# Patient Record
Sex: FEMALE | Race: WHITE
[De-identification: names, ages, dates, MRNs, and addresses within clinical notes are randomized per-mention and may not be internally consistent; named-entity substitution may affect disease eponyms.]

---

## 2024-10-21 ENCOUNTER — HOSPITAL ENCOUNTER (OUTPATIENT)
Dept: HOSPITAL 100 - US | Age: 24
Discharge: HOME | End: 2024-10-21
Payer: COMMERCIAL

## 2024-10-21 DIAGNOSIS — Z12.4: Primary | ICD-10-CM

## 2024-10-21 PROCEDURE — 88175 CYTOPATH C/V AUTO FLUID REDO: CPT

## 2024-10-21 PROCEDURE — G0145 SCR C/V CYTO,THINLAYER,RESCR: HCPCS

## 2024-10-23 ENCOUNTER — HOSPITAL ENCOUNTER (OUTPATIENT)
Dept: HOSPITAL 100 - LAB | Age: 24
Discharge: HOME | End: 2024-10-23
Payer: COMMERCIAL

## 2024-10-23 DIAGNOSIS — E28.2: Primary | ICD-10-CM

## 2024-10-23 LAB
ALANINE AMINOTRANSFER ALT/SGPT: 51 U/L (ref 13–56)
ALBUMIN SERPL-MCNC: 4.2 G/DL (ref 3.2–5)
ALKALINE PHOSPHATASE: 79 U/L (ref 45–117)
ANION GAP: 5 (ref 5–15)
AST(SGOT): 22 U/L (ref 15–37)
BUN SERPL-MCNC: 9 MG/DL (ref 7–18)
BUN/CREAT RATIO: 11 RATIO (ref 10–20)
CALCIUM SERPL-MCNC: 8.9 MG/DL (ref 8.5–10.1)
CARBON DIOXIDE: 26 MMOL/L (ref 21–32)
CHLORIDE: 109 MMOL/L (ref 98–107)
CHOLEST SERPL-MCNC: 237 MG/DL
EST GLOM FILT RATE - AFR AMER: 108 ML/MIN (ref 60–?)
GLOBULIN: 3.3 G/DL (ref 2.2–4.2)
GLUCOSE: 105 MG/DL (ref 74–106)
POTASSIUM: 4 MMOL/L (ref 3.5–5.1)
TRIGLYCERIDES: 86 MG/DL
VLDLC SERPL-MCNC: 17 MG/DL (ref 5–40)

## 2024-10-23 PROCEDURE — 83036 HEMOGLOBIN GLYCOSYLATED A1C: CPT

## 2024-10-23 PROCEDURE — 82627 DEHYDROEPIANDROSTERONE: CPT

## 2024-10-23 PROCEDURE — 80053 COMPREHEN METABOLIC PANEL: CPT

## 2024-10-23 PROCEDURE — 84146 ASSAY OF PROLACTIN: CPT

## 2024-10-23 PROCEDURE — 84443 ASSAY THYROID STIM HORMONE: CPT

## 2024-10-23 PROCEDURE — 84439 ASSAY OF FREE THYROXINE: CPT

## 2024-10-23 PROCEDURE — 80061 LIPID PANEL: CPT

## 2024-10-23 PROCEDURE — 84144 ASSAY OF PROGESTERONE: CPT

## 2024-10-23 PROCEDURE — 84402 ASSAY OF FREE TESTOSTERONE: CPT

## 2024-10-23 PROCEDURE — 36415 COLL VENOUS BLD VENIPUNCTURE: CPT

## 2024-10-23 NOTE — XMS RPT_ITS
Comprehensive CCD (C-CDA v2.1)  
  
                          Created on: 2024  
  
  
OLEKSANDR MITTALHEL  
External Reference #: CDR,PersonID:[id number]  
: 2000  
Sex: Female  
  
Demographics  
  
  
                                        Address             [address] [place]  [address]  
   
                                        Home Phone          [phone]  
   
                                        Work Phone          [phone]  
   
                                        Home Phone          [phone]  
   
                                        Home Phone          [phone]  
   
                                        Preferred Language  en  
   
                                        Marital Status        
   
                                        Sikh Affiliation Unknown  
   
                                        Race                White  
   
                                        Ethnic Group        Not  or Lati  
no  
  
  
Author  
  
  
                                        Organization        [facility]  
[facility]  
  
  
Care Team Providers  
  
  
                                Care Team Member Name Role            Phone  
   
                                Thelma CANO MD, Virginie ROBBINS Primary Care Provider Keira  
slime Sanderson MD, Jose Goldman Primary Care Provider [phone]  
[phone]  
   
                                Bridenthal JAMES, Monica Primary Care Provider [phone]( [phone]  
   
                                ERNESTINE SHER    Attending       Unavailable  
   
                                BRIDENTHAL, MONICA Primary Care    Unavailable  
   
                                AWILDA BELL Attending       Unavail  
able  
   
                                VIRGINIE RENEE III Primary Care    Unavailable  
   
                                BRIDENTHAL, MONICA Primary Care    Unavailable  
   
                                SHER, ERNESTINE    Attending       Unavailable  
   
                                BRIDENTHAL, MONICA Primary Care    Unavailable  
   
                                SHER ERNESTINE    Attending       Unavailable  
   
                                SHER, ERNESTINE    Referring       Unavailable  
   
                                BRIDENTHAL, MONICA Primary Care    Unavailable  
   
                                BRIDENTHAL, MONICA Attending       Unavailable  
   
                                ANTONIO, JOSE Primary Care    Unavailable  
   
                                BRIDENTHAL, MONICA Attending       Unavailable  
   
                                ANTONIO, JOSE Primary Care    Unavailable  
   
                                BRIDENTHAL, MONICA Attending       Unavailable  
   
                                ANTONIO, JOSE Primary Care    Unavailable  
   
                                BRIDENTHAL, MONICA Attending       Unavailable  
   
                                ANTONIO, JOSE Primary Care    Unavailable  
   
                                BRIDENTHAL, MONICA Attending       Unavailable  
   
                                ANTONIO, JOSE Primary Care    Unavailable  
  
  
  
Allergies  
  
  
                                                    Allergy   
Classification                          Reported   
Allergen(s)               Allergy Type              Date of   
Onset                     Reaction(s)               Facility  
   
                                                      
(16 sources)                            Grass pollen;   
Translations:   
[GRASS POLLEN]            Drug Allergy                
3                                       Unknown, Other:   
See Comments                            [facility]  
   
                                                      
(16 sources)                            Seasonal   
allergy;   
Translations:   
[SEASONAL   
ALLERGIES]                              Allergy to   
substance                                 
3                                       Unknown, Other:   
See Comments                            [facility]  
   
                                                      
(16 sources)                            Tree;   
Translations:   
[TREES]                                 Allergy to   
substance                                 
3                                       Unknown, Other:   
See Comments                            [facility]  
   
                                                      
(6 sources)         Grass pollen        Drug Allergy          
3                         Unknown                   [facility]  
   
                                                      
(6 sources)               Other                     Allergy to   
substance                                 
3                         Unknown                   [facility]  
   
                                                      
(1 source)                Pollen                    Allergy to   
substance                                 
3                         Other                     [facility]  
  
  
  
Medications  
Current Medications  
  
  
  
                      Medication Drug Class(es) Dates      Sig (Normalized) Sig   
(Original)  
   
                                                    24 hr buPROPion   
hydrochloride 300 mg   
extended release oral   
tablet  
(7 sources)               Aminoketone               Start:   
2024                              take 1 tablet by   
mouth once daily in   
the morning                             buPROPion XL   
(Wellbutrin XL)   
300 MG 24 hr   
tablet   
Indications:   
Anxiety and   
depression take 1   
tablet by mouth   
every morning DO   
NOT CRUSH, CHEW,   
AND/OR DIVIDE 30   
tablet 2   
2024 Active  
  
  
  
                                                    Start: 2024  
End: 2024                         take 1 tablet by mouth once   
daily in the morning                    buPROPion XL (WELLBUTRIN XL) 150 mg   
24 hr tablet Take 150 mg by mouth   
every morning. 0 2024 Active  
  
  
  
                                        Comment on above:   Take 150 mg by mouth  
 every morning.   
   
                                                    Desogestrel / Ethinyl   
Estradiol  
(2 sources)                             Progestin,   
Estrogen                                Start:   
  
4  
End:   
  
5                                       take 1 tablet   
by mouth once   
daily, then   
take 0.15   
tablet by mouth   
once                                    Desogestrel-Ethinyl   
Estradiol (APRI)   
0.15-0.03 mg per   
tablet Indications:   
PCOS (polycystic   
ovarian syndrome) ,   
Provided repeat   
prescription for oral   
contraceptive Take 1   
tablet by mouth once   
daily. 84 tablet 3   
2024   
Active  
  
  
  
                                                take 1 tablet by mouth once ashley  
y Juleber 0.15-30 MG-MCG tablet Take 1 tablet   
by   
mouth daily. 0 Active  
  
  
  
                                        Comment on above:   Take 1 tablet by grover  
 once daily.   
   
                                                    metFORMIN   
hydrochloride 500 mg   
oral tablet  
(6 sources)               Biguanide                 Start:   
  
4  
End:   
  
4                                       take 2 tablets by   
mouth twice daily   
at mealtime                             metFORMIN   
(GLUCOPHAGE) 500 mg   
tablet Indications:   
PCOS (polycystic   
ovarian syndrome) ,   
IFG (impaired   
fasting glucose) ,   
Irregular menses ,   
Overweight with   
body mass index   
(BMI) of 29 to 29.9   
in adult Take 2   
tablets by mouth   
two times a day   
with meals. 360   
tablet 0 2024 Active  
  
  
  
                                                    Start: 2024  
End: 2024                         take 1 tablet by mouth in the   
morning                                 metFORMIN (Glucophage) 500 MG tablet   
Take 1 tablet by mouth in the   
morning and 1 tablet in the evening.   
Take with meals. 0 2024 Active  
  
  
  
                                        Comment on above:   Take 2 tablets by mo  
uth two times a day with meals.   
   
                                                            Take 1 tablet by grover  
th two times a day with meals.   
   
                                                    naltrexone   
hydrochloride 50 mg   
oral tablet  
(2 sources)               Opioid Antagonist         Start:   
  
4  
End:   
  
4                                       take 0.5 tablet   
by mouth twice   
daily                                   naltrexone 50 mg   
tablet Indications:   
PCOS (polycystic   
ovarian syndrome) ,   
Overweight with   
body mass index   
(BMI) of 29 to 29.9   
in adult Take 0.5   
tablets by mouth   
two times a day. 90   
tablet 0 2024 Active  
  
  
  
                                                                naltrexone (Depa  
de) 50 MG tablet Take 25 mg by mouth in the morning and 25 mg   
in   
the evening. 0 Active  
  
  
  
                                        Comment on above:   Take 0.5 tablets by   
mouth two times a day.   
   
                                                    Prenatal   
Multivit-Min-Fe-FA   
(PRENATAL/IRON PO)  
(3 sources)                                                 take 1 tablet by   
mouth once in the   
morning                                 Prenatal   
Multivit-Min-Fe-FA   
(PRENATAL/IRON PO)   
Take 1 tablet by   
mouth in the morning.   
0 Active  
  
  
  
Completed/Discontinued Medications  
  
  
  
                      Medication Drug Class(es) Dates      Sig (Normalized) Sig   
(Original)  
   
                                                    [id number]   
actuat albuterol   
0.09 mg/actuat   
metered dose   
inhaler  
(15 sources)                            beta2-Adrenergic   
Agonist                                 Start:   
2019                              take 2 puff(s) by   
inhalation every   
four hours as   
needed                                  albuterol HFA   
(PROVENTIL HFA,   
VENTOLIN HFA) 90   
mcg/actuation   
inhaler   
Indications:   
Exertional asthma   
Inhale 2 Puffs as   
instructed every 4   
hours as needed. 1   
Inhaler 0   
2019 Active  
   
                                        Comment on above:   Inhale 2 Puffs as in  
structed every 4 hours as needed.   
   
                                                    drospirenone /   
Ethinyl Estradiol  
(1 source)                Progestin, Estrogen       Start:   
2023                              take 1 tablet by   
mouth once daily,   
then take 1 tablet   
by mouth once                           Drospirenone-Ethin  
yl Estradiol   
(SINGH, 28,)   
3-0.03 mg per   
tablet   
Indications: PCOS   
(polycystic   
ovarian syndrome)   
Take 1 tablet by   
mouth once daily.   
Take one active   
pill continuously   
x 3 months-   
discard placebo   
pills 112 tablet 3   
2023 Active  
   
                                        Comment on above:   Take 1 tablet by grover  
th once daily. Take one active pill   
continuously x 3 months- discard placebo pills   
   
                                                    escitalopram 20 mg   
oral tablet  
(3 sources)                             Serotonin Reuptake   
Inhibitor                               Start:   
12-  
End:   
2024                              take 1 tablet by   
mouth once daily                        escitalopram   
(Lexapro) 20 MG   
tablet   
Indications:   
Anxiety and   
depression Take 1   
tablet (20 mg) by   
mouth daily. 30   
tablet 1   
12/15/2023   
2024   
Discontinued   
(Ineffective)  
  
  
  
                                                    Start: 2023  
End: 2024                         take 1 tablet by mouth once   
daily                                   escitalopram (Lexapro) 10 MG tablet   
Indications: Anxiety and depression   
Take 1 tablet (10 mg) by mouth   
daily. 30 tablet 1 2023 Active  
  
  
  
                                                    Ethinyl Estradiol /   
Norethindrone  
(12 sources)              Estrogen                  Start: 2023  
End: 2023                         take 1 tablet   
by mouth once   
daily, then   
take 0.05   
tablet by mouth   
once                                    Norethindrone   
Acet-Ethinyl Est   
(JUNEL 1/20, 21,)   
1-20 mg-mcg per   
tablet TAKE 1 TABLET   
BY MOUTH DAILY 28   
tablet 2 2023   
Discontinued  
  
  
  
                                        Start: 2023   take 1 tablet by grover  
th once   
daily, then take 0.05 tablet   
by mouth once                           Norethindrone Acet-Ethinyl Est (JUNEL   
1/20, ,) 1-20 mg-mcg per tablet   
TAKE 1 TABLET BY MOUTH DAILY 28   
tablet 2 2023 Active  
   
                                                    Start: 2023  
End: 2023                         take 1 tablet by mouth once   
daily, then take 0.05 tablet   
by mouth once                           Norethindrone Acet-Ethinyl Est (JUNEL   
1/20, ,) 1-20 mg-mcg per tablet   
TAKE 1 TABLET BY MOUTH DAILY 28   
tablet 3 2023   
Discontinued  
   
                                        Start: 2023   take 1 tablet by grover  
th once   
daily, then take 0.05 tablet   
by mouth once                           Norethindrone Acet-Ethinyl Est (JUNEL   
1/20, 21,) 1-20 mg-mcg per tablet   
TAKE 1 TABLET BY MOUTH DAILY 28   
tablet 3 2023 Active  
   
                                                    Start: 2023  
End: 2023                         take 1 tablet by mouth once   
daily, then take 0.05 tablet   
by mouth once                           Norethindrone Acet-Ethinyl Est (JUNEL   
1/20, 21,) 1-20 mg-mcg per tablet   
TAKE 1 TABLET BY MOUTH DAILY 28   
tablet 3 2023   
Discontinued  
   
                                                    Start: 2022  
End: 2022                         take 1 tablet by mouth once   
daily, then take 0.05 tablet   
by mouth once                           Norethindrone Acet-Ethinyl Est (JUNEL   
1/20, 21,) 1-20 mg-mcg per tablet   
TAKE 1 TABLET BY MOUTH DAILY 28   
tablet 14 2022   
Discontinued  
   
                                        Start: 2022   take 1 tablet by grover  
th once   
daily, then take 0.05 tablet   
by mouth once                           Norethindrone Acet-Ethinyl Est (JUNEL   
1/20, 21,) 1-20 mg-mcg per tablet   
TAKE 1 TABLET BY MOUTH DAILY 28   
tablet 14 2022 Active  
   
                                                    Start: 2021  
End: 2022                         take 1 tablet by mouth once   
daily, then take 0.05 tablet   
by mouth once                           Norethindrone Acet-Ethinyl Est (JUNEL   
1/20, 21,) 1-20 mg-mcg per tablet   
TAKE 1 TABLET BY MOUTH DAILY TAKES   
ACTIVE TABLET CONTINUOUSLY 84 tablet   
3 2021 Discontinued  
  
  
  
                                        Comment on above:   TAKE 1 TABLET BY GROVER  
TH DAILY TAKES ACTIVE TABLET   
CONTINUOUSLY   
   
                                                            TAKE 1 TABLET BY GROVER  
TH DAILY   
   
                                                    medroxyPROGESTERone   
acetate 10 mg oral tablet  
(4 sources)               Progestin                 Start:   
2022  
End:   
2023                                    take 1   
tablet by   
mouth once   
daily                                   medroxyPROGESTERone   
(PROVERA) 10 mg tablet   
Take 1 tablet by mouth   
once daily for 10 days.   
Or until menses starts.   
10 tablet 0 2022 Discontinued  
   
                                        Comment on above:   Take 1 tablet by grover  
th once daily for 10 days. Or until   
menses starts.   
   
                                                    PNV/iron/folic acid   
(PRENATAL VITAMIN-IRON-FA   
ORAL)  
(2 sources)                                                 take 1   
tablet by   
mouth once   
daily                                   PNV/iron/folic acid   
(PRENATAL   
VITAMIN-IRON-FA ORAL)   
Take 1 tablet by mouth   
once daily. 0 Active  
   
                                        Comment on above:   Take 1 tablet by grover  
th once daily.   
   
                                                    Prenatal MV-Min-Fe   
Fum-FA-DHA (PRENATAL 1   
PO)  
(6 sources)                                           
End:   
2024                                                Prenatal MV-Min-Fe   
Fum-FA-DHA (PRENATAL 1   
PO) Take by mouth. 0   
2024 Discontinued   
(Duplicate order)  
  
  
  
                                                                Prenatal MV-Min-  
Fe Fum-FA-DHA (PRENATAL 1 PO) Take by mouth. 0 Active  
  
  
  
Problems  
Active Problems  
  
  
                                                    Problem   
Classification  Problem         Date            Documented Date Episodic/Chronic  
   
                                                    Anxiety disorders  
(18 sources)                            Mixed anxiety and   
depressive disorder;   
Translations:   
[Anxiety disorder,   
unspecified]                            Onset:   
2023                Chronic  
   
                                                    Asthma  
(8 sources)                             Asthma; Translations:   
[Unspecified asthma,   
uncomplicated]                          Onset:   
2011                Chronic  
   
                                                    Contraceptive and   
procreative   
management  
(2 sources)                             Oral contraceptive   
repeat; Translations:   
[Encounter for   
surveillance of   
contraceptive pills]                    Onset:   
2024                Episodic  
   
                                                    Diabetes mellitus   
without complication  
(1 source)                              Impaired fasting   
glycemia;   
Translations:   
[Impaired fasting   
glucose]                                2024          Episodic  
   
                                                    Disorders of lipid   
metabolism  
(3 sources)                             Raised low density   
lipoprotein   
cholesterol;   
Translations: [Pure   
hypercholesterolemia,   
unspecified]                            Onset:   
2024                Chronic  
   
                                                    Female infertility  
(1 source)                              Anovulation;   
Translations: [Female   
infertility   
associated with   
anovulation]                                                Chronic  
   
                                                    Menstrual disorders  
(6 sources)                             Intermenstrual   
bleeding - irregular;   
Translations:   
[Excessive and   
frequent menstruation   
with irregular cycle]                   Onset:   
2024                                          Chronic  
   
                                                    Mood disorders  
(9 sources)                             Mood disorders;   
Translations:   
[Anxiety and   
depression]                             Onset:   
2023                  
   
                                                    Other endocrine   
disorders  
(14 sources)                            Polycystic ovary   
syndrome;   
Translations:   
[Polycystic ovarian   
syndrome]                               Onset:   
2022                                          Chronic  
   
                                                    Other endocrine   
disorders  
(1 source)                              Polycystic ovarian   
syndrome;   
Translations: [PCOS   
(polycystic ovarian   
syndrome)]                              Onset:   
2024                                          Chronic  
   
                                                    Other female genital   
disorders  
(1 source)                              Abnormal uterine   
bleeding;   
Translations:   
[Abnormal uterine and   
vaginal bleeding,   
unspecified]                                                Chronic  
   
                                                    Other nervous system   
disorders  
(2 sources)                             Skin sensation   
disturbance;   
Translations:   
[Unspecified   
disturbances of skin   
sensation]                              Onset:   
2024                Episodic  
   
                                                    Other nutritional;   
endocrine; and   
metabolic disorders  
(3 sources)                             Overweight in   
adulthood with body   
mass index of 25 or   
more but less than   
30; Translations:   
[Overweight]                            Onset:   
2024                Episodic  
   
                                                    Other nutritional;   
endocrine; and   
metabolic disorders  
(1 source)                              Overweight;   
Translations:   
[Overweight with body   
mass index (BMI) of   
29 to 29.9 in adult]                    Onset:   
2024                                          Episodic  
   
                                                    Other nutritional;   
endocrine; and   
metabolic disorders  
(1 source)                              Body mass index (BMI)   
29.0-29.9, adult;   
Translations:   
[Overweight with body   
mass index (BMI) of   
29 to 29.9 in adult]                    Onset:   
2024                                          Episodic  
   
                                                    Screening and history   
of mental health and   
substance abuse codes  
(8 sources)                             History of bulimia   
nervosa;   
Translations:   
[Personal history of   
other mental and   
behavioral disorders]                   Onset:   
2024                Episodic  
   
                                                    Unclassified  
(2 sources)                             Medication Check;   
Translations:   
[Medication Check]                      Onset:   
2024                                            
  
  
Past or Other Problems  
  
  
                      Problem Classification Problem    Date       Documented Da  
te Episodic/Chronic  
   
                                                    Other nutritional;   
endocrine; and   
metabolic disorders  
(7 sources)                             Weight gain;   
Translations:   
[Abnormal weight   
gain]                                   Onset:   
2024                Episodic  
   
                                                    Other screening for   
suspected conditions   
(not mental disorders   
or infectious disease)  
(20 sources)                            Patient encounter   
status;   
Translations:   
[Encounter for   
screening for   
malignant   
neoplasm of   
cervix]                                 Onset:   
08-                                          Episodic  
   
                                                    Other skin disorders  
(18 sources)                            Hirsutism;   
Translations:   
[Hirsutism]                             Onset:   
03-                08-                Episodic  
   
                                                    Other upper respiratory   
disease  
(20 sources)                            Vocal cord   
dysfunction;   
Translations:   
[Other diseases   
of vocal cords]                         Onset:   
05-                05-                Episodic  
   
                                                    Other upper respiratory   
disease  
(2 sources)                             Other diseases of   
vocal cords;   
Translations:   
[Other diseases   
of vocal cords]                         Onset:   
2023                                          Episodic  
  
  
  
Results  
  
  
                      Test Name  Value      Interpretation Reference Range Facil  
ity  
   
                                                    36on 2024   
   
                      36         Not due for refill. Normal                [facility]  
   
                      36         Refill not yet due. Normal                [facility]  
   
                                                    Office Visiton 2024   
   
                                        Follow-up visit     [id number] Lux Mittal   
2000 F  
Date Provider   
Department Center  
2024   
[id number] SANCHEZ [facility]  
Family History  
Problem Relation Age   
of Onset  
Thyroid disease Mother  
Perkins syndrome Father  
Asthma Sister  
Breast cancer Maternal   
Grandmother  
Cancer Maternal   
Grandfather  
Cancer Paternal   
Grandfather  
Family Status -   
Relation Status Age at   
Death  
Mother Alive  
Father Alive  
Sister  
Sister   
Maternal Grandmother   
  
Maternal Grandfather   
  
Paternal Grandmother   
Alive  
Paternal Grandfather   
  
Level of Service:61750   
MT OFFICE/OUTPATIENT   
ESTABLISHED LOW MDM 20   
MIN  
Reason for Visit and   
Comments:  
Medication Check   
[[id number]]  
Foot Problem [229]  Normal                                  [facility]  
   
                                                    PATINSon 2024   
   
                                        PATINS              Ice and elevate foot  
   
couple times a day, be   
careful not to wear   
shoes that  
pinch/pressure to much   
on top of foot.     Normal                                  [facility]  
   
                                                    Progress Noteon 2024   
   
                                        Progress Note       Stable. Continue   
Wellbutrin 300 mg   
daily               Normal                                  [facility]  
   
                                        Progress Note       Left foot exam   
unremarkable and   
currently   
asymptomatic. Likely   
superficial  
nerve inflammation.   
Recommend avoiding   
shoes that are tight   
across the top of  
the foot, ice and   
elevate 2-3 times   
daily and follow-up if   
fails to resolve.  
Unknown etiology at   
this time otherwise Normal                                  [facility]  
   
                                        Progress Note       2024  
Don Mittal (:   
2000) is a 24   
y.o. female ,   
Established patient,   
here  
for evaluation of the   
following chief   
complaint(s):  
Medication Check and   
Foot Problem  
ASSESSMENT/PLAN:  
1. Anxiety and   
depression  
Assessment & Plan:  
Stable. Continue   
Wellbutrin 300 mg   
daily  
2. Skin sensation   
disturbance  
Assessment & Plan:  
Left foot exam   
unremarkable and   
currently   
asymptomatic. Likely   
superficial  
nerve inflammation.   
Recommend avoiding   
shoes that are tight   
across the top of  
the foot, ice and   
elevate 2-3 times   
daily and follow-up if   
fails to resolve.  
Unknown etiology at   
this time otherwise  
Follow up in about 6   
months (around   
2024) for Yearly   
Wellness Visit.  
SUBJECTIVE/OBJECTIVE:  
HPI -  
Don Mittal (:   
2000) is a 24   
y.o. female ,   
Established patient,   
here  
for the evaluation of   
the following chief   
complaint(s):  
Medication Check and   
Foot Problem  
Anxiety/depression-   
Doing pretty well,   
wellbutrin  mg   
daily. Denies any SI  
or HI. Not doing   
counseling. Reports   
being able to handle   
stress better and not  
feeling as   
overwhelmed.  
Left foot problem:  
Is having a hot   
sensation/tingling to   
the top of the left   
foot and numbness. No  
known trigger. No   
known injury.   
Happening for about   
1.5 weeks, will happen  
intermittently through   
the day (?50 times or   
so)- lasts only for a   
few seconds  
or so.  
No swelling or redness   
noted.  
Currently asymptomatic  
Prior to Admission   
medications  
Medication Sig Start   
Date End Date Taking?   
Authorizing Provider  
buPROPion XL   
(Wellbutrin XL) 300 MG   
24 hr tablet take 1   
tablet by mouth every  
morning DO NOT CRUSH,   
CHEW, AND/OR DIVIDE   
24 Yes Adri Aquino APRN - CNP  
Juleber 0.15-30 MG-MCG   
tablet Take 1 tablet   
by mouth daily. Yes   
Historical  
Provider, MD  
metFORMIN (Glucophage)   
500 MG tablet Take 1   
tablet by mouth in the   
morning and 1  
tablet in the evening.   
Take with meals.   
24 Yes   
Historical Provider,  
MD  
naltrexone (Depade) 50   
MG tablet Take 25 mg   
by mouth in the   
morning and 25 mg in  
the evening. Yes   
Historical Provider,   
MD  
Prenatal   
Multivit-Min-Fe-FA   
(PRENATAL/IRON PO)   
Take 1 tablet by mouth   
in the  
morning. Yes   
Historical Provider,   
MD  
Prenatal MV-Min-Fe   
Fum-FA-DHA (PRENATAL 1   
PO) Take by mouth.   
Historical  
Provider, MD  
Review of Systems  
Constitutional:   
Negative for activity   
change, chills,   
fatigue and fever.  
Respiratory: Negative.  
Cardiovascular:   
Negative.  
Gastrointestinal:   
Negative.  
Genitourinary:   
Negative for   
difficulty urinating.  
Musculoskeletal:  
Left foot.  
Psychiatric/Behavioral  
: Positive for   
decreased   
concentration.   
Negative for  
agitation, dysphoric   
mood and sleep   
disturbance. The   
patient is not  
nervous/anxious.  
Vitals:  
24 1307  
BP: 129/85  
Pulse: 102  
Resp: 18  
Temp: 36.9 ?C (98.4   
?F)  
TempSrc: Infrared  
SpO2: 97%  
Weight: 161 lb 9.6 oz   
(73.3 kg)  
Physical Exam  
Constitutional:  
General: She is not in   
acute distress.  
Appearance: Normal   
appearance. She is not   
ill-appearing.  
HENT:  
Head: Normocephalic   
and atraumatic.  
Cardiovascular:  
Rate and Rhythm:   
Normal rate and   
regular rhythm.  
Pulses: Normal pulses.  
Heart sounds: Normal   
heart sounds.  
Pulmonary:  
Effort: Pulmonary   
effort is normal.  
Breath sounds: Normal   
breath sounds.  
Musculoskeletal:  
Right foot: Normal.  
Left foot: Normal.  
Comments: Left foot   
exam normal  
Skin:  
General: Skin is warm   
and dry.  
Neurological:  
Mental Status: She is   
alert and oriented to   
person, place, and   
time.  
Psychiatric:  
Mood and Affect: Mood   
normal.  
Behavior: Behavior   
normal.  
Thought Content:   
Thought content   
normal.  
Judgment: Judgment   
normal.  
An electronic   
signature was used to   
authenticate this   
note.  
SHANEKA Rao - CNP  
2024  
4:40 PM             [facility]  
   
                                        Progress Note       Patient was identifi  
ed   
by name and Date of   
Birth.              [facility]  
   
                                                    36on 2024   
   
                                        36                  Rx sent. Follow up a  
s   
scheduled.          [facility]  
   
                                        36                  Prescription Request  
:  
  
Last medication check:   
24  
Last physical exam:   
23  
Next scheduled   
appointment: 24  
  
Last date of refill on   
this medication   
3/21/24             [facility]  
   
                                                    CNOVon 2024   
   
                                        CNOV                Office Visit (OBGYWM  
)  
----------------------  
--------------  
DON MITTAL   
([id number]) 2000   
F  
Date Time Provider   
Department  
3/21/24 7:30 AM   
ERNESTINE SHER  
During your visit   
today, we recorded the   
following information   
about you:  
Pulse Blood pressure   
Weight  
80/minute 126/82 75.8   
kg  
Ernestine Sher APRN.CNP   
3/21/2024 8:47 AM   
Signed  
Some documentation   
from previous visit of   
2024 was copied   
and pasted,  
documentation has been   
reviewed and edited as   
necessary for today's   
visit.  
Patient Summary:   
Don is a 24 year   
old female who   
presents for follow-up  
evaluation of her   
obesity/weight   
management to treat   
PCOS, IFG, elevated   
LDL,  
anxiety and depression   
and prevent   
relatedco-morbidities.   
In our previous  
visits we have   
discussed lifestyle   
intervention including   
a nutrition  
recommendations and   
physical activity   
optimization. Her last   
office visit was 1  
month ago.  
Irregular menses -   
Menses 2023 LMP   
1/2-3 spotting  
No contraception.  
20 lb weight gain with   
Lexapro for anxiety.   
Changed to bupropion   
by PCP plans  
to discuss increasing   
dose with PCP  
Assessment/plan from   
last visit:  
Metformin 1 gm twice a   
day with meals -   
Adjusted to 500 mg at   
breakfast and 1  
gm at dinner but has   
had frequent diarrhea   
since increasing to 1   
gm twice a day  
3 weeks ago.  
History of anorexia   
and bulimia in HS - no   
treatment, In   
remission since age  
15. Dad helped her and   
she started adding   
protein shakes.  
Interval History -  
Awake - 700  
B - 0745 Premier   
Protein shake  
S - none  
L -  cup cottage   
cheese with fruit  
S - none  
D -  protein,   
pasta/potato/rice/swee  
t potato and veg -   
asparagus, cucumber  
salad, winter and   
summer squash/salad  
S - none  
Fluids - water,   
unsweetened tea  
Bedtime -   
She feels the   
medication is helping   
to lessen hunger and   
craving to candy  
controlled  
Exercise: stable   
sedentary job at bank.   
1 mile daily walk with   
dog  
Stress: increased -   
 wearing heart   
monitor  
Sleep: 7 hours, up to   
go to bathroom a few   
times LASHAWN -no  
Weight gain since last   
vist: 2 lbs since last   
visit  
3/21/2024 167 lb BMI:   
28.67  
2024 169 lb  
2024 166 lb   
metformin  
CrCl cannot be   
calculated (Patient's   
most recent lab result   
is older than the  
maximum 180 days   
allowed.).  
PAST MEDICAL HISTORY  
Diagnosis Date  
Exertional asthma   
2012  
Generalized anxiety   
disorder  
IFG (impaired fasting   
glucose) 2024  
Medial meniscus tear   
2012  
PCOS (polycystic   
ovarian syndrome)   
Unspecified otitis   
media  
Recurrent otitis  
Vocal cord dysfunction   
05/15/2012  
Current Outpatient   
Medications  
Medication Sig   
Dispense Refill  
metFORMIN (GLUCOPHAGE)   
500 mg tablet Take 2   
tablets by mouth two   
times a day  
with meals. 360 tablet   
0  
buPROPion XL   
(WELLBUTRIN XL) 150 mg   
24 hr tablet Take 150   
mg by mouth every  
morning.  
PNV/iron/folic acid   
(PRENATAL   
VITAMIN-IRON-FA ORAL)   
Take 1 tablet by mouth  
once daily.  
albuterol HFA   
(PROVENTIL HFA,   
VENTOLIN HFA) 90   
mcg/actuation inhaler   
Inhale 2  
Puffs as instructed   
every 4 hours as   
needed. 1 Inhaler 0  
No current   
facility-administered   
medications for this   
visit.  
Occupation: bank  
Contraception: none  
/82   Pulse 80     
Wt 167 lb (75.8 kg)     
LMP 2024   SpO2   
98%    
BMI 28.67 kg/m?  
Assessment/Plan:  
Don Mittal is a 24   
year old yo female   
with overweight   
(pre-obesity) who  
presented today for   
follow up for   
supervised weight loss   
to treat PCOS, IFG,  
elevated LDL, anxiety   
and depression and   
prevent related   
co-morbidities.  
ASSESSMENT/PLAN:  
1. PCOS (polycystic   
ovarian syndrome) -   
ICD9: 256.4, ICD10:   
E28.2 (primary  
diagnosis)  
- Whole food balanced   
protein low-carb   
nutrition  
- METFORMIN 500 MG   
TABLET  
- DESOGESTREL 0.15   
MG-ETHINYL ESTRADIOL   
0.03 MG TABLET  
- NALTREXONE 50 MG   
TABLET  
2. Irregular menses -   
ICD9: 626.4, ICD10:   
N92.6  
- DESOGESTREL 0.15   
MG-ETHINYL ESTRADIOL   
0.03 MG TABLET  
3. Anxiety and   
depression - ICD9:   
300.00, 311, ICD10:   
F41.9, F32.A  
- Continue bupropion   
prescribed by PCP- she   
plans to discuss   
increasing dose  
with PCP  
4. History of bulimia   
- ICD9: V11.8, ICD10:   
Z86.59  
- in remission since   
age 15  
5. History of anorexia   
nervosa - ICD9: V11.8,   
ICD10: Z86.59  
- in remission since   
age 15  
6. Provided repeat   
prescription for oral   
contraceptive - ICD9:   
V25.41, ICD10:  
Z30.41  
- RX for Apri given   
today.  
- discussed with   
patient on how to take   
OCP's.Given written   
information  
- counseled on   
benefits, risks and   
possible severe side   
effects of OCP's.  
- discussed need to   
use Condoms to help to   
prevent STD's   
including HIV etc.  
- DESOGESTREL 0.15   
MG-ETHINYL ESTRADIOL   
0.03 MG TABLET  
7. Overweight with   
body mass index (BMI)   
of 29 to 29.9 in adult   
- ICD9: 278.02,  
V85.25, ICD10: E66.3,   
Z68.29  
- METFORMIN 500 MG   
TABLET - decrease to   
500 mg with breakfast   
and 1 gm with  
dinner due to diarrhea   
with 1 gm twice daily  
- DESOGESTREL 0.15   
MG-ETHINYL ESTRADIOL   
0.03 MG TABLET  
- NALTREXONE 50 (more   
content not   
included)...        Normal                                  [facility]  
   
                                                    CNOVon 2024   
   
                                        CNOV                Office Visit (OBGYWM  
)  
----------------------  
--------------  
DON MITTAL   
([id number]) 2000   
F  
Date Time Provider   
Department  
24 7:00 AM   
ERNESTINE SHER  
During your visit   
today, we recorded the   
following information   
about you:  
Blood pressure Weight   
Last Period  
116/84 76.7 kg   
24  
Ernestine Sher APRN.CNP   
2024 7:59 PM   
Signed  
Some documentation   
from previous visit of   
2023 was copied   
and pasted,  
documentation has been   
reviewed and edited as   
necessary for today's   
visit.  
Patient Summary:   
Don is a 23 year   
old female who   
presents for follow-up  
evaluation of her   
obesity/weight   
management to treat   
PCOS, IFG, elevated   
LDL,  
anxiety and depression   
and prevent   
relatedco-morbidities.   
In our previous  
visits we have   
discussed lifestyle   
intervention including   
a nutrition  
recommendations and   
physical activity   
optimization. Her last   
office visit was 1  
month ago.  
Irregular menses -   
Menses 2023 LMP   
1/2-3 spotting  
20 lb weight gain with   
Lexapro for anxiety.   
Changed to bupropion   
by PCP.  
Assessment/plan from   
last visit:  
Metformin 500 mg twice   
a day with meals -   
tried 1 gm at dinner   
but had nausea  
and mild diarrhea   
(actually took at   
bedtime)  
Bupropion 150 mg 24/hr   
tab for anxiety and   
depression  
Was in [place] with   
 who races -   
very hectic travel.   
Tried to eat  
healthier.  
Quit second job so   
life is becoming more   
calm  
History of anorexia   
and bulimia in HS - no   
treatment, Dad helped   
her and she  
started adding protein   
shakes.  
Interval History -   
changes made in past   
month  
Awake - 0700 but is   
changing to 0520 to   
exercise before work  
B - 0745 Premier   
Protein shake  
S - none  
L - SKIP  
S - none  
D - 1830 protein,   
pasta/potato/rice/swee  
t potato and veg -   
asparagus, cucumber  
salad, winter and   
summer squash  
S - none or Skinny   
popcorn  
Fluids - water, zero   
sugar electrolyte   
drink mixes,   
unsweetened tea  
Bedtime -   
She feels the   
medication is helping   
to lessen hunger and   
craving to candy  
Exercise: stable   
sedentary job at bank.   
Just got gym   
membership - walking  
and free weights  
Stress: decreased -   
 races and   
season has not started   
yet and decreased  
stress after quitting   
second job  
Sleep: 7 hours, well   
rested LASHAWN -no  
Weight gain since last   
vist: 3 lbs since last   
visit  
2024 169 lb  
2024 166 lb   
metformin  
CrCl cannot be   
calculated (Patient's   
most recent lab result   
is older than the  
maximum 180 days   
allowed.).  
PAST MEDICAL HISTORY  
Diagnosis Date  
Exertional asthma   
2012  
Generalized anxiety   
disorder  
IFG (impaired fasting   
glucose) 2024  
Medial meniscus tear   
2012  
PCOS (polycystic   
ovarian syndrome)   
Unspecified otitis   
media  
Recurrent otitis  
Vocal cord dysfunction   
05/15/2012  
Current Outpatient   
Medications  
Medication Sig   
Dispense Refill  
buPROPion XL   
(WELLBUTRIN XL) 150 mg   
24 hr tablet Take 150   
mg by mouth every  
morning.  
PNV/iron/folic acid   
(PRENATAL   
VITAMIN-IRON-FA ORAL)   
Take 1 tablet by mouth  
once daily.  
metFORMIN (GLUCOPHAGE)   
500 mg tablet Take 1   
tablet by mouth two   
times a day  
with meals. 180 tablet   
0  
albuterol HFA   
(PROVENTIL HFA,   
VENTOLIN HFA) 90   
mcg/actuation inhaler   
Inhale 2  
Puffs as instructed   
every 4 hours as   
needed. 1 Inhaler 0  
No current   
facility-administered   
medications for this   
visit.  
/84   Wt 169 lb   
(76.7 kg)   LMP   
2024   BMI 29.01   
kg/m?  
Assessment/Plan:  
Don Mittal is a 23   
year old yo female   
with overweight   
(pre-obesity) who  
presented today for   
follow up for   
supervised weight loss   
to treat PCOS, IFG,  
elevated LDL, anxiety   
and depression and   
prevent related   
co-morbidities.  
ASSESSMENT/PLAN:  
1. PCOS (polycystic   
ovarian syndrome) -   
ICD9: 256.4, ICD10:   
E28.2 (primary  
diagnosis)  
- Whole food balanced   
protein low-carb   
nutrition  
- METFORMIN 500 MG   
TABLET  
2. Elevated LDL   
cholesterol level -   
ICD9: 272.0, ICD10:   
E78.00  
- benefits of weight   
loss discussed  
3. IFG (impaired   
fasting glucose) -   
ICD9: 790.21, ICD10:   
R73.01  
- METFORMIN 500 MG   
TABLET  
4. Irregular menses -   
ICD9: 626.4, ICD10:   
N92.6  
- METFORMIN 500 MG   
TABLET  
5. Anxiety and   
depression - ICD9:   
300.00, 311, ICD10:   
F41.9, F32.A  
- well-controlled with   
bupropion  
6. History of bulimia   
- ICD9: V11.8, ICD10:   
Z86.59  
- in remission  
7. History of anorexia   
nervosa - ICD9: V11.8,   
ICD10: Z86.59  
- in remission  
8. Overweight with   
body mass index (BMI)   
of 29 to 29.9 in adult   
- ICD9: 278.02,  
V85.25, ICD10: E66.3,   
Z68.29  
- METFORMIN 500 MG   
TABLET - continue to   
increase dose as   
tolerated  
- Recommended whole   
food low-carb diet   
with 30 g of protein 3   
times a day and  
30 g of carbs at lunch   
and dinner only. Given   
tracking log.  
- Given 15 gram carb   
whole food and protein   
suggestion list.  
- Given protein snack   
ideas  
- continue exercise  
Lengthy discussion   
regarding history of   
anorexia and bulimia   
which is in  
remission. Discussed   
appropriate amount of   
exercise. We agreed   
that in-office  
visits (more content   
not included)...    Normal                                  [facility]  
   
                                                    Office Visiton 2024   
   
                                        Follow-up visit     [id number] Lux Mittal   
2000 F  
Date Provider   
Department Center  
2024   
[id number]   
[facility]  
Family History  
Problem Relation Age   
of Onset  
Thyroid disease Mother  
Perkins syndrome Father  
Asthma Sister  
Breast cancer Maternal   
Grandmother  
Cancer Maternal   
Grandfather  
Cancer Paternal   
Grandfather  
Family Status -   
Relation Status Age at   
Death  
Mother Alive  
Father Alive  
Sister  
Sister   
Maternal Grandmother   
  
Maternal Grandfather   
  
Paternal Grandmother   
Alive  
Paternal Grandfather   
  
Level of Service:68245   
MT OFFICE/OUTPATIENT   
ESTABLISHED MOD MDM 30   
MIN  
Reason for Visit and   
Comments:  
Medication Check   
[[id number]]        [facility]  
   
                                                    PATINSon 2024   
   
                                        PATINS              Asked about extended  
   
release metformin.  
Give update in about a   
month- if you want to   
increase dose of   
Wellbutrin or not   Normal                                  [facility]  
   
                                                    Progress Noteon 2024   
   
                                        Progress Note       Recommend following   
up   
with OB/GYN regarding   
side effects of   
metformin and  
requesting if she can   
take extended release   
metformin to see if   
that is better  
tolerated.          [facility]  
   
                                        Progress Note       Denies any suicidal   
or   
homicidal ideation.   
Anxiety and depression   
have improved  
we will continue   
Wellbutrin 150 mg   
daily, patient to give   
us an update in about  
1 month via [place] if   
she would like to   
increase the dose to   
300 mg. We will  
schedule her for   
follow-up in 3 months [facility]  
   
                                        Progress Note       Patient was identifi  
ed   
by name and Date of   
Birth.              [facility]  
   
                                        Progress Note       2024  
Don Mittal (:   
2000) is a 23   
y.o. female ,   
Established patient,   
here  
for evaluation of the   
following chief   
complaint(s):  
Medication Check  
ASSESSMENT/PLAN:  
1. Anxiety and   
depression  
Assessment & Plan:  
Denies any suicidal or   
homicidal ideation.   
Anxiety and depression   
have improved  
we will continue   
Wellbutrin 150 mg   
daily, patient to give   
us an update in about  
1 month via [facility] [place] if   
she would like to   
increase the dose to   
300 mg. We will  
schedule her for   
follow-up in 3 months  
2. PCOS (polycystic   
ovarian syndrome)  
Assessment & Plan:  
Recommend following up   
with OB/GYN regarding   
side effects of   
metformin and  
requesting if she can   
take extended release   
metformin to see if   
that is better  
tolerated.  
Follow up for 3 month   
[facility].  
SUBJECTIVE/OBJECTIVE:  
HPI -  
Don Mittal (:   
2000) is a 23   
y.o. female ,   
Established patient,   
here  
for the evaluation of   
the following chief   
complaint(s):  
Medication Check  
RACING WITH  IN   
[place] WENT WELL, WAS   
SUPER BUSY. Did not   
get to do any  
sightseeing as they   
were busy at the track   
every day. Got back   
from [place] on  
2024.   
Patient reports she  
went to ob/gyn for   
pcos- was started on   
metformin and reports   
that she has been  
having nausea and mild   
diarrhea with it. She   
has a follow-up with   
them  
tomorrow.  
Reports that the   
wellbutrin has helped   
anxiety and mood. Is   
doing better  
handling things. Is   
not. Feeling as   
overwhelmed, reports   
sleeping is getting  
back on schedule.  
Still doing shreya and   
yoga. Most days.  
Would like to keep the   
Wellbutrin at the   
current dose for now  
Prior to Admission   
medications  
Medication Sig Start   
Date End Date Taking?   
Authorizing Provider  
buPROPion XL   
(Wellbutrin XL) 150 MG   
24 hr tablet Take 1   
tablet (150 mg) by   
mouth  
every morning. Do not   
crush, chew, or split.   
1/22/24 3/22/24 Yes   
SHANEKA Chacon CNP  
metFORMIN (Glucophage)   
500 MG tablet Take 1   
tablet by mouth in the   
morning and 1  
tablet in the evening.   
Take with meals.   
24 Yes   
Historical Provider,  
MD  
Prenatal   
Multivit-Min-Fe-FA   
(PRENATAL/IRON PO)   
Take 1 tablet by mouth   
in the  
morning. Yes   
Historical Provider,   
MD  
Prenatal MV-Min-Fe   
Fum-FA-DHA (PRENATAL 1   
PO) Take by mouth. Yes   
Historical  
Provider, MD  
Review of Systems  
Constitutional:   
Negative for activity   
change, chills,   
fatigue and fever.  
HENT: Negative.   
Negative for   
congestion.  
Respiratory: Negative.  
Cardiovascular:   
Negative.  
Gastrointestinal:   
Positive for diarrhea   
and nausea. Negative   
for abdominal pain  
and vomiting.  
Genitourinary:   
Positive for menstrual   
problem (Irregular   
menses due to PCOS).  
Neurological:   
Negative.  
Vitals:  
24 0735  
BP: 112/69  
Pulse: 94  
Resp: 18  
Temp: 37 ?C (98.6 ?F)  
TempSrc: Infrared  
SpO2: 98%  
Weight: 170 lb 3.2 oz   
(77.2 kg)  
Physical Exam  
Constitutional:  
Appearance: Normal   
appearance.  
HENT:  
Head: Normocephalic   
and atraumatic.  
Mouth/Throat:  
Mouth: Mucous   
membranes are moist.  
Pharynx: Oropharynx is   
clear. No posterior   
oropharyngeal   
erythema.  
Cardiovascular:  
Rate and Rhythm:   
Normal rate and   
regular rhythm.  
Pulses: Normal pulses.  
Heart sounds: Normal   
heart sounds.  
Pulmonary:  
Effort: Pulmonary   
effort is normal.  
Breath sounds: Normal   
breath sounds.  
Skin:  
General: Skin is warm   
and dry.  
Neurological:  
Mental Status: She is   
alert and oriented to   
person, place, and   
time.  
Psychiatric:  
Mood and Affect: Mood   
normal.  
Behavior: Behavior   
normal.  
Thought Content:   
Thought content   
normal.  
Judgment: Judgment   
normal.  
An electronic   
signature was used to   
authenticate this   
note.  
SHANEKA Chacon CNP  
2024  
9:35 AM             Normal                                  [facility]  
   
                                                    CNOVon 2024   
   
                                        CNOV                Office Visit (OBGYWM  
)  
----------------------  
--------------  
DON MITTAL   
([id number]) 2000   
F  
Date Time Provider   
Department  
24 7:00 AM   
ERNESTINE SHER OBTIFFANYWVENICE  
During your visit   
today, we recorded the   
following information   
about you:  
Blood pressure Weight   
Last Period  
100/72 75.3 kg   
24  
Ernestine Sher APRN.CNP   
2024 1:09 PM   
Signed  
Don GRIMES Hernan is a 23   
year old female who   
presents for problem   
visit missed  
menses x 2 months and   
multiple negative home   
pregnancy tests.  
HPI: Stopped OCP to   
attempt pregnancy in   
2023. Last   
menses in  
November. Multiple   
home pregnancy tests   
have all been   
negative.  
Has PCOS - recently   
noticing more facial   
hair although acne   
improving. 20 lb  
weight gain with   
Lexapro for anxiety.   
Changed to bupropion a   
few days ago by  
PCP.  
No previous   
pregnancies. Partner   
has 3 children.  
Shreya and yoga daily   
for exercise. Trying   
to eat healthy.  
OB History  
 T0  L0  
SAB0 IAB0 Ectopic0   
Multiple0 Live Births0  
Comment: 3   
stepchildren  
Gyn History  
LMP: 2024,   
Having periods  
Age at Menarche:  
Age at First   
Pregnancy:  
Age at Menopause:  
Gyn History Comments:  
Sexual Activity: Yes;   
Male; sexually active   
with   
Contraception: Pill  
PAST MEDICAL HISTORY  
Diagnosis Date  
Exertional asthma   
2012  
Generalized anxiety   
disorder  
Medial meniscus tear   
2012  
PCOS (polycystic   
ovarian syndrome)   
Unspecified otitis   
media  
Recurrent otitis  
Vocal cord dysfunction   
05/15/2012  
PAST SURGICAL HISTORY  
Procedure Laterality   
Date  
ANKLE ARTHROSCOPY   
Right 16  
Dr. Yeung  
FAMILY HISTORY  
Problem Relation Age   
of Onset  
other (Other [Other])   
Paternal Grandfather  
leukemia  
Cancer Maternal   
Grandmother  
breast  
Asthma Sister  
Social History  
Tobacco Use  
Smoking status: Never  
Smokeless tobacco:   
Never  
Vaping Use  
Vaping Use: Never used  
Substance Use Topics  
Alcohol use: Yes  
Drug use: No  
Current Outpatient   
Medications  
Medication Sig  
buPROPion XL   
(WELLBUTRIN XL) 150 mg   
24 hr tablet Take 150   
mg by mouth every  
morning.  
PNV/iron/folic acid   
(PRENATAL   
VITAMIN-IRON-FA ORAL)   
Take 1 tablet by mouth  
once daily.  
albuterol HFA   
(PROVENTIL HFA,   
VENTOLIN HFA) 90   
mcg/actuation inhaler   
Inhale 2  
Puffs as instructed   
every 4 hours as   
needed.  
Drospirenone-Ethinyl   
Estradiol (SINGH,   
28,) 3-0.03 mg per   
tablet Take 1  
tablet by mouth once   
daily. Take one active   
pill continuously x 3   
months-  
discard placebo pills   
(Patient not taking:   
Reported on 2024)  
No current   
facility-administered   
medications for this   
visit.  
Allergies As of Date:   
2024  
Allergen Noted   
Reaction  
GRASS POLLEN   
2013 Unknown  
SEASONAL ALLERGIES   
2013 Unknown  
TREES 2013   
Unknown  
Fully Assessed   
2024  
REVIEW OF SYSTEMS  
Abdomen: No bloating,   
early satiety,   
indigestion, or   
increased flatulence.   
No  
abdominal pain,   
nausea, vomiting,   
diarrhea, or   
constipation.  
Bladder: No dysuria,   
gross hematuria,   
urinary frequency,   
urinary urgency, or  
incontinence.  
Allergies and current   
medication updated:Yes  
EXAM: /72   Wt   
166 lb (75.3kg)   LMP   
2024  
GENERAL: pleasant,   
 female in no   
apparent distress  
CHEST: Normal   
inspiratory effort  
NEURO: alert and   
oriented x3,exam   
grossly non-focal  
ASSESSMENT/PLAN:  
1. Irregular menses -   
ICD9: 626.4, ICD10:   
N92.6 (primary   
diagnosis)  
- HCG QUAL UR B/O -   
negative  
- METFORMIN 500 MG   
TABLET  
- HGB A1C  
- INSULIN ASSAY BLOOD  
- GLUCOSE FASTING BLD  
2. PCOS (polycystic   
ovarian syndrome) -   
ICD9: 256.4, ICD10:   
E28.2  
- METFORMIN 500 MG   
TABLET  
Agreeable to begin   
Metformin 500 mg with   
dinner daily x 1 week.   
If tolerating  
will increase to 2   
tablets with dinner   
daily.  
We discussed common   
side effects of this   
medication including   
nausea, changes  
in bowel habits,   
abdominal discomfort,   
and flatulence.   
Discussed taking it   
with  
food and complication   
of lactic acidosis and   
signs/symptoms and   
medication  
handout given. Further   
instructed that if she   
experiences malaise,   
muscle  
aches, difficulty   
breathing, or severe   
abdominal pain to seek   
immediate medical  
attention.  
- HGB A1C  
- INSULIN ASSAY BLOOD  
- GLUCOSE FASTING BLD  
- - discussed with pt   
- review of PCOS with   
signs and symptoms.   
Increased risk  
of DMT2, CAD,   
infertility. Treatment   
discussed: weight loss   
to restore  
ovulatory cycles and   
reduce androgen   
concentrations,   
exercise, cyclic  
medroxyprogesterone to   
induce menses.   
Metformin discussed -   
explained that it  
is not considered a   
first-line treatment   
as it does not change   
pregnancy  
outcomes but that it   
will decrease HgbA1c   
although weight loss   
will do the  
same. Discussed that   
she may need   
medication to induce   
ovulation if she does  
not become pregnant   
with lifestyle changes   
and weight loss.  
- Eat primarily whole   
foods. Limit carbs,   
especially processed   
carbs.  
- Do not drink your   
calories  
- 30 grams of protein   
for your first meal of   
the day decreases your   
hu (more content not   
included)...        Normal                                  [facility]  
   
                                                    Glucose p fast SerPl-mCncon   
2024   
   
                                                    Glucose post fast   
[Mass/Vol]      101 mg/dL       High            74-99           [facility]  
   
                                        Comment on above:   Order Comment: Speci  
men Type: BLOOD SPECIMEN  
Ordering Facility: [facility]  
Address: [address] [place] [address]   
   
                                                            Result Comment: Amer  
ican Diabetes Association guidelines state that   
a diabetes mellitus diagnosis is preliminarily made when the   
fasting plasma glucose meets or exceeds 126 mg/dL. In the absence   
of unequivocal hyperglycemia, results should be confirmed with   
repeat testing. Patients are at increased risk for diabetes   
mellitus (prediabetes) when the fasting glucose is 100 to 125   
mg/dL.   
   
                                                            Performed By: #### 1  
[id number] ####  
[facility]  
CLIA [id number]  
[address] [place] [address] [place]   
   
                                                    HbA1c (Bld)on 2024   
   
                                                    Average glucose   
Estimated from   
glycated   
hemoglobin (Bld)   
[Mass/Vol]      111 mg/dL       Normal                          [facility]  
   
                                        Comment on above:   Order Comment: Speci  
men Type: BLOOD SPECIMEN  
Ordering Facility: [facility]  
Address: [address] [place] [address]   
   
                                                            Result Comment: eAG:  
 (Estimated average glucose) is a calculated   
value from HgbA1c and is representative of the average blood   
glucose level in the last 2-3 month period.   
   
                                                            Performed By: #### 5  
[id number] ####  
[facility]  
CLIA [id number]  
[address] [place] [address] [place]   
   
                                                    HbA1c (Bld) [Mass   
fraction]       5.5 %           Normal          4.3-5.6         [facility]  
   
                                        Comment on above:   Order Comment: Speci  
men Type: BLOOD SPECIMEN  
Ordering Facility: [facility]  
Address: [address] [place] [address]   
   
                                                            Result Comment: Amer  
ican Diabetes Association guidelines indicate   
that patients with HgbA1c in the range 5.7-6.4% are at increased   
risk for development of diabetes, and intervention by lifestyle   
modification may be beneficial. HgbA1c greater or equal to 6.5% is   
considered diagnostic of diabetes.   
   
                                                            Performed By: #### 5  
[id number] ####  
[facility]  
CLIA [id number]  
[address] [place] [address] [place]   
   
                                                    Insulin SerPl-aCncon   
024   
   
                      Insulin Qn 13.0 u[IU]/mL Normal     3.0-25.0   [facility]  
   
                                        Comment on above:   Order Comment: Speci  
men Type: BLOOD SPECIMEN  
Ordering Facility: [facility]  
Address: [address] [place] [address]   
   
                                                            Performed By: #### 2  
[id number] ####  
[facility]  
CLIA [id number]  
[address] [place] [address] [place]   
   
                                                    Office Visiton 2024   
   
                                        Follow-up visit     [id number] Lux Mittal   
2000 F  
Date Provider   
Department Center  
2024   
MONICA MORRIS [facility]   
[facility]  
Family History  
Problem Relation Age   
of Onset  
Thyroid disease Mother  
Perkins syndrome Father  
Asthma Sister  
Breast cancer Maternal   
Grandmother  
Cancer Maternal   
Grandfather  
Cancer Paternal   
Grandfather  
Family Status -   
Relation Status Age at   
Death  
Mother Alive  
Father Alive  
Sister  
Sister   
Maternal Grandmother   
  
Maternal Grandfather   
  
Paternal Grandmother   
Alive  
Paternal Grandfather   
  
Level of Service:74623   
MT OFFICE/OUTPATIENT   
ESTABLISHED MOD MDM 30   
MIN  
Reason for Visit and   
Comments:  
Medication Check   
[[id number]]  
Weight Gain [180]   Normal                                  [facility]  
   
                                                    PATINSon 2024   
   
                                        PATINS              Start wellbutrin and  
   
decrease lexapro to 10   
mg daily x 7 days,   
then stop the  
lexapro.  
Check [employer] james   
for meditation      Normal                                  [facility]  
   
                                                    Progress Noteon 2024   
   
                                        Progress Note       Will have her follow  
   
up with her ob/gyn,   
suspect gain posssilby   
from pcos.  
Continue exercise,   
healthy eating and   
portion control. Will   
stop lexapro  
(possible weight gain)   
and switch to   
wellbutrin.         Normal                                  [facility]  
   
                                        Progress Note       Denies si/hi. Anxiet  
y   
and depression   
symptoms better but   
still is having   
trouble  
focusing. Will taper   
discontinue lexapro   
and start wellbutrin.   
Follow up in  
about 1 month       Normal                                  [facility]  
   
                                        Progress Note       Patient was identifi  
ed   
by name and Date of   
Birth.              Normal                                  [facility]  
   
                                        Progress Note       2024  
Don Mittal (:   
2000) is a 23   
y.o. female ,   
Established patient,   
here  
for evaluation of the   
following chief   
complaint(s):  
Medication Check and   
Weight Gain  
ASSESSMENT/PLAN:  
1. Anxiety and   
depression  
Assessment & Plan:  
Denies si/hi. Anxiety   
and depression   
symptoms better but   
still is having   
trouble  
focusing. Will taper   
discontinue lexapro   
and start wellbutrin.   
Follow up in  
about 1 month  
Orders:  
- buPROPion XL   
(Wellbutrin XL) 150 MG   
24 hr tablet; Take 1   
tablet (150 mg)  
by mouth every   
morning. Do not crush,   
chew, or split.,   
Starting Mon   
2024,  
Until Fri 3/22/2024,   
Normal  
2. Weight gain  
Assessment & Plan:  
Will have her follow   
up with her ob/gyn,   
suspect gain posssilby   
from pcos.  
Continue exercise,   
healthy eating and   
portion control. Will   
stop lexapro  
(possible weight gain)   
and switch to   
wellbutrin.  
Follow up in about 1   
month (around   
2024).  
SUBJECTIVE/OBJECTIVE:  
HPI -  
Don Mittal (:   
2000) is a 23   
y.o. female ,   
Established patient,   
here  
for the evaluation of   
the following chief   
complaint(s):  
Medication Check and   
Weight Gain  
Stopped nicotine and   
stopped etoh since we   
last met. . Has gained   
some weight.  
She thinks it may be   
from going off of her   
birth control   
medication, Is working  
out with online [employer]   
classes daily,   
watching what she is   
eating. Is doing well  
as far as eating and   
exercising. She also   
is doing yoga.  
Stopped birth control.   
Has not had period   
since sept and   
November and has been  
pregnancy testing. Is   
trying to get   
pregnant. Will be   
following up with her  
ob/gyn.  
Reports anxiety is   
better, but is not   
able to focus well   
still. Denies si/hi.  
Her and her    
are getting ready to   
go to [place] for car   
racing for 11  
days. Her    
races cars. States she   
is looking forward to   
going.  
Prior to Admission   
medications  
Medication Sig Start   
Date End Date Taking?   
Authorizing Provider  
escitalopram (Lexapro)   
20 MG tablet Take 1   
tablet (20 mg) by   
mouth daily.  
12/15/23 3/14/24 Yes   
SHANEKA Chacon - CNP  
Prenatal MV-Min-Fe   
Fum-FA-DHA (PRENATAL 1   
PO) Take by mouth. Yes   
Historical  
Provider, MD  
Review of Systems  
Constitutional:   
Negative.  
HENT: Negative.  
Respiratory: Negative.  
Cardiovascular:   
Negative.  
Gastrointestinal:   
Negative.  
Genitourinary:   
Negative.  
Musculoskeletal:   
Negative.  
Neurological:   
Negative.  
Psychiatric/Behavioral  
: Positive for   
decreased   
concentration.   
Negative for  
self-injury, sleep   
disturbance and   
suicidal ideas. The   
patient is  
nervous/anxious.  
Vitals:  
24 1346  
BP: 133/89  
Pulse: 98  
Resp: 18  
Temp: 36.7 ?C (98.1   
?F)  
TempSrc: Infrared  
SpO2: 97%  
Weight: 164 lb (74.4   
kg)  
Physical Exam  
Constitutional:  
General: She is not in   
acute distress.  
Appearance: Normal   
appearance. She is not   
ill-appearing.  
HENT:  
Head: Normocephalic   
and atraumatic.  
Right Ear: Tympanic   
membrane normal.  
Left Ear: Tympanic   
membrane normal.  
Nose: No congestion or   
rhinorrhea.  
Mouth/Throat:  
Mouth: Mucous   
membranes are moist.  
Pharynx: Oropharynx is   
clear. No posterior   
oropharyngeal   
erythema.  
Eyes:  
Conjunctiva/sclera:   
Conjunctivae normal.  
Cardiovascular:  
Rate and Rhythm:   
Normal rate and   
regular rhythm.  
Pulmonary:  
Effort: Pulmonary   
effort is normal.  
Breath sounds: Normal   
breath sounds.  
Lymphadenopathy:  
Cervical: No cervical   
adenopathy.  
Skin:  
General: Skin is warm   
and dry.  
Neurological:  
Mental Status: She is   
alert and oriented to   
person, place, and   
time.  
Psychiatric:  
Mood and Affect: Mood   
normal.  
Behavior: Behavior   
normal.  
Thought Content:   
Thought content   
normal.  
Judgment: Judgment   
normal.  
An electronic   
signature was used to   
authenticate this   
note.  
Monica Laura,   
APRN - CNP  
2024  
4:24 PM             [facility]  
   
                                                    Office Visiton 2023   
   
                                        Follow-up visit     [id number] Lux Mittal   
2000 F  
Date Provider   
Department Center  
2023   
[id number]  
Family History  
Problem Relation Age   
of Onset  
Thyroid disease Mother  
Perkins syndrome Father  
Asthma Sister  
Breast cancer Maternal   
Grandmother  
Cancer Maternal   
Grandfather  
Cancer Paternal   
Grandfather  
Family Status -   
Relation Status Age at   
Death  
Mother Alive  
Father Alive  
Sister  
Sister   
Maternal Grandmother   
  
Maternal Grandfather   
  
Paternal Grandmother   
Alive  
Paternal Grandfather   
  
Level of Service:01645   
MT PERIODIC PREVENTIVE   
MED EST PATIENT 18-39   
YRS  
Reason for Visit and   
Comments:  
Follow-up [189494]  
Health Maintenance   
[872] - Lipid-pended  
HIV/Hep  
C-declined  
Covid-declined  
PHQ-completed  
Pap-[facility] (will scan  
in)  
Tdap-declined  
Influenza-declined  
?                   Normal                                  [facility]  
   
                                                    PATINSon 2023   
   
                                        PATINS              Melatonin 1- 5 mg   
nightly to help with   
sleep.  
  
Send update via   
[employer] to provider in   
1 month on how you are   
doing on the  
lexapro 10 mg daily. Normal                                  [facility]  
   
                                                    Progress Noteon 2023   
   
                                        Progress Note       Denies any suicidal   
or   
homicidal ideation.   
Reports improved   
symptoms. We will  
continue on Lexapro 10   
mg daily she is to   
provide an update   
through [place] in  
approximately 4 weeks.   
Consider up titration   
if needed at that   
point.              Normal                                  [facility]  
   
                                        Progress Note       Patient was identifi  
ed   
by name and Date of   
Birth.  
  
Health Main:  
  
Lipid-pended  
HIV/Hep C-declined  
Covid-declined  
PHQ-completed  
Pap-[facility] (will scan in)  
Tdap-declined  
Influenza-declined  Normal                                  [facility]  
   
                                        Progress Note       2023  
Don Mittal (:   
2000) is a 23   
y.o. female ,   
Established patient,   
here  
for evaluation of the   
following chief   
complaint(s):  
Follow-up and Health   
Maintenance   
(Lipid-pended/HIV/Hep  
C-declined/Covid-decli  
edin/PHQ-completed/Pap-  
[facility] (will scan  
in)/Tdap-declined/Infl  
uenza-declined/ )  
ASSESSMENT/PLAN:  
1. Annual physical   
exam  
- Comprehensive   
metabolic panel  
- CBC  
- Lipid panel  
2. Screening for   
deficiency anemia  
- CBC  
3. Screening for   
cholesterol level  
- Lipid panel  
4. Anxiety and   
depression  
Assessment & Plan:  
Denies any suicidal or   
homicidal ideation.   
Reports improved   
symptoms. We will  
continue on Lexapro 10   
mg daily she is [facility]   
[facility]  
[facility] weeks.   
Consider up titration   
if needed at that   
point.  
Follow up in about 3   
months (around   
2024).  
SUBJECTIVE/OBJECTIVE:  
HPI -  
Don Mittal (:   
2000) is a 23   
y.o. female ,   
Established patient,   
here  
for the evaluation of   
the following chief   
complaint(s):  
Follow-up and Health   
Maintenance   
(Lipid-pended/HIV/Hep  
C-declined/Covid-decli  
edin/PHQ-completed/Pap-  
[facility] (will scan  
in)/Tdap-declined/Infl  
uenza-declined/ )  
Recently newly   
established patient to   
us about 2 weeks ago   
who had presented for  
an acute complaint of   
anxiety and   
depression. She was   
started on Lexapro 10   
mg  
at that time and   
recommended to get set   
up with a counselor.   
No other acute  
complaints today.  
Anxiety/dep- feels   
like medication is   
helping some. Is able   
to think clearer. Is  
processing thoughts   
better. States that   
she has not broke down   
and cried like  
she was before. Denies   
any suicidal or   
homicidal ideation.  
She has yet to set up   
with a counselor.  
Has OB/GYN which she   
follows for women's   
health.  
Prior to Admission   
medications  
Medication Sig Start   
Date End Date Taking?   
Authorizing Provider  
escitalopram (Lexapro)   
10 MG tablet Take 1   
tablet (10 mg) by   
mouth daily.  
23 Yes   
SHANEKA Chacon - CNP  
Prenatal MV-Min-Fe   
Fum-FA-DHA (PRENATAL 1   
PO) Take by mouth. Yes   
Historical  
Provider, MD  
Review of Systems  
Constitutional:   
Negative.  
HENT: Negative.  
Respiratory: Negative.  
Cardiovascular:   
Negative.  
Gastrointestinal:   
Negative.  
Genitourinary:   
Negative.  
Musculoskeletal:   
Negative.  
Neurological:   
Negative.  
Psychiatric/Behavioral  
: Positive for   
decreased   
concentration and   
sleep  
disturbance (still not   
sleeping well.).   
Negative for   
self-injury and   
suicidal  
ideas. The patient is   
nervous/anxious.  
Vitals:  
23 0929  
BP: 116/77  
Pulse: 98  
Resp: 18  
Temp: 36.4 ?C (97.6   
?F)  
TempSrc: Infrared  
SpO2: 99%  
Weight: 151 lb (68.5   
kg)  
Physical Exam  
Constitutional:  
General: She is not in   
acute distress.  
Appearance: Normal   
appearance. She is not   
ill-appearing.  
HENT:  
Head: Normocephalic   
and atraumatic.  
Right Ear: Tympanic   
membrane normal.  
Left Ear: Tympanic   
membrane normal.  
Mouth/Throat:  
Mouth: Mucous   
membranes are moist.  
Pharynx: Oropharynx is   
clear. No posterior   
oropharyngeal   
erythema.  
Eyes:  
Conjunctiva/sclera:   
Conjunctivae normal.  
Cardiovascular:  
Rate and Rhythm:   
Normal rate and   
regular rhythm.  
Pulses: Normal pulses.  
Heart sounds: Normal   
heart sounds.  
Musculoskeletal:  
Right lower leg: No   
edema.  
Left lower leg: No   
edema.  
Skin:  
General: Skin is warm   
and dry.  
Neurological:  
Mental Status: She is   
alert and oriented to   
person, place, and   
time.  
Psychiatric:  
Mood and Affect: Mood   
normal.  
Behavior: Behavior   
normal.  
Thought Content:   
Thought content   
normal.  
Judgment: Judgment   
normal.  
An electronic   
signature was used to   
authenticate this   
note.  
SHANEKA Chacon - JAMES  
2023  
1:22 PM             Normal                                  [facility]  
   
                                                    Office Visiton 2023   
   
                                        Follow-up visit     [id number] HernanLux   
2000 F  
Date Provider   
Department Center  
2023   
[id number]  
Family History  
Problem Relation Age   
of Onset  
Thyroid disease Mother  
Perkins syndrome Father  
Asthma Sister  
Breast cancer Maternal   
Grandmother  
Cancer Maternal   
Grandfather  
Cancer Paternal   
Grandfather  
Family Status -   
Relation Status Age at   
Death  
Mother Alive  
Father Alive  
Sister  
Sister   
Maternal Grandmother   
  
Maternal Grandfather   
  
Paternal Grandmother   
Alive  
Paternal Grandfather   
  
Level of Service:94438   
MT OFFICE/OUTPATIENT   
NEW MODERATE MDM 45-59   
MINUTES  
Reason for Visit and   
Comments:  
New Patient [542]   Normal                                  [facility]  
   
                                                    PATINSon 2023   
   
                                        PATINS              Start with 1/2 tab   
daily x 1 week, then   
increase to 1 whole   
tablet  
If you or someone you   
know needs support   
now, call or text [phone]   
or chat  
[email or web address]  
----------------------  
--------------  
----------------------  
----------------------  
---  
To find providers in   
your area for mental   
health services  
https://findtreatment.  
gov/  
To find additional   
support and   
information regarding   
mental health services   
and  
substance abuse  
https://www.samhsa.gov  
/  
Mental Health and   
Psychiatry Resources  
Counseling  
Family [address]  
[phone]  
[address]  
[phone]  
[facility]   
[facility]   
Office  
[address],   
[place]  
[phone]  
[facility] Counseling   
and Consultation   
Services (LGBTQIA+   
inclusive)  
[phone]  
[place] Office  
[address]   
[address]  
[place]   
[address]  
[place] Office  
[address] [place] [id number]  
[email or web address]  
[facility]   
[facility]ing Center  
[facility] [place], [place]  
[phone]  
Psychiatry  
[facility]  
[facility]  
[phone]  
[email or web address]  
CHRISTIE Sumner  
(commercial insurance   
only)  
Behavioral Health   
Services  
[facility]  
[phone]  
Counseling and   
Psychiatry  
[facility]  
[address] [place] [address] Drive   
[address], [place]  
[phone]  
[facility]  
[place] [facility]   
[facility]: [phone]  
[place] [facility]   
[facility]: [phone]  
[facility]   
Outpatient Clinic:   
[phone]  
Psychiatric Emergency   
Services, [address]   
[place] (OPEN   
): [phone]  
[facility] Behavioral   
Health, Psychiatry and   
Traumatic Stress   
Center  
[facility]   
Health [address]  
[phone]  
[facility]  
Multiple locations in   
[place]  
Go to [email or web address] Normal                                  [facility]  
   
                                                    Progress Noteon 2023   
   
                                        Progress Note       Denies any active   
suicidal or homicidal   
ideation. Symptoms are   
poorly  
controlled. Will start   
Lexapro 10 mg daily,   
recommend that she   
start cognitive  
behavioral   
therapy-resources   
provided. Close   
follow-up in 2 weeks   
sooner for  
any worsening symptoms Normal                                  [facility]  
   
                                        Progress Note       Follow-up with OB/GY  
N   
as directed         Normal                                  [facility]  
   
                                        Progress Note       Symptoms are   
controlled.         Normal                                  [facility]  
   
                                        Progress Note       Controlled. Has not   
needed her rescue   
inhaler over a year,   
continue albuterol  
as needed as needed Normal                                  [facility]  
   
                                        Progress Note       2023  
Don Mittal (:   
2000) is a 23   
y.o. female , New   
patient, here for  
evaluation of the   
following chief   
complaint(s):  
New Patient  
ASSESSMENT/PLAN:  
1. Anxiety and   
depression  
Assessment & Plan:  
Denies any active   
suicidal or homicidal   
ideation. Symptoms are   
poorly  
controlled. Will start   
Lexapro 10 mg daily,   
recommend that she   
start cognitive  
behavioral   
therapy-resources   
provided. Close   
follow-up in 2 weeks   
sooner for  
any worsening symptoms  
Orders:  
- escitalopram   
(Lexapro) 10 MG   
tablet; Take 1 tablet   
(10 mg) by mouth  
daily., Starting 2023, Until 2024, Normal  
2. Mild intermittent   
asthma, unspecified   
whether complicated  
Assessment & Plan:  
Controlled. Has not   
needed her rescue   
inhaler over a year,   
continue albuterol  
as needed as needed  
3. Vocal cord   
dysfunction  
Assessment & Plan:  
Symptoms are   
controlled.  
4. PCOS (polycystic   
ovarian syndrome)  
Assessment & Plan:  
Follow-up with OB/GYN   
as directed  
Follow up in about 2   
weeks (around   
2023) for   
Recheck.  
SUBJECTIVE/OBJECTIVE:  
HPI -  
Don Mittal presents   
as new patient,   
previous primary care   
provider Virginie Mann III, last seen 10   
years by previous   
provider.  
Specialists/other   
providers? Yes,   
describe: ob/gyn.  
Chief complaint(s):   
New Patient  
Patient presents today   
to establish and for   
an acute complaint of   
anxiety and  
depression symptoms.   
Reports these have   
been worsening over   
the past few months  
and is not sure if it   
is related to the   
amount of stressors   
that she is  
currently   
experiencing. States   
she has a history of   
some trauma in her  
childhood. Her father   
is currently   
estranged, has a good   
relationship now with  
her mother.  
Is working full-time   
at [employer]   
and then part-time at   
[employer].  
She also helps with   
administrative and   
business aspects with   
her  and  
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   
business. Was    
2 years ago to her   
 and is  
now his stepmom to 3   
young children, they   
have them usually   
every other  
weekend. She reports   
this is stressful for   
her and she tries the   
best she can  
but struggles with   
figuring out how to be   
a good mom. There are   
some stressors  
with their biological   
mother whom may or may   
not have bipolar   
disorder or some  
type of mood disorder   
as she is not always   
consistent with   
behaviors/attitude  
toward her.  
States that she has   
struggled with anxiety   
and some depression   
symptoms since  
childhood but has   
never been on any   
medications and only   
briefly saw a   
counselor  
1 time about 6 years   
ago. Reports daily   
symptoms of feeling   
overwhelmed,  
difficulty   
concentrating, and   
difficulty in daily   
activities. Reports   
fleeting  
thoughts of suicide   
when feeling   
overwhelmed with no   
plan. States she would  
never act on any of   
those thoughts due to   
her stepchildren and   
has been.  
Vocal cord   
dysfunction-reports   
diagnosed as a child   
and has gone through   
speech  
therapy for this which   
has helped. States she   
has not had any   
flareups. Also  
thinks that it helped   
after she quit vaping.  
Asthma,-reports   
symptoms controlled.   
Has not needed her   
rescue inhaler for a  
very long time, no   
hospitalizations  
PCOS-was briefly on   
birth control through   
her OB/GYN however   
decided not to take  
it any longer . Is   
currently taking   
prenatal vitamins   
states she would be   
okay  
if she got pregnant  
Past Medical History:  
Diagnosis Date  
Asthma 2010  
Past Surgical History:  
Procedure Laterality   
Date  
ANKLE SURGERY Right  
x 3. Dr. Reza Verdin- [facility] /   
previously [facility]   
[facility]- last  
sx 2019  
WISDOM TOOTH   
EXTRACTION Bilateral  
2015  
Family History  
Problem Relation Name   
Age of Onset  
Thyroid disease Mother  
Perkins syndrome Father  
Asthma Sister Karo Kincaid  
Breast cancer Maternal   
Grandmother  
Cancer Maternal   
Grandfather  
Cancer Paternal   
Grandfather  
Social History  
Socioeconomic History  
Marital status:   
  
Spouse name: Not on   
file  
Number of children:   
Not on file  
Years of education:   
Not on file  
Highest education   
level: Not on file  
Occupational History  
Not on file  
Tobacco Use  
Smoking status: Never  
Smokeless tobacco:   
Current  
Types: Chew  
Tobacco comments:  
Nicotine pouch,  
Vaping Use  
Vaping Use: Former  
Substance and Sexual   
Activity  
Alcohol use: Yes  
Alcohol/week: 5.0   
standard drinks of   
alcohol  
Types: 5 Shots of   
liquor per week  
Comment: occ.  
Drug use: Never  
Sexual activity: Yes  
Partners: Male  
Birth   
control/protection:   
None  
Other Topics Concern  
Not on file  
Social History   
Narrative  
Lives with -   
padmini, 2 dogs- nancy and   
lev, dalmation and   
rott- both 2 yo.  has 3   
kids- live wth them   
occasionally- every   
other weekend (10-  
Cristian boy,8- Jenny-   
girl, 2- Akash- boy)  
Works at [employer]   
bank full time and   
part time [employer]   
[employer]  
 races cars.   
R[place] home in [place]   
and then lives in   
motor home when he  
is racing.  
Social Determinants of   
Health  
Financial Resource   
Strain: Not on file  
Food Insecurity: Not   
on file  
Transp (more content   
not included)...    [facility] 2023   
   
                                        OV                Office Visit (OBGYWM  
)  
----------------------  
--------------  
DON MITTAL   
([id number]) 2000   
F  
Date Time Provider   
Department  
23 2:30 PM AWILDA BELL   
[facility]  
During your visit   
today, we recorded the   
following information   
about you:  
Blood pressure Weight   
Height Last Period  
118/68 67.6 kg 1.626 m   
23  
Awilda Bell MD 2023   
3:09 PM Signed  
Chaperone offered:   
Patient declines.  
Don is a 23 year   
old  who   
presents for an annual   
gynecologic exam  
without complaints.   
Does have irregular   
bleeding with pills.   
 does  
sprint car racing. Has   
three step children.   
Thinking about kids.  
Menses: cycles every   
28 days and 5-8 days   
but does have a lot of   
irregular  
bleeding for 3-4 days   
per month- not as   
heavy. Does not miss   
pills or take them  
late.  
Contraception:   
combined hormonal   
contraceptives  
HPV vaccine: Yes  
Last Pap: 2022   
normal  
HPV: N/A  
History of abnormal   
pap: No  
Last mammogram: never  
Sexually active: Yes  
History of STDS: None  
Patient concerns for   
STD exposure: No.  
Pain with intercourse:   
No  
Postcoital bleeding:   
No  
Exercise:active   
walking  
Diet: balanced  
OB History  
 T0  L0  
SAB0 IAB0 Ectopic0   
Multiple0 Live Births0  
Comment: 3   
stepchildren  
Gyn History  
LMP: 2022,   
Having periods  
Age at Menarche:  
Age at First   
Pregnancy:  
Age at Menopause:  
Gyn History Comments:  
Sexual Activity: Yes;   
Male; sexually active   
with   
Contraception: Pill  
PAST MEDICAL HISTORY  
Diagnosis Date  
Exertional asthma   
2012  
Medial meniscus tear   
2012  
Unspecified otitis   
media  
Recurrent otitis  
Vocal cord dysfunction   
5/15/2012  
PAST SURGICAL HISTORY  
Procedure Laterality   
Date  
ANKLE ARTHROSCOPY   
Right 16  
Dr. Yeung  
FAMILY HISTORY  
Problem Relation Age   
of Onset  
other (Other [Other])   
Paternal Grandfather  
leukemia  
Cancer Maternal   
Grandmother  
breast  
Asthma Sister  
SOCIAL HISTORY  
Social History  
Tobacco Use  
Smoking status: Never  
Smokeless tobacco:   
Never  
Vaping Use  
Vaping Use: Never used  
Substance Use Topics  
Alcohol use: Yes  
Drug use: No  
REVIEW OF SYSTEMS  
Abdomen: No abdominal   
pain, nausea,   
vomiting, diarrhea, or   
constipation. No  
bloating, early   
satiety, indigestion,   
or increased   
flatulence.  
Bladder: No dysuria,   
gross hematuria,   
urinary frequency,   
urinary urgency, or  
incontinence.  
Breast: No breast   
lumps, nipple d/c,   
overlying skin   
changes, redness or   
skin  
retraction.  
Allergies and current   
medication updated:Yes  
EXAM: /68   Ht   
5' 4  (1.63m)   Wt 149   
lb (67.6kg)   LMP   
2023   BMI  
25.56 kg/(m2).  
GENERAL: pleasant,   
 female in no   
apparent distress  
HEENT: Normocephalic,   
atraumatic, mucus   
membranes moist, and   
no lesions  
NECK: Supple, full   
range of motion, no   
adenopathy, and   
thyroid normal  
DERMATOLOGY: Normal,   
without lesions,   
non-icteric, and   
non-hirsute  
BREAST: soft,   
non-tender, symmetric,   
no dominant mass,   
normal nipple-areolar  
complex, no   
lymphadenopathy, and   
no nipple discharge  
ABDOMEN: soft,   
non-tender, and no   
masses  
PELVIC: external   
genitalia normal,   
normal Bartholin's   
glands, urethra,   
Goldston's  
glands, no vulvar   
lesions, no cervical   
lesions, good vaginal   
support,  
physiologic discharge   
present, normal   
appearing perineal   
body and perianal  
region  
BIMANUAL: uterus   
normal size, shape and   
consistency, no   
adnexal masses, and  
non-tender  
RECTOVAGINAL:   
deferred.  
NEURO: alert and   
oriented x3,exam   
grossly non-focal  
EXTREMITIES: normal  
ASSESSMENT/PLAN:  
1) Health maintenance:  
Pap/HPV up to date.  
Mammogram starting age   
38.  
Nutrition, exercise   
and routine health   
maintenance exams   
reviewed.  
Calcium/Vitamin D   
supplementation   
information provided.  
2) Contraception:   
combined hormonal   
contraceptives.   
Contraceptive options  
reviewed and   
information provided.   
Changed to singh  
3) STD screening:   
Declined STD check.  
4) Follow up one year   
or sooner as needed  
Awilda Schulz MD  
Allergies As of Date:   
2023 Noted   
Allergy Reaction  
GRASS POLLEN   
2013 16 -   
Unknown  
SEASONAL ALLERGIES   
2013 16 -   
Unknown  
Comments: Weeds  
TREES 2013 16 -   
Unknown  
Date Reviewed:   
2023  
Reviewed by: Queta Tejada Ma - Fully   
Assessed  
Reason for Visit:  
Yearly Exam [187]  
Primary Visit   
Diagnosis:Encounter   
for gynecological   
examination (general)  
(routine) without   
abnormal findings   
[Z01.419]  
Other Visit   
Diagnosis:PCOS   
(polycystic ovarian   
syndrome) [E28.2]  
Order(s):Drospirenone-  
Ethinyl Estradiol   
(SINGH, 28,) 3-0.03   
mg per tabletTake  
1 tablet by mouth once   
daily. Take one active   
pill continuously x 3  
months- discard   
placebo pillsDisp: 112   
tabletRfl: 3  
Prescriptions as of   
2023  
- Drospirenone-Ethinyl   
Estradiol (SINGH,   
28,) 3-0.03 mg per   
tablet  
Take 1 tablet by mouth   
once daily. Take one   
active pill   
continuously x 3  
months- discard   
placebo pills  
- albuterol HFA   
(PROVENTIL HFA,   
VENTOLIN HFA) 90   
mcg/actuatio (more   
content not   
included)...        Normal                                  [facility]  
   
                                                    CBC W Auto Differential pane  
l (Bld)on 2022   
   
                      Abs Immature Gran <0.03                 <0.10 k/uL [facility]  
   
                                                    Basophils (Bld)   
[#/Vol]         0.03 10*3/uL                    <0.11 k/uL      [facility]  
   
                                                    Basophils/100 WBC   
(Bld)           0.4 %                                           [facility]  
   
                                                    Differential cell   
count method Nom   
(Bld)           Auto                                            [facility]  
   
                                                    Eosinophils (Bld)   
[#/Vol]         0.18 10*3/uL                    <0.46 k/uL      [facility]  
   
                                                    Eosinophils/100   
WBC (Bld)       2.5 %                                           [facility]  
   
                                                    Erythrocyte   
distribution width   
(RBC) [Ratio]   13.0 %                          11.5 - 15.0 %   [facility]  
   
                                                    Hematocrit (Bld)   
[Volume fraction] 37.3 %                          36.0 - 46.0 %   [facility]  
   
                                                    Hemoglobin (Bld)   
[Mass/Vol]          12.3 g/dL                               11.5 - 15.5   
g/dL                                    [facility]  
   
                      Immature Gran % 0.1 %                            [facility]  
   
                                                    Lymphocytes (Bld)   
[#/Vol]             2.81 10*3/uL                            1.00 - 4.00   
k/uL                                    [facility]  
   
                                                    Lymphocytes/100   
WBC (Bld)       39.7 %                                          [facility]  
   
                                                    MCH (RBC) [Entitic   
mass]           28.6 pg                         26.0 - 34.0 pg  [facility]  
   
                                                    MCHC (RBC)   
[Mass/Vol]          33.0 g/dL                               30.5 - 36.0   
g/dL                                    [facility]  
   
                                                    MCV (RBC) [Entitic   
vol]            86.7 fL                         80.0 - 100.0 fL [facility]  
   
                                                    Monocytes (Bld)   
[#/Vol]         0.46 10*3/uL                    <0.87 k/uL      [facility]  
   
                                                    Monocytes/100 WBC   
(Bld)           6.5 %                                           [facility]  
   
                                                    Neutrophils (Bld)   
[#/Vol]             3.58 10*3/uL                            1.45 - 7.50   
k/uL                                    [facility]  
   
                                                    Neutrophils/100   
WBC (Bld)       50.8 %                                          [facility]  
   
                                                    Nucleated RBC   
(Bld) [#/Vol]   10*3/uL                         <0.01 k/uL      [facility]  
   
                                                    Nucleated RBC/100   
WBC (Bld) [Ratio] 0.0 /100 WBC                                    [facility]  
   
                                                    Platelet mean   
volume (Bld)   
[Entitic vol]   11.4 fL                         9.0 - 12.7 fL   [facility]  
   
                                                    Platelets (Bld)   
[#/Vol]         276 10*3/uL                     150 - 400 k/uL  [facility]  
   
                          RBC (Bld) [#/Vol] 4.30 10*6/uL              3.90 - 5.2  
0   
m/uL                                    [facility]  
   
                          WBC (Bld) [#/Vol] 7.07 10*3/uL              3.70 - 11.  
00   
k/uL                                    [facility]  
   
                                                    HCG QUAL UR B/Oon 2022  
   
   
                      Pregnancy status Negative              neg - pos  [facility]   
[facility]  
   
                      Quality Check Yes                              [facility]  
  
  
  
Vital Signs  
  
  
                      Date Time  Vital Sign Value      Performing Clinician Rody gould  
   
                                                    2024   
13:                              Body mass index (BMI)   
[Ratio]                   27.74 kg/m2               Monica Bridenthal   
APRN - CNP  
Work Phone:   
[phone]                          [facility]  
   
                                                    2024   
13:          Body temperature    98.4 [degF]         Monica Bridenthal   
APRN - CNP  
Work Phone:   
[phone]                          [facility]  
   
                                                    2024   
13:          Body weight         73.3 kg             Monica Bridenthal   
APRN - CNP  
Work Phone:   
[phone]                          [facility]  
   
                                                    2024   
13:                              Diastolic blood   
pressure                  85 mm[Hg]                 Monica Bridenthal   
APRN - CNP  
Work Phone:   
[phone]                          [facility]  
   
                                                    2024   
13:          Heart rate          102 /min            Monica Bridenthal   
APRN - CNP  
Work Phone:   
[phone]                          [facility]  
   
                                                    2024   
13:          Respiratory rate    18 /min             Monica Bridenthal   
APRN - CNP  
Work Phone:   
[phone]                          [facility]  
   
                                                    2024   
13:                              SaO2% (BldA) [Mass   
fraction]                 97 %                      Monica Bridenthal   
APRN - CNP  
Work Phone:   
[phone]                          [facility]  
   
                                                    2024   
13:                              Systolic blood   
pressure                  129 mm[Hg]                Monica Bridenthal   
APRN - CNP  
Work Phone:   
[phone]                          [facility]  
   
                                                    2024   
07:          Body weight         75.75 kg            Ernestine Sher APRN.CNP  
Work Phone:   
[phone]                          [facility]  
   
                                                    2024   
07:                              Diastolic blood   
pressure                  82 mm[Hg]                 Ernestine Sher APRN.CNP  
Work Phone:   
[phone]                          [facility]  
   
                                                    2024   
07:          Heart rate          80 /min             Ernestine ALANISCNP  
Work Phone:   
[phone]                          [facility]  
   
                                                    2024   
07:                              SaO2% (BldA) [Mass   
fraction]                 98 %                      Ernestine ALANISCNP  
Work Phone:   
[phone]                          [facility]  
   
                                                    2024   
07:                              Systolic blood   
pressure                  126 mm[Hg]                Ernestine MUNROE.CNP  
Work Phone:   
[phone]                          [facility]  
   
                                                    2024   
06:          Body weight         76.66 kg            Ernestine MUNROE.CNP  
Work Phone:   
[phone]                          [facility]  
   
                                                    2024   
06:                              Diastolic blood   
pressure                  84 mm[Hg]                 Ernestine MUNROE.CNP  
Work Phone:   
[phone]                          [facility]  
   
                                                    2024   
06:                              Systolic blood   
pressure                  116 mm[Hg]                Ernestine MUNROE.CNP  
Work Phone:   
[phone]                          [facility]  
   
                                                    2024   
07:                              Body mass index (BMI)   
[Ratio]                   29.21 kg/m2               Monica Bridenthal   
APRN - CNP  
Work Phone:   
[phone]                          [facility]  
   
                                                    2024   
07:          Body temperature    98.6 [degF]         Monica Bridenthal   
APRN - CNP  
Work Phone:   
[phone]                          [facility]  
   
                                                    2024   
07:          Body weight         77.2 kg             Monica Bridenthal   
APRN - CNP  
Work Phone:   
[phone]                          [facility]  
   
                                                    2024   
07:                              Diastolic blood   
pressure                  69 mm[Hg]                 Monica Bridenthal   
APRN - CNP  
Work Phone:   
[phone]                          [facility]  
   
                                                    2024   
07:          Heart rate          94 /min             Monica Bridenthal   
APRN - CNP  
Work Phone:   
[phone]                          [facility]  
   
                                                    2024   
07:          Respiratory rate    18 /min             Monica Bridenthal   
APRN - CNP  
Work Phone:   
[phone]                          "[employer] [facility]  
   
                                                    2024   
07:                              SaO2% (BldA) [Mass   
fraction]                 98 %                      Monica Luis Antonioenthal   
APRN - CNP  
Work Phone:   
[phone]                          "[employer] [facility]  
   
                                                    2024   
07:                              Systolic blood   
pressure                  112 mm[Hg]                Monica Luis Antonioenthal   
APRN - CNP  
Work Phone:   
[phone]                          "[employer] [facility]  
   
                                                    2024   
13:                              Body mass index (BMI)   
[Ratio]                   28.15 kg/m2               Monica Bridenthal   
APRN - CNP  
Work Phone:   
[phone]                          "[employer] [facility]  
   
                                                    2024   
13:          Body temperature    98.1 [degF]         Monica Luis Antonioenthal   
APRN - CNP  
Work Phone:   
[phone]                          "[employer] [facility]  
   
                                                    2024   
13:          Body weight         74.39 kg            Monica Luis Antonioenthal   
APRN - CNP  
Work Phone:   
[phone]                          "[employer] [facility]  
   
                                                    2024   
13:                              Diastolic blood   
pressure                  89 mm[Hg]                 Monica Bridenthal   
APRN - CNP  
Work Phone:   
[phone]                          "[employer] [facility]  
   
                                                    2024   
13:          Heart rate          98 /min             Monica Luis Antonioenthal   
APRN - CNP  
Work Phone:   
[phone]                          "[employer] [facility]  
   
                                                    2024   
13:          Respiratory rate    18 /min             Monica Bridenthal   
APRN - CNP  
Work Phone:   
[phone]                          "[employer] [facility]  
   
                                                    2024   
13:                              SaO2% (BldA) [Mass   
fraction]                 97 %                      Monica Bridenthal   
APRN - CNP  
Work Phone:   
[phone]                          "[employer] [facility]  
   
                                                    2024   
13:                              Systolic blood   
pressure                  133 mm[Hg]                Monica Luis Antonioenthal   
APRN - CNP  
Work Phone:   
[phone]                          [facility]  
   
                                                    2023   
09:                              Body mass index (BMI)   
[Ratio]                   25.92 kg/m2               Monica Bridenthal   
APRN - CNP  
Work Phone:   
[phone]                          [facility]  
   
                                                    2023   
09:          Body temperature    97.59 [degF]        Monica Bridenthal   
APRN - CNP  
Work Phone:   
[phone]                          [facility]  
   
                                                    2023   
09:          Body weight         68.49 kg            Monica Bridenthal   
APRN - CNP  
Work Phone:   
[phone]                          [facility]  
   
                                                    2023   
09:                              Diastolic blood   
pressure                  77 mm[Hg]                 Monica Bridenthal   
APRN - CNP  
Work Phone:   
[phone]                          [facility]  
   
                                                    2023   
09:          Heart rate          98 /min             Monica Bridenthal   
APRN - CNP  
Work Phone:   
[phone]                          [facility]  
   
                                                    2023   
09:          Respiratory rate    18 /min             Monica Bridenthal   
APRN - CNP  
Work Phone:   
[phone]                          [facility]  
   
                                                    2023   
09:                              SaO2% (BldA) [Mass   
fraction]                 99 %                      Monica Bridenthal   
APRN - CNP  
Work Phone:   
[phone]                          [facility]  
   
                                                    2023   
09:                              Systolic blood   
pressure                  116 mm[Hg]                Monica Bridenthal   
APRN - CNP  
Work Phone:   
[phone]                          [facility]  
   
                                                    2023   
14:          Body height         162.6 cm            Awilda Leonard MD  
Work Phone:   
[phone]                          [facility]  
   
                                                    2023   
14:          Body weight         67.59 kg            Awilda Leonard MD  
Work Phone:   
[phone]                          [facility]  
   
                                                    2023   
14:                              Diastolic blood   
pressure                  68 mm[Hg]                 wAilda Leonard MD  
Work Phone:   
[phone]                          [facility]  
   
                                                    2023   
14:                              Systolic blood   
pressure                  118 mm[Hg]                Awilda Leonard MD  
Work Phone:   
[phone]                          [facility]  
   
                                                    2022   
07:240400          Body height         163 cm              Lauryn Ryan MD  
Work Phone:   
[phone]                          [facility]  
   
                                                    2022   
07:          Body weight         67.59 kg            Lauryn Ryan MD  
Work Phone:   
[phone]                          [facility]  
   
                                                    2022   
07:                              Diastolic blood   
pressure                  64 mm[Hg]                 Lauryn Ryan MD  
Work Phone:   
[phone]                          [facility]  
   
                                                    2022   
07:                              Systolic blood   
pressure                  104 mm[Hg]                Lauryn Ryan MD  
Work Phone:   
[phone]                          [facility]  
  
  
  
Encounters  
  
  
                          Encounter Date Encounter Type Care Provider Facility  
   
                                                    Start: 2024  
End: 2024                         Office outpatient visit 15   
minutes                                 Monica Bridmorrisal   
APRN - CNP  
Work Phone:   
[phone]                          [facility]   
Med[facility]  
   
                                        Comment on above:   Anxiety and depressi  
on (Primary Dx);  
Skin sensation disturbance   
   
                                                    Start: 2024  
End: 2024     ambulatory          MONICA BRIDENTHAL  [facility]  
   
                                                    Start: 2024  
End: 2024     ambulatory          MONICA BRIDENTHAL  Facility:[facility]  
   
                                                    Start: 2024  
End: 2024                         Patient encounter   
procedure                               Ernestine MUNROE.CNP  
Work Phone:   
[phone]                          OB/Gynecology  
   
                                        Comment on above:   PCOS (polycystic ova  
cheo syndrome) (Primary Dx);  
Irregular menses;  
Anxiety and depression;  
History of bulimia;  
History of anorexia nervosa;  
Provided repeat prescription for oral contraceptive;  
Overweight with body mass index (BMI) of 29 to 29.9 in adult   
   
                                                    Start: 2024  
End: 2024     ambulatory          MONICA BRIDENTHAL  Facility:[facility]  
   
                                                    Start: 2024  
End: 2024                         Patient encounter   
procedure                               Ernestine MUNROE.CNP  
Work Phone:   
[phone]                          OB/Gynecology  
   
                                        Comment on above:   PCOS (polycystic ova  
cheo syndrome) (Primary Dx);  
Elevated LDL cholesterol level;  
IFG (impaired fasting glucose);  
Irregular menses;  
Anxiety and depression;  
History of bulimia;  
History of anorexia nervosa;  
Overweight with body mass index (BMI) of 29 to 29.9 in adult   
   
                                                    Start: 2024  
End: 2024                         Office outpatient visit 15   
minutes                                 Monica Bridenthal   
APRN - CNP  
Work Phone:   
[phone]                          [facility]   
Med[facility]  
   
                                        Comment on above:   Anxiety and depressi  
on (Primary Dx);  
PCOS (polycystic ovarian syndrome)   
   
                                                    Start: 2024  
End: 2024                         Office outpatient visit 25   
minutes                                 Monica Bridenthal   
APRN - CNP  
Work Phone:   
[phone]                          [facility]   
[facility]  
   
                                        Comment on above:   Anxiety and depressi  
on (Primary Dx);  
PCOS (polycystic ovarian syndrome)   
   
                                                    Start: 2024  
End: 2024     [facility]  
   
                                                    Start: 2024  
End: 2024     ambulatory          ERNESTINE SHER         Facility:[facility]  
   
                                                    Start: 2024  
End: 2024                         Office outpatient visit 15   
minutes                                 Monica Bridenthal   
APRN - CNP  
Work Phone:   
[phone]                          [facility]   
[facility]  
   
                                        Comment on above:   Anxiety and depressi  
on (Primary Dx);  
Weight gain   
   
                                                    Start: 2024  
End: 2024                         Office outpatient visit 25   
minutes                                 Monica Bridenthal   
APRN - CNP  
Work Phone:   
[phone]                          [facility]   
[facility]  
   
                                        Comment on above:   Anxiety and depressi  
on (Primary Dx);  
Weight gain   
   
                                                    Start: 2024  
End: 2024     ambulatory          [facility]  
   
                                                    Start: 2023  
End: 2023                         Patient encounter   
procedure                               Monica Bridenthal   
APRN - CNP  
Work Phone:   
[phone]                          [facility]  
Work Phone:   
[phone]  
   
                                                    Start: 2023  
End: 2023                         Periodic preventive med   
est patient 18-39 yrs                   Monica Bridenthal   
APRN - CNP  
Work Phone:   
[phone]                          [facility]   
[facility]  
   
                                        Comment on above:   Annual physical exam  
 (Primary Dx);  
Screening for deficiency anemia;  
Screening for cholesterol level;  
Anxiety and depression   
   
                                                    Start: 2023  
End: 2023     ambulatory          [facility]  
   
                                                    Start: 2023  
End: 2023                         Encounter for general   
adult medical examination   
without abnormal findings [facility]  
   
                                                    Start: 2023  
End: 2023     ambulatory          [facility]  
   
                                                    Start: 2023  
End: 2023           ambulatory                AWILDA LEONARD                                Facility:[facility]  
   
                                                    Start: 2023  
End: 2023                         Patient encounter   
procedure                               Awilda Leonard MD  
Work Phone:   
[phone]                          OB/Gynecology  
   
                                        Comment on above:   Encounter for gyneco  
logical examination (general) (routine)   
without   
abnormal findings (Primary Dx);  
PCOS (polycystic ovarian syndrome)   
   
                                                    Start: 2023  
End: 2023           Patient encounter status  Awilda Leonard MD  
Work Phone:   
[phone]                          [facility]  
   
                                Start: 2023 Refill          Rachel small APRN.CNM  
Work Phone:   
[phone]                          OB/Gynecology  
   
                                        Comment on above:   Refill Request; Refi  
ll Request   
   
                                Start: 2023 ambulatory      Lauryn CHAPA  
Work Phone:   
[phone]                          OB/Gynecology  
   
                                        Comment on above:   Birth Control   
   
                                Start: 2023 Telephone encounter Lauryn grady MD  
Work Phone:   
[phone]                          OB/Gynecology  
   
                                        Comment on above:   Orders   
   
                                Start: 2023 ambulatory      Lauryn CHAPA  
Work Phone:   
[phone]                          OB/Gynecology  
   
                                        Comment on above:   Period Update   
   
                                Start: 2022 ambulatory      Lauryn CHAPA  
Work Phone:   
[phone]                          OB/Gynecology  
   
                                        Comment on above:   Period/Pregnancy Birdie  
t Update   
   
                                Start: 2022 Telephone encounter Lauryn grady MD  
Work Phone:   
[phone]                          OB/Gynecology  
   
                                        Comment on above:   Follow Up   
   
                                                    Start: 2022  
End: 2022           ambulatory                Lauryn Ryan MD  
Work Phone:   
[phone]                          OB/Gynecology  
   
                                        Comment on above:   PCOS (polycystic ova  
cheo syndrome) (Primary Dx);  
Missed menses   
   
                                                    Start: 2022  
End: 2022                         Telemedicine consultation   
with patient                            Lauryn Ryan MD  
Work Phone:   
[phone]                          [facility]  
   
                                Start: 2022 Telephone encounter Lauryn grady MD  
Work Phone:   
[phone]                          OB/Gynecology  
   
                                        Comment on above:   Patient Question   
   
                                Start: 2022 Telephone encounter Lauryn grady MD  
Work Phone:   
[phone]                          OB/Gynecology  
   
                                        Comment on above:   Patient Question   
   
                                Start: 2022 ambulatory      Awilda Leonard MD  
Work Phone:   
[phone]                          [facility]  
   
                                Start: 2022 Manual pelvic examination Deid  
chuyita Leonard MD  
Work Phone:   
[phone]                          OB/Gynecology  
   
                                        Comment on above:   Question regarding P  
ELVIC US [facility]   
   
                                                    Start: 2022  
End: 2022           ambulatory                Awilda Leonard MD  
Work Phone:   
[phone]                          OB/Gynecology  
   
                                        Comment on above:   GYN Ultrasound   
   
                                                    Start: 2022  
End: 2022                         Patient encounter   
procedure                               Awilda Leonard MD  
Work Phone:   
[phone]                          [facility]  
   
                                                    Start: 2022  
End: 2022                         Patient encounter   
procedure                               Lauryn Ryan MD  
Work Phone:   
[phone]                          OB/Gynecology  
   
                                        Comment on above:   Encounter for gyneco  
logical examination without abnormal   
finding   
(Primary Dx);  
Encounter for screening for malignant neoplasm of cervix;  
Irregular intermenstrual bleeding   
   
                                                    Start: 2022  
End: 2022           Patient encounter status  Lauryn Ryan MD  
Work Phone:   
[phone]                          OB/Gynecology  
  
  
  
Procedures  
  
  
                          Date         Procedure    Procedure Detail Performing   
Clinician  
   
                                        Start: 2023   Lipid 1996 panel - S  
eun   
or Plasma                                           Monica Bridenthal APRN   
- CNP  
Work Phone:   
[phone]  
   
                                        Start: 2022   Urine pregnancy test  
   
visual color cmprsn meths                           Lauryn Ryan MD  
Work Phone:   
[phone]  
   
                                        Start: 2022   Microscopic observat  
ion   
[Identifier] in Cervix by   
Cyto stain                                          Monica Laura APRN   
- CNP  
Work Phone:   
[phone]  
   
                                        Start: 2017   Adult depression scr  
eening   
assessment                                          Lauryn Ryan MD  
Work Phone:   
[phone]  
  
  
  
Plan of Treatment  
  
  
                          Date         Care Activity Detail       Author  
   
                                        Start: 2058   RSV Immunization age  
d   
60 or older (1 - 1-dose   
60+ series)                             RSV Immunization aged 60   
or older (1 - 1-dose 60+   
series)                                 [facility]  
   
                                        Start: 2048   Zoster Vaccines (1 o  
f   
2)                        Zoster Vaccines (1 of 2)  [facility]  
   
                          Start: 2028 Lipid panel  Lipid Panel  [facility]  
th  
   
                          Start: 2025 PAP TESTING  PAP TESTING  [facility]  
   
                                        Start: 2025   Screening for malign  
ant   
neoplasm of cervix                                  [facility]  
   
                          Start: 2024 COVID-19 Vaccine (#1) COVID-19 Vacci  
ne (#1) [facility]  
   
                                        Comment on above:   Postponed from  (Patient Refused)   
   
                                        Start: 2024   COVID-19 Vaccine ( season)                         COVID-19 Vaccine ( season)                         [facility]  
   
                                        Comment on above:   Postponed from  (Patient Refused)   
   
                                        Start: 2024   DTaP/Tdap/Td Vaccine  
s   
(7 - Td or Tdap)                        DTaP/Tdap/Td Vaccines (7   
- Td or Tdap)                           [facility]  
   
                                        Comment on above:   Postponed from  (Patient Refused)   
   
                          Start: 2024 Hepatitis C screening Hepatitis C Sc  
reening [facility]  
   
                                        Comment on above:   Postponed from  (Patient Refused)   
   
                          Start: 2024 HIV screening HIV Screening [facility]  
   
                                        Comment on above:   Postponed from  (Patient Refused)   
   
                                                    Start: 2024  
End: 2024                         Patient encounter   
procedure                               2024 7:40 AM EST   
Office Visit [facility]   
Family Medicine [address]   
[place] [address] [phone]   
Bridenthal Monica,   
APRN - CNP [address]   
[address] [place]   
[address] [phone]   
(Work) [phone]   
(Fax)                                   [facility]   
Medicine  
   
                                Start: 2024 Influenza vaccination Influenz  
a Vaccine   
(Season Ended)                          [facility]  
   
                          Start: 2024 Influenza vaccination Influenza Vacc  
ine (#1) [facility]  
   
                                        Comment on above:   Postponed from  (Patient Refused)   
   
                          Start: 2024 Depression Monitoring Depression Mon  
itoring [facility]  
   
                          Start: 2024 Depresssion Monitoring Depresssion M  
onitoring [facility]  
   
                                                    Start: 2024  
End: 2024                         Patient encounter   
procedure                               2024 1:00 PM EDT   
Office Visit [facility] [address] [place]   
[place] [address] [phone]   
Bridenthal Monica,   
APRN - CNP [address]    
[address] [place]   
[address] [phone]   
(Work) [phone]   
(Fax)                                   [facility]   
Medicine  
   
                                                    Start: 2024  
End: 2024                         Patient encounter   
procedure                               2024 2:00 PM EST   
Office Visit [facility] [address] [place]   
[place] [address] [phone]   
Bridenthal, Monica,   
APRN - CNP [address]   
[address] [place]   
[address] [phone]   
(Work) [phone]   
(Fax)                                   [facility]  
   
                                                    Start: 2024  
End: 2024                         Patient encounter   
procedure                               2024 7:40 AM EST   
Office Visit [facility] [address]   
[place] [address] [place] [address] [phone]   
Bridenthal, Monica,   
APRN - CNP [address]   
[address] [place]   
[address] [phone]   
(Work) [phone]   
(Fax)                                   [facility]  
   
                          Start: 2024 Depression Assessment [facility] Ass  
[facility]  
   
                                                    Start: 2023  
End: 2024                         CBC panel - Blood by   
Automated count                         CBC Lab Routine   
Screening for deficiency   
anemia Annual physical   
exam Expected:   
2023   
(Approximate), Expires:   
2024                              [facility]  
   
                                        Comment on above:   Expected: 2023  
 (Approximate), Expires: 2024   
   
                                                    Start: 2023  
End: 2024                         Comprehensive metabolic   
1998 panel - Serum or   
Plasma                                  Comprehensive metabolic   
panel Lab Routine Annual   
physical exam Expected:   
2023   
(Approximate), Expires:   
2024                              [facility]  
Work Phone:   
[phone]  
   
                                        Comment on above:   Expected: 2023  
 (Approximate), Expires: 2024   
   
                                                    Start: 2023  
End: 2024                         Lipid 1996 panel -   
Serum or Plasma                         Lipid panel Lab Routine   
Screening for   
cholesterol level Annual   
physical exam Expected:   
2023   
(Approximate), Expires:   
2024                              [facility]  
   
                                        Comment on above:   Expected: 2023  
 (Approximate), Expires: 2024   
   
                                        Start: 2023   Covid-19 Vaccine ( season)                         Covid-19 Vaccine ( season)                         [facility]  
   
                          Start: 2023 Influenza vaccination              [facility]  
   
                                                    Start: 2023  
End: 2023                         Progesterone   
[Mass/volume] in Serum   
or Plasma                               PROGESTERONE BLD Lab   
Routine Anovulation   
Expected: 2023,   
Expires: 2023                     [facility]  
Work Phone:   
[phone]  
   
                                        Comment on above:   Expected: 2023  
, Expires: 2023   
   
                          Start: 2023 [facility]PRESSION ASSESSMENT [facility]  
[facility]  
   
                          Start: 2022 PAP TESTING  PAP TESTING  [facility]  
   
                                                    Start: 2022  
End: 2023                         Choriogonadotropin.beta   
subunit [Units/volume]   
in Serum or Plasma                      HCG QUANTITATIVE Lab   
Routine Missed menses   
Expected: 2022,   
Expires: 2023                     [facility]  
Work Phone:   
[phone]  
   
                                        Comment on above:   Expected: 2022  
, Expires: 2023   
   
                          Start: 2022 Influenza vaccination              [facility]  
   
                                                    Start: 2022  
End: 2022                         Thyrotropin   
[Units/volume] in Serum   
or Plasma                                           [facility]  
Work Phone:   
[phone]  
   
                                        Comment on above:   Expected: 2022  
, Expires: 2022   
   
                          Start: 2022 DEPRESSION ASSESSMENT DEPRESSION ASS  
ESSMENT [facility]  
   
                                        Start: 2021   COVID-19 VACCINE (2   
-   
Booster for Gilbert   
series)                                 COVID-19 VACCINE (2 -   
Booster for Gilbert   
series)                                 [facility]  
   
                                        Start: 2020   Urine microalbumin   
profile                                             [facility]  
   
                                        Start: 2018   Adult depression   
screening assessment      DEPRESSION SCREENING      [facility]  
   
                          Start: 2016 HEPATITIS C SCREENING HEPATITIS C [facility]  
   
                          Start: 2016 Hepatitis C screening Hepatitis C [facility]  
   
                          Start: 2016 HIV SCREENING HIV SCREENING [facility]  
   
                          Start: 2016 HIV screening HIV Screening [facility]  
[facility]  
   
                                        Start: 2012   PEDS TO ADULT   
TRANSITION ANNUAL   
ASSESSMENT                              PEDS TO ADULT TRANSITION   
ANNUAL ASSESSMENT                       [facility]  
   
                                        Start: 2010   PEDS TO ADULT   
TRANSITION INITIAL   
DISCUSSION                              PEDS TO ADULT TRANSITION   
INITIAL DISCUSSION                      [facility]  
   
                                        Start: 2008   MENINGOCOCCAL B:   
Consider based on risk   
(1 of 2 - Risk Bexsero   
2-dose series)                          MENINGOCOCCAL B:   
Consider based on risk   
(1 of 2 - Risk Bexsero   
2-dose series)                          [facility]  
   
                                        Start: 2004   Pneumococcal Vaccine  
:   
Pediatrics (0 to 5   
Years) and At-Risk   
Patients (6 to 64   
Years) (1 of 2 - PCV)                   Pneumococcal Vaccine:   
Pediatrics (0 to 5   
Years) and At-Risk   
Patients (6 to 64 Years)   
(1 of 2 - PCV)                          [facility]  
   
                          Start: 1998 Lipid panel  Lipid Panel  [facility]  
th  
   
                                                            PAP FLUID CERVICAL   
SCREENING                               PAP FLUID CERVICAL   
SCREENING Lab Routine   
Encounter for screening   
for malignant neoplasm   
of cervix 2022   
8:07 AM EDT                             [facility]  
Work Phone:   
[phone]  
   
                                                PELVIC US [facility]I   PELVIC US [facility] An  
c   
Imaging Routine   
Irregular intermenstrual   
bleeding Ordered:   
2022                              [facility]  
Work Phone:   
[phone]  
   
                                        Comment on above:   Ordered: 2022   
   
                                                                 [facility]  
c  
   
                                                                 [facility]  
c  
   
                                                                 [facility]  
c  
   
                                                                 [facility]  
c  
   
                                                                 [facility]  
c  
   
                                                                 [facility]  
c  
   
                                                                 [facility]  
c  
   
                                                                 [facility]  
c  
   
                                                                 [facility]  
  
  
  
  
Immunizations  
  
  
                      Immunization Date Immunization Notes      Care Provider Fa  
benjamin  
   
                                        2021          Gilbert SARS-CoV-2   
Vaccination                                         Monica Bridalysas   
APRN - CNP  
Work Phone:   
[phone]                          [facility]  
   
                                        2017          influenza, injectabl  
e,   
quadrivalent, contains   
preservative                                        Lauryn Ryan MD  
Work Phone:   
[phone]                          [facility]  
   
                                        2017          influenza virus vacc  
ine,   
unspecified formulation                             Monica Sanchez   
APRN - CNP  
Work Phone:   
[phone]                          [facility]  
   
                                        2014          meningococcal   
polysaccharide (groups   
A, C, Y and W-135)   
diphtheria toxoid   
conjugate vaccine   
(MCV4P)                                             Lauryn Ryan MD  
Work Phone:   
[phone]                          [facility]  
   
                                        2013          human papilloma viru  
s   
vaccine, quadrivalent                               Lauryn Rayn MD  
Work Phone:   
[phone]                          [facility]  
   
                                        2013          human papilloma viru  
s   
vaccine, quadrivalent                               Lauryn Ryan MD  
Work Phone:   
[phone]                          [facility]  
   
                                        2013          human papilloma viru  
s   
vaccine, quadrivalent                               Lauryn Ryan MD  
Work Phone:   
[phone]                          [facility]  
   
                                        2010          Meningococcal, MCV4,  
   
unspecified conjugate   
formulation(groups A, C,   
Y and W-135)                                        Lauryn Ryan MD  
Work Phone:   
[phone]                          [facility]  
   
                                        2010          tetanus toxoid, redu  
pastor   
diphtheria toxoid, and   
acellular pertussis   
vaccine, adsorbed                                   Lauryn Ryan MD  
Work Phone:   
[phone]                          [facility]  
   
                          2009   varicella virus vaccine              Marianne Ryan MD  
Work Phone:   
[phone]                          [facility]  
   
                                        2005          poliovirus vaccine,   
inactivated                                         Lauryn Ryan MD  
Work Phone:   
[phone]                          [facility]  
Work Phone:   
[phone]  
   
                                        2004          measles, mumps and   
rubella virus vaccine                               Lauryn Ryan MD  
Work Phone:   
[phone]                          [facility]  
Work Phone:   
[phone]  
   
                                        2004          diphtheria, tetanus   
toxoids and acellular   
pertussis vaccine                                   Lauryn Ryan MD  
Work Phone:   
[phone]                          [facility]  
Work Phone:   
[phone]  
   
                                        1999          DTaP-Haemophilus   
influenzae type b   
conjugate vaccine                                   Lauryn Ryan MD  
Work Phone:   
[phone]                          [facility]  
   
                                        1999          poliovirus vaccine,   
inactivated                                         Lauryn Ryan MD  
Work Phone:   
[phone]                          [facility]  
   
                          06-   varicella virus vaccine              Marianne Ryan MD  
Work Phone:   
[phone]                          [facility]  
   
                                        1999          measles, mumps and   
rubella virus vaccine                               Lauryn Ryan MD  
Work Phone:   
[phone]                          [facility]  
   
                                        1998          hepatitis B vaccine,  
   
pediatric or   
pediatric/adolescent   
dosage                                              Lauryn Ryan MD  
Work Phone:   
[phone]                          [facility]  
   
                                        1998          DTaP-Haemophilus   
influenzae type b   
conjugate vaccine                                   Lauryn Ryan MD  
Work Phone:   
[phone]                          [facility]  
   
                                        1998          poliovirus vaccine,   
inactivated                                         Lauryn Ryan MD  
Work Phone:   
[phone]                          [facility]  
   
                                        1998          DTaP-Haemophilus   
influenzae type b   
conjugate vaccine                                   Lauryn Ryan MD  
Work Phone:   
[phone]                          [facility]  
   
                                        1998          poliovirus vaccine,   
inactivated                                         Lauryn Ryan MD  
Work Phone:   
[phone]                          [facility]  
   
                                        1998          DTaP-Haemophilus   
influenzae type b   
conjugate vaccine                                   Lauryn Ryan MD  
Work Phone:   
[phone]                          [facility]  
   
                                        1998          hepatitis B vaccine,  
   
pediatric or   
pediatric/adolescent   
dosage                                              Lauryn Ryan MD  
Work Phone:   
[phone]                          [facility]  
   
                                        1998          poliovirus vaccine,   
inactivated                                         Lauryn Ryan MD  
Work Phone:   
[phone]                          [facility]  
   
                                        1998          hepatitis B vaccine,  
   
pediatric or   
pediatric/adolescent   
dosage                                              Lauryn Ryan MD  
Work Phone:   
[phone]                          [facility]  
  
  
  
Payers  
  
  
                          Date         Payer Category Payer        Policy ID  
   
                          2023   Unknown                   [id number]  
   
                                2022      Unknown         [employer]  
 PLUS [id number]   
3/12/2022-Present [phone]   
[address] [place], [place]   
[address] PPO                          [id number]   
[id number]  
[id number]  
   
                          2022   Unknown                   [id number]  
[id number]  
[id number]  
  
  
  
Social History  
  
  
                          Date         Type         Detail       Facility  
   
                                                    Start:   
08-  
End: 2023                         Tobacco smoking status   
NHIS                      Never smoked tobacco      [facility]  
Work Phone:   
[phone]  
   
                                                    Start:   
2022  
End: 2024           Alcohol intake            Current drinker of   
alcohol (finding)                       [facility]  
   
                                                    Start:   
05-                              History SDOH Alcohol   
Frequency                 3                         [facility]  
   
                                                    Start:   
05-                              History SDOH Alcohol Std   
Drinks                    1                         [facility]  
   
                                                    Start:   
05-          History SDOH Alcohol Binge 2                   [facility]  
rob  
   
                                                    Start:   
05-                              History SDOH Social   
Connections Phone         5                         [facility]  
   
                                                    Start:   
05-                              History SDOH Social   
Connections Living        7                         [facility]  
   
                                                    Start:   
05-                              History SDOH Physical   
Activity DPW              4                         [facility]  
   
                                                    Start:   
05-                              History SDOH Physical   
Activity MPS              6                         [facility]  
   
                                                    Start:   
05-          Education           14                  [facility]  
   
                                                    Start:   
1998          Sex Assigned At Birth Female              [facility]  
   
                                                    Start:   
2022  
End: 2022                         Exposure to SARS-CoV-2   
(event)                   Not sure                  [facility]  
   
                                                    Start:   
08-                Tobacco use and exposure  Smokeless tobacco   
non-user                                [facility]  
   
                                                    Start:   
05-  
End: 2023     History of Social function                     [facility]  
rob  
   
                                                    Start:   
05-  
End: 2023                         Social connection and   
isolation panel                                     [facility]  
   
                                                            Do you belong to any  
 clubs   
or organizations such as   
P[employer]ism groups, unions,   
fraternal or athletic   
groups, or school groups? Yes                       [facility]  
   
                                                            Are you now ,  
   
, ,   
, never    
or living with a partner? Never              [facility]  
   
                                                            How often to you hav  
e a   
drink containing alcohol? 2-4 times a month         [facility]  
   
                                                            How many standard dr  
inks   
containing alcohol do you   
have on a typical day?    1 or 2                    [facility]  
   
                                                            How often do you hav  
e 6 or   
more drinks on 1 occasion? Less than monthly         [facility]  
   
                                                            How hard is it for y  
ou to   
pay for the very basics   
like food, housing,   
medical care, and heating Not very hard             [facility]  
   
                                                            Do you feel stress -  
   
tense, restless, nervous,   
or anxious, or unable to   
sleep at night because   
your mind is troubled all   
the time - these days   
[OSQ]                     Rather much               [facility]  
   
                                                            (I/We) worried angelica  
er   
(my/our) food would run   
out before (I/we) got   
money to buy more.        Never true                [facility]  
   
                                                            In the past 12 month  
s, has   
lack of transportation   
kept you from medical   
appointments or from   
getting medications?      No                        [facility]  
   
                                                            In the past 12 month  
s, was   
there a time when you were   
not able to pay the   
mortgage or rent on time? No                        [facility]  
   
                                                    Start:   
2022                Gender identity           Identifies as female   
gender (finding)                        [facility]  
   
                                                    Start:   
2022          Sexual orientation  Heterosexual (finding) [facility]  
   
                                                    Start:   
2023                Tobacco use and exposure  User of smokeless   
tobacco                                 [facility]  
   
                                       History of tobacco use Chews [facility]  
[facility]  
   
                                                    Start:   
2023          Tobacco Comment     Nicotine pouch,     [facility]  
   
                                                    Start:   
2023          Alcohol Comment     occ.                [facility]  
  
  
  
Clinical Notes 2013 to 2024  
  Assessment & Plan Note - SHANEKA Chacon CNP - 2024 4:40 PM   
EDTAssessment & Plan Note - SHANEKA Chacon CNP - 2024 4:40 PM 
EDTPatient Instructions  
  
                                Note Date & Type Note            Facility  
   
                                                    2024 Evaluation + Plan  
   
note                                    Associated Problem(s): Anxiety and   
depression  
Formatting of this note might be   
different from the original.  
Stable. Continue Wellbutrin 300 mg   
daily  
Electronically signed by SHANEKA Chacon CNP at   
2024 4:40 PM EDT  
                                        [facility]  
   
                                                    2024 Evaluation + Plan  
   
note                                    Associated Problem(s): Skin   
sensation disturbance  
Formatting of this note might be   
different from the original.  
Left foot exam unremarkable and   
currently asymptomatic. Likely   
superficial nerve inflammation.   
Recommend avoiding shoes that are   
tight across the top of the foot,   
ice and elevate 2-3 times daily   
and follow-up if fails to resolve.   
Unknown etiology at this time   
otherwise  
Electronically signed by SHANEKA Chacon CNP at   
2024 4:40 PM EDT  
                                        [facility]  
   
                                                    2024 Miscellaneous   
Notes                                   Associated Problem(s): Anxiety and   
depression  
Formatting of this note might be   
different from the original.  
Stable. Continue Wellbutrin 300 mg   
daily  
Electronically signed by SHANEKA Chacon CNP at   
2024 4:40 PM EDT  
Associated Problem(s): Skin   
sensation disturbance  
Formatting of this note might be   
different from the original.  
Left foot exam unremarkable and   
currently asymptomatic. Likely   
superficial nerve inflammation.   
Recommend avoiding shoes that are   
tight across the top of the foot,   
ice and elevate 2-3 times daily   
and follow-up if fails to resolve.   
Unknown etiology at this time   
otherwise  
Electronically signed by SHANEKA Chacon CNP at   
2024 4:40 PM EDT  
documented in this encounter            [facility]  
   
                                                    2024 History of   
Present illness Narrative               Formatting of this note might be   
different from the original.  
Patient was identified by name and   
Date of Birth.  
  
Electronically signed by Mandi D'Amico at 2024 4:41 PM EDT  
Formatting of this note is   
different from the original.  
Images from the original note were   
not included.  
  
  
2024  
  
Don Mittal (: 2000) is a   
24 y.o. female , Established   
patient, here for evaluation of   
the following chief complaint(s):  
Medication Check and Foot Problem  
  
ASSESSMENT/PLAN:  
  
1. Anxiety and depression  
Assessment & Plan:  
Stable. Continue Wellbutrin 300 mg   
daily  
2. Skin sensation disturbance  
Assessment & Plan:  
Left foot exam unremarkable and   
currently asymptomatic. Likely   
superficial nerve inflammation.   
Recommend avoiding shoes that are   
tight across the top of the foot,   
ice and elevate 2-3 times daily   
and follow-up if fails to resolve.   
Unknown etiology at this time   
otherwise  
  
Follow up in about 6 months   
(around 2024) for Yearly   
Wellness Visit.  
  
SUBJECTIVE/OBJECTIVE:  
  
Hospitals in [place] -  
Don Mittal (: 2000) is a   
24 y.o. female , Established   
patient, here for the evaluation   
of the following chief   
complaint(s):  
Medication Check and Foot Problem  
  
Anxiety/depression- Doing pretty   
well, wellbutrin  mg daily.   
Denies any SI or HI. Not doing   
counseling. Reports being able to   
handle stress better and not   
feeling as overwhelmed.  
  
Left foot problem:  
Is having a hot sensation/tingling   
to the top of the left foot and   
numbness. No known trigger. No   
known injury. Happening for about   
1.5 weeks, will happen   
intermittently through the day   
(?50 times or so)- lasts only for   
a few seconds or so.  
No swelling or redness noted.  
Currently asymptomatic  
  
Prior to Admission medications  
Medication Sig Start Date End Date   
Taking? Authorizing Provider  
buPROPion XL (Wellbutrin XL) 300   
MG 24 hr tablet take 1 tablet by   
mouth every morning DO NOT CRUSH,   
CHEW, AND/OR DIVIDE 24 Yes   
SHANEKA Phan - CNP  
Juleber 0.15-30 MG-MCG tablet Take   
1 tablet by mouth daily. Yes   
Historical Provider, MD  
metFORMIN (Glucophage) 500 MG   
tablet Take 1 tablet by mouth in   
the morning and 1 tablet in the   
evening. Take with meals. 24 Yes Historical Provider,   
MD  
naltrexone (Depade) 50 MG tablet   
Take 25 mg by mouth in the morning   
and 25 mg in the evening. Yes   
Historical Provider, MD  
Prenatal Multivit-Min-Fe-FA   
(PRENATAL/IRON PO) Take 1 tablet   
by mouth in the morning. Yes   
Historical Provider, MD  
Prenatal MV-Min-Fe Fum-FA-DHA   
(PRENATAL 1 PO) Take by mouth.   
Historical Provider, MD  
  
  
  
Review of Systems  
Constitutional: Negative for   
activity change, chills, fatigue   
and fever.  
Respiratory: Negative.  
Cardiovascular: Negative.  
Gastrointestinal: Negative.  
Genitourinary: Negative for   
difficulty urinating.  
Musculoskeletal:  
Left foot.  
Psychiatric/Behavioral: Positive   
for decreased concentration.   
Negative for agitation, dysphoric   
mood and sleep disturbance. The   
patient is not nervous/anxious.  
  
Vitals:  
24 1307  
BP: 129/85  
Pulse: 102  
Resp: 18  
Temp: 36.9 C (98.4 F)  
TempSrc: Infrared  
SpO2: 97%  
Weight: 161 lb 9.6 oz (73.3 kg)  
  
Physical Exam  
Constitutional:  
General: She is not in acute   
distress.  
Appearance: Normal appearance. She   
is not ill-appearing.  
HENT:  
Head: Normocephalic and   
atraumatic.  
Cardiovascular:  
Rate and Rhythm: Normal rate and   
regular rhythm.  
Pulses: Normal pulses.  
Heart sounds: Normal heart sounds.  
Pulmonary:  
Effort: Pulmonary effort is   
normal.  
Breath sounds: Normal breath   
sounds.  
Musculoskeletal:  
Right foot: Normal.  
Left foot: Normal.  
Comments: Left foot exam normal  
Skin:  
General: Skin is warm and dry.  
Neurological:  
Mental Status: She is alert and   
oriented to person, place, and   
time.  
Psychiatric:  
Mood and Affect: Mood normal.  
Behavior: Behavior normal.  
Thought Content: Thought content   
normal.  
Judgment: Judgment normal.  
  
An electronic signature was used   
to authenticate this note.  
  
SHANEKA Rao CNP  
2024  
4:40 PM  
Electronically signed by SHANEKA Chacon CNP at   
2024 4:41 PM EDT  
documented in this encounter            [facility]  
   
                                        2024 Instructions   
  
  
SHANEKA Chacon CNP -   
2024 1:00 PM EDTFormatting   
of this note might be different   
from the original.  
Ice and elevate foot couple times   
a day, be careful not to wear   
shoes that pinch/pressure to much   
on top of foot.  
Electronically signed by SHANEKA Chacon CNP at   
2024 1:25 PM EDT  
  
documented in this encounter            [facility]  
   
                                        2024 Note     HNO ID: [id number]  
Author: ERNESTINE SHER APRN.CNP  
Service: ?  
Author Type: Nurse Practitioner  
Type: Progress Notes  
Filed: 2024 08:47  
Note Text:  
Some documentation from previous   
visit of 2024 was copied and   
pasted,  
documentation has been reviewed   
and edited as necessary for   
today's visit.  
Patient Summary: Don is a 24   
year old female who presents for   
follow-up  
evaluation of her obesity/weight   
management to treat PCOS, IFG,   
elevated LDL,  
anxiety and depression and prevent   
relatedco-morbidities. In our   
previous  
visits we have discussed lifestyle   
intervention including a nutrition  
recommendations and physical   
activity optimization. Her last   
office visit was 1  
month ago.  
Irregular menses - Menses   
2023 LMP 1/2-3 spotting  
No contraception.  
20 lb weight gain with Lexapro for   
anxiety. Changed to bupropion by   
PCP plans  
to discuss increasing dose with   
PCP  
Assessment/plan from last visit:  
Metformin 1 gm twice a day with   
meals - Adjusted to 500 mg at   
breakfast and 1 gm  
at dinner but has had frequent   
diarrhea since increasing to 1 gm   
twice a day 3  
weeks ago.  
History of anorexia and bulimia in   
[place] - no treatment, In remission   
since age 15.  
Dad helped her and she started   
adding protein shakes.  
Interval History -  
Awake - 0700  
B - 0745 Premier Protein shake  
S - none  
L - 1/2 cup cottage cheese with   
fruit  
S - none  
D -  protein,   
pasta/potato/rice/sweet potato and   
veg - asparagus, cucumber  
salad, winter and summer   
squash/salad  
S - none  
Fluids - water, unsweetened tea  
Bedtime -   
She feels the medication is   
helping to lessen hunger and   
craving to candy  
controlled  
Exercise: stable sedentary job at   
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  
Stress: increased -    
wearing heart monitor  
Sleep: 7 hours, up to go to   
bathroom a few times LASHAWN -no  
Weight gain since last vist: 2 lbs   
since last visit  
3/21/2024 167 lb BMI: 28.67  
2024 169 lb  
2024 166 lb metformin  
CrCl cannot be calculated   
(Patient's most recent lab result   
is older than the  
maximum 180 days allowed.).  
PAST MEDICAL HISTORY  
Diagnosis Date  
Exertional asthma 2012  
Generalized anxiety disorder  
IFG (impaired fasting glucose)   
2024  
Medial meniscus tear 2012  
PCOS (polycystic ovarian syndrome)   
  
Unspecified otitis media  
Recurrent otitis  
Vocal cord dysfunction 05/15/2012  
Current Outpatient Medications  
Medication Sig Dispense Refill  
metFORMIN (GLUCOPHAGE) 500 mg   
tablet Take 2 tablets by mouth two   
times a day  
with meals. 360 tablet 0  
buPROPion XL (WELLBUTRIN XL) 150   
mg 24 hr tablet Take 150 mg by   
mouth every  
morning.  
PNV/iron/folic acid (PRENATAL   
VITAMIN-IRON-FA ORAL) Take 1   
tablet by mouth once  
daily.  
albuterol HFA (PROVENTIL HFA,   
VENTOLIN HFA) 90 mcg/actuation   
inhaler Inhale 2  
Puffs as instructed every 4 hours   
as needed. 1 Inhaler 0  
No current facility-administered   
medications for this visit.  
Occupation: bank  
Contraception: none  
/82   Pulse 80   Wt 167 lb   
(75.8 kg)   LMP 2024   SpO2   
98%    
BMI 28.67 kg/m?  
Assessment/Plan:  
Don Mittal is a 24 year old yo   
female with overweight   
(pre-obesity) who  
presented today for follow up for   
supervised weight loss to treat   
PCOS, IFG,  
elevated LDL, anxiety and   
depression and prevent related   
co-morbidities.  
ASSESSMENT/PLAN:  
1. PCOS (polycystic ovarian   
syndrome) - ICD9: 256.4, ICD10:   
E28.2 (primary  
diagnosis)  
- Whole food balanced protein   
low-carb nutrition  
- METFORMIN 500 MG TABLET  
- DESOGESTREL 0.15 MG-ETHINYL   
ESTRADIOL 0.03 MG TABLET  
- NALTREXONE 50 MG TABLET  
2. Irregular menses - ICD9: 626.4,   
ICD10: N92.6  
- DESOGESTREL 0.15 MG-ETHINYL   
ESTRADIOL 0.03 MG TABLET  
3. Anxiety and depression - ICD9:   
300.00, 311, ICD10: F41.9, F32.A  
- Continue bupropion prescribed by   
PCP- she plans to discuss   
increasing dose  
with PCP  
4. History of bulimia - ICD9:   
V11.8, ICD10: Z86.59  
- in remission since age 15  
5. History of anorexia nervosa -   
ICD9: V11.8, ICD10: Z86.59  
- in remission since age 15  
6. Provided repeat prescription   
for oral contraceptive - ICD9:   
V25.41, ICD10:  
Z30.41  
- RX for Apri given today.  
- discussed with patient on how to   
take OCP's.Given written   
information  
- counseled on benefits, risks and   
possible severe side effects of   
OCP's.  
- discussed need to use Condoms to   
help to prevent STD's including   
HIV etc.  
- DESOGESTREL 0.15 MG-ETHINYL   
ESTRADIOL 0.03 MG TABLET  
7. Overweight with body mass index   
(BMI) of 29 to 29.9 in adult -   
ICD9: 278.02,  
V85.25, ICD10: E66.3, Z68.29  
- METFORMIN 500 MG TABLET -   
decrease to 500 mg with breakfast   
and 1 gm with  
dinner due to diarrhea with 1 gm   
twice daily  
- DESOGESTREL 0.15 MG-ETHINYL   
ESTRADIOL 0.03 MG TABLET  
- NALTREXONE 50 MG TABLET  
Agreeable to adding naltrexone to   
Wellbutrin to mimic the effect of   
Contrave.  
Confirmed no   
allergies/contraindications to   
naltrexone. Reviewed potential   
side  
effects. Patient will notify me if   
there are any adverse effec (more   
content not included)...                [facility]  
   
                                        2024 Instructions   
  
  
Ernestine Sher APRN.CNP -   
2024 8:28 AM EDTFormatting   
of this note is different from the   
original.  
  
The addition of naltrexone to your   
current buprioprion prescription   
will mimic the brand name weight   
loss drug called Contrave.  
  
Here is a link to the brand name   
medication:  
https://contrave.com/about/  
  
AM PM (early afternoon)  
Week 1 Naltrexone 12.5 mg (1/4   
tablet) None  
Week 2 Naltrexone 12.5 mg (1/4   
tablet) Naltrexone 12.5 mg (1/4   
tablet)  
Week 3 Naltrexone 25 mg (1/2   
tablet) Naltrexone 25 mg (1/2   
tablet)  
Week 4 Naltrexone 25 mg (1/2   
tablet) Naltrexone 25 mg (1/2   
tablet)  
  
- Please take your bupropion as   
usual, these medications need to   
work together to formulate the   
desired effect.  
- If at anytime you feel a   
positive effect, you can stay at   
that dose and do not need to   
increase.  
- Please stop the medication if   
you develop symptoms that are   
concerning.  
  
Take low dose naltrexone to:  
  
Regulate appetite: Naltrexone   
helps normalize your metabolism,   
matching your appetite to resting   
energy expenditures.  
  
Reduce insulin resistance:   
Naltrexone modulates cellular   
resistance to insulin, which may   
lead to weight loss.  
  
Improve sleep: Lack of sleep has   
negative effects on your body s   
hormonal system, which can lead to   
weight gain. Naltrexone combats   
this unhealthy cycle.  
  
Improve mood: Combination LDN   
weight loss medications trigger an   
increase in serotonin and dopamine   
production, which decreases   
anxiety and stress and reduces   
emotional eating.  
  
https://www.Unbxd.Truviso/na  
ltrexone#:~:text=Regulate%20appeti  
te%3A%20Naltrexone%20helps%20norma  
lize,may%20lead%20to%20weight%20lo  
ss.  
  
Does Wellbutrin cause weight loss?  
It can. Bupropion (the generic   
form of Wellbutrin) was initially   
prescribed as an antidepressant.   
It is the only antidepressant   
associated with weight loss   
(Carolyne, 2019). Healthcare   
providers noticed that mostly   
pleasant side effect, and today   
bupropion is sometimes prescribed   
as part of a medication for weight   
loss (naltrexone/bupropion, brand   
name Contrave), as well as a   
stop-smoking aid (brand name   
Zyban).  
  
As far as the evidence that   
bupropion by itself causes weight   
loss:  
  
A 2016 study that analyzed the   
long-term weight loss effect of   
various antidepressant medications   
found that non-smokers who took   
bupropion lost 7.1 pounds over two   
years. (This effect was not seen   
in smokers). Users of the other   
antidepressants in the study   
gained weight (Karine, 2016).  
Bupropion seems to be effective   
for weight-loss maintenance as   
well. A 2012 study found that   
obese adults who took bupropion SR   
(standard release) in 300mg or   
400mg doses lost 7.2% and 10% of   
their body weight, respectively,   
over 24 weeks and maintained that   
weight loss at 48 weeks (Zurdo,   
2012).  
And a 2019 review of 27 studies on   
antidepressants and weight gain   
found that antidepressant use   
increases body weight by an   
average of 5%--except bupropion,   
which is associated with weight   
loss (Raudle-Pedremanuela, 2019).  
  
https://ro.co/health-guide/wellbut  
rin-for-weight-loss/  
  
Bupropion and naltrexone: Patient   
drug information  
Access [employer] Online for   
additional drug information,   
tools, and databases.  
Copyright 7419-9173 [employer]   
All rights reserved.  
(For additional information see   
 Bupropion and naltrexone: Drug   
information )  
  
Contrave (naltrexone/bupropion)   
Patient Information  
  
Who Is Contrave For?  
Contrave is a medication for   
chronic weight management. It is   
for people with overweight and   
weight-related complications or   
obesity. It is meant to be used   
together with a lifestyle therapy   
regimen involving a reduced   
calorie diet and increased   
physical activity.  
  
How Does Contrave Work?  
Contrave works in the brain as an   
appetite suppressant.  
  
Who Should Not Take Contrave?   
Women who are pregnant, nursing,   
or planning to become pregnant  
People who have uncontrolled high   
blood pressure  
People who have or have had   
seizures  
People with a history of eating   
disorders such as anorexia nervosa   
or bulimia  
People who have glaucoma or are at   
risk for glaucoma  
People who used to drink a lot of   
alcohol and abruptly stop drinking  
People who use other medications   
containing bupropion, such as   
Wellbutrin or  
Aplenzin  
People who are taking opioid pain   
relievers or are in opioid   
withdrawal, or use  
medicines to help stop taking   
opioids such as methadone or   
buprenorphine  
People who are taking a monoamine   
oxidase inhibitor (MAOI) now or   
have taken one  
within the past 14 days  
  
Do not take Contrave with a high   
fat meal.  
Swallow Contrave tablets whole. Do   
not cut, chew, or crush Contrave   
tablets.  
If you miss a dose of Contrave,   
wait until your next regular time   
to take it. Do not take more than   
1 dose of Contrave at a time.  
  
Is Contrave a Controlled   
Substance?  
No, Contrave is not a controlled   
substance.  
Which Medications Might Not Be   
Safe to Use with Contrave?  
Contrave can affect how other   
medicines work in your body, and   
other medicines can affect how   
Contrave works. Tell your doctor   
about all the medicines and   
supplements you take, especially:  
Carbamazepine, phenobarbital, or   
phenytoin--usually given to treat   
seizures  
Efavirenz, lopinavir, or   
ritonavir--used to treat HIV   
infection Antidepressants  
Pain medications  
What Are the Most Common Side   
Effects of Contrave?  
Nausea or vomiting  
Dizziness  
Changes in the way foods taste or   
loss of taste  
Trouble sleeping  
Constipation or diarrhea  
Headache  
Dry mouth  
  
What Are the Possible Serious Side   
Effects of Contrave?  
Suicidal Thoughts or Actions  
One of the ingredients in Contrave   
is bupropion, which has caused   
some people to have suicidal   
thoughts or actions or unusual   
changes in behavior, whether or   
not they are taking medicines used   
to treat depression.  
If you already have depression or   
another mental illness, taking   
bupropion may cause your condition   
to get worse, especially within   
the first few months of treatment.   
Let your doctor know if you   
experience an increase in symptoms   
of depression, anxiety,   
irritability, suicidal thoughts,   
agitation, anger, or other unusual   
changes in behavior or mood.  
Seizures  
There is a risk of having a   
seizure when you take Contrave.   
The risk of seizure is higher in   
people who take higher doses of   
Contrave, have certain medical   
conditions, or take Contrave with   
certain other medicines. If you   
have a seizure while taking   
Contrave, stop taking Contrave and   
call your doctor right away. Do   
not take Contrave again if you   
have a seizure.  
2  
  
Contrave (naltrexone/bupropion)   
Patient Information  
  
Risk of Opioid Overdose  
One of the ingredients in Contrave   
(naltrexone) can increase your   
chance of having an opioid   
overdose if you take opioid   
medicines while taking Contrave.   
Opioids are common pain   
medications. Do not use any opioid   
medications while taking Contrave.   
Using opioids in the 7 to 10 days   
before you start taking Contrave   
may cause you to suddenly have   
symptoms of opioid withdrawal when   
you take it. Tell your doctor if   
you have used these medications in   
the past 10 days. Inform your   
doctor that you are taking   
Contrave before any medical   
procedure or surgery.  
  
Severe Allergic Reactions  
Some people have had a severe   
allergic reaction to bupropion,   
one of the ingredients in   
Contrave. Stop taking Contrave and   
call your doctor or go to the   
nearest hospital emergency room   
right away if you have signs and   
symptoms of an allergic reaction   
such as rash, itching, swelling of   
the lips or tongue, fever, chest   
pain, or trouble breathing.  
Increases in Blood Pressure or   
Heart Rate  
Some people may get high blood   
pressure or have a higher heart   
rate when taking Contrave. Your   
doctor should check your blood   
pressure and heart rate before you   
start taking Contrave and while   
you take it.  
  
Liver Damage or Hepatitis  
One of the ingredients in   
Contrave--naltrexone--can cause   
liver damage or hepatitis. Stop   
taking Contrave and tell your   
doctor if you have any signs or   
symptoms of liver problems such as   
stomach pain, dark urine, yellow   
eyes (jaundice), or extreme   
fatigue.  
  
Manic Episodes  
One of the ingredients in   
Contrave--bupropion--can cause   
some people who were manic or   
depressed in the past, or who have   
bipolar disease, to become manic   
or depressed again.  
Vision Problems (Angle-Closure   
Glaucoma)  
One of the ingredients in   
Contrave--bupropion--can cause   
some people to have problems with   
their vision (angle-closure   
glaucoma). Signs and symptoms of   
angle-closure glaucoma may include   
eye pain, vision changes, or   
swelling or redness in or around   
the eye. Ask your eye doctor if   
you are at risk for angle-closure   
glaucoma and do not use Contrave   
if you are at risk.  
  
Low Blood Sugar (Hypoglycemia)  
Weight loss can cause low blood   
sugar in people with type 2   
diabetes who also take medicines   
used to treat type 2 diabetes   
(such as insulin or   
sulfonylureas). You should check   
your blood sugar before you start   
taking Contrave and while you take   
Contrave.  
  
This is not intended to be a   
complete list. For additional   
information please see the   
 s website:   
https://www.Faraday.Truviso.  
  
Oral Contraceptives: The Pill  
  
Beginning the Pill  
  
Pills come in either a 21 day pack   
or a 28 day pack. With the 21 day   
pack you will take one pill for 21   
days then no pill for 7 days,   
during which time you will have   
what is known as withdrawal   
bleeding. The 28 day pack allows   
you to take a pill every day of   
the cycle with no interruptions.   
The first 21 pills are the pills   
with the active ingredients and   
the last 7 are the nonmedical   
pills (placebo) or they may   
contain iron. There will be   
bleeding during the week you are   
taking the nonmedical pills. The   
advantage to the 28 day pack is   
that you don t have to keep track   
of when you stopped the pill.   
There are a group of 28 day pills   
that contain 24 active pills and   
only 4 placebo pills. These are   
formulated to give you a lighter   
period.  
  
Unless otherwise instructed, you   
should start your pills the    
following your first day of   
bleeding with your next period (if   
your period starts on a ,   
you should start pills the same   
day)  
Read your information packet that   
comes with the pills.  
  
Pill Benefits  
  
The pill is the most popular   
method of reversible birth control   
being used today. Millions of   
women rely on oral contraceptives   
as their birth control method. It   
is important to have an   
examination by your physician to   
determine if the pill is safe for   
you. There are several advantages   
associated with the pill: it is   
97-98% effective when used   
correctly; may improve acne;   
periods are more regular and less   
painful; there is less iron   
deficiency anemia in pill users.   
Long term use is associated with a   
decreased incidence of ovarian and   
uterine cancer. There is also no   
evidence that the pill increases   
the incidence of any cancer.  
  
How Oral Contraceptives Work  
  
Oral contraceptives come in two   
varieties. One is the combination   
pill which contains both estrogen   
and progesterone. Combination   
pills are considered 98-99%   
effective in preventing pregnancy.   
This pill comes in either   
monophasic, which delivers the   
same amount of estrogen and   
progesterone throughout the cycle;   
and triphasic, which try tries to   
mimic the normal hormone cycle by   
changing the levels of the   
hormones in the pills during the   
month. There is no real advantage   
to taking the one over the other.   
The other type of pill only   
contains progesterone. It is best   
used for women who can t take   
estrogen. This type of pill is   
slightly less effective than the   
combination pill in preventing   
pregnancy. It is VERY important to   
take the progesterone only pill at   
the same time every day. Oral   
contraceptives prevent ovulation   
(release of an egg from the ovary)   
by suppressing the pituitary gland   
s action. The pill does NOT   
prevent sexually transmitted   
disease.  
  
Obtaining a Prescription  
It is important to see your doctor   
before starting oral   
contraceptives so that you can   
have a full medical history taken   
and a physical examination given.   
Certain medical conditions may   
make the pill inappropriate for   
you, therefore it is very   
important to be honest and as   
complete as possible with the   
information you share with your   
doctor.  
  
The types of predisposing factors   
which would make the pill a poor   
choice of birth control would   
include:  
History of blood clots  
Stroke  
Serious liver disease or impaired   
liver function  
Unexplained vaginal bleeding or   
pregnancy  
Cancer of the reproductive system  
Active gall bladder disease  
Hypertension  
  
Possible Side Effects  
  
It can take up to three months for   
your body to become adjusted to   
the pill. The more common side   
effects experienced at this time   
are: breakthrough spotting or   
bleeding, which is bleeding at any   
other time other than when you   
should be having a period; nausea   
or vomiting; breast tenderness;   
and mild fluid retention. There is   
no long term weight gain with the   
use of the pill. Breakthrough   
bleeding is the most common   
complaint of new pill users. There   
is no way to predict who will have   
it and there is no way of   
preventing it. Breakthrough   
bleeding usually subsides on its   
own with no further treatment   
after the first three months of   
taking the pill. If these symptoms   
continue to occur after the first   
three months you should check with   
your physician to see if there is   
any physical cause and possibly   
change to another birth control   
pill.  
  
Problems:  
  
Missed 1 pill: Take 2 pills the   
next day.  
Missed 2 pills: Take 2 pills the   
next day and 2 pills the following   
day. Also use another form of   
birth control (condoms) along with   
the pill for the rest of the   
month.  
Missed 3 or more pills: You have   
two choices. You can take two   
pills each day until you are on   
schedule, plus use an additional   
form of birth control along with   
the pill for the rest of the   
month. Or you can stop the pill   
and start a completely new pack of   
pills the next . You must   
use another form of birth control   
with the pill for at least the   
first two weeks of the new pack.  
You re ill and you have been   
vomiting or have diarrhea: You   
must use another form of birth   
control with the pill since the   
pill may not be fully absorbed   
during your illness. Continue to   
use the added birth control until   
the end of the cycle.  
Desire to become pregnant: Stop   
using the pill for one month   
before trying to become pregnant.  
Taking other medications: The   
birth control pill is less   
effective when you take the   
antibiotic Rifampin, epilepsy   
(seizure) drugs such as phenytoin,   
carbamazepine, phenobarbital,   
topiramate and some medications   
for HIV. Let your doctor know if   
you start taking any of these   
medications while on the pill.  
  
Symptoms to Notify Your Doctor   
with Immediately:  
  
Pain in your chest or legs  
Continuous blurred vision  
Severe headaches  
Slurred speech  
Tingling or weakness on one side   
of your body  
Shortness of breath  
Swelling of one leg  
  
Refills of Birth Control Pills  
  
You need to see a doctor every   
year for a refill of your   
prescription. This is necessary in   
order that your health can be   
monitored closely while you are   
taking birth control pills. If   
your prescription should    
before your next scheduled   
appointment you can usually get a   
one month extension from your   
doctors office if you call during   
regular business hours about one   
week before you need to start the   
new package of pills. This allows   
the physician to refer to your   
chart for necessary health   
information.  
  
Bupropion: Patient drug   
information  
  
Access [employer] for   
additional drug information,   
tools, and databases.  
Copyright 0887-7644 [employer]   
All rights reserved.  
Contributor Disclosures  
(For additional information see   
 Bupropion: Drug information  and   
see  Bupropion: Pediatric drug   
information )  
  
You must carefully read the   
 Consumer Information Use and   
Disclaimer  below in order to   
understand and correctly use this   
information.  
  
Brand Names: [place]  
Aplenzin;  
Forfivo XL;  
Wellbutrin SR;  
Wellbutrin XL;  
Zyban [DSC]  
  
Brand Names: Yenny  
MYLAN-BuPROPion XL;  
ODAN Bupropion SR;  
PMS-BuPROPion SR;  
RATIO-BuPROPion SR [DSC];  
TARO-Bupropion XL;  
TEVA-Bupropion XL;  
Wellbutrin SR;  
Wellbutrin XL;  
Zyban  
  
Warning  
Drugs like this one have raised   
the chance of suicidal thoughts or   
actions in children and young   
adults. The risk may be greater in   
people who have had these thoughts   
or actions in the past. All people   
who take this drug need to be   
watched closely. Call the doctor   
right away if signs like low mood   
(depression), nervousness,   
restlessness, grouchiness, panic   
attacks, or changes in mood or   
actions are new or worse. Call the   
doctor right away if any thoughts   
or actions of suicide occur.  
  
What is this drug used for?  
It is used to treat low mood   
(depression).  
It is used to prevent seasonal   
affective disorder (SAD).  
It is used to help you stop   
smoking.  
It may be given to you for other   
reasons. Talk with the doctor.  
  
What do I need to tell my doctor   
BEFORE I take this drug?  
If you are allergic to this drug;   
any part of this drug; or any   
other drugs, foods, or substances.   
Tell your doctor about the allergy   
and what signs you had.  
If you have ever had seizures.  
If you drink a lot of alcohol and   
you stop drinking all of a sudden.  
If you use certain other drugs   
like drugs for seizures or anxiety   
and you stop using them all of a   
sudden.  
If you have ever had an eating   
problem like anorexia or bulimia.  
If you have any of these health   
problems: Kidney disease or liver   
disease.  
If you have taken certain drugs   
for depression or Parkinson's   
disease in the last 14 days. This   
includes isocarboxazid,   
phenelzine, tranylcypromine,   
selegiline, or rasagiline. Very   
high blood pressure may happen.  
If you are taking any of these   
drugs: Linezolid or methylene   
blue.  
If you are taking another drug   
that has the same drug in it.  
This is not a list of all drugs or   
health problems that interact with   
this drug.  
Tell your doctor and pharmacist   
about all of your drugs   
(prescription or OTC, natural   
products, vitamins) and health   
problems. You must check to make   
sure that it is safe for you to   
take this drug with all of your   
drugs and health problems. Do not   
start, stop, or change the dose of   
any drug without checking with   
your doctor.  
  
What are some things I need to   
know or do while I take this drug?  
For all patients taking this drug:  
Tell all of your health care   
providers that you take this drug.   
This includes your doctors,   
nurses, pharmacists, and dentists.  
Avoid driving and doing other   
tasks or actions that call for you   
to be alert or have clear eyesight   
until you see how this drug   
affects you.  
This drug may affect certain lab   
tests. Tell all of your health   
care providers and lab workers   
that you take this drug.  
Do not stop taking this drug all   
of a sudden without calling your   
doctor. You may have a greater   
risk of side effects. If you need   
to stop this drug, you will want   
to slowly stop it as ordered by   
your doctor.  
High blood pressure has happened   
with this drug. Have your blood   
pressure checked as you have been   
told by your doctor.  
This drug may raise the chance of   
seizures. The risk may be higher   
in people who take higher doses,   
have certain health problems, or   
take certain other drugs. People   
who suddenly stop drinking a lot   
of alcohol or suddenly stop taking   
certain drugs (like drugs used for   
anxiety, sleep, or seizures) may   
also have a higher risk. Talk to   
your doctor to see if you have a   
greater chance of seizures.  
Avoid drinking alcohol while   
taking this drug.  
Talk with your doctor before you   
use marijuana, other forms of   
cannabis, or prescription or OTC   
drugs that may slow your actions.  
It may take several weeks to see   
the full effects.  
This drug is not approved for use   
in children. Talk with the doctor.  
If you are 65 or older, use this   
drug with care. You could have   
more side effects.  
Tell your doctor if you are   
pregnant, plan on getting   
pregnant, or are breast-feeding.   
You will need to talk about the   
benefits and risks to you and the   
baby.  
If you smoke:  
Not all products are approved for   
use to help stop smoking. Talk   
with the doctor to make sure that   
you have the right product.  
New or worse mental, mood, or   
behavior problems have happened   
when bupropion has been used to   
stop smoking. These problems   
include thoughts of suicide or   
murder, depression, forceful   
actions, fury, anxiety, and anger.   
These problems have happened in   
people with and without a history   
of mental or mood problems. Talk   
with the doctor.  
  
What are some side effects that I   
need to call my doctor about right   
away?  
WARNING/CAUTION: Even though it   
may be rare, some people may have   
very bad and sometimes deadly side   
effects when taking a drug. Tell   
your doctor or get medical help   
right away if you have any of the   
following signs or symptoms that   
may be related to a very bad side   
effect:  
Signs of an allergic reaction,   
like rash; hives; itching; red,   
swollen, blistered, or peeling   
skin with or without fever;   
wheezing; tightness in the chest   
or throat; trouble breathing,   
swallowing, or talking; unusual   
hoarseness; or swelling of the   
mouth, face, lips, tongue, or   
throat.  
Signs of high blood pressure like   
very bad headache or dizziness,   
passing out, or change in   
eyesight.  
Feeling confused, not able to   
focus, or change in behavior.  
Hallucinations (seeing or hearing   
things that are not there).  
If seizures are new or worse after   
starting this drug.  
Chest pain or pressure, a fast   
heartbeat, or an abnormal   
heartbeat.  
Swelling.  
Shortness of breath.  
Change in hearing.  
Ringing in ears.  
Passing urine more often.  
Swollen gland.  
Trouble moving around.  
Some people may have a higher   
chance of eye problems with this   
drug. Your doctor may want you to   
have an eye exam to see if you   
have a higher chance of these eye   
problems. Call your doctor right   
away if you have eye pain, change   
in eyesight, or swelling or   
redness in or around the eye.  
A severe skin reaction   
(Amador-Jl syndrome/toxic   
epidermal necrolysis) may happen.   
It can cause severe health   
problems that may not go away, and   
sometimes death. Get medical help   
right away if you have signs like   
red, swollen, blistered, or   
peeling skin (with or without   
fever); red or irritated eyes; or   
sores in your mouth, throat, nose,   
or eyes.  
  
What are some other side effects   
of this drug?  
All drugs may cause side effects.   
However, many people have no side   
effects or only have minor side   
effects. Call your doctor or get   
medical help if any of these side   
effects or any other side effects   
bother you or do not go away:  
All products:  
Dizziness or headache.  
Constipation, diarrhea, stomach   
pain, upset stomach, throwing up,   
or feeling less hungry.  
Shakiness.  
Feeling nervous and excitable.  
Strange or odd dreams.  
Gas.  
Dry mouth.  
Trouble sleeping.  
Muscle or joint pain.  
Nose or throat irritation.  
Sweating a lot.  
A change in weight without trying.  
Extended-release tablets:  
For some brands, you may see the   
tablet shell in your stool. For   
these brands, this is normal and   
not a cause for concern. If you   
have questions, talk with your   
doctor.  
These are not all of the side   
effects that may occur. If you   
have questions about side effects,   
call your doctor. Call your doctor   
for medical advice about side   
effects.  
You may report side effects to   
your national health agency.  
  
How is this drug best taken?  
Use this drug as ordered by your   
doctor. Read all information given   
to you. Follow all instructions   
closely.  
For all uses of this drug:  
Do not take this drug more often   
than you are told. This may raise   
the risk of seizures. Be sure you   
know how far apart to take your   
doses.  
Take in the morning if taking once   
a day.  
Take with or without food.  
If you are not able to sleep, do   
not take this drug too close to   
bedtime. Talk with your doctor.  
Swallow whole. Do not chew, break,   
or crush.  
Keep taking this drug as you have   
been told by your doctor or other   
health care provider, even if you   
feel well.  
If you have trouble swallowing,   
talk with your doctor.  
For stopping smoking:  
You may take this drug for 1 week   
before you stop smoking.  
Nicotine products and counseling   
may be used at the same time for   
best results.  
If you have not been able to quit   
smoking after taking this drug for   
12 weeks, talk with your doctor.  
You may have signs of nicotine   
withdrawal when you try to quit   
smoking even when using drugs like   
this one to help you quit smoking.   
There are many signs of nicotine   
withdrawal. Rarely depression and   
suicidal thoughts have happened in   
people trying to quit smoking.   
Talk with your doctor.  
  
What do I do if I miss a dose?  
Skip the missed dose and go back   
to your normal time.  
Do not take 2 doses at the same   
time or extra doses.  
  
How do I store and/or throw out   
this drug?  
Store at room temperature   
protected from light. Store in a   
dry place. Do not store in a   
bathroom.  
Keep all drugs in a safe place.   
Keep all drugs out of the reach of   
children and pets.  
Throw away unused or    
drugs. Do not flush down a toilet   
or pour down a drain unless you   
are told to do so. Check with your   
pharmacist if you have questions   
about the best way to throw out   
drugs. There may be drug take-back   
programs in your area.  
  
General drug facts  
If your symptoms or health   
problems do not get better or if   
they become worse, call your   
doctor.  
Do not share your drugs with   
others and do not take anyone   
else's drugs.  
Some drugs may have another   
patient information leaflet. If   
you have any questions about this   
drug, please talk with your   
doctor, nurse, pharmacist, or   
other health care provider.  
If you think there has been an   
overdose, call your poison control   
center or get medical care right   
away. Be ready to tell or show   
what was taken, how much, and when   
it happened.  
  
Last Reviewed [id number]  
Consumer Information Use and   
Disclaimer  
This generalized information is a   
limited summary of diagnosis,   
treatment, and/or medication   
information. It is not meant to be   
comprehensive and should be used   
as a tool to help the user   
understand and/or assess potential   
diagnostic and treatment options.   
It does NOT include all   
information about conditions,   
treatments, medications, side   
effects, or risks that may apply   
to a specific patient. It is not   
intended to be medical advice or a   
substitute for the medical advice,   
diagnosis, or treatment of a   
health care provider based on the   
health care provider's examination   
and assessment of a patient's   
specific and unique circumstances.   
Patients must speak with a health   
care provider for complete   
information about their health,   
medical questions, and treatment   
options, including any risks or   
benefits regarding use of   
medications. This information does   
not endorse any treatments or   
medications as safe, effective, or   
approved for treating a specific   
patient. [employer] and its   
affiliates disclaim any warranty   
or liability relating to this   
information or the use thereof.   
The use of this information is   
governed by the Terms of Use,   
available at   
https://www.GreenNote.[employer]ruviso/en/k  
now/clinical-effectiveness-terms.  
  
 [employer] and its   
affiliates and/or licensors. All   
rights reserved.  
  
  
Electronically signed by Ernestine Sher APRN.CNP at 2024 8:29   
AM EDT  
  
documented in this encounter            [facility]  
   
                                                    2024 History of   
Present illness Narrative               Formatting of this note is   
different from the original.  
Some documentation from previous   
visit of 2024 was copied and   
pasted, documentation has been   
reviewed and edited as necessary   
for today's visit.  
  
Patient Summary: Don is a 24   
year old female who presents for   
follow-up evaluation of her   
obesity/weight management to treat   
PCOS, IFG, elevated LDL, anxiety   
and depression and prevent   
relatedco-morbidities. In our   
previous visits we have discussed   
lifestyle intervention including a   
nutrition recommendations and   
physical activity optimization.   
Her last office visit was 1 month   
ago.  
  
Irregular menses - Menses   
2023 LMP 1/2-3 spotting  
No contraception.  
20 lb weight gain with Lexapro for   
anxiety. Changed to bupropion by   
PCP plans to discuss increasing   
dose with PCP  
  
Assessment/plan from last visit:  
Metformin 1 gm twice a day with   
meals - Adjusted to 500 mg at   
breakfast and 1 gm at dinner but   
has had frequent diarrhea since   
increasing to 1 gm twice a day 3   
weeks ago.  
History of anorexia and bulimia in   
[place] - no treatment, In remission   
since age 15. Dad helped her and   
she started adding protein shakes.  
  
Interval History -  
Awake - 0700  
B - 0745 Premier Protein shake  
S - none  
L - 1/2 cup cottage cheese with   
fruit  
S - none  
D - 183 protein,   
pasta/potato/rice/sweet potato and   
veg - asparagus, cucumber salad,   
winter and summer squash/salad  
S - none  
Fluids - water, unsweetened tea  
Bedtime -   
  
She feels the medication is   
helping to lessen hunger and   
craving to candy controlled  
  
Exercise: stable sedentary job at   
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  
  
Stress: increased -    
wearing heart monitor  
  
Sleep: 7 hours, up to go to   
bathroom a few times LASHAWN -no  
  
Weight gain since last vist: 2 lbs   
since last visit  
  
3/21/2024 167 lb BMI: 28.67  
2024 169 lb  
2024 166 lb metformin  
  
CrCl cannot be calculated   
(Patient's most recent lab result   
is older than the maximum 180 days   
allowed.).  
  
PAST MEDICAL HISTORY  
Diagnosis Date  
Exertional asthma 2012  
Generalized anxiety disorder  
IFG (impaired fasting glucose)   
2024  
Medial meniscus tear 2012  
PCOS (polycystic ovarian syndrome)   
  
Unspecified otitis media  
Recurrent otitis  
Vocal cord dysfunction 05/15/2012  
  
Current Outpatient Medications  
Medication Sig Dispense Refill  
metFORMIN (GLUCOPHAGE) 500 mg   
tablet Take 2 tablets by mouth two   
times a day with meals. 360 tablet   
0  
buPROPion XL (WELLBUTRIN XL) 150   
mg 24 hr tablet Take 150 mg by   
mouth every morning.  
PNV/iron/folic acid (PRENATAL   
VITAMIN-IRON-FA ORAL) Take 1   
tablet by mouth once daily.  
albuterol HFA (PROVENTIL HFA,   
VENTOLIN HFA) 90 mcg/actuation   
inhaler Inhale 2 Puffs as   
instructed every 4 hours as   
needed. 1 Inhaler 0  
  
No current facility-administered   
medications for this visit.  
  
Occupation: bank  
Contraception: none  
  
/82   Pulse 80   Wt 167 lb   
(75.8 kg)   LMP 2024   SpO2   
98%   BMI 28.67 kg/m  
  
Assessment/Plan:  
Don Mittal is a 24 year old yo   
female with overweight   
(pre-obesity) who presented today   
for follow up for supervised   
weight loss to treat PCOS, IFG,   
elevated LDL, anxiety and   
depression and prevent related   
co-morbidities.  
  
ASSESSMENT/PLAN:  
1. PCOS (polycystic ovarian   
syndrome) - ICD9: 256.4, ICD10:   
E28.2 (primary diagnosis)  
- Whole food balanced protein   
low-carb nutrition  
- METFORMIN 500 MG TABLET  
- DESOGESTREL 0.15 MG-ETHINYL   
ESTRADIOL 0.03 MG TABLET  
- NALTREXONE 50 MG TABLET  
  
2. Irregular menses - ICD9: 626.4,   
ICD10: N92.6  
- DESOGESTREL 0.15 MG-ETHINYL   
ESTRADIOL 0.03 MG TABLET  
  
3. Anxiety and depression - ICD9:   
300.00, 311, ICD10: F41.9, F32.A  
- Continue bupropion prescribed by   
PCP- she plans to discuss   
increasing dose with PCP  
  
4. History of bulimia - ICD9:   
V11.8, ICD10: Z86.59  
- in remission since age 15  
  
5. History of anorexia nervosa -   
ICD9: V11.8, ICD10: Z86.59  
- in remission since age 15  
  
6. Provided repeat prescription   
for oral contraceptive - ICD9:   
V25.41, ICD10: Z30.41  
- RX for Apri given today.  
- discussed with patient on how to   
take OCP's.Given written   
information  
- counseled on benefits, risks and   
possible severe side effects of   
OCP's.  
- discussed need to use Condoms to   
help to prevent STD's including   
HIV etc.  
- DESOGESTREL 0.15 MG-ETHINYL   
ESTRADIOL 0.03 MG TABLET  
  
7. Overweight with body mass index   
(BMI) of 29 to 29.9 in adult -   
ICD9: 278.02, V85.25, ICD10:   
E66.3, Z68.29  
- METFORMIN 500 MG TABLET -   
decrease to 500 mg with breakfast   
and 1 gm with dinner due to   
diarrhea with 1 gm twice daily  
- DESOGESTREL 0.15 MG-ETHINYL   
ESTRADIOL 0.03 MG TABLET  
- NALTREXONE 50 MG TABLET  
Agreeable to adding naltrexone to   
Wellbutrin to mimic the effect of   
Contrave. Confirmed no   
allergies/contraindications to   
naltrexone. Reviewed potential   
side effects. Patient will notify   
me if there are any adverse   
effects with the medication.   
Prescription provided today and   
dosing schedule provided. Patient   
advised to increase dose only if   
tolerated. Will remain on lower   
dose if effective.  
Discussed increased risk for   
opioid overdose if opioid   
medicines are taken while taking   
naltrexone. Aware to not use any   
opioid medications while taking   
this medication. Has not taken any   
opioid medication in the last 7-10   
days. Will discontinue naltrexone   
before any medical procedure or   
surgery.  
- Continue bupropion prescribed by   
PCP- she plans to discuss   
increasing dose with PCP  
  
- Continue whole food low-carb   
diet with 30 g of protein 3 times   
a day and 30 g of carbs at lunch   
and dinner only.  
- continue exercise. Discussed   
benefits of walking 15 minutes   
immediately after meals  
  
Due to history of anorexia and   
bulimia, we agreed that in-office   
visits would be best for now.  
  
Follow up in 6 weeks  
  
Ernestine Sher APRN.CNP  
Advanced Education from the   
[employer]  
  
Medical Decision Making:  
  
Problems:  
Moderate: 1+ chronic illnesses   
with change  
Risk:  
Moderate: Drug management and   
Moderate risk from   
testing/treatment  
  
Medical Decision Making Level: 4 -   
Moderate  
  
  
Electronically signed by Ernestine Sher APRN.CNP at 2024 8:47   
AM EDT  
documented in this encounter            [facility]  
   
                                        2024 Note     HNO ID: [id number]  
Author: ERNESTINE SHER APRN.CNP  
Service: ?  
Author Type: Nurse Practitioner  
Type: Progress Notes  
Filed: 2024 19:59  
Note Text:  
Some documentation from previous   
visit of 2023 was copied and   
pasted,  
documentation has been reviewed   
and edited as necessary for   
today's visit.  
Patient Summary: Don is a 23   
year old female who presents for   
follow-up  
evaluation of her obesity/weight   
management to treat PCOS, IFG,   
elevated LDL,  
anxiety and depression and prevent   
relatedco-morbidities. In our   
previous  
visits we have discussed lifestyle   
intervention including a nutrition  
recommendations and physical   
activity optimization. Her last   
office visit was 1  
month ago.  
Irregular menses - Menses   
2023 LMP 1/2-3 spotting  
20 lb weight gain with Lexapro for   
anxiety. Changed to bupropion by   
PCP.  
Assessment/plan from last visit:  
Metformin 500 mg twice a day with   
meals - tried 1 gm at dinner but   
had nausea  
and mild diarrhea (actually took   
at bedtime)  
Bupropion 150 mg 24/hr tab for   
anxiety and depression  
Was in [place] with  who   
races - very hectic travel. Tried   
to eat  
healthier.  
Quit second job so life is   
becoming more calm  
History of anorexia and bulimia in   
HS - no treatment, Dad helped her   
and she  
started adding protein shakes.  
Interval History - changes made in   
past month  
Awake - 0700 but is changing to   
0520 to exercise before work  
B - 0745 Premier Protein shake  
S - none  
L - SKIP  
S - none  
D - 1830 protein,   
pasta/potato/rice/sweet potato and   
veg - asparagus, cucumber  
salad, winter and summer squash  
S - none or Skinny popcorn  
Fluids - water, zero sugar   
electrolyte drink mixes,   
unsweetened tea  
Bedtime -   
She feels the medication is   
helping to lessen hunger and   
craving to candy  
Exercise: stable sedentary job at   
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   
walking and  
free weights  
Stress: decreased -  races   
and season has not started yet and   
decreased  
stress after quitting second job  
Sleep: 7 hours, well rested LASHAWN   
-no  
Weight gain since last vist: 3 lbs   
since last visit  
2024 169 lb  
2024 166 lb metformin  
CrCl cannot be calculated   
(Patient's most recent lab result   
is older than the  
maximum 180 days allowed.).  
PAST MEDICAL HISTORY  
Diagnosis Date  
Exertional asthma 2012  
Generalized anxiety disorder  
IFG (impaired fasting glucose)   
2024  
Medial meniscus tear 2012  
PCOS (polycystic ovarian syndrome)   
  
Unspecified otitis media  
Recurrent otitis  
Vocal cord dysfunction 05/15/2012  
Current Outpatient Medications  
Medication Sig Dispense Refill  
buPROPion XL (WELLBUTRIN XL) 150   
mg 24 hr tablet Take 150 mg by   
mouth every  
morning.  
PNV/iron/folic acid (PRENATAL   
VITAMIN-IRON-FA ORAL) Take 1   
tablet by mouth once  
daily.  
metFORMIN (GLUCOPHAGE) 500 mg   
tablet Take 1 tablet by mouth two   
times a day  
with meals. 180 tablet 0  
albuterol HFA (PROVENTIL HFA,   
VENTOLIN HFA) 90 mcg/actuation   
inhaler Inhale 2  
Puffs as instructed every 4 hours   
as needed. 1 Inhaler 0  
No current facility-administered   
medications for this visit.  
/84   Wt 169 lb (76.7 kg)     
LMP 2024   BMI 29.01 kg/m?  
Assessment/Plan:  
Don Mittal is a 23 year old yo   
female with overweight   
(pre-obesity) who  
presented today for follow up for   
supervised weight loss to treat   
PCOS, IFG,  
elevated LDL, anxiety and   
depression and prevent related   
co-morbidities.  
ASSESSMENT/PLAN:  
1. PCOS (polycystic ovarian   
syndrome) - ICD9: 256.4, ICD10:   
E28.2 (primary  
diagnosis)  
- Whole food balanced protein   
low-carb nutrition  
- METFORMIN 500 MG TABLET  
2. Elevated LDL cholesterol level   
- ICD9: 272.0, ICD10: E78.00  
- benefits of weight loss   
discussed  
3. IFG (impaired fasting glucose)   
- ICD9: 790.21, ICD10: R73.01  
- METFORMIN 500 MG TABLET  
4. Irregular menses - ICD9: 626.4,   
ICD10: N92.6  
- METFORMIN 500 MG TABLET  
5. Anxiety and depression - ICD9:   
300.00, 311, ICD10: F41.9, F32.A  
- well-controlled with bupropion  
6. History of bulimia - ICD9:   
V11.8, ICD10: Z86.59  
- in remission  
7. History of anorexia nervosa -   
ICD9: V11.8, ICD10: Z86.59  
- in remission  
8. Overweight with body mass index   
(BMI) of 29 to 29.9 in adult -   
ICD9: 278.02,  
V85.25, ICD10: E66.3, Z68.29  
- METFORMIN 500 MG TABLET -   
continue to increase dose as   
tolerated  
- Recommended whole food low-carb   
diet with 30 g of protein 3 times   
a day and  
30 g of carbs at lunch and dinner   
only. Given tracking log.  
- Given 15 gram carb whole food   
and protein suggestion list.  
- Given protein snack ideas  
- continue exercise  
Lengthy discussion regarding   
history of anorexia and bulimia   
which is in  
remission. Discussed appropriate   
amount of exercise. We agreed that   
in-office  
visits would be best for now.  
Follow up in 4 weeks  
SHANEKA Wan.CNP  
Advanced Education from the   
Obesity Medicine Association  
Medical Decision Making:  
Problems:  
Moderate: 1+ chronic illnesses   
with change  
Risk:  
Moderate: Drug man (more content   
not included)...                        [facility]  
   
                                        2024 Instructions   
  
  
Ernestine Sher APRN.CNP -   
2024 7:10 AM ESTFormatting   
of this note is different from the   
original.  
- Whole food low-carb diet with 30   
g of protein 3 times a day and up   
to 30 g of carbs at lunch and   
dinner only.  
Meals - protein is a goal and   
carbohydrates are a limit.  
Snacks - all protein or more   
protein than carbs  
Use tracking log as a worksheet   
and bring with you to your next   
appointment.  
  
Protein - no carbs  
Egg 1 large - 6g  
Egg white 1 large 3.6g  
  
3 oz is approximately the size of   
a deck of cards and equals 21 g   
protein  
Beef, Chicken, Turkey, Pork, Lamb   
1 oz 7g  
Fish, Tuna Fish 1 oz 7g (Starkist   
tuna packet 2.6 oz 17 gm protein)  
Seafood (Crabmeat, Shrimp,   
Lobster) 1 oz 6g  
  
Protein shakes (read labels)  
Premier Protein or generic [employer]   
Equate, Aldi Elevate, [employer] High   
Performance- 30g protein & 1g carb   
- meal replacement  
Premier Protein plant protein   
powder - 25 gm protein, 0 suger/2   
carb Vanilla and chocolate (not a   
meal replacement)  
Fairlife 30 gram protein - 30g   
protein & 3g carb  
BOOST Glucose Control Max 30g   
Protein Nutritional Drink - 30g   
protein & 1 carb - meal   
replacement  
Slimfast High Protein - 20g   
protein & 1g carb  
Ensure Max Protein Nutrition Shake   
30g protein & 2 carb  
__________________________________  
_______________  
Protein AND carbs  
Beef/Turkey Jerky 1 oz dried   
10-15g protein - check carb count,   
can be high if sugar added  
Slim Thompson - 6 gm protein and 4 net   
carb  
Great Value original turkey   
sausage sticks - 7 gm protein and   
2 gm carb  
Imitation Crab Meat 1 oz - 2g   
protein & 4g carb  
  
Milk, skim 2% or 1% 8 oz - 8g   
protein & 12g carb  
  
Greek yogurt  
Full Fat Greek Yogurt 1 cup -   
20.4g protein & 9.1g carb  
2% Greek Yogurt 1 cup - 22.7g   
protein & 9.1g carb  
0% (fat-free) Greek Yogurt - 1 cup   
24g protein & 9.3g carb  
:ratio, KETO Friendly Dairy Snack   
1 single svg - 15g protein & 2g   
carb  
:ratio Protein 1 single svg - 25g   
protein & 8g carb  
Dannon Light + Fit 1 single csvg -   
12g protein & 9g carb  
Two Good Lowfat Greek Yogurt,   
Strawberry, Lower Sugar - 12g   
protein & 2g carb  
Oikos Triple Zero Greek Nonfat   
Yogurt 1 single svg - 15g protein   
& 7g carb  
Aldi Greek yogurt 10g protein & 4   
g carb  
  
Cheese each oz  
Brie 5.9g protein & 0.1g carb  
Cheddar Cheese 7g protein & 0.4g   
carb  
Mozzarella Cheese 6.3g protein &   
0.6g carb  
Bassem Cheese 6.7g protein & 0.7g   
carb  
Parmesan Cheese 10g protein & 0.9g   
carb  
Cream Cheese 1.7g protein & 1.2g   
carb  
Feta 4g protein & 1.2g carb  
Swiss Cheese 7.6g protein & 1.5g   
carb  
  
Franklin s Low Fat Cottage Cheese   
1/2cup 12g protein & 4g carb  
  
Legumes  
Lentils cup 9g protein & 20g carb  
Lima beans cup 7g protein & 20g   
carb  
Kidney, Black, Navy, Cannellini   
beans cup 8g protein & 20g carb  
Soybeans 1/2 c 14g protein & 8.5g   
carb  
Peanut butter, natural 2 Tbsp 7-8g   
protein & 4g net carbs, 190   
calories  
[place] milk, unsweetened 8 oz 1g   
protein & 2g carb  
Soy milk 8 oz 3.5g protein & 1.6g   
carb  
Tofu 1/2 cup 10g protein & 2.3g   
carb  
  
Nuts and Seeds per oz  
Pumpkin Seeds - 6.9g protein & 5g   
carb  
Almonds - 5.9g protein & 6.1g carb  
Sunflower Seeds - 5.8g protein &   
5.6g carb  
Pistachios - 5.8g protein & 7.8g   
carb  
Cashews - 5.1g protein & 9.2g carb  
Walnuts - 4.3g protein & 3.8g carb  
Hazelnuts - 4.2g protein & 4.7g   
carb  
Brazil Nuts - 4.0g protein & 3.4g   
carb  
Pecans - 2.6g protein & 3.9g carb  
Peanuts - 7g protein & 4.6g carb  
  
<15 gram carb fruit options  
Berries have the lowest sugar   
content  
  
1/2 cup diced honeydew melon - 8   
carbs  
1/2 cup diced watermelon - 6 carbs  
One half medium grapefruit - 10.5   
carbs  
1 medium orange -15.5 carbs  
1 medium peach -14.5 carbs  
1/2 cup fresh cranberries - 6.5   
carbs  
1 medium plum -7.5 carbs  
1/2 cup raspberries -7.5 carbs  
1 medium Mona -9 carbs  
1/2 cup fresh pineapple -11 carbs  
1 medium nectarine - 15 carbs  
1/2 cup blueberries - 11 carbs -   
may actually help you lose weight  
1 medium kiwi without skin - 11   
carbs  
1/2 cup fresh cherries -11 carbs  
1 medium tangerine -12 carbs  
1/2 cup sliced franky -14 carbs  
1/2 medium banana  
1/2 c grapes  
1/2 medium apple - 12.5 carbs  
1/2 c strawberries - 12.7 carbs  
  
5 (FIVE) gram carb vegetable   
options  
  
1 cup raw OR cup cooked:  
Asparagus  
Cabbage  
Spinach  
Peppers  
Green beans  
Carrots  
Tomato  
Cucumber  
Bean sprouts  
Cauliflower  
Lettuce  
Snap peas  
Broccoli  
Eggplant  
Zucchini  
Turnips  
Spaghetti squash  
Brussel sprouts  
  
15 gram carb vegetable options  
  
cup cooked green peas  
cup cooked corn or hominy  
corn on the cob, large (5 oz)  
cup cooked sweet potato, plain  
cup cooked potato, plain  
1 small potato or sweet potato  
1 cup winter squash (pumpkin,  
acorn, butternut)  
1 cup marinara or pasta sauce -   
check label  
cup tomato juice  
cup tomato puree  
Beans, Seeds, Nuts  
cup cooked beans (kidney, elizabeth,  
red, green, etc.)  
cup cooked lentils  
cup baked beans  
4 tablespoons nut butter  
  
Grains  
White long-grain rice 1/2 c 2g   
protein 22.5 carb  
Brown rice 1/2 c 5.5g protein 24   
carb  
Quinoa 1/2 c 4 gm complete protein   
25 carbs  
Oatmeal  
  
30 High Protein Snack Ideas  
  
1. Jerky  
2. Trail mix without or minimal   
dried fruit  
3. Turkey roll-ups  
4. Greek yogurt  
5. Veggies and yogurt dip  
6. Tuna  
7. Hard-boiled eggs  
8. Peanut butter celery sticks  
9. No-bake energy bites  
10. Cheese slices/ Cheese Stick  
11. Handful of almonds  
12. Roasted chickpeas  
13. Hummus and veggies  
14. Cottage Cheese  
15. Celery/fruit with peanut   
butter  
16. Beef sticks (Grass-fed,   
natural ingredients)  
17. Protein bars  
18. Canned Maiden  
19. Jose pudding  
20. Homemade granola - rolled   
oats, nuts, and a little sweetener   
- 1/4 cup serving  
21. Pumpkin seeds  
22. Nut butter  
23. Protein shakes  
24. Edamame  
25. Avocado and chicken salad  
26. Fruit and nut bars - natural   
ingredients without added sugar.  
27. Lentil salad  
28. Overnight oatmeal  
29. Egg muffins  
30. Leftover protein or lunch meat  
Electronically signed by Ernestine Sher APRN.CNP at 2024 7:36   
AM EST  
  
documented in this encounter            [facility]  
   
                                                    2024 History of   
Present illness Narrative               Formatting of this note is   
different from the original.  
Some documentation from previous   
visit of 2023 was copied and   
pasted, documentation has been   
reviewed and edited as necessary   
for today's visit.  
  
Patient Summary: Don is a 23   
year old female who presents for   
follow-up evaluation of her   
obesity/weight management to treat   
PCOS, IFG, elevated LDL, anxiety   
and depression and prevent   
relatedco-morbidities. In our   
previous visits we have discussed   
lifestyle intervention including a   
nutrition recommendations and   
physical activity optimization.   
Her last office visit was 1 month   
ago.  
  
Irregular menses - Menses   
2023 LMP 1/2-3 spotting  
20 lb weight gain with Lexapro for   
anxiety. Changed to bupropion by   
PCP.  
  
Assessment/plan from last visit:  
Metformin 500 mg twice a day with   
meals - tried 1 gm at dinner but   
had nausea and mild diarrhea   
(actually took at bedtime)  
Bupropion 150 mg 24/hr tab for   
anxiety and depression  
Was in [place] with  who   
races - very hectic travel. Tried   
to eat healthier.  
Quit second job so life is   
becoming more calm  
History of anorexia and bulimia in   
HS - no treatment, Dad helped her   
and she started adding protein   
shakes.  
  
Interval History - changes made in   
past month  
Awake - 0700 but is changing to   
0520 to exercise before work  
B - 0745 Premier Protein shake  
S - none  
L - SKIP  
S - none  
D - 183 protein,   
pasta/potato/rice/sweet potato and   
veg - asparagus, cucumber salad,   
winter and summer squash  
S - none or Skinny popcorn  
Fluids - water, zero sugar   
electrolyte drink mixes,   
unsweetened tea  
Bedtime -   
  
She feels the medication is   
helping to lessen hunger and   
craving to candy  
Exercise: stable sedentary job at   
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   
walking and free weights  
  
Stress: decreased -  races   
and season has not started yet and   
decreased stress after quitting   
second job  
  
Sleep: 7 hours, well rested LASHAWN   
-no  
  
Weight gain since last vist: 3 lbs   
since last visit  
2024 169 lb  
2024 166 lb metformin  
  
CrCl cannot be calculated   
(Patient's most recent lab result   
is older than the maximum 180 days   
allowed.).  
  
PAST MEDICAL HISTORY  
Diagnosis Date  
Exertional asthma 2012  
Generalized anxiety disorder  
IFG (impaired fasting glucose)   
2024  
Medial meniscus tear 2012  
PCOS (polycystic ovarian syndrome)   
  
Unspecified otitis media  
Recurrent otitis  
Vocal cord dysfunction 05/15/2012  
  
Current Outpatient Medications  
Medication Sig Dispense Refill  
buPROPion XL (WELLBUTRIN XL) 150   
mg 24 hr tablet Take 150 mg by   
mouth every morning.  
PNV/iron/folic acid (PRENATAL   
VITAMIN-IRON-FA ORAL) Take 1   
tablet by mouth once daily.  
metFORMIN (GLUCOPHAGE) 500 mg   
tablet Take 1 tablet by mouth two   
times a day with meals. 180 tablet   
0  
albuterol HFA (PROVENTIL HFA,   
VENTOLIN HFA) 90 mcg/actuation   
inhaler Inhale 2 Puffs as   
instructed every 4 hours as   
needed. 1 Inhaler 0  
  
No current facility-administered   
medications for this visit.  
  
/84   Wt 169 lb (76.7 kg)     
LMP 2024   BMI 29.01 kg/m  
  
Assessment/Plan:  
Don Mittal is a 23 year old yo   
female with overweight   
(pre-obesity) who presented today   
for follow up for supervised   
weight loss to treat PCOS, IFG,   
elevated LDL, anxiety and   
depression and prevent related   
co-morbidities.  
  
ASSESSMENT/PLAN:  
1. PCOS (polycystic ovarian   
syndrome) - ICD9: 256.4, ICD10:   
E28.2 (primary diagnosis)  
- Whole food balanced protein   
low-carb nutrition  
- METFORMIN 500 MG TABLET  
  
2. Elevated LDL cholesterol level   
- ICD9: 272.0, ICD10: E78.00  
- benefits of weight loss   
discussed  
  
3. IFG (impaired fasting glucose)   
- ICD9: 790.21, ICD10: R73.01  
- METFORMIN 500 MG TABLET  
  
4. Irregular menses - ICD9: 626.4,   
ICD10: N92.6  
- METFORMIN 500 MG TABLET  
  
5. Anxiety and depression - ICD9:   
300.00, 311, ICD10: F41.9, F32.A  
- well-controlled with bupropion  
  
6. History of bulimia - ICD9:   
V11.8, ICD10: Z86.59  
- in remission  
  
7. History of anorexia nervosa -   
ICD9: V11.8, ICD10: Z86.59  
- in remission  
  
8. Overweight with body mass index   
(BMI) of 29 to 29.9 in adult -   
ICD9: 278.02, V85.25, ICD10:   
E66.3, Z68.29  
- METFORMIN 500 MG TABLET -   
continue to increase dose as   
tolerated  
- Recommended whole food low-carb   
diet with 30 g of protein 3 times   
a day and 30 g of carbs at lunch   
and dinner only. Given tracking   
log.  
- Given 15 gram carb whole food   
and protein suggestion list.  
- Given protein snack ideas  
- continue exercise  
  
Lengthy discussion regarding   
history of anorexia and bulimia   
which is in remission. Discussed   
appropriate amount of exercise. We   
agreed that in-office visits would   
be best for now.  
  
Follow up in 4 weeks  
  
Ernestine Sher APRN.CNP  
Advanced Education from the   
Obesity Medicine Association  
  
Medical Decision Making:  
  
Problems:  
Moderate: 1+ chronic illnesses   
with change  
Risk:  
Moderate: Drug management and   
Moderate risk from   
testing/treatment  
  
Medical Decision Making Level: 4 -   
Moderate  
  
  
Electronically signed by Ernestine Sher APRN.CNP at 2024 7:59   
PM EST  
documented in this encounter            [facility]  
   
                                                    2024 Evaluation + Plan  
   
note                                    Associated Problem(s): PCOS   
(polycystic ovarian syndrome)  
Formatting of this note might be   
different from the original.  
Recommend following up with OB/GYN   
regarding side effects of   
metformin and requesting if she   
can take extended release   
metformin to see if that is better   
tolerated.  
Electronically signed by SHANEKA Chacon CNP at   
2024 9:35 AM EST  
                                        [facility]  
   
                                                    2024 Miscellaneous   
Notes                                   Associated Problem(s): PCOS   
(polycystic ovarian syndrome)  
Formatting of this note might be   
different from the original.  
Recommend following up with OB/GYN   
regarding side effects of   
metformin and requesting if she   
can take extended release   
metformin to see if that is better   
tolerated.  
Electronically signed by SHANEKA Chacon CNP at   
2024 9:35 AM EST  
Associated Problem(s): Anxiety and   
depression  
Formatting of this note might be   
different from the original.  
Denies any suicidal or homicidal   
ideation. Anxiety and depression   
have improved we will continue   
Wellbutrin 150 mg daily, patient   
to give us an update in about 1   
month via [facility] if she would   
like to increase the dose to 300   
mg. We will schedule her for   
follow-up in 3 months  
Electronically signed by SHANEKA Chacon CNP at   
2024 9:35 AM EST  
documented in this encounter            [facility]  
   
                                                    2024 Miscellaneous   
Notes                                   Associated Problem(s): PCOS   
(polycystic ovarian syndrome)  
Formatting of this note might be   
different from the original.  
Recommend following up with OB/GYN   
regarding side effects of   
metformin and requesting if she   
can take extended release   
metformin to see if that is better   
tolerated.  
Electronically signed by SHANEKA Chacon CNP at   
2024 9:35 AM EST  
Associated Problem(s): Anxiety and   
depression  
Formatting of this note might be   
different from the original.  
Denies any suicidal or homicidal   
ideation. Anxiety and depression   
have improved we will continue   
Wellbutrin 150 mg daily, patient   
to give us an update in about 1   
month via [facility] if she would   
like to increase the dose to 300   
mg. We will schedule her for   
follow-up in 3 months  
Electronically signed by SHANEKA Chacon CNP at   
2024 9:35 AM EST  
Addended by: MONICA SANCHEZ   
on: 2024 08:32 AM  
  
Modules accepted: Level of Service  
  
  
Electronically signed by SHANEKA Chacon CNP at   
2024 8:32 AM EST  
documented in this encounter            [facility]  
   
                                                    2024 Evaluation + Plan  
   
note                                    Associated Problem(s): Anxiety and   
depression  
Formatting of this note might be   
different from the original.  
Denies any suicidal or homicidal   
ideation. Anxiety and depression   
have improved we will continue   
Wellbutrin 150 mg daily, patient   
to give us an update in about 1   
month via [facility] if she would   
like to increase the dose to 300   
mg. We will schedule her for   
follow-up in 3 months  
Electronically signed by SHANEKA Chacon CNP at   
2024 9:35 AM EST  
                                        [facility]  
   
                                                    2024 History of   
Present illness Narrative               Formatting of this note might be   
different from the original.  
Patient was identified by name and   
Date of Birth.  
  
Electronically signed by Mandi D'Amico at 2024 9:37 AM EST  
Formatting of this note is   
different from the original.  
Images from the original note were   
not included.  
  
  
2024  
  
Don Mittal (: 2000) is a   
23 y.o. female , Established   
patient, here for evaluation of   
the following chief complaint(s):  
Medication Check  
  
ASSESSMENT/PLAN:  
  
1. Anxiety and depression  
Assessment & Plan:  
Denies any suicidal or homicidal   
ideation. Anxiety and depression   
have improved we will continue   
Wellbutrin 150 mg daily, patient   
to give us an update in about 1   
month via [place] if she would   
like to increase the dose to 300   
mg. We will schedule her for   
follow-up in 3 months  
2. PCOS (polycystic ovarian   
syndrome)  
Assessment & Plan:  
Recommend following up with OB/GYN   
regarding side effects of   
metformin and requesting if she   
can take extended release   
metformin to see if that is better   
tolerated.  
  
Follow up for 3 month [facility].  
  
SUBJECTIVE/OBJECTIVE:  
  
HPI -  
Don Mittal (: 2000) is a   
23 y.o. female , Established   
patient, here for the evaluation   
of the following chief   
complaint(s):  
Medication Check  
  
RACING WITH  IN [place]   
WENT WELL, WAS SUPER BUSY. Did not   
get to do any sightseeing as they   
were busy at the track every day.   
Got back from [place] on 2024. Patient reports she  
went to ob/gyn for pcos- was   
started on metformin and reports   
that she has been having nausea   
and mild diarrhea with it. She has   
a follow-up with them tomorrow.  
  
Reports that the wellbutrin has   
helped anxiety and mood. Is doing   
better handling things. Is not.   
Feeling as overwhelmed, reports   
sleeping is getting back on   
schedule.  
Still doing shreya and yoga. Most   
days.  
Would like to keep the Wellbutrin   
at the current dose for now  
Prior to Admission medications  
Medication Sig Start Date End Date   
Taking? Authorizing Provider  
buPROPion XL (Wellbutrin XL) 150   
MG 24 hr tablet Take 1 tablet (150   
mg) by mouth every morning. Do not   
crush, chew, or split. 1/22/24   
3/22/24 Yes SHANEKA Chacon CNP  
metFORMIN (Glucophage) 500 MG   
tablet Take 1 tablet by mouth in   
the morning and 1 tablet in the   
evening. Take with meals. 24 Yes Historical Provider,   
MD  
Prenatal Multivit-Min-Fe-FA   
(PRENATAL/IRON PO) Take 1 tablet   
by mouth in the morning. Yes   
Historical Provider, MD  
Prenatal MV-Min-Fe Fum-FA-DHA   
(PRENATAL 1 PO) Take by mouth. Yes   
Historical Provider, MD  
  
  
  
Review of Systems  
Constitutional: Negative for   
activity change, chills, fatigue   
and fever.  
HENT: Negative. Negative for   
congestion.  
Respiratory: Negative.  
Cardiovascular: Negative.  
Gastrointestinal: Positive for   
diarrhea and nausea. Negative for   
abdominal pain and vomiting.  
Genitourinary: Positive for   
menstrual problem (Irregular   
menses due to PCOS).  
Neurological: Negative.  
  
Vitals:  
24 0735  
BP: 112/69  
Pulse: 94  
Resp: 18  
Temp: 37 C (98.6 F)  
TempSrc: Infrared  
SpO2: 98%  
Weight: 170 lb 3.2 oz (77.2 kg)  
  
Physical Exam  
Constitutional:  
Appearance: Normal appearance.  
HENT:  
Head: Normocephalic and   
atraumatic.  
Mouth/Throat:  
Mouth: Mucous membranes are moist.  
Pharynx: Oropharynx is clear. No   
posterior oropharyngeal erythema.  
Cardiovascular:  
Rate and Rhythm: Normal rate and   
regular rhythm.  
Pulses: Normal pulses.  
Heart sounds: Normal heart sounds.  
Pulmonary:  
Effort: Pulmonary effort is   
normal.  
Breath sounds: Normal breath   
sounds.  
Skin:  
General: Skin is warm and dry.  
Neurological:  
Mental Status: She is alert and   
oriented to person, place, and   
time.  
Psychiatric:  
Mood and Affect: Mood normal.  
Behavior: Behavior normal.  
Thought Content: Thought content   
normal.  
Judgment: Judgment normal.  
  
An electronic signature was used   
to authenticate this note.  
  
SHANEKA Chacon CNP  
2024  
9:35 AM  
Electronically signed by SHANEKA Chacon CNP at   
2024 9:37 AM EST  
documented in this encounter            [facility]  
   
                                                    2024 History of   
Present illness Narrative               Formatting of this note might be   
different from the original.  
Patient was identified by name and   
Date of Birth.  
  
Electronically signed by Mandi D'Amico at 2024 9:37 AM EST  
Formatting of this note is   
different from the original.  
Images from the original note were   
not included.  
  
  
2024  
  
Don Mittal (: 2000) is a   
23 y.o. female , Established   
patient, here for evaluation of   
the following chief complaint(s):  
Medication Check  
  
ASSESSMENT/PLAN:  
  
1. Anxiety and depression  
Assessment & Plan:  
Denies any suicidal or homicidal   
ideation. Anxiety and depression   
have improved we will continue   
Wellbutrin 150 mg daily, patient   
to give us an update in about 1   
month via [place] if she would   
like to increase the dose to 300   
mg. We will schedule her for   
follow-up in 3 months  
2. PCOS (polycystic ovarian   
syndrome)  
Assessment & Plan:  
Recommend following up with OB/GYN   
regarding side effects of   
metformin and requesting if she   
can take extended release   
metformin to see if that is better   
tolerated.  
  
Follow up for 3 month [facility].  
  
SUBJECTIVE/OBJECTIVE:  
  
HPI -  
Don Mittal (: 2000) is a   
23 y.o. female , Established   
patient, here for the evaluation   
of the following chief   
complaint(s):  
Medication Check  
  
RACING WITH  IN [place]   
WENT WELL, WAS SUPER BUSY. Did not   
get to do any sightseeing as they   
were busy at the track every day.   
Got back from [place] on 2024. Patient reports she  
went to ob/gyn for pcos- was   
started on metformin and reports   
that she has been having nausea   
and mild diarrhea with it. She has   
a follow-up with them tomorrow.  
  
Reports that the wellbutrin has   
helped anxiety and mood. Is doing   
better handling things. Is not.   
Feeling as overwhelmed, reports   
sleeping is getting back on   
schedule.  
Still doing shreya and yoga. Most   
days.  
Would like to keep the Wellbutrin   
at the current dose for now  
Prior to Admission medications  
Medication Sig Start Date End Date   
Taking? Authorizing Provider  
buPROPion XL (Wellbutrin XL) 150   
MG 24 hr tablet Take 1 tablet (150   
mg) by mouth every morning. Do not   
crush, chew, or split. 1/22/24   
3/22/24 Yes SHANEKA Chacon - CNP  
metFORMIN (Glucophage) 500 MG   
tablet Take 1 tablet by mouth in   
the morning and 1 tablet in the   
evening. Take with meals. 24 Yes Historical Provider,   
MD  
Prenatal Multivit-Min-Fe-FA   
(PRENATAL/IRON PO) Take 1 tablet   
by mouth in the morning. Yes   
Historical Provider, MD  
Prenatal MV-Min-Fe Fum-FA-DHA   
(PRENATAL 1 PO) Take by mouth. Yes   
Historical Provider, MD  
  
  
  
Review of Systems  
Constitutional: Negative for   
activity change, chills, fatigue   
and fever.  
HENT: Negative. Negative for   
congestion.  
Respiratory: Negative.  
Cardiovascular: Negative.  
Gastrointestinal: Positive for   
diarrhea and nausea. Negative for   
abdominal pain and vomiting.  
Genitourinary: Positive for   
menstrual problem (Irregular   
menses due to PCOS).  
Neurological: Negative.  
  
Vitals:  
24 0735  
BP: 112/69  
Pulse: 94  
Resp: 18  
Temp: 37 C (98.6 F)  
TempSrc: Infrared  
SpO2: 98%  
Weight: 170 lb 3.2 oz (77.2 kg)  
  
Physical Exam  
Constitutional:  
Appearance: Normal appearance.  
HENT:  
Head: Normocephalic and   
atraumatic.  
Mouth/Throat:  
Mouth: Mucous membranes are moist.  
Pharynx: Oropharynx is clear. No   
posterior oropharyngeal erythema.  
Cardiovascular:  
Rate and Rhythm: Normal rate and   
regular rhythm.  
Pulses: Normal pulses.  
Heart sounds: Normal heart sounds.  
Pulmonary:  
Effort: Pulmonary effort is   
normal.  
Breath sounds: Normal breath   
sounds.  
Skin:  
General: Skin is warm and dry.  
Neurological:  
Mental Status: She is alert and   
oriented to person, place, and   
time.  
Psychiatric:  
Mood and Affect: Mood normal.  
Behavior: Behavior normal.  
Thought Content: Thought content   
normal.  
Judgment: Judgment normal.  
  
An electronic signature was used   
to authenticate this note.  
  
SHANEKA Chacon CNP  
2024  
9:35 AM  
Electronically signed by SHANEKA Chacon CNP at   
2024 9:37 AM EST  
documented in this encounter            [facility]  
   
                                        2024 Instructions   
  
  
SHANEKA Chacon CNP -   
2024 7:40 AM ESTFormatting   
of this note might be different   
from the original.  
Asked about extended release   
metformin.  
Give update in about a month- if   
you want to increase dose of   
Wellbutrin or not  
Electronically signed by SHANEKA Chacon CNP at   
2024 7:56 AM EST  
  
documented in this encounter            [facility]  
   
                                        2024 Instructions   
  
  
SHANEKA Chacon CNP -   
2024 7:40 AM ESTFormatting   
of this note might be different   
from the original.  
Asked about extended release   
metformin.  
Give update in about a month- if   
you want to increase dose of   
Wellbutrin or not  
Electronically signed by SHANEKA Chacon CNP at   
2024 7:56 AM EST  
  
documented in this encounter            [facility]  
   
                                        2024 Note     Addended by: MONICA MCADAMS   
on: 2024 08:32 AM  
  
Modules accepted: Level of Service  
  
  
Electronically signed by SHANEKA Chacon CNP at   
2024 8:32 AM EST  
                                        [facility]  
   
                                        2024 Note     Addended by: MONICA MCADAMS   
on: 2024 08:32 AM  
  
Modules accepted: Level of Service      [facility]  
   
                                        2024 Note     HNO ID: [id number]  
Author: ERNESTINE SHER APRN.CNP  
Service: ?  
Author Type: Nurse Practitioner  
Type: Progress Notes  
Filed: 2024 13:09  
Note Text:  
Don Mittal is a 23 year old   
female who presents for problem   
visit missed  
menses x 2 months and multiple   
negative home pregnancy tests.  
HPI: Stopped OCP to attempt   
pregnancy in 2023. Last   
menses in  
November. Multiple home pregnancy   
tests have all been negative.  
Has PCOS - recently noticing more   
facial hair although acne   
improving. 20 lb  
weight gain with Lexapro for   
anxiety. Changed to bupropion a   
few days ago by  
PCP.  
No previous pregnancies. Partner   
has 3 children.  
Shreya and yoga daily for exercise.   
Trying to eat healthy.  
OB History  
 T0  L0  
SAB0 IAB0 Ectopic0 Multiple0 Live   
Births0  
Comment: 3 stepchildren  
Gyn History  
LMP: 2024, Having periods  
Age at Menarche:  
Age at First Pregnancy:  
Age at Menopause:  
Gyn History Comments:  
Sexual Activity: Yes; Male;   
sexually active with   
Contraception: Pill  
PAST MEDICAL HISTORY  
Diagnosis Date  
Exertional asthma 2012  
Generalized anxiety disorder  
Medial meniscus tear 2012  
PCOS (polycystic ovarian syndrome)   
  
Unspecified otitis media  
Recurrent otitis  
Vocal cord dysfunction 05/15/2012  
PAST SURGICAL HISTORY  
Procedure Laterality Date  
ANKLE ARTHROSCOPY Right 16  
Dr. Yeung  
FAMILY HISTORY  
Problem Relation Age of Onset  
other (Other [Other]) Paternal   
Grandfather  
leukemia  
Cancer Maternal Grandmother  
breast  
Asthma Sister  
Social History  
Tobacco Use  
Smoking status: Never  
Smokeless tobacco: Never  
Vaping Use  
Vaping Use: Never used  
Substance Use Topics  
Alcohol use: Yes  
Drug use: No  
Current Outpatient Medications  
Medication Sig  
buPROPion XL (WELLBUTRIN XL) 150   
mg 24 hr tablet Take 150 mg by   
mouth every  
morning.  
PNV/iron/folic acid (PRENATAL   
VITAMIN-IRON-FA ORAL) Take 1   
tablet by mouth once  
daily.  
albuterol HFA (PROVENTIL HFA,   
VENTOLIN HFA) 90 mcg/actuation   
inhaler Inhale 2  
Puffs as instructed every 4 hours   
as needed.  
Drospirenone-Ethinyl Estradiol   
(SINGH, 28,) 3-0.03 mg per tablet   
Take 1 tablet  
by mouth once daily. Take one   
active pill continuously x 3   
months- discard  
placebo pills (Patient not taking:   
Reported on 2024)  
No current facility-administered   
medications for this visit.  
Allergies As of Date: 2024  
Allergen Noted Reaction  
GRASS POLLEN 2013 Unknown  
SEASONAL ALLERGIES 2013   
Unknown  
TREES 2013 Unknown  
Fully Assessed 2024  
REVIEW OF SYSTEMS  
Abdomen: No bloating, early   
satiety, indigestion, or increased   
flatulence. No  
abdominal pain, nausea, vomiting,   
diarrhea, or constipation.  
Bladder: No dysuria, gross   
hematuria, urinary frequency,   
urinary urgency, or  
incontinence.  
Allergies and current medication   
updated:Yes  
EXAM: /72   Wt 166 lb   
(75.3kg)   LMP 2024  
GENERAL: pleasant,    
female in no apparent distress  
CHEST: Normal inspiratory effort  
NEURO: alert and oriented x3,exam   
grossly non-focal  
ASSESSMENT/PLAN:  
1. Irregular menses - ICD9: 626.4,   
ICD10: N92.6 (primary diagnosis)  
- HCG QUAL UR B/O - negative  
- METFORMIN 500 MG TABLET  
- HGB A1C  
- INSULIN ASSAY BLOOD  
- GLUCOSE FASTING BLD  
2. PCOS (polycystic ovarian   
syndrome) - ICD9: 256.4, ICD10:   
E28.2  
- METFORMIN 500 MG TABLET  
Agreeable to begin Metformin 500   
mg with dinner daily x 1 week. If   
tolerating  
will increase to 2 tablets with   
dinner daily.  
We discussed common side effects   
of this medication including   
nausea, changes in  
bowel habits, abdominal   
discomfort, and flatulence.   
Discussed taking it with  
food and complication of lactic   
acidosis and signs/symptoms and   
medication  
handout given. Further instructed   
that if she experiences malaise,   
muscle aches,  
difficulty breathing, or severe   
abdominal pain to seek immediate   
medical  
attention.  
- HGB A1C  
- INSULIN ASSAY BLOOD  
- GLUCOSE FASTING BLD  
- - discussed with pt - review of   
PCOS with signs and symptoms.   
Increased risk  
of DMT2, CAD, infertility.   
Treatment discussed: weight loss   
to restore ovulatory  
cycles and reduce androgen   
concentrations, exercise, cyclic   
medroxyprogesterone  
to induce menses. Metformin   
discussed - explained that it is   
not considered a  
first-line treatment as it does   
not change pregnancy outcomes but   
that it will  
decrease HgbA1c although weight   
loss will do the same. Discussed   
that she may  
need medication to induce   
ovulation if she does not become   
pregnant with  
lifestyle changes and weight loss.  
- Eat primarily whole foods. Limit   
carbs, especially processed carbs.  
- Do not drink your calories  
- 30 grams of protein for your   
first meal of the day decreases   
your hunger  
during the day by up to 40 %   
[facility]ier Protein or generic 30 gm   
protein 1 gm  
sugar  
- Walk for 15 minutes immediately   
a meal.  
3. Elevated LDL cholesterol level   
- ICD9: 272.0, ICD10: E78.00  
- benefits of weight loss   
discussed  
Fol (more content not included)...      [facility]  
   
                                                    2024 Evaluation + Plan  
   
note                                    Associated Problem(s): Weight gain  
Formatting of this note might be   
different from the original.  
Will have her follow up with her   
ob/gyn, suspect gain posssilby   
from pcos. Continue exercise,   
healthy eating and portion   
control. Will stop lexapro   
(possible weight gain) and switch   
to wellbutrin.  
Electronically signed by SHANEKA Chacon CNP at   
2024 4:24 PM [facility]  
   
                                                    2024 Miscellaneous   
Notes                                   Associated Problem(s): Weight gain  
Formatting of this note might be   
different from the original.  
Will have her follow up with her   
ob/gyn, suspect gain posssilby   
from pcos. Continue exercise,   
healthy eating and portion   
control. Will stop lexapro   
(possible weight gain) and switch   
to wellbutrin.  
Electronically signed by SHANEKA Chacon CNP at   
2024 4:24 PM EST  
Associated Problem(s): Anxiety and   
depression  
Formatting of this note might be   
different from the original.  
Denies si/hi. Anxiety and   
depression symptoms better but   
still is having trouble focusing.   
Will taper discontinue lexapro and   
start wellbutrin. Follow up in   
about 1 month  
Electronically signed by SHANEKA Chacon CNP at   
2024 4:17 PM EST  
documented in this encounter            [facility]  
   
                                                    2024 Miscellaneous   
Notes                                   Associated Problem(s): Weight gain  
Formatting of this note might be   
different from the original.  
Will have her follow up with her   
ob/gyn, suspect gain posssilby   
from pcos. Continue exercise,   
healthy eating and portion   
control. Will stop lexapro   
(possible weight gain) and switch   
to wellbutrin.  
Electronically signed by SHANEKA Chacon CNP at   
2024 4:24 PM EST  
Associated Problem(s): Anxiety and   
depression  
Formatting of this note might be   
different from the original.  
Denies si/hi. Anxiety and   
depression symptoms better but   
still is having trouble focusing.   
Will taper discontinue lexapro and   
start wellbutrin. Follow up in   
about 1 month  
Electronically signed by SHANEKA Chacon CNP at   
2024 4:17 PM EST  
Addended by: MONICA SANCHEZ   
on: 2024 10:57 AM  
  
Modules accepted: Level of Service  
  
  
Electronically signed by SHANEKA Chacon CNP at   
2024 10:57 AM EST  
documented in this encounter            [facility]  
   
                                                    2024 Evaluation + Plan  
   
note                                    Associated Problem(s): Anxiety and   
depression  
Formatting of this note might be   
different from the original.  
Denies si/hi. Anxiety and   
depression symptoms better but   
still is having trouble focusing.   
Will taper discontinue lexapro and   
start wellbutrin. Follow up in   
about 1 month  
Electronically signed by SHANEKA Chacon CNP at   
2024 4:17 PM EST  
                                        [facility]  
   
                                                    2024 History of   
Present illness Narrative               Formatting of this note might be   
different from the original.  
Patient was identified by name and   
Date of Birth.  
  
Electronically signed by Mandi D'Amico at 2024 4:24 PM EST  
Formatting of this note is   
different from the original.  
Images from the original note were   
not included.  
  
  
2024  
  
Don Mittal (: 2000) is a   
23 y.o. female , Established   
patient, here for evaluation of   
the following chief complaint(s):  
Medication Check and Weight Gain  
  
ASSESSMENT/PLAN:  
  
1. Anxiety and depression  
Assessment & Plan:  
Denies si/hi. Anxiety and   
depression symptoms better but   
still is having trouble focusing.   
Will taper discontinue lexapro and   
start wellbutrin. Follow up in   
about 1 month  
Orders:  
- buPROPion XL (Wellbutrin XL) 150   
MG 24 hr tablet; Take 1 tablet   
(150 mg) by mouth every morning.   
Do not crush, chew, or split.,   
Starting Mon 2024, Until Fri   
3/22/2024, Normal  
2. Weight gain  
Assessment & Plan:  
Will have her follow up with her   
ob/gyn, suspect gain posssilby   
from pcos. Continue exercise,   
healthy eating and portion   
control. Will stop lexapro   
(possible weight gain) and switch   
to wellbutrin.  
  
Follow up in about 1 month (around   
2024).  
  
SUBJECTIVE/OBJECTIVE:  
  
HPI -  
Don Mittal (: 2000) is a   
23 y.o. female , Established   
patient, here for the evaluation   
of the following chief   
complaint(s):  
Medication Check and Weight Gain  
  
Stopped nicotine and stopped etoh   
since we last met. . Has gained   
some weight. She thinks it may be   
from going off of her birth   
control medication, Is working out   
with online [employer] classes daily,   
watching what she is eating. Is   
doing well as far as eating and   
exercising. She also is doing   
yoga.  
Stopped birth control. Has not had   
period since sept and November and   
has been pregnancy testing. Is   
trying to get pregnant. Will be   
following up with her ob/gyn.  
Reports anxiety is better, but is   
not able to focus well still.   
Denies si/hi.  
Her and her  are getting   
ready to go to [place] for car   
racing for 11 days. Her    
races cars. States she is looking   
forward to going.  
Prior to Admission medications  
Medication Sig Start Date End Date   
Taking? Authorizing Provider  
escitalopram (Lexapro) 20 MG   
tablet Take 1 tablet (20 mg) by   
mouth daily. 12/15/23 3/14/24 Yes   
SHANEKA Chacon CNP  
Prenatal MV-Min-Fe Fum-FA-DHA   
(PRENATAL 1 PO) Take by mouth. Yes   
Historical Provider, MD  
  
  
  
Review of Systems  
Constitutional: Negative.  
HENT: Negative.  
Respiratory: Negative.  
Cardiovascular: Negative.  
Gastrointestinal: Negative.  
Genitourinary: Negative.  
Musculoskeletal: Negative.  
Neurological: Negative.  
Psychiatric/Behavioral: Positive   
for decreased concentration.   
Negative for self-injury, sleep   
disturbance and suicidal ideas.   
The patient is nervous/anxious.  
  
Vitals:  
24 1346  
BP: 133/89  
Pulse: 98  
Resp: 18  
Temp: 36.7 C (98.1 F)  
TempSrc: Infrared  
SpO2: 97%  
Weight: 164 lb (74.4 kg)  
  
Physical Exam  
Constitutional:  
General: She is not in acute   
distress.  
Appearance: Normal appearance. She   
is not ill-appearing.  
HENT:  
Head: Normocephalic and   
atraumatic.  
Right Ear: Tympanic membrane   
normal.  
Left Ear: Tympanic membrane   
normal.  
Nose: No congestion or rhinorrhea.  
Mouth/Throat:  
Mouth: Mucous membranes are moist.  
Pharynx: Oropharynx is clear. No   
posterior oropharyngeal erythema.  
Eyes:  
Conjunctiva/sclera: Conjunctivae   
normal.  
Cardiovascular:  
Rate and Rhythm: Normal rate and   
regular rhythm.  
Pulmonary:  
Effort: Pulmonary effort is   
normal.  
Breath sounds: Normal breath   
sounds.  
Lymphadenopathy:  
Cervical: No cervical adenopathy.  
Skin:  
General: Skin is warm and dry.  
Neurological:  
Mental Status: She is alert and   
oriented to person, place, and   
time.  
Psychiatric:  
Mood and Affect: Mood normal.  
Behavior: Behavior normal.  
Thought Content: Thought content   
normal.  
Judgment: Judgment normal.  
  
An electronic signature was used   
to authenticate this note.  
  
SHANEKA Chacon CNP  
2024  
4:24 PM  
Electronically signed by SHANEKA Chacon CNP at   
2024 4:24 PM EST  
documented in this encounter            [facility]  
   
                                                    2024 History of   
Present illness Narrative               Formatting of this note might be   
different from the original.  
Patient was identified by name and   
Date of Birth.  
  
Electronically signed by Mandi D'Amico at 2024 4:24 PM EST  
Formatting of this note is   
different from the original.  
Images from the original note were   
not included.  
  
  
2024  
  
Don Mittal (: 2000) is a   
23 y.o. female , Established   
patient, here for evaluation of   
the following chief complaint(s):  
Medication Check and Weight Gain  
  
ASSESSMENT/PLAN:  
  
1. Anxiety and depression  
Assessment & Plan:  
Denies si/hi. Anxiety and   
depression symptoms better but   
still is having trouble focusing.   
Will taper discontinue lexapro and   
start wellbutrin. Follow up in   
about 1 month  
Orders:  
- buPROPion XL (Wellbutrin XL) 150   
MG 24 hr tablet; Take 1 tablet   
(150 mg) by mouth every morning.   
Do not crush, chew, or split.,   
Starting Mon 2024, Until Fri   
3/22/2024, Normal  
2. Weight gain  
Assessment & Plan:  
Will have her follow up with her   
ob/gyn, suspect gain posssilby   
from pcos. Continue exercise,   
healthy eating and portion   
control. Will stop lexapro   
(possible weight gain) and switch   
to wellbutrin.  
  
Follow up in about 1 month (around   
2024).  
  
SUBJECTIVE/OBJECTIVE:  
  
HPI -  
Don Mittal (: 2000) is a   
23 y.o. female , Established   
patient, here for the evaluation   
of the following chief   
complaint(s):  
Medication Check and Weight Gain  
  
Stopped nicotine and stopped etoh   
since we last met. . Has gained   
some weight. She thinks it may be   
from going off of her birth   
control medication, Is working out   
with online shreya classes daily,   
watching what she is eating. Is   
doing well as far as eating and   
exercising. She also is doing   
yoga.  
Stopped birth control. Has not had   
period since sept and November and   
has been pregnancy testing. Is   
trying to get pregnant. Will be   
following up with her ob/gyn.  
Reports anxiety is better, but is   
not able to focus well still.   
Denies si/hi.  
Her and her  are getting   
ready to go to [place] for car   
racing for 11 days. Her    
races cars. States she is looking   
forward to going.  
Prior to Admission medications  
Medication Sig Start Date End Date   
Taking? Authorizing Provider  
escitalopram (Lexapro) 20 MG   
tablet Take 1 tablet (20 mg) by   
mouth daily. 12/15/23 3/14/24 Yes   
Monica Sanchez, SHANEKA - CNP  
Prenatal MV-Min-Fe Fum-FA-DHA   
(PRENATAL 1 PO) Take by mouth. Yes   
Historical Provider, MD  
  
  
  
Review of Systems  
Constitutional: Negative.  
HENT: Negative.  
Respiratory: Negative.  
Cardiovascular: Negative.  
Gastrointestinal: Negative.  
Genitourinary: Negative.  
Musculoskeletal: Negative.  
Neurological: Negative.  
Psychiatric/Behavioral: Positive   
for decreased concentration.   
Negative for self-injury, sleep   
disturbance and suicidal ideas.   
The patient is nervous/anxious.  
  
Vitals:  
24 1346  
BP: 133/89  
Pulse: 98  
Resp: 18  
Temp: 36.7 C (98.1 F)  
TempSrc: Infrared  
SpO2: 97%  
Weight: 164 lb (74.4 kg)  
  
Physical Exam  
Constitutional:  
General: She is not in acute   
distress.  
Appearance: Normal appearance. She   
is not ill-appearing.  
HENT:  
Head: Normocephalic and   
atraumatic.  
Right Ear: Tympanic membrane   
normal.  
Left Ear: Tympanic membrane   
normal.  
Nose: No congestion or rhinorrhea.  
Mouth/Throat:  
Mouth: Mucous membranes are moist.  
Pharynx: Oropharynx is clear. No   
posterior oropharyngeal erythema.  
Eyes:  
Conjunctiva/sclera: Conjunctivae   
normal.  
Cardiovascular:  
Rate and Rhythm: Normal rate and   
regular rhythm.  
Pulmonary:  
Effort: Pulmonary effort is   
normal.  
Breath sounds: Normal breath   
sounds.  
Lymphadenopathy:  
Cervical: No cervical adenopathy.  
Skin:  
General: Skin is warm and dry.  
Neurological:  
Mental Status: She is alert and   
oriented to person, place, and   
time.  
Psychiatric:  
Mood and Affect: Mood normal.  
Behavior: Behavior normal.  
Thought Content: Thought content   
normal.  
Judgment: Judgment normal.  
  
An electronic signature was used   
to authenticate this note.  
  
SHANEKA Chacon CNP  
2024  
4:24 PM  
Electronically signed by SHANEKA Chacon CNP at   
2024 4:24 PM EST  
documented in this encounter            [facility]  
   
                                        2024 Instructions   
  
  
SHANEKA Chacon CNP -   
2024 1:40 PM ESTFormatting   
of this note might be different   
from the original.  
Start wellbutrin and decrease   
lexapro to 10 mg daily x 7 days,   
then stop the lexapro.  
Check [employer] james for meditation  
Electronically signed by SHANEKA Chacon CNP at   
2024 2:05 PM EST  
  
documented in this encounter            [facility]  
   
                                        2024 Instructions   
  
  
SHANEKA Chacon CNP -   
2024 1:40 PM ESTFormatting   
of this note might be different   
from the original.  
Start wellbutrin and decrease   
lexapro to 10 mg daily x 7 days,   
then stop the lexapro.  
Check [employer] james for meditation  
Electronically signed by SHANEKA Chacon CNP at   
2024 2:05 PM EST  
  
documented in this encounter            [facility]  
   
                                        2024 Note     Addended by: MONICA MCADAMS   
on: 2024 10:57 AM  
  
Modules accepted: Level of Service  
  
  
Electronically signed by SHANEKA Chacon CNP at   
2024 10:57 AM EST  
                                        [facility]  
   
                                        2024 Note     Addended by: MONICA MCADAMS   
on: 2024 10:57 AM  
  
Modules accepted: Level of Service      [facility]  
   
                                                    2023 Evaluation + Plan  
   
note                                    Associated Problem(s): Anxiety and   
depression  
Formatting of this note might be   
different from the original.  
Denies any suicidal or homicidal   
ideation. Reports improved   
symptoms. We will continue on   
Lexapro 10 mg daily she is to   
provide an update through [facility]   
in approximately 4 weeks. Consider   
up titration if needed at that   
point.  
Electronically signed by SHANEKA Chacon CNP at   
2023 1:22 PM EST  
                                        [facility]  
   
                                                    2023 Miscellaneous   
Notes                                   Associated Problem(s): Anxiety and   
depression  
Formatting of this note might be   
different from the original.  
Denies any suicidal or homicidal   
ideation. Reports improved   
symptoms. We will continue on   
Lexapro 10 mg daily she is to   
provide an update through [facility]   
in approximately 4 weeks. Consider   
up titration if needed at that   
point.  
Electronically signed by SHANEKA Chacon CNP at   
2023 1:22 PM EST  
documented in this encounter            [facility]  
   
                                                    2023 History of   
Present illness Narrative               Formatting of this note might be   
different from the original.  
Patient was identified by name and   
Date of Birth.  
  
Health Main:  
  
Lipid-pended  
HIV/Hep C-declined  
Covid-declined  
PHQ-completed  
Pap-[facility] (will scan in)  
Tdap-declined  
Influenza-declined  
  
  
Electronically signed by Mandi D'Amico at 2023 1:22 PM EST  
Formatting of this note is   
different from the original.  
Images from the original note were   
not included.  
  
  
2023  
  
Don Mittal (: 2000) is a   
23 y.o. female , Established   
patient, here for evaluation of   
the following chief complaint(s):  
Follow-up and Health Maintenance   
(Lipid-pended/HIV/Hep   
C-declined/Covid-declined/PHQ-comp  
leted/Pap-[facility] (will scan   
in)/Tdap-declined/Influenza-declin  
ed/ )  
  
ASSESSMENT/PLAN:  
  
1. Annual physical exam  
- Comprehensive metabolic panel  
- CBC  
- Lipid panel  
2. Screening for deficiency anemia  
- CBC  
3. Screening for cholesterol level  
- Lipid panel  
4. Anxiety and depression  
Assessment & Plan:  
Denies any suicidal or homicidal   
ideation. Reports improved   
symptoms. We will continue on   
Lexapro 10 mg daily she is to   
provide an update through [facility]   
in approximately 4 weeks. Consider   
up titration if needed at that   
point.  
  
Follow up in about 3 months   
(around 2024).  
  
SUBJECTIVE/OBJECTIVE:  
  
HPI -  
Don Mittal (: 2000) is a   
23 y.o. female , Established   
patient, here for the evaluation   
of the following chief   
complaint(s):  
Follow-up and Health Maintenance   
(Lipid-pended/HIV/Hep   
C-declined/Covid-declined/PHQ-comp  
leted/Pap-[facility] (will scan   
in)/Tdap-declined/Influenza-declin  
ed/ )  
Recently newly established patient   
to us about 2 weeks ago who had   
presented for an acute complaint   
of anxiety and depression. She was   
started on Lexapro 10 mg at that   
time and recommended to get set up   
with a counselor. No other acute   
complaints today.  
Anxiety/dep- feels like medication   
is helping some. Is able to think   
clearer. Is processing thoughts   
better. States that she has not   
broke down and cried like she was   
before. Denies any suicidal or   
homicidal ideation.  
She has yet to set up with a   
counselor.  
Has OB/GYN which she follows for   
women's health.  
  
Prior to Admission medications  
Medication Sig Start Date End Date   
Taking? Authorizing Provider  
escitalopram (Lexapro) 10 MG   
tablet Take 1 tablet (10 mg) by   
mouth daily. 23 Yes   
SHANEKA Chacon CNP  
Prenatal MV-Min-Fe Fum-FA-DHA   
(PRENATAL 1 PO) Take by mouth. Yes   
Historical Provider, MD  
  
  
  
Review of Systems  
Constitutional: Negative.  
HENT: Negative.  
Respiratory: Negative.  
Cardiovascular: Negative.  
Gastrointestinal: Negative.  
Genitourinary: Negative.  
Musculoskeletal: Negative.  
Neurological: Negative.  
Psychiatric/Behavioral: Positive   
for decreased concentration and   
sleep disturbance (still not   
sleeping well.). Negative for   
self-injury and suicidal ideas.   
The patient is nervous/anxious.  
  
Vitals:  
23 0929  
BP: 116/77  
Pulse: 98  
Resp: 18  
Temp: 36.4 C (97.6 F)  
TempSrc: Infrared  
SpO2: 99%  
Weight: 151 lb (68.5 kg)  
  
Physical Exam  
Constitutional:  
General: She is not in acute   
distress.  
Appearance: Normal appearance. She   
is not ill-appearing.  
HENT:  
Head: Normocephalic and   
atraumatic.  
Right Ear: Tympanic membrane   
normal.  
Left Ear: Tympanic membrane   
normal.  
Mouth/Throat:  
Mouth: Mucous membranes are moist.  
Pharynx: Oropharynx is clear. No   
posterior oropharyngeal erythema.  
Eyes:  
Conjunctiva/sclera: Conjunctivae   
normal.  
Cardiovascular:  
Rate and Rhythm: Normal rate and   
regular rhythm.  
Pulses: Normal pulses.  
Heart sounds: Normal heart sounds.  
Musculoskeletal:  
Right lower leg: No edema.  
Left lower leg: No edema.  
Skin:  
General: Skin is warm and dry.  
Neurological:  
Mental Status: She is alert and   
oriented to person, place, and   
time.  
Psychiatric:  
Mood and Affect: Mood normal.  
Behavior: Behavior normal.  
Thought Content: Thought content   
normal.  
Judgment: Judgment normal.  
  
An electronic signature was used   
to authenticate this note.  
  
SHANEKA Chacon CNP  
2023  
1:22 PM  
Electronically signed by SHANEKA Chacon CNP at   
2023 1:22 PM EST  
documented in this encounter            [facility]  
   
                                        2023 Instructions   
  
  
SHANEKA Chacon CNP -   
2023 9:40 AM ESTFormatting   
of this note might be different   
from the original.  
Melatonin 1- 5 mg nightly to help   
with sleep.  
  
Send update via mychart to   
provider in 1 month on how you are   
doing on the lexapro 10 mg daily.  
Electronically signed by SHANEKA Chacon CNP at   
2023 10:00 AM EST  
  
documented in this encounter            [facility]  
   
                                        2023 Note     HNO ID: [id number]  
Author: Awilda Bell MD  
Service: ?  
Author Type: Physician  
Type: Progress Notes  
Filed: 2023 3:09 PM  
Note Text:  
Chaperone offered: Patient   
declines.  
Don is a 23 year old    
who presents for an annual   
gynecologic  
exam without complaints. Does have   
irregular bleeding with pills.   
  
does sprint car racing. Has three   
step children. Thinking about   
kids.  
Menses: cycles every 28 days and   
5-8 days but does have a lot of   
irregular  
bleeding for 3-4 days per month-   
not as heavy. Does not miss pills   
or take  
them late.  
Contraception: combined hormonal   
contraceptives  
HPV vaccine: Yes  
Last Pap: 2022 normal  
HPV: N/A  
History of abnormal pap: No  
Last mammogram: never  
Sexually active: Yes  
History of STDS: None  
Patient concerns for STD exposure:   
No.  
Pain with intercourse: No  
Postcoital bleeding: No  
Exercise:active walking  
Diet: balanced  
OB History  
 T0  L0  
SAB0 IAB0 Ectopic0 Multiple0 Live   
Births0  
Comment: 3 stepchildren  
Gyn History  
LMP: 2022, Having periods  
Age at Menarche:  
Age at First Pregnancy:  
Age at Menopause:  
Gyn History Comments:  
Sexual Activity: Yes; Male;   
sexually active with   
Contraception: Pill  
PAST MEDICAL HISTORY  
Diagnosis Date  
Exertional asthma 2012  
Medial meniscus tear 2012  
Unspecified otitis media  
Recurrent otitis  
Vocal cord dysfunction 5/15/2012  
PAST SURGICAL HISTORY  
Procedure Laterality Date  
ANKLE ARTHROSCOPY Right 16  
Dr. Yeung  
FAMILY HISTORY  
Problem Relation Age of Onset  
other (Other [Other]) Paternal   
Grandfather  
leukemia  
Cancer Maternal Grandmother  
breast  
Asthma Sister  
SOCIAL HISTORY  
Social History  
Tobacco Use  
Smoking status: Never  
Smokeless tobacco: Never  
Vaping Use  
Vaping Use: Never used  
Substance Use Topics  
Alcohol use: Yes  
Drug use: No  
REVIEW OF SYSTEMS  
Abdomen: No abdominal pain,   
nausea, vomiting, diarrhea, or   
constipation.  
No bloating, early satiety,   
indigestion, or increased   
flatulence.  
Bladder: No dysuria, gross   
hematuria, urinary frequency,   
urinary urgency,  
or incontinence.  
Breast: No breast lumps, nipple   
d/c, overlying skin changes,   
redness or  
skin retraction.  
Allergies and current medication   
updated:Yes  
EXAM: /68   Ht 5' 4  (1.63m)   
  Wt 149 lb (67.6kg)   LMP   
2023    
BMI 25.56 kg/(m2).  
GENERAL: pleasant,    
female in no apparent distress  
HEENT: Normocephalic, atraumatic,   
mucus membranes moist, and no   
lesions  
NECK: Supple, full range of   
motion, no adenopathy, and thyroid   
normal  
DERMATOLOGY: Normal, without   
lesions, non-icteric, and   
non-hirsute  
BREAST: soft, non-tender,   
symmetric, no dominant mass,   
normal  
nipple-areolar complex, no   
lymphadenopathy, and no nipple   
discharge  
ABDOMEN: soft, non-tender, and no   
masses  
PELVIC: external genitalia normal,   
normal Bartholin's glands,   
urethra,  
Goldston's glands, no vulvar lesions,   
no cervical lesions, good vaginal  
support, physiologic discharge   
present, normal appearing perineal   
body and  
perianal region  
BIMANUAL: uterus normal size,   
shape and consistency, no adnexal   
masses,  
and non-tender  
RECTOVAGINAL: deferred.  
NEURO: alert and oriented x3,exam   
grossly non-focal  
EXTREMITIES: normal  
ASSESSMENT/PLAN:  
1) Health maintenance:  
Pap/HPV up to date.  
Mammogram starting age 40.  
Nutrition, exercise and routine   
health maintenance exams reviewed.  
Calcium/Vitamin D supplementation   
information provided.  
2) Contraception: combined   
hormonal contraceptives.   
Contraceptive  
options reviewed and information   
provided. Changed to singh  
3) STD screening: Declined STD   
check.  
4) Follow up one year or sooner as   
needed  
Awilda Schulz MD             [facility]  
   
                                                    2023 History of   
Present illness Narrative               Formatting of this note is   
different from the original.  
Chaperone offered: Patient   
declines.  
  
Don is a 23 year old    
who presents for an annual   
gynecologic exam without   
complaints. Does have irregular   
bleeding with pills.  does   
sprint car racing. Has three step   
children. Thinking about kids.  
  
Menses: cycles every 28 days and   
5-8 days but does have a lot of   
irregular bleeding for 3-4 days   
per month- not as heavy. Does not   
miss pills or take them late.  
Contraception: combined hormonal   
contraceptives  
HPV vaccine: Yes  
Last Pap: 2022 normal  
HPV: N/A  
History of abnormal pap: No  
Last mammogram: never  
Sexually active: Yes  
History of STDS: None  
Patient concerns for STD exposure:   
No.  
Pain with intercourse: No  
Postcoital bleeding: No  
Exercise:active walking  
Diet: balanced  
  
OB History  
 T0  L0  
SAB0 IAB0 Ectopic0 Multiple0 Live   
Births0  
  
Comment: 3 stepchildren  
  
Gyn History  
  
LMP: 2022, Having periods  
Age at Menarche:  
Age at First Pregnancy:  
Age at Menopause:  
Gyn History Comments:  
Sexual Activity: Yes; Male;   
sexually active with   
Contraception: Pill  
  
  
  
PAST MEDICAL HISTORY  
Diagnosis Date  
Exertional asthma 2012  
Medial meniscus tear 2012  
Unspecified otitis media  
Recurrent otitis  
Vocal cord dysfunction 5/15/2012  
  
PAST SURGICAL HISTORY  
Procedure Laterality Date  
ANKLE ARTHROSCOPY Right 16  
Dr. Yeung  
  
FAMILY HISTORY  
Problem Relation Age of Onset  
other (Other [Other]) Paternal   
Grandfather  
leukemia  
Cancer Maternal Grandmother  
breast  
Asthma Sister  
SOCIAL HISTORY  
Social History  
  
Tobacco Use  
Smoking status: Never  
Smokeless tobacco: Never  
Vaping Use  
Vaping Use: Never used  
Substance Use Topics  
Alcohol use: Yes  
Drug use: No  
  
REVIEW OF SYSTEMS  
Abdomen: No abdominal pain,   
nausea, vomiting, diarrhea, or   
constipation. No bloating, early   
satiety, indigestion, or increased   
flatulence.  
Bladder: No dysuria, gross   
hematuria, urinary frequency,   
urinary urgency, or incontinence.  
Breast: No breast lumps, nipple   
d/c, overlying skin changes,   
redness or skin retraction.  
Allergies and current medication   
updated:Yes  
  
EXAM: /68   Ht 5' 4  (1.63m)   
  Wt 149 lb (67.6kg)   LMP   
2023   BMI 25.56 kg/(m^2).  
  
  
GENERAL: pleasant,    
female in no apparent distress  
HEENT: Normocephalic, atraumatic,   
mucus membranes moist, and no   
lesions  
NECK: Supple, full range of   
motion, no adenopathy, and thyroid   
normal  
DERMATOLOGY: Normal, without   
lesions, non-icteric, and   
non-hirsute  
BREAST: soft, non-tender,   
symmetric, no dominant mass,   
normal nipple-areolar complex, no   
lymphadenopathy, and no nipple   
discharge  
ABDOMEN: soft, non-tender, and no   
masses  
PELVIC: external genitalia normal,   
normal Bartholin's glands,   
urethra, Goldston's glands, no vulvar   
lesions, no cervical lesions, good   
vaginal support, physiologic   
discharge present, normal   
appearing perineal body and   
perianal region  
BIMANUAL: uterus normal size,   
shape and consistency, no adnexal   
masses, and non-tender  
RECTOVAGINAL: deferred.  
NEURO: alert and oriented x3,exam   
grossly non-focal  
EXTREMITIES: normal  
  
ASSESSMENT/PLAN:  
1) Health maintenance:  
Pap/HPV up to date.  
Mammogram starting age 40.  
Nutrition, exercise and routine   
health maintenance exams reviewed.  
Calcium/Vitamin D supplementation   
information provided.  
2) Contraception: combined   
hormonal contraceptives.   
Contraceptive options reviewed and   
information provided. Changed to   
singh  
3) STD screening: Declined STD   
check.  
4) Follow up one year or sooner as   
needed  
  
Awilda Schulz MD  
  
Electronically signed by Awilda Bell MD at 2023   
3:09 PM EDT  
documented in this encounter            [facility]  
   
                                                    2023 Miscellaneous   
Notes                                   Formatting of this note is   
different from the original.  
Patient called in requesting   
refill today. Annual scheduled   
with DM 23. No need to call   
back.  
  
Requested Prescriptions  
  
Pending Prescriptions Disp Refills  
Norethindrone Acet-Ethinyl Est   
(JUNEL 1/20, 21,) 1-20 mg-mcg per   
tablet 28 tablet 2  
Sig: TAKE 1 TABLET BY MOUTH DAILY  
  
Shiloh Alvarado RN  
  
  
Electronically signed by Shiloh Alvarado RN at 2023 10:30 AM   
EDT  
documented in this encounter            [facility]  
   
                                                    2023 Miscellaneous   
Notes                                   Addended by: RACHEL NARAYANAN on:   
3/9/2023 03:14 PM  
  
Modules accepted: Orders  
  
  
Electronically signed by Rachel Narayanan APRN.CNM at 2023   
3:14 PM EST  
Addended by: JENNA STEPHENS LPN   
on: 3/9/2023 02:59 PM  
  
Modules accepted: Orders  
  
  
Electronically signed by Jenna Stephens LPN at 2023 2:59 PM   
EST  
Formatting of this note might be   
different from the original.  
Please see pended order below in   
DM absence. Pt is needing this   
today and it went to the wrong   
pharmacy. Call only if problems.   
Jenna Stephens LPN  
  
Electronically signed by Jenna Stephens LPN at 2023 2:59 PM   
EST  
Formatting of this note might be   
different from the original.  
ordered  
Electronically signed by Awilda Leonard MD at 2023   
2:54 PM EST  
Formatting of this note might be   
different from the original.  
Patient needing Rx before [facility]   
returns on Friday.  
Chitra Tijerina RN  
  
Electronically signed by Chitra Tijerina RN at 2023 1:53 PM   
EST  
Formatting of this note might be   
different from the original.  
RX pending. Patient last seen for   
annual 22  
Electronically signed by America Beck RN at 2023 1:15 PM   
EST  
documented in this encounter            [facility]  
   
                                                    2023 Miscellaneous   
Notes                                   Formatting of this note might be   
different from the original.  
Left detailed message on patient's   
voicemail  
Electronically signed by Ronel Diaz LPN at 2023 2:33   
PM EST  
Formatting of this note might be   
different from the original.  
Please let her know that I   
apologize. I thought the order was   
placed. Sorry for her   
inconvenience. Tomorrow morning   
would be fine for her to get her   
blood drawn.  
  
Lauryn Ryan MD  
  
Electronically signed by Lauryn Ryan MD at 2023 2:21 PM   
EST  
Formatting of this note might be   
different from the original.  
Patient was to have her 21 day   
progesterone drawn today. Patient   
went to the lab at 7 AM and there   
was no order. Patient voiced her   
frustration and lack of   
communication. If patient chooses,   
can she have it drawn tomorrow?   
Otherwise she's aware that she   
will have to wait until her next   
cycle. Order pending.  
America Beck RN  
  
Electronically signed by America Beck RN at 2023 2:14 PM   
EST  
documented in this encounter            [facility]  
   
                                                    2023 Miscellaneous   
Notes                                   Formatting of this note might be   
different from the original.  
Please call & explain that day 21   
would be  and   
today is day 1.  
Thanks!  
  
Lauryn Ryan MD  
  
Electronically signed by Lauryn Ryan MD at 2023 10:50 AM   
EST  
documented in this encounter            [facility]  
   
                                                    2022 Miscellaneous   
Notes                                   Formatting of this note might be   
different from the original.  
Menses can take 7-10 days to start   
after finishing provera.  
  
Lauryn Ryan MD  
  
Electronically signed by Lauryn Ryan MD at 2022 10:28 AM   
EST  
Formatting of this note might be   
different from the original.  
Rx provera given  
  
Lauryn Ryan MD  
  
Electronically signed by Lauryn Ryan MD at 2022 10:28 AM   
EST  
documented in this encounter            [facility]  
   
                                                    2022 Miscellaneous   
Notes                                   Formatting of this note might be   
different from the original.  
Left message for patient to call   
office or check [facility] message.  
Shiloh Alvarado RN  
  
Electronically signed by Shiloh Alvarado RN at 2022 11:20 AM   
EST  
Formatting of this note might be   
different from the original.  
Images from the original note were   
not included.  
Left message for patient to call   
office.  
MD Shiloh Baum RN, RN  
Please call patient & make sure   
she is taking a folic acid   
supplement as desires pregnancy.   
Thanks!  
  
KJ  
Electronically signed by Shiloh Alvarado RN at 2022 3:30 PM   
EST  
documented in this encounter            [facility]  
   
                                                    2022 History of   
Present illness Narrative               Formatting of this note is   
different from the original.  
Don Mittal is a 22 year old   
female who presents for problem   
visit (virtually).  
  
HPI: Patient has questions about   
her positive pregnancy test and   
missed menses. Also she would like   
to get pregnant and has questions   
about that as well.  
  
OB History  
 T0  L0  
SAB0 IAB0 Ectopic0 Multiple0 Live   
Births0  
  
Comment: 3 stepchildren  
  
Gyn History  
  
LMP: 2022, Having periods  
Age at Menarche:  
Age at First Pregnancy:  
Age at Menopause:  
Gyn History Comments:  
Sexual Activity: Yes; Male;   
sexually active with   
Contraception: Pill  
  
  
  
PAST MEDICAL HISTORY  
Diagnosis Date  
Exertional asthma 2012  
Medial meniscus tear 2012  
Unspecified otitis media  
Recurrent otitis  
Vocal cord dysfunction 5/15/2012  
  
PAST SURGICAL HISTORY  
Procedure Laterality Date  
ANKLE ARTHROSCOPY Right 16  
Dr. Yeung  
  
FAMILY HISTORY  
Problem Relation Age of Onset  
other (Other [Other]) Paternal   
Grandfather  
leukemia  
Cancer Maternal Grandmother  
breast  
Asthma Sister  
  
Social History  
  
Tobacco Use  
Smoking status: Never  
Smokeless tobacco: Never  
Vaping Use  
Vaping Use: Never used  
Substance Use Topics  
Alcohol use: Yes  
Drug use: No  
  
Current Outpatient Medications  
Medication Sig  
Norethindrone Acet-Ethinyl Est   
(JUNEL 1/20, ,) 1-20 mg-mcg per   
tablet TAKE 1 TABLET BY MOUTH   
DAILY  
albuterol HFA (PROVENTIL HFA,   
VENTOLIN HFA) 90 mcg/actuation   
inhaler Inhale 2 Puffs as   
instructed every 4 hours as   
needed.  
  
No current facility-administered   
medications for this visit.  
  
Allergies As of Date: 2022  
Allergen Noted Reaction  
GRASS POLLEN 2013 Unknown  
SEASONAL ALLERGIES 2013   
Unknown  
TREES 2013 Unknown  
  
Fully Assessed 08/10/2022  
  
Allergies and current medication   
updated:Yes  
  
EXAM: LMP 2022  
  
  
GENERAL: pleasant,    
female in no apparent distress  
  
ASSESSMENT AND PLAN:  
23yo female with PCOS & missed   
menses  
Discussed likely false positive   
home pregnancy test vs blighted   
ovum. Serum hcg confirms patient   
is no pregnant.  
Missed menses - discussed R/B/A   
and will proceed with provera to   
induce menses if no menses by   
.  
PCOS - patient reports that home   
OPK don't show ovulation. Will   
check day 21 progesterone.  
All questions answered & patient   
agrees with plan.  
  
I spent a total of 25 minutes on   
the date of the service which   
included preparing to see the   
patient, face-to-face patient   
care, completing clinical   
documentation, and counseling and   
educating the   
patient/family/caregiver.  
  
Lauryn Ryan MD  
  
  
Electronically signed by Lauryn Ryan MD at 2022 2:56 PM   
EST  
documented in this encounter            [facility]  
   
                                                    2022 Miscellaneous   
Notes                                   Formatting of this note might be   
different from the original.  
Patient notified and voiced   
understanding of below. Virtual   
visit appointment scheduled per   
request.  
  
Chitra Tijerina RN  
  
Electronically signed by Chitra Tijerina RN at 2022 2:31 PM   
EST  
Formatting of this note might be   
different from the original.  
Based on the dates of her tests I   
suspect that she had a false home   
pregnancy test but can't exclude a   
blighted ovum. If she misses   
another menses I could give her   
provera to induce menses. If she'd   
like to discuss things more please   
offer her a virtual or in person   
visit.  
Has she stopped taking the ocps?  
Please remind her to take a folic   
acid supplement daily when trying   
to conceive.  
  
Lauryn Ryan MD  
  
Electronically signed by Lauryn Ryan MD at 2022 2:21 PM   
EST  
Formatting of this note might be   
different from the original.  
LMP 10/26/22  
She had positive UPT  at home   
and then negative hcg quant on   
. Concerned because she still   
hasn't started menses. Not having   
any symptoms like she is going to   
start soon. She has been having   
regular menses in July, August and   
September. She was previously   
diagnosed with PCOS earlier this   
year and is aware that can cause   
irregular menses. She has been   
trying to conceive x2 months.   
Asking what else she can do to   
help regulate menses to continue   
to try to conceive. Aware [facility] back   
in office tomorrow. Please advise.  
Shiloh Alvarado RN  
  
Electronically signed by Shiloh Alvarado RN at 2022 11:11 AM   
EST  
documented in this encounter            [facility]  
   
                                                    2022 Miscellaneous   
Notes                                   Formatting of this note might be   
different from the original.  
Patient notified and appt made for   
tomorrow at her request. Leave   
phone note open for quant result  
Electronically signed by Fariba Mojica RN at 2022 1:10 PM EST  
Formatting of this note might be   
different from the original.  
Hcg quant ordered  
  
Lauryn Ryan MD  
  
Electronically signed by Lauryn Ryan MD at 2022 12:12 PM   
EST  
Formatting of this note might be   
different from the original.  
Pt calling and stated that she got   
a positive pregnancy test on   
10/19/22. Pt has her PNOB and NOB   
appointment scheduled. She took   
another test this morning and and   
received a negative test. Pt   
stating she also go a negative   
results yesterday as well. Pt   
wanting to know if she can have a   
serum quant to verify. LMP   
10/26/22. Please advise. Jenna Stephens LPN  
  
Electronically signed by Jenna Stephens LPN at 2022 11:28 AM   
EST  
documented in this encounter            [facility]  
   
                                                    2022 Miscellaneous   
Notes                                     
  
  
Formatting of this note might be   
different from the original.  
See phone note.  
Shiloh Alvarado RN  
  
  
  
Electronically signed by Shiloh Alvarado RN at 2022 8:50 AM   
EDT  
  
  
Formatting of this note might be   
different from the original.  
This is [facility] patient- I read   
ultrasound. Please forward to her.  
  
  
Electronically signed by Awilda Leonard MD at 2022   
8:45 AM EDTdocumented in this   
encounter                               [facility]  
   
                                                    2022 History of   
Present illness Narrative                 
  
  
Formatting of this note is   
different from the original.  
Don is a 22 year old who   
presents for an annual gynecologic   
exam.  
  
Menses are typically regular. She   
had some breakthrough bleeding so   
she stopped the ocps last month.   
The episode of irregular bleeding   
was heavy. She then restarted the   
ocps. Patient has changed jobs   
(last year) & got  in   
March. She takes her ocps   
regularly. Patient states home hcg   
tests are negative.  
  
Menses:regular other than above  
Contraception: combined hormonal   
contraceptives  
HPV vaccine: Yes  
Last Pap: 2019 normal  
HPV: N/A  
History of abnormal pap: No  
Last mammogram: never  
  
OB History  
No obstetric history on file.  
  
Gyn History  
LMP: 2022, Having periods  
Age at Menarche:  
Age at First Pregnancy:  
Age at Menopause:  
Gyn History Comments:  
Sexual Activity: Yes; Male;   
sexually active with   
Contraception: Pill  
  
  
PAST MEDICAL HISTORY  
Diagnosis Date  
Exertional asthma 2012  
Medial meniscus tear 2012  
Unspecified otitis media  
Recurrent otitis  
Vocal cord dysfunction 5/15/2012  
  
PAST SURGICAL HISTORY  
Procedure Laterality Date  
ANKLE ARTHROSCOPY Right 16  
Dr. Yeung  
  
FAMILY HISTORY  
Problem Relation Age of Onset  
other (Other [Other]) Paternal   
Grandfather  
leukemia  
Cancer Maternal Grandmother  
breast  
Asthma Sister  
SOCIAL HISTORY  
Social History  
  
Tobacco Use  
Smoking status: Never Smoker  
Smokeless tobacco: Never Used  
Vaping Use  
Vaping Use: Never used  
Substance Use Topics  
Alcohol use: Yes  
Drug use: No  
  
REVIEW OF SYSTEMS  
Abdomen: No abdominal pain,   
nausea, vomiting, diarrhea, or   
constipation. No bloating, early   
satiety, indigestion, or increased   
flatulence.  
Bladder: No dysuria, gross   
hematuria, urinary frequency,   
urinary urgency, or incontinence.  
Breast: No breast lumps, nipple   
d/c, overlying skin changes,   
redness or skin retraction.  
Allergies and current medication   
updated:Yes  
  
EXAM: /64   Ht 5' 4.173    
(1.63m)   Wt 149 lb (67.6kg)   LMP   
2022   BMI 25.44 kg/(m^2).  
  
  
GENERAL: pleasant,    
female in no apparent distress  
BREAST: soft, non-tender,   
symmetric, no dominant mass,   
normal nipple-areolar complex, no   
lymphadenopathy and no nipple   
discharge  
CHEST: Normal inspiratory effort  
ABDOMEN: soft, non-tender and no   
masses  
PELVIC: external genitalia normal,   
normal Bartholin's glands,   
urethra, Goldston's glands, no vulvar   
lesions, no cervical lesions, good   
vaginal support, physiologic   
discharge present, normal   
appearing perineal body and   
perianal region  
BIMANUAL: uterus normal size,   
shape and consistency, no adnexal   
masses and non-tender  
RECTOVAGINAL: deferred.  
NEURO: alert and oriented x3,exam   
grossly non-focal  
EXTREMITIES: normal  
  
ASSESSMENT/PLAN:  
1) Health maintenance:  
Pap done with HPV.  
Nutrition, exercise and routine   
health maintenance exams reviewed.  
2) Contraception: combined   
hormonal contraceptives.   
Contraceptive options reviewed and   
information provided.  
3) STD screening: Declined STD   
check.  
4) Follow up one year or sooner as   
needed  
5) Breakthrough bleeding - likely   
from stress & then stopping ocps.   
Check labs & pelvic US.  
  
Lauryn Ryan MD  
  
  
Electronically signed by Lauryn Ryan MD at 2022 8:54 AM   
EDTdocumented in this encounter         [facility]  
  
  
  
                                                    2013 History of Past i  
llness Narrative   
  
  
  
                                Problem         Noted Date      Resolved Date  
   
                                Posterior tibial tendonitis 2013  
   
                                Achilles tendinitis 2013  
  
documented as of this encounter (statuses as of 2022)  
[facility]- History of Past illness Narrative*   
  
                                Problem         Noted Date      Resolved Date  
   
                                Posterior tibial tendonitis 2013  
   
                                Achilles tendinitis 2013  
  
documented as of this encounter (statuses as of 2022)  
[facility]- History of Past illness Narrative*   
  
                                Problem         Noted Date      Resolved Date  
   
                                Posterior tibial tendonitis 2013  
   
                                Achilles tendinitis 2013  
  
documented as of this encounter (statuses as of 2022)  
[facility]- History of Past illness Narrative*   
  
                                Problem         Noted Date      Resolved Date  
   
                                Posterior tibial tendonitis 2013  
   
                                Achilles tendinitis 2013  
  
documented as of this encounter (statuses as of 2022)  
[facility]- History of Past illness Narrative*   
  
                                Problem         Noted Date      Resolved Date  
   
                                Posterior tibial tendonitis 2013  
   
                                Achilles tendinitis 2013  
  
documented as of this encounter (statuses as of 2022)  
[facility]- History of Past illness Narrative*   
  
                                Problem         Noted Date      Resolved Date  
   
                                Posterior tibial tendonitis 2013  
   
                                Achilles tendinitis 2013  
  
documented as of this encounter (statuses as of 2022)  
[facility]- History of Past illness Narrative*   
  
                                Problem         Noted Date      Resolved Date  
   
                                Posterior tibial tendonitis 2013  
   
                                Achilles tendinitis 2013  
  
documented as of this encounter (statuses as of 2022)  
[facility]- History of Past illness Narrative*   
  
                                Problem         Noted Date      Resolved Date  
   
                                Posterior tibial tendonitis 2013  
   
                                Achilles tendinitis 2013  
  
documented as of this encounter (statuses as of 2023)  
[facility]- History of Past illness Narrative*   
  
                                Problem         Noted Date      Resolved Date  
   
                                Posterior tibial tendonitis 2013  
   
                                Achilles tendinitis 2013  
  
documented as of this encounter (statuses as of 2023)  
[facility]- History of Past illness Narrative*   
  
                                Problem         Noted Date      Resolved Date  
   
                                Posterior tibial tendonitis 2013  
   
                                Achilles tendinitis 2013  
  
documented as of this encounter (statuses as of 2023)  
[facility] History of Past illness Narrative*   
  
                                Problem         Noted Date      Resolved Date  
   
                                Posterior tibial tendonitis 2013  
   
                                Achilles tendinitis 2013  
  
documented as of this encounter (statuses as of 2023)  
[facility]- History of Past illness Narrative*   
  
                                Problem         Noted Date      Resolved Date  
   
                                Posterior tibial tendonitis 2013  
   
                                Achilles tendinitis 2013  
  
documented as of this encounter (statuses as of 2023)  
[facility]- History of Past illness Narrative*   
  
                          Problem      Noted Date   Diagnosed Date Resolved Date  
   
                          Posterior tibial tendonitis 2013  
   
                          Achilles tendinitis 2013  
15  
  
documented as of this encounter (statuses as of 2023)  
[facility]- History of Past illness Narrative*   
  
                          Problem      Noted Date   Diagnosed Date Resolved Date  
   
                          Posterior tibial tendonitis 2013  
   
                          Achilles tendinitis 2013  
15  
  
documented as of this encounter (statuses as of 2024)  
[facility]- History of Past illness Narrative*   
  
                          Problem      Noted Date   Diagnosed Date Resolved Date  
   
                          Posterior tibial tendonitis 2013  
   
                          Achilles tendinitis 2013  
15  
  
documented as of this encounter (statuses as of 2024)  
[facility]   
  
                                                    Diagnosis  
   
                                                      
  
  
Encounter for gynecological examination without abnormal finding- Primary  
  
  
Routine gynecological examination  
   
                                                      
  
  
Encounter for screening for malignant neoplasm of cervix  
  
  
Screening for malignant neoplasm of the cervix  
   
                                                      
  
  
Irregular intermenstrual bleeding  
  
  
Metrorrhagia  
  
documented in this encounter  
[facility]   
  
                                                    Diagnosis  
   
                                                      
  
  
Abnormal uterine bleeding (AUB)- Primary  
  
documented in this encounter  
[facility]   
  
                                                    Diagnosis  
   
                                                      
  
  
Missed menses- Primary  
  
  
Absence of menstruation  
  
documented in this encounter  
[facility]   
  
                                                    Diagnosis  
   
                                                      
  
  
PCOS (polycystic ovarian syndrome)- Primary  
  
  
Polycystic ovaries  
   
                                                      
  
  
Missed menses  
  
  
Absence of menstruation  
  
documented in this encounter  
[facility]   
  
                                                    Diagnosis  
   
                                                      
  
  
Anovulation- Primary  
  
  
Female infertility associated with anovulation  
  
documented in this encounter  
[facility]   
  
                                                    Diagnosis  
   
                                                      
  
  
Encounter for gynecological examination (general) (routine) without abnormal   
findings- Primary  
   
                                                      
  
  
PCOS (polycystic ovarian syndrome)  
  
  
Polycystic ovaries  
  
documented in this encounter  
[facility]   
  
                                                    Diagnosis  
   
                                                      
  
  
Annual physical exam- Primary  
  
  
Routine general medical examination at a health care facility  
   
                                                      
  
  
Screening for deficiency anemia  
  
  
Screening for other and unspecified deficiency anemia  
   
                                                      
  
  
Screening for cholesterol level  
   
                                                      
  
  
Anxiety and depression  
  
documented in this encounter  
[facility]   
  
                                                    Diagnosis  
   
                                                      
  
  
Anxiety and depression- Primary  
   
                                                      
  
  
Weight gain  
  
  
Other symptoms concerning nutrition, metabolism, and development  
  
documented in this encounter  
[facility] note*   
  
                                                    Diagnosis  
   
                                                      
  
  
Anxiety and depression- Primary  
   
                                                      
  
  
Weight gain  
  
  
Other symptoms concerning nutrition, metabolism, and development  
  
documented in this encounter  
[facility] note*   
  
                                                    Diagnosis  
   
                                                      
  
  
Anxiety and depression- Primary  
   
                                                      
  
  
PCOS (polycystic ovarian syndrome)  
  
  
Polycystic ovaries  
  
documented in this encounter  
[facility] note*   
  
                                                    Diagnosis  
   
                                                      
  
  
Anxiety and depression- Primary  
   
                                                      
  
  
PCOS (polycystic ovarian syndrome)  
  
  
Polycystic ovaries  
  
documented in this encounter  
[facility] note*   
  
                                                    Diagnosis  
   
                                                      
  
  
PCOS (polycystic ovarian syndrome)- Primary  
  
  
Polycystic ovaries  
   
                                                      
  
  
Elevated LDL cholesterol level  
  
  
Pure hypercholesterolemia  
   
                                                      
  
  
IFG (impaired fasting glucose)  
  
  
Impaired fasting glucose  
   
                                                      
  
  
Irregular menses  
  
  
Irregular menstrual cycle  
   
                                                      
  
  
Anxiety and depression  
  
  
Dysthymic disorder  
   
                                                      
  
  
History of bulimia  
  
  
Personal history of other mental disorder  
   
                                                      
  
  
History of anorexia nervosa  
  
  
Personal history of other mental disorder  
   
                                                      
  
  
Overweight with body mass index (BMI) of 29 to 29.9 in adult  
  
documented in this encounter  
[facility] note*   
  
                                                    Diagnosis  
   
                                                      
  
  
PCOS (polycystic ovarian syndrome)- Primary  
  
  
Polycystic ovaries  
   
                                                      
  
  
Irregular menses  
  
  
Irregular menstrual cycle  
   
                                                      
  
  
Anxiety and depression  
  
  
Dysthymic disorder  
   
                                                      
  
  
History of bulimia  
  
  
Personal history of other mental disorder  
   
                                                      
  
  
History of anorexia nervosa  
  
  
Personal history of other mental disorder  
   
                                                      
  
  
Provided repeat prescription for oral contraceptive  
   
                                                      
  
  
Overweight with body mass index (BMI) of 29 to 29.9 in adult  
  
documented in this encounter  
[facility] note*   
  
                                                    Diagnosis  
   
                                                      
  
  
Anxiety and depression- Primary  
   
                                                      
  
  
Skin sensation disturbance  
  
  
Disturbance of skin sensation  
  
documented in this encounter  
[facility] for referral (narrative)* Diagnostic Procedure Only (Routine)
   - Authorized  
  
                          Specialty    Diagnoses / Procedures Referred By Contac  
t Referred To Contact  
   
                                                    [facility]                                 
  
  
Diagnoses  
  
  
Irregular intermenstrual   
bleeding  
  
  
  
Procedures  
  
  
PELVIC US WHI  
  
  
US PELVIC NONOBSTETRIC   
REAL-TIME IMAGE COMPLETE                  
  
  
Lauryn Ryan MD  
  
  
[facility] [place] [address] [place] [address]  
  
  
Phone: [phone]  
  
  
Fax: [phone]                         
  
  
[facility]  
  
  
[address] [place] [address]  
  
  
  
                          Referral ID  Status       Reason       Start   
Date                                    Expiration   
Date                                    Visits   
Requested                               Visits   
Authorized  
   
                                [id number]        Authorized        
  
  
[employer]  
ed Referral     2022       1               1  
  
  
  
  
Electronically signed by Lauryn Ryan MD at 2022 7:46 AM EDT  
  
  
[facility]  
  
Summary Purpose  
  
  
                                                      
  
  
  
Family History  
No Family History Records FoundNo Family History Records Found  
  
Advance Directives  
No Advanced Directives Records FoundNo Advanced Directives Records Found  
  
Additional Source Comments  
  
  
  
                                                    Source Comments (unrecognize  
d section and content)  
   
                                                    In the event this informatio  
n is protected by the Federal Confidentiality of   
Alcohol   
and Drug Abuse Patient Records regulations: This information has been disclosed 
to   
you from records protected by Federal confidentiality rules (42 CFR Part 2). The
   
Federal rules prohibit you from making any further disclosure of this 
information   
unless further disclosure is expressly permitted by the written consent of the 
person   
to whom it pertains or as otherwise permitted by 42 CFR Part 2. A general   
authorization for the release of medical or other information is NOT sufficient 
for   
this purpose. The Federal rules restrict any use of the information to 
criminally   
investigate or prosecute any alcohol or drug abuse patient.[facility] 
the   
event this information is protected by the Federal Confidentiality of Alcohol 
and   
Drug Abuse Patient Records regulations: This information has been disclosed to 
you   
from records protected by Federal confidentiality rules (42 CFR Part 2). The 
Federal   
rules prohibit you from making any further disclosure of this information unless
   
further disclosure is expressly permitted by the written consent of the person 
to   
whom it pertains or as otherwise permitted by 42 CFR Part 2. A general 
authorization   
for the release of medical or other information is NOT sufficient for this 
purpose.   
The Federal rules restrict any use of the information to criminally investigate 
or   
prosecute any alcohol or drug abuse patient.[facility] the event this   
information is protected by the Federal Confidentiality of Alcohol and Drug 
Abuse   
Patient Records regulations: This information has been disclosed to you from 
records   
protected by Federal confidentiality rules (42 CFR Part 2). The Federal rules   
prohibit you from making any further disclosure of this information unless 
further   
disclosure is expressly permitted by the written consent of the person to whom 
it   
pertains or as otherwise permitted by 42 CFR Part 2. A general authorization for
 the   
release of medical or other information is NOT sufficient for this purpose. The   
Federal rules restrict any use of the information to criminally investigate or   
prosecute any alcohol or drug abuse patient.[facility] the event this   
information is protected by the Federal Confidentiality of Alcohol and Drug 
Abuse   
Patient Records regulations: This information has been disclosed to you from 
records   
protected by Federal confidentiality rules (42 CFR Part 2). The Federal rules   
prohibit you from making any further disclosure of this information unless 
further   
disclosure is expressly permitted by the written consent of the person to whom 
it   
pertains or as otherwise permitted by 42 CFR Part 2. A general authorization for
 the   
release of medical or other information is NOT sufficient for this purpose. The   
Federal rules restrict any use of the information to criminally investigate or   
prosecute any alcohol or drug abuse patient.[facility] the event this   
information is protected by the Federal Confidentiality of Alcohol and Drug 
Abuse   
Patient Records regulations: This information has been disclosed to you from 
records   
protected by Federal confidentiality rules (42 CFR Part 2). The Federal rules   
prohibit you from making any further disclosure of this information unless 
further   
disclosure is expressly permitted by the written consent of the person to whom 
it   
pertains or as otherwise permitted by 42 CFR Part 2. A general authorization for
 the   
release of medical or other information is NOT sufficient for this purpose. The   
Federal rules restrict any use of the information to criminally investigate or   
prosecute any alcohol or drug abuse patient.[facility] the event this   
information is protected by the Federal Confidentiality of Alcohol and Drug 
Abuse   
Patient Records regulations: This information has been disclosed to you from 
records   
protected by Federal confidentiality rules (42 CFR Part 2). The Federal rules   
prohibit you from making any further disclosure of this information unless 
further   
disclosure is expressly permitted by the written consent of the person to whom 
it   
pertains or as otherwise permitted by 42 CFR Part 2. A general authorization for
 the   
release of medical or other information is NOT sufficient for this purpose. The   
Federal rules restrict any use of the information to criminally investigate or   
prosecute any alcohol or drug abuse patient.[facility]In the event this   
information is protected by the Federal Confidentiality of Alcohol and Drug 
Abuse   
Patient Records regulations: This information has been disclosed to you from 
records   
protected by Federal confidentiality rules (42 CFR Part 2). The Federal rules   
prohibit you from making any further disclosure of this information unless 
further   
disclosure is expressly permitted by the written consent of the person to whom 
it   
pertains or as otherwise permitted by 42 CFR Part 2. A general authorization for
 the   
release of medical or other information is NOT sufficient for this purpose. The   
Federal rules restrict any use of the information to criminally investigate or   
prosecute any alcohol or drug abuse patient.[facility] ColumbusIn the event this   
information is protected by the Federal Confidentiality of Alcohol and Drug 
Abuse   
Patient Records regulations: This information has been disclosed to you from 
records   
protected by Federal confidentiality rules (42 CFR Part 2). The Federal rules   
prohibit you from making any further disclosure of this information unless 
further   
disclosure is expressly permitted by the written consent of the person to whom 
it   
pertains or as otherwise permitted by 42 CFR Part 2. A general authorization for
 the   
release of medical or other information is NOT sufficient for this purpose. The   
Federal rules restrict any use of the information to criminally investigate or   
prosecute any alcohol or drug abuse patient.[facility] ColumbusIn the event this   
information is protected by the Federal Confidentiality of Alcohol and Drug 
Abuse   
Patient Records regulations: This information has been disclosed to you from 
records   
protected by Federal confidentiality rules (42 CFR Part 2). The Federal rules   
prohibit you from making any further disclosure of this information unless 
further   
disclosure is expressly permitted by the written consent of the person to whom 
it   
pertains or as otherwise permitted by 42 CFR Part 2. A general authorization for
 the   
release of medical or other information is NOT sufficient for this purpose. The   
Federal rules restrict any use of the information to criminally investigate or   
prosecute any alcohol or drug abuse patient.[facility] ColumbusIn the event this   
information is protected by the Federal Confidentiality of Alcohol and Drug 
Abuse   
Patient Records regulations: This information has been disclosed to you from 
records   
protected by Federal confidentiality rules (42 CFR Part 2). The Federal rules   
prohibit you from making any further disclosure of this information unless 
further   
disclosure is expressly permitted by the written consent of the person to whom 
it   
pertains or as otherwise permitted by 42 CFR Part 2. A general authorization for
 the   
release of medical or other information is NOT sufficient for this purpose. The   
Federal rules restrict any use of the information to criminally investigate or   
prosecute any alcohol or drug abuse patient.[facility] ColumbusIn the event this   
information is protected by the Federal Confidentiality of Alcohol and Drug 
Abuse   
Patient Records regulations: This information has been disclosed to you from 
records   
protected by Federal confidentiality rules (42 CFR Part 2). The Federal rules   
prohibit you from making any further disclosure of this information unless 
further   
disclosure is expressly permitted by the written consent of the person to whom 
it   
pertains or as otherwise permitted by 42 CFR Part 2. A general authorization for
 the   
release of medical or other information is NOT sufficient for this purpose. The   
Federal rules restrict any use of the information to criminally investigate or   
prosecute any alcohol or drug abuse patient.[facility] ColumbusIn the event this   
information is protected by the Federal Confidentiality of Alcohol and Drug 
Abuse   
Patient Records regulations: This information has been disclosed to you from 
records   
protected by Federal confidentiality rules (42 CFR Part 2). The Federal rules   
prohibit you from making any further disclosure of this information unless 
further   
disclosure is expressly permitted by the written consent of the person to whom 
it   
pertains or as otherwise permitted by 42 CFR Part 2. A general authorization for
 the   
release of medical or other information is NOT sufficient for this purpose. The   
Federal rules restrict any use of the information to criminally investigate or   
prosecute any alcohol or drug abuse patient.[facility] ColumbusIn the event this   
information is protected by the Federal Confidentiality of Alcohol and Drug 
Abuse   
Patient Records regulations: This information has been disclosed to you from 
records   
protected by Federal confidentiality rules (42 CFR Part 2). The Federal rules   
prohibit you from making any further disclosure of this information unless 
further   
disclosure is expressly permitted by the written consent of the person to whom 
it   
pertains or as otherwise permitted by 42 CFR Part 2. A general authorization for
 the   
release of medical or other information is NOT sufficient for this purpose. The   
Federal rules restrict any use of the information to criminally investigate or   
prosecute any alcohol or drug abuse patient.[facility] ColumbusIn the event this   
information is protected by the Federal Confidentiality of Alcohol and Drug 
Abuse   
Patient Records regulations: This information has been disclosed to you from 
records   
protected by Federal confidentiality rules (42 CFR Part 2). The Federal rules   
prohibit you from making any further disclosure of this information unless 
further   
disclosure is expressly permitted by the written consent of the person to whom 
it   
pertains or as otherwise permitted by 42 CFR Part 2. A general authorization for
 the   
release of medical or other information is NOT sufficient for this purpose. The   
Federal rules restrict any use of the information to criminally investigate or   
prosecute any alcohol or drug abuse patient.[facility] ColumbusIn the event this   
information is protected by the Federal Confidentiality of Alcohol and Drug 
Abuse   
Patient Records regulations: This information has been disclosed to you from 
records   
protected by Federal confidentiality rules (42 CFR Part 2). The Federal rules   
prohibit you from making any further disclosure of this information unless 
further   
disclosure is expressly permitted by the written consent of the person to whom 
it   
pertains or as otherwise permitted by 42 CFR Part 2. A general authorization for
 the   
release of medical or other information is NOT sufficient for this purpose. The   
Federal rules restrict any use of the information to criminally investigate or   
prosecute any alcohol or drug abuse patient.[facility]  
  
  
  
  
                                                    Reason for Visit (unrecogniz  
ed section and content)  
   
                                          
  
  
  
                                Reason          Onset Date      Comments  
   
                                Yearly Exam     2022        
  
  
  
                                        Reason              Comments  
   
                                        GYN Ultrasound        
  
  
  
                                        Reason              Comments  
   
                                        Patient Question      
  
  
  
                                        Reason              Comments  
   
                                        Irregular Menstrual Cycle   
  
  
  
                                        Reason              Comments  
   
                                        Follow Up             
  
  
  
                                        Reason              Comments  
   
                                        Orders                
  
  
  
                                Reason          Onset Date      Comments  
   
                                Refill Request                    
   
                                Refill Request  2023        
  
  
  
                                        Reason              Comments  
   
                                        Yearly Exam           
  
  
  
                                        Reason              Comments  
   
                                        Follow-up             
   
                                        Health Maintenance  Lipid-pendedHIV/Hep   
H-sesdyswxHjvrr-xbvujllrQCF-completedPap-CCF   
(will scan in)Tdap-declinedInfluenza-declined  
  
  
  
                                        Reason              Comments  
   
                                        Medication Check      
   
                                        Weight Gain           
  
  
  
                                        Reason              Comments  
   
                                        Medication Check      
  
  
  
                                        Reason              Comments  
   
                                        Follow Up           Labs and medication  
  
  
  
                                        Reason              Comments  
   
                                        Weight Management     
  
  
  
                                        Reason              Comments  
   
                                        Medication Check      
   
                                        Foot Problem          
  
  
  
  
  
                                                    Care Teams (unrecognized sec  
tion and content)  
   
                                          
  
  
  
                      Team Member Relationship Specialty  Start Date End Date  
   
                                                      
  
  
MachelleVirginie ramos III, MD  
  
  
NO FORWARDING ADDRESS PCP - General                   10/24/02          
  
  
  
                      Team Member Relationship Specialty  Start Date End Date  
   
                                                      
  
  
MachelleVirginie ramos III, MD  
  
  
NO FORWARDING ADDRESS PCP - General                   10/24/02          
  
  
  
                      Team Member Relationship Specialty  Start Date End Date  
   
                                                      
  
  
MachelleVirginie ramos III, MD  
  
  
NO FORWARDING ADDRESS PCP - General                   10/24/02          
  
  
  
                      Team Member Relationship Specialty  Start Date End Date  
   
                                                      
  
  
Virginie Renee III, MD  
  
  
NO FORWARDING ADDRESS PCP - General                   10/24/02          
  
  
  
                      Team Member Relationship Specialty  Start Date End Date  
   
                                                      
  
  
MachelleVirginie ramos III, MD  
  
  
NO FORWARDING ADDRESS PCP - General                   10/24/02          
  
  
  
                      Team Member Relationship Specialty  Start Date End Date  
   
                                                      
  
  
MachelleVirginie ramos III, MD  
  
  
NO FORWARDING ADDRESS PCP - General                   10/24/02          
  
  
  
                      Team Member Relationship Specialty  Start Date End Date  
   
                                                      
  
  
Virginie Renee III, MD  
  
  
NO FORWARDING ADDRESS PCP - General                   10/24/02          
  
  
  
                      Team Member Relationship Specialty  Start Date End Date  
   
                                                      
  
  
Virginie Renee III, MD  
  
  
NO FORWARDING ADDRESS PCP - General                   10/24/02          
  
  
  
                      Team Member Relationship Specialty  Start Date End Date  
   
                                                      
  
  
Virginie Renee III, MD  
  
  
NO FORWARDING ADDRESS PCP - General                   10/24/02          
  
  
  
                      Team Member Relationship Specialty  Start Date End Date  
   
                                                      
  
  
MachelleVirginie ramos III, MD  
  
  
NPI: [id number]  
  
  
NO FORWARDING ADDRESS PCP - General                   10/24/02          
  
  
  
                      Team Member Relationship Specialty  Start Date End Date  
   
                                                      
  
  
Jose Sanderson MD  
  
  
NPI: [id number]  
  
  
[address] [place] [address] B  
  
  
[place] [address]  
  
  
[phone] (Work)  
  
  
[phone] (Fax) PCP - General   Family Medicine 23          
  
  
  
                      Team Member Relationship Specialty  Start Date End Date  
   
                                                      
  
  
Jose Sanderson MD  
  
  
NPI: [id number]  
  
  
[address] [place], [place] [address], [place] [address]  
  
  
[phone] (Work)  
  
  
[phone] (Fax) PCP - General   Family Medicine 23          
  
  
  
                      Team Member Relationship Specialty  Start Date End Date  
   
                                                      
  
  
Jose Sanderson MD  
  
  
NPI: [id number]  
  
  
[address] [facility]  
  
  
[place] [address]  
  
  
[phone] (Work)  
  
  
[phone] (Fax) PCP - [facility] 23          
  
  
  
                      Team Member Relationship Specialty  Start Date End Date  
   
                                                      
  
  
Jose Sanderson MD  
  
  
NPI: [id number]  
  
  
[address] [facility] [place] [address]  
  
  
[phone] (Work)  
  
  
[phone] (Fax) PCP - [facility]   Family Medicine 23          
  
  
  
                      Team Member Relationship Specialty  Start Date End Date  
   
                                                      
  
  
Monica Sanchez CNP  
  
  
NPI: [id number]  
  
  
[address] [facility] [place] [id number] [phone] (Work)  
  
  
[phone] (Fax) PCP - General   Family Medicine 23          
  
  
  
                      Team Member Relationship Specialty  Start Date End Date  
   
                                                      
  
  
Monica Sanchez CNP  
  
  
NPI: [id number]  
  
  
[address] [place], [place] [id number] [phone] (Work)  
  
  
[phone] (Fax) PCP - General   Family Medicine 23          
  
  
  
                      Team Member Relationship Specialty  Start Date End Date  
   
                                                      
  
  
Jose Sanderson MD  
  
  
NPI: [id number]  
  
  
[address] [place] [address]  
  
  
[phone] (Work)  
  
  
[phone] (Fax) PCP - General   Family Medicine 23          
  
  
  
  
  
                                                    INFORMATION SOURCE (unrecogn  
ized section and content)  
   
                                          
  
  
  
                                        DATE CREATED        AUTHOR  
   
                                2024                      [facility]  
  
  
  
                                DATE CREATED    AUTHOR          AUTHOR'S ISIAH SAMPSON  
   
                                2024                      [facility]  
  
  
FOR RECORDS PERTAINING TO PATIENTS WHO ARE OR HAVE BEEN ENROLLED IN A CHEMICAL 
DEPENDENCY/SUBSTANCEABUSE PROGRAM, SOME INFORMATION MAY BE OMITTED. This 
clinical summary was aggregated from multiple sources. Caution should be 
exercised in using it in the provision of clinical care. This summary normalizes
 information from multiple sources, and as a consequence, information in this 
document may materially change the coding, format and clinical context of 
patient data. In addition, data may be omitted in some cases. CLINICAL DECISIONS
 SHOULD BE BASED ON THE PRIMARY CLINICAL RECORDS. [facility] provides
 no warranty or guarantee of the accuracy or completeness of information in this
 document.

## 2024-10-24 LAB — PROGESTERONE: 0.7 NG/ML

## 2024-10-28 LAB
PAP IMAGE GUIDED TEST METHOD: (no result)
PROLACTIN SERPL-MCNC: 37.7 NG/ML (ref 4.8–33.4)

## 2024-10-29 ENCOUNTER — HOSPITAL ENCOUNTER (OUTPATIENT)
Dept: HOSPITAL 100 - US | Age: 24
Discharge: HOME | End: 2024-10-29
Payer: COMMERCIAL

## 2024-10-29 DIAGNOSIS — E28.2: Primary | ICD-10-CM

## 2024-10-29 PROCEDURE — 76830 TRANSVAGINAL US NON-OB: CPT

## 2024-10-29 PROCEDURE — 76856 US EXAM PELVIC COMPLETE: CPT

## 2024-10-31 ENCOUNTER — HOSPITAL ENCOUNTER (OUTPATIENT)
Dept: HOSPITAL 100 - LABSPEC | Age: 24
Discharge: HOME | End: 2024-10-31
Payer: COMMERCIAL

## 2024-10-31 DIAGNOSIS — Z12.4: ICD-10-CM

## 2024-10-31 DIAGNOSIS — N89.8: Primary | ICD-10-CM

## 2024-10-31 PROCEDURE — 36415 COLL VENOUS BLD VENIPUNCTURE: CPT

## 2024-10-31 PROCEDURE — G0145 SCR C/V CYTO,THINLAYER,RESCR: HCPCS

## 2024-10-31 PROCEDURE — 87205 SMEAR GRAM STAIN: CPT

## 2024-10-31 PROCEDURE — 87070 CULTURE OTHR SPECIMN AEROBIC: CPT

## 2024-10-31 PROCEDURE — 88175 CYTOPATH C/V AUTO FLUID REDO: CPT

## 2024-10-31 NOTE — XMS RPT_ITS
Comprehensive CCD (C-CDA v2.1)  
  
                          Created on: 2024  
  
  
DON MITTAL  
External Reference #: [id number],PersonID:[id number]  
: 2000  
Sex: Female  
  
Demographics  
  
  
                                        Address             [address] [place]  [address]  
   
                                        Home Phone          [phone]  
   
                                        Work Phone          [phone]  
   
                                        Home Phone          [phone]  
   
                                        Home Phone          [phone]  
   
                                        Preferred Language  en  
   
                                        Marital Status        
   
                                        [place] Affiliation Unknown  
   
                                        Race                White  
   
                                        Ethnic Group        Not  or Lati  
no  
  
  
Author  
  
  
                                        Organization        [facility]  
  
  
Care Team Providers  
  
  
                                Care Team Member Name Role            Phone  
   
                                Thelma CANO MD, Olvin ROBBINS Primary Care Provider Keira  
slime Sanderson MD, Jose Goldman Primary Care Provider [phone]  
[phone]  
   
                                Bridenthal JAMES, Monica Primary Care Provider [phone]  
   
                                CHRIS GUTIERREZ    Attending       Unavailable  
   
                                BRIDENTHAL, MONICA Primary Care    Unavailable  
   
                                KAYLA LALA Attending       Unavail  
able  
   
                                OLVIN HAWLEY III Primary Care    Unavailable  
   
                                BRIDENTHAL, MONICA Primary Care    Unavailable  
   
                                GUTIERREZ CHRIS    Attending       Unavailable  
   
                                BRIDENTHAL, MONICA Primary Care    Unavailable  
   
                                GUTIERREZ CHRIS    Attending       Unavailable  
   
                                GUTIERREZ, CHRIS    Referring       Unavailable  
   
                                BRIDENTHAL, MONICA Primary Care    Unavailable  
   
                                BRIDENTHAL, MONICA Attending       Unavailable  
   
                                ANTONIO, JOSE Primary Care    Unavailable  
   
                                BRIDENTHAL, MONICA Attending       Unavailable  
   
                                ANTONIO, JOSE Primary Care    Unavailable  
   
                                BRIDENTHAL, MONICA Attending       Unavailable  
   
                                ANTONIO, JOSE Primary Care    Unavailable  
   
                                BRIDENTHAL, MONICA Attending       Unavailable  
   
                                ANTONIO, JOSE Primary Care    Unavailable  
   
                                BRIDENTHAL, MONICA Attending       Unavailable  
   
                                ANTONIO, JOSE Primary Care    Unavailable  
  
  
  
Allergies  
  
  
                                                    Allergy   
Classification                          Reported   
Allergen(s)               Allergy Type              Date of   
Onset                     Reaction(s)               Facility  
   
                                                      
(16 sources)                            Grass pollen;   
Translations:   
[GRASS POLLEN]            Drug Allergy                
3                                       Unknown, Other:   
See Comments                            [facility]  
   
       
[id number]|2024-10-31 16:19:00|2024-10-31 16:22:00|XMS RPT_ITS|ANITAG DAEMON|XMS Department|[id number]|

## 2024-11-13 ENCOUNTER — HOSPITAL ENCOUNTER (OUTPATIENT)
Dept: HOSPITAL 100 - LAB | Age: 24
Discharge: HOME | End: 2024-11-13
Payer: COMMERCIAL

## 2024-11-13 DIAGNOSIS — N97.9: Primary | ICD-10-CM

## 2024-11-13 LAB — REPROSOURCE: (no result)

## 2024-11-13 PROCEDURE — 36415 COLL VENOUS BLD VENIPUNCTURE: CPT

## 2024-12-01 ENCOUNTER — HOSPITAL ENCOUNTER (OUTPATIENT)
Age: 24
Discharge: HOME | End: 2024-12-01
Payer: COMMERCIAL

## 2024-12-01 DIAGNOSIS — N97.9: ICD-10-CM

## 2024-12-01 DIAGNOSIS — E28.2: Primary | ICD-10-CM

## 2024-12-01 PROCEDURE — 86376 MICROSOMAL ANTIBODY EACH: CPT

## 2024-12-01 PROCEDURE — 84144 ASSAY OF PROGESTERONE: CPT

## 2024-12-01 PROCEDURE — 84146 ASSAY OF PROLACTIN: CPT

## 2024-12-01 PROCEDURE — 36415 COLL VENOUS BLD VENIPUNCTURE: CPT

## 2024-12-01 NOTE — XMS RPT_ITS
Comprehensive CCD (C-CDA v2.1)  
  
                          Created on: 2024  
  
  
DON MITTAL  
External Reference #: CDR,PersonID:[id number]  
: 2000  
Sex: Female  
  
Demographics  
  
  
                                        Address             [address] [place]  [address]  
   
                                        Home Phone          [phone]  
   
                                        Work Phone          [phone]  
   
                                        Home Phone          [phone]  
   
                                        Home Phone          [phone]  
   
                                        Preferred Language  en  
   
                                        Marital Status        
   
                                        Gnosticist Affiliation Unknown  
   
                                        Race                White  
   
                                        Ethnic Group        Not  or Lati  
no  
  
  
Author  
  
  
                                        Organization        [facility]  
[facility]  
  
  
Care Team Providers  
  
  
                                Care Team Member Name Role            Phone  
   
                                Thelma CANO MD, Virginie ROBBINS Primary Care Provider Keira  
slime Sanderson MD, Jose Goldman Primary Care Provider [phone]  
[phone]  
   
                                Bridenthal JAMES, Monica Primary Care Provider [phone]  
   
                                ERNESTINE SHER    Attending       Unavailable  
   
                                BRIDENTHAL, MONICA Primary Care    Unavailable  
   
                                AWILDA BELL Attending       Unavail  
able  
   
                                VIRGINIE RENEE III Primary Care    Unavailable  
   
                                BRIDENTHAL, MONICA Primary Care    Unavailable  
   
                                SHER, ERNESTINE    Attending       Unavailable  
   
                                BRIDENTHAL, MONICA Primary Care    Unavailable  
   
                                SHER ERNESTINE    Attending       Unavailable  
   
                                SHER, ERNESTINE    Referring       Unavailable  
   
                                BRIDENTHAL, MONICA Primary Care    Unavailable  
   
                                BRIDENTHAL, MONICA Attending       Unavailable  
   
                                ANTONIO, JOSE Primary Care    Unavailable  
   
                                BRIDENTHAL, MONICA Attending       Unavailable  
   
                                ANTONIO, JOSE Primary Care    Unavailable  
   
                                BRIDENTHAL, MONICA Attending       Unavailable  
   
                                ANTONIO, JOSE Primary Care    Unavailable  
   
                                BRIDENTHAL, MONICA Attending       Unavailable  
   
                                ANTONIO, JOSE Primary Care    Unavailable  
   
                                BRIDENTHAL, MONICA Attending       Unavailable  
   
                                ANTONIO, JOSE Primary Care    Unavailable  
  
  
  
Allergies  
  
  
                                                    Allergy   
Classification                          Reported   
Allergen(s)               Allergy Type              Date of   
Onset                     Reaction(s)               Facility  
   
                                                      
(16 sources)                            Grass pollen;   
Translations:   
[GRASS POLLEN]            Drug Allergy                
3                                       Unknown, Other:   
See Comments                            [facility]  
   
                                                      
(16 sources)                            Seasonal   
allergy;   
Translations:   
[SEASONAL   
ALLERGIES]                              Allergy to   
substance                                 
3                                       Unknown, Other:   
See Comments                            [facility]  
   
                                                      
(16 sources)                            Tree;   
Translations:   
[TREES]                                 Allergy to   
substance                                 
3                                       Unknown, Other:   
See Comments                            [facility]  
   
                                                      
(6 sources)         Grass pollen        Drug Allergy          
3                         Unknown                   [facility]  
   
                                                      
(6 sources)               Other                     Allergy to   
substance                                 
3                         Unknown                   [facility]  
   
                                                      
(1 source)                Pollen                    Allergy to   
substance                                 
3                         Other                     [facility]  
  
  
  
Medications  
Current Medications  
  
  
  
                      Medication Drug Class(es) Dates      Sig (Normalized) Sig   
(Original)  
   
                                                    24 hr buPROPion   
hydrochloride 300 mg   
extended release oral   
tablet  
(7 sources)               Aminoketone               Start:   
2024                              take 1 tablet by   
mouth once daily in   
the morning                             buPROPion XL   
(Wellbutrin XL)   
300 MG 24 hr   
tablet   
Indications:   
Anxiety and   
depression take 1   
tablet by mouth   
every morning DO   
NOT CRUSH, CHEW,   
AND/OR DIVIDE 30   
tablet 2   
2024 Active  
  
  
  
                                                    Start: 2024  
End: 2024                         take 1 tablet by mouth once   
daily in the morning                    buPROPion XL (WELLBUTRIN XL) 150 mg   
24 hr tablet Take 150 mg by mouth   
every morning. 0 2024 Active  
  
  
  
                                        Comment on above:   Take 150 mg by mouth  
 every morning.   
   
                                                    Desogestrel / Ethinyl   
Estradiol  
(2 sources)                             Progestin,   
Estrogen                                Start:   
  
4  
End:   
  
5                                       take 1 tablet   
by mouth once   
daily, then   
take 0.15   
tablet by mouth   
once                                    Desogestrel-Ethinyl   
Estradiol (APRI)   
0.15-0.03 mg per   
tablet Indications:   
PCOS (polycystic   
ovarian syndrome) ,   
Provided repeat   
prescription for oral   
contraceptive Take 1   
tablet by mouth once   
daily. 84 tablet 3   
2024   
Active  
  
  
  
                                                take 1 tablet by mouth once ashley  
y Juleber 0.15-30 MG-MCG tablet Take 1 tablet   
by   
mouth daily. 0 Active  
  
  
  
                                        Comment on above:   Take 1 tablet by grover  
 once daily.   
   
                                                    metFORMIN   
hydrochloride 500 mg   
oral tablet  
(6 sources)               Biguanide                 Start:   
  
4  
End:   
  
4                                       take 2 tablets by   
mouth twice daily   
at mealtime                             metFORMIN   
(GLUCOPHAGE) 500 mg   
tablet Indications:   
PCOS (polycystic   
ovarian syndrome) ,   
IFG (impaired   
fasting glucose) ,   
Irregular menses ,   
Overweight with   
body mass index   
(BMI) of 29 to 29.9   
in adult Take 2   
tablets by mouth   
two times a day   
with meals. 360   
tablet 0 2024 Active  
  
  
  
                                                    Start: 2024  
End: 2024                         take 1 tablet by mouth in the   
morning                                 metFORMIN (Glucophage) 500 MG tablet   
Take 1 tablet by mouth in the   
morning and 1 tablet in the evening.   
Take with meals. 0 2024 Active  
  
  
  
                                        Comment on above:   Take 2 tablets by mo  
uth two times a day with meals.   
   
                                                            Take 1 tablet by grover  
th two times a day with meals.   
   
                                                    naltrexone   
hydrochloride 50 mg   
oral tablet  
(2 sources)               Opioid Antagonist         Start:   
  
4  
End:   
  
4                                       take 0.5 tablet   
by mouth twice   
daily                                   naltrexone 50 mg   
tablet Indications:   
PCOS (polycystic   
ovarian syndrome) ,   
Overweight with   
body mass index   
(BMI) of 29 to 29.9   
in adult Take 0.5   
tablets by mouth   
two times a day. 90   
tablet 0 2024 Active  
  
  
  
                                                                naltrexone (Depa  
de) 50 MG tablet Take 25 mg by mouth in the morning and 25 mg   
in   
the evening. 0 Active  
  
  
  
                                        Comment on above:   Take 0.5 tablets by   
mouth two times a day.   
   
                                                    Prenatal   
Multivit-Min-Fe-FA   
(PRENATAL/IRON PO)  
(3 sources)                                                 take 1 tablet by   
mouth once in the   
morning                                 Prenatal   
Multivit-Min-Fe-FA   
(PRENATAL/IRON PO)   
Take 1 tablet by   
mouth in the morning.   
0 Active  
  
  
  
Completed/Discontinued Medications  
  
  
  
                      Medication Drug Class(es) Dates      Sig (Normalized) Sig   
(Original)  
   
                                                    [id number]   
actuat albuterol   
0.09 mg/actuat   
metered dose   
inhaler  
(15 sources)                            beta2-Adrenergic   
Agonist                                 Start:   
2019                              take 2 puff(s) by   
inhalation every   
four hours as   
needed                                  albuterol HFA   
(PROVENTIL HFA,   
VENTOLIN HFA) 90   
mcg/actuation   
inhaler   
Indications:   
Exertional asthma   
Inhale 2 Puffs as   
instructed every 4   
hours as needed. 1   
Inhaler 0   
2019 Active  
   
                                        Comment on above:   Inhale 2 Puffs as in  
structed every 4 hours as needed.   
   
                                                    drospirenone /   
Ethinyl Estradiol  
(1 source)                Progestin, Estrogen       Start:   
2023                              take 1 tablet by   
mouth once daily,   
then take 1 tablet   
by mouth once                           Drospirenone-Ethin  
yl Estradiol   
(SINGH, 28,)   
3-0.03 mg per   
tablet   
Indications: PCOS   
(polycystic   
ovarian syndrome)   
Take 1 tablet by   
mouth once daily.   
Take one active   
pill continuously   
x 3 months-   
discard placebo   
pills 112 tablet 3   
2023 Active  
   
                                        Comment on above:   Take 1 tablet by grover  
th once daily. Take one active pill   
continuously x 3 months- discard placebo pills   
   
                                                    escitalopram 20 mg   
oral tablet  
(3 sources)                             Serotonin Reuptake   
Inhibitor                               Start:   
12-  
End:   
2024                              take 1 tablet by   
mouth once daily                        escitalopram   
(Lexapro) 20 MG   
tablet   
Indications:   
Anxiety and   
depression Take 1   
tablet (20 mg) by   
mouth daily. 30   
tablet 1   
12/15/2023   
2024   
Discontinued   
(Ineffective)  
  
  
  
                                                    Start: 2023  
End: 2024                         take 1 tablet by mouth once   
daily                                   escitalopram (Lexapro) 10 MG tablet   
Indications: Anxiety and depression   
Take 1 tablet (10 mg) by mouth   
daily. 30 tablet 1 2023 Active  
  
  
  
                                                    Ethinyl Estradiol /   
Norethindrone  
(12 sources)              Estrogen                  Start: 2023  
End: 2023                         take 1 tablet   
by mouth once   
daily, then   
take 0.05   
tablet by mouth   
once                                    Norethindrone   
Acet-Ethinyl Est   
(JUNEL 1/20, 21,)   
1-20 mg-mcg per   
tablet TAKE 1 TABLET   
BY MOUTH DAILY 28   
tablet 2 2023   
Discontinued  
  
  
  
                                        Start: 2023   take 1 tablet by grover  
th once   
daily, then take 0.05 tablet   
by mouth once                           Norethindrone Acet-Ethinyl Est (JUNEL   
1/20, ,) 1-20 mg-mcg per tablet   
TAKE 1 TABLET BY MOUTH DAILY 28   
tablet 2 2023 Active  
   
                                                    Start: 2023  
End: 2023                         take 1 tablet by mouth once   
daily, then take 0.05 tablet   
by mouth once                           Norethindrone Acet-Ethinyl Est (JUNEL   
1/20, ,) 1-20 mg-mcg per tablet   
TAKE 1 TABLET BY MOUTH DAILY 28   
tablet 3 2023   
Discontinued  
   
                                        Start: 2023   take 1 tablet by grover  
th once   
daily, then take 0.05 tablet   
by mouth once                           Norethindrone Acet-Ethinyl Est (JUNEL   
1/20, 21,) 1-20 mg-mcg per tablet   
TAKE 1 TABLET BY MOUTH DAILY 28   
tablet 3 2023 Active  
   
                                                    Start: 2023  
End: 2023                         take 1 tablet by mouth once   
daily, then take 0.05 tablet   
by mouth once                           Norethindrone Acet-Ethinyl Est (JUNEL   
1/20, 21,) 1-20 mg-mcg per tablet   
TAKE 1 TABLET BY MOUTH DAILY 28   
tablet 3 2023   
Discontinued  
   
                                                    Start: 2022  
End: 2022                         take 1 tablet by mouth once   
daily, then take 0.05 tablet   
by mouth once                           Norethindrone Acet-Ethinyl Est (JUNEL   
1/20, 21,) 1-20 mg-mcg per tablet   
TAKE 1 TABLET BY MOUTH DAILY 28   
tablet 14 2022   
Discontinued  
   
                                        Start: 2022   take 1 tablet by grover  
th once   
daily, then take 0.05 tablet   
by mouth once                           Norethindrone Acet-Ethinyl Est (JUNEL   
1/20, 21,) 1-20 mg-mcg per tablet   
TAKE 1 TABLET BY MOUTH DAILY 28   
tablet 14 2022 Active  
   
                                                    Start: 2021  
End: 2022                         take 1 tablet by mouth once   
daily, then take 0.05 tablet   
by mouth once                           Norethindrone Acet-Ethinyl Est (JUNEL   
1/20, 21,) 1-20 mg-mcg per tablet   
TAKE 1 TABLET BY MOUTH DAILY TAKES   
ACTIVE TABLET CONTINUOUSLY 84 tablet   
3 2021 Discontinued  
  
  
  
                                        Comment on above:   TAKE 1 TABLET BY GROVER  
TH DAILY TAKES ACTIVE TABLET   
CONTINUOUSLY   
   
                                                            TAKE 1 TABLET BY GROVER  
TH DAILY   
   
                                                    medroxyPROGESTERone   
acetate 10 mg oral tablet  
(4 sources)               Progestin                 Start:   
2022  
End:   
2023                                    take 1   
tablet by   
mouth once   
daily                                   medroxyPROGESTERone   
(PROVERA) 10 mg tablet   
Take 1 tablet by mouth   
once daily for 10 days.   
Or until menses starts.   
10 tablet 0 2022 Discontinued  
   
                                        Comment on above:   Take 1 tablet by grover  
th once daily for 10 days. Or until   
menses starts.   
   
                                                    PNV/iron/folic acid   
(PRENATAL VITAMIN-IRON-FA   
ORAL)  
(2 sources)                                                 take 1   
tablet by   
mouth once   
daily                                   PNV/iron/folic acid   
(PRENATAL   
VITAMIN-IRON-FA ORAL)   
Take 1 tablet by mouth   
once daily. 0 Active  
   
                                        Comment on above:   Take 1 tablet by grover  
th once daily.   
   
                                                    Prenatal MV-Min-Fe   
Fum-FA-DHA (PRENATAL 1   
PO)  
(6 sources)                                           
End:   
2024                                                Prenatal MV-Min-Fe   
Fum-FA-DHA (PRENATAL 1   
PO) Take by mouth. 0   
2024 Discontinued   
(Duplicate order)  
  
  
  
                                                                Prenatal MV-Min-  
Fe Fum-FA-DHA (PRENATAL 1 PO) Take by mouth. 0 Active  
  
  
  
Problems  
Active Problems  
  
  
                                                    Problem   
Classification  Problem         Date            Documented Date Episodic/Chronic  
   
                                                    Anxiety disorders  
(18 sources)                            Mixed anxiety and   
depressive disorder;   
Translations:   
[Anxiety disorder,   
unspecified]                            Onset:   
2023                Chronic  
   
                                                    Asthma  
(8 sources)                             Asthma; Translations:   
[Unspecified asthma,   
uncomplicated]                          Onset:   
2011                Chronic  
   
                                                    Contraceptive and   
procreative   
management  
(2 sources)                             Oral contraceptive   
repeat; Translations:   
[Encounter for   
surveillance of   
contraceptive pills]                    Onset:   
2024                Episodic  
   
                                                    Diabetes mellitus   
without complication  
(1 source)                              Impaired fasting   
glycemia;   
Translations:   
[Impaired fasting   
glucose]                                2024          Episodic  
   
                                                    Disorders of lipid   
metabolism  
(3 sources)                             Raised low density   
lipoprotein   
cholesterol;   
Translations: [Pure   
hypercholesterolemia,   
unspecified]                            Onset:   
2024                Chronic  
   
                                                    Female infertility  
(1 source)                              Anovulation;   
Translations: [Female   
infertility   
associated with   
anovulation]                                                Chronic  
   
                                                    Menstrual disorders  
(6 sources)                             Intermenstrual   
bleeding - irregular;   
Translations:   
[Excessive and   
frequent menstruation   
with irregular cycle]                   Onset:   
2024                                          Chronic  
   
                                                    Mood disorders  
(9 sources)                             Mood disorders;   
Translations:   
[Anxiety and   
depression]                             Onset:   
2023                  
   
                                                    Other endocrine   
disorders  
(14 sources)                            Polycystic ovary   
syndrome;   
Translations:   
[Polycystic ovarian   
syndrome]                               Onset:   
2022                                          Chronic  
   
                                                    Other endocrine   
disorders  
(1 source)                              Polycystic ovarian   
syndrome;   
Translations: [PCOS   
(polycystic ovarian   
syndrome)]                              Onset:   
2024                                          Chronic  
   
                                                    Other female genital   
disorders  
(1 source)                              Abnormal uterine   
bleeding;   
Translations:   
[Abnormal uterine and   
vaginal bleeding,   
unspecified]                                                Chronic  
   
                                                    Other nervous system   
disorders  
(2 sources)                             Skin sensation   
disturbance;   
Translations:   
[Unspecified   
disturbances of skin   
sensation]                              Onset:   
2024                Episodic  
   
                                                    Other nutritional;   
endocrine; and   
metabolic disorders  
(3 sources)                             Overweight in   
adulthood with body   
mass index of 25 or   
more but less than   
30; Translations:   
[Overweight]                            Onset:   
2024                Episodic  
   
                                                    Other nutritional;   
endocrine; and   
metabolic disorders  
(1 source)                              Overweight;   
Translations:   
[Overweight with body   
mass index (BMI) of   
29 to 29.9 in adult]                    Onset:   
2024                                          Episodic  
   
                                                    Other nutritional;   
endocrine; and   
metabolic disorders  
(1 source)                              Body mass index (BMI)   
29.0-29.9, adult;   
Translations:   
[Overweight with body   
mass index (BMI) of   
29 to 29.9 in adult]                    Onset:   
2024                                          Episodic  
   
                                                    Screening and history   
of mental health and   
substance abuse codes  
(8 sources)                             History of bulimia   
nervosa;   
Translations:   
[Personal history of   
other mental and   
behavioral disorders]                   Onset:   
2024                Episodic  
   
                                                    Unclassified  
(2 sources)                             Medication Check;   
Translations:   
[Medication Check]                      Onset:   
2024                                            
  
  
Past or Other Problems  
  
  
                      Problem Classification Problem    Date       Documented Da  
te Episodic/Chronic  
   
                                                    Other nutritional;   
endocrine; and   
metabolic disorders  
(7 sources)                             Weight gain;   
Translations:   
[Abnormal weight   
gain]                                   Onset:   
2024                Episodic  
   
                                                    Other screening for   
suspected conditions   
(not mental disorders   
or infectious disease)  
(20 sources)                            Patient encounter   
status;   
Translations:   
[Encounter for   
screening for   
malignant   
neoplasm of   
cervix]                                 Onset:   
08-                                          Episodic  
   
                                                    Other skin disorders  
(18 sources)                            Hirsutism;   
Translations:   
[Hirsutism]                             Onset:   
03-                08-                Episodic  
   
                                                    Other upper respiratory   
disease  
(20 sources)                            Vocal cord   
dysfunction;   
Translations:   
[Other diseases   
of vocal cords]                         Onset:   
05-                05-                Episodic  
   
                                                    Other upper respiratory   
disease  
(2 sources)                             Other diseases of   
vocal cords;   
Translations:   
[Other diseases   
of vocal cords]                         Onset:   
2023                                          Episodic  
  
  
  
Results  
  
  
                      Test Name  Value      Interpretation Reference Range Facil  
ity  
   
                                                    36on 2024   
   
                      36         Not due for refill. Normal                [facility]  
   
                      36         Refill not yet due. Normal                [facility]  
   
                                                    Office Visiton 2024   
   
                                        Follow-up visit     [id number] Lux Mittal   
2000 F  
Date Provider   
Department Center  
2024   
[id number] SANCHEZ [facility]  
Family History  
Problem Relation Age   
of Onset  
Thyroid disease Mother  
Perkins syndrome Father  
Asthma Sister  
Breast cancer Maternal   
Grandmother  
Cancer Maternal   
Grandfather  
Cancer Paternal   
Grandfather  
Family Status -   
Relation Status Age at   
Death  
Mother Alive  
Father Alive  
Sister  
Sister   
Maternal Grandmother   
  
Maternal Grandfather   
  
Paternal Grandmother   
Alive  
Paternal Grandfather   
  
Level of Service:80192   
[place] OFFICE/OUTPAT[place]   
ESTABLISHED LOW MDM 20   
MIN  
Reason for Visit and   
Comments:  
Medication Check   
[[id number]]  
Foot Problem [229]  Normal                                  [facility]  
   
                                                    PATINSon 2024   
   
                                        PATINS              Ice and elevate foot  
   
couple times a day, be   
careful not to wear   
shoes that  
pinch/pressure to much   
on top of foot.     Normal                                  [facility]  
   
                                                    Progress Noteon 2024   
   
                                        Progress Note       Stable. Continue   
Wellbutrin 300 mg   
daily               Normal                                  [facility]  
   
                                        Progress Note       Left foot exam   
unremarkable and   
currently   
asymptomatic. Likely   
superficial  
nerve inflammation.   
Recommend avoiding   
shoes that are tight   
across the top of  
the foot, ice and   
elevate 2-3 times   
daily and follow-up if   
fails to resolve.  
Unknown etiology at   
this time otherwise Normal                                  [facility]  
   
                                        Progress Note       2024  
Don Mittal (:   
2000) is a 24   
y.o. female ,   
Established patient,   
here  
for evaluation of the   
following chief   
complaint(s):  
Medication Check and   
Foot Problem  
ASSESSMENT/PLAN:  
1. Anxiety and   
depression  
Assessment & Plan:  
Stable. Continue   
Wellbutrin 300 mg   
daily  
2. Skin sensation   
disturbance  
Assessment & Plan:  
Left foot exam   
unremarkable and   
currently   
asymptomatic. Likely   
superficial  
nerve inflammation.   
Recommend avoiding   
shoes that are tight   
across the top of  
the foot, ice and   
elevate 2-3 times   
daily and follow-up if   
fails to resolve.  
Unknown etiology at   
this time otherwise  
Follow up in about 6   
months (around   
2024) for Yearly   
Wellness Visit.  
SUBJECTIVE/OBJECTIVE:  
HPI -  
Don Mittal (:   
2000) is a 24   
y.o. female ,   
Established patient,   
here  
for the evaluation of   
the following chief   
complaint(s):  
Medication Check and   
Foot Problem  
Anxiety/depression-   
Doing pretty well,   
wellbutrin  mg   
daily. Denies any SI  
or HI. Not doing   
counseling. Reports   
being able to handle   
stress better and not  
feeling as   
overwhelmed.  
Left foot problem:  
Is having a hot   
sensation/tingling to   
the top of the left   
foot and numbness. No  
known trigger. No   
known injury.   
Happening for about   
1.5 weeks, will happen  
intermittently through   
the day (?50 times or   
so)- lasts only for a   
few seconds  
or so.  
No swelling or redness   
noted.  
Currently asymptomatic  
Prior to Admission   
medications  
Medication Sig Start   
Date End Date Taking?   
Authorizing Provider  
buPROPion XL   
(Wellbutrin XL) 300 MG   
24 hr tablet take 1   
tablet by mouth every  
morning DO NOT CRUSH,   
CHEW, AND/OR DIVIDE   
24 Yes Adri Aquino APRN - CNP  
Juleber 0.15-30 MG-MCG   
tablet Take 1 tablet   
by mouth daily. Yes   
Historical  
Provider, MD  
metFORMIN (Glucophage)   
500 MG tablet Take 1   
tablet by mouth in the   
morning and 1  
tablet in the evening.   
Take with meals.   
24 Yes   
Historical Provider,  
MD  
naltrexone (Depade) 50   
MG tablet Take 25 mg   
by mouth in the   
morning and 25 mg in  
the evening. Yes   
Historical Provider,   
MD  
Prenatal   
Multivit-Min-Fe-FA   
(PRENATAL/IRON PO)   
Take 1 tablet by mouth   
in the  
morning. Yes   
Historical Provider,   
MD  
Prenatal MV-Min-Fe   
Fum-FA-DHA (PRENATAL 1   
PO) Take by mouth.   
Historical  
Provider, MD  
Review of Systems  
Constitutional:   
Negative for activity   
change, chills,   
fatigue and fever.  
Respiratory: Negative.  
Cardiovascular:   
Negative.  
Gastrointestinal:   
Negative.  
Genitourinary:   
Negative for   
difficulty urinating.  
Musculoskeletal:  
Left foot.  
Psychiatric/Behavioral  
: Positive for   
decreased   
concentration.   
Negative for  
agitation, dysphoric   
mood and sleep   
disturbance. The   
patient is not  
nervous/anxious.  
Vitals:  
24 1307  
BP: 129/85  
Pulse: 102  
Resp: 18  
Temp: 36.9 ?C (98.4   
?F)  
TempSrc: Infrared  
SpO2: 97%  
Weight: 161 lb 9.6 oz   
(73.3 kg)  
Physical Exam  
Constitutional:  
General: She is not in   
acute distress.  
Appearance: Normal   
appearance. She is not   
ill-appearing.  
HENT:  
Head: Normocephalic   
and atraumatic.  
Cardiovascular:  
Rate and Rhythm:   
Normal rate and   
regular rhythm.  
Pulses: Normal pulses.  
Heart sounds: Normal   
heart sounds.  
Pulmonary:  
Effort: Pulmonary   
effort is normal.  
Breath sounds: Normal   
breath sounds.  
Musculoskeletal:  
Right foot: Normal.  
Left foot: Normal.  
Comments: Left foot   
exam normal  
Skin:  
General: Skin is warm   
and dry.  
Neurological:  
Mental Status: She is   
alert and oriented to   
person, place, and   
time.  
Psychiatric:  
Mood and Affect: Mood   
normal.  
Behavior: Behavior   
normal.  
Thought Content:   
Thought content   
normal.  
Judgment: Judgment   
normal.  
An electronic   
signature was used to   
authenticate this   
note.  
SHANEKA Rao - CNP  
2024  
4:40 PM             [facility]  
   
                                        Progress Note       Patient was identifi  
ed   
by name and Date of   
Birth.              [facility]  
   
                                                    36on 2024   
   
                                        36                  Rx sent. Follow up a  
s   
scheduled.          [facility]  
   
                                        36                  Prescription Request  
:  
  
Last medication check:   
24  
Last physical exam:   
23  
Next scheduled   
appointment: 24  
  
Last date of refill on   
this medication   
3/21/24             [facility]  
   
                                                    CNOVon 2024   
   
                                        CNOV                Office Visit (OBGYWM  
)  
----------------------  
--------------  
DON MITTAL   
([id number]) 2000   
F  
Date Time Provider   
Department  
3/21/24 7:30 AM   
ERNESTINE SHER  
During your visit   
today, we recorded the   
following information   
about you:  
Pulse Blood pressure   
Weight  
80/minute 126/82 75.8   
kg  
Ernestine Sher APRN.CNP   
3/21/2024 8:47 AM   
Signed  
Some documentation   
from previous visit of   
2024 was copied   
and pasted,  
documentation has been   
reviewed and edited as   
necessary for today's   
visit.  
Patient Summary:   
Don is a 24 year   
old female who   
presents for follow-up  
evaluation of her   
obesity/weight   
management to treat   
PCOS, IFG, elevated   
LDL,  
anxiety and depression   
and prevent   
relatedco-morbidities.   
In our previous  
visits we have   
discussed lifestyle   
intervention including   
a nutrition  
recommendations and   
physical activity   
optimization. Her last   
office visit was 1  
month ago.  
Irregular menses -   
Menses 2023 LMP   
1/2-3 spotting  
No contraception.  
20 lb weight gain with   
Lexapro for anxiety.   
Changed to bupropion   
by PCP plans  
to discuss increasing   
dose with PCP  
Assessment/plan from   
last visit:  
Metformin 1 gm twice a   
day with meals -   
Adjusted to 500 mg at   
breakfast and 1  
gm at dinner but has   
had frequent diarrhea   
since increasing to 1   
gm twice a day  
3 weeks ago.  
History of anorexia   
and bulimia in HS - no   
treatment, In   
remission since age  
15. Dad helped her and   
she started adding   
protein shakes.  
Interval History -  
Awake - 700  
B - 0745 Premier   
Protein shake  
S - none  
L -  cup cottage   
cheese with fruit  
S - none  
D -  protein,   
pasta/potato/rice/swee  
t potato and veg -   
asparagus, cucumber  
salad, winter and   
summer squash/salad  
S - none  
Fluids - water,   
unsweetened tea  
Bedtime -   
She feels the   
medication is helping   
to lessen hunger and   
craving to candy  
controlled  
Exercise: stable   
sedentary job at bank.   
1 mile daily walk with   
dog  
Stress: increased -   
 wearing heart   
monitor  
Sleep: 7 hours, up to   
go to bathroom a few   
times LASHAWN -no  
Weight gain since last   
vist: 2 lbs since last   
visit  
3/21/2024 167 lb BMI:   
28.67  
2024 169 lb  
2024 166 lb   
metformin  
CrCl cannot be   
calculated (Patient's   
most recent lab result   
is older than the  
maximum 180 days   
allowed.).  
PAST MEDICAL HISTORY  
Diagnosis Date  
Exertional asthma   
2012  
Generalized anxiety   
disorder  
IFG (impaired fasting   
glucose) 2024  
Medial meniscus tear   
2012  
PCOS (polycystic   
ovarian syndrome)   
Unspecified otitis   
media  
Recurrent otitis  
Vocal cord dysfunction   
05/15/2012  
Current Outpatient   
Medications  
Medication Sig   
Dispense Refill  
metFORMIN (GLUCOPHAGE)   
500 mg tablet Take 2   
tablets by mouth two   
times a day  
with meals. 360 tablet   
0  
buPROPion XL   
(WELLBUTRIN XL) 150 mg   
24 hr tablet Take 150   
mg by mouth every  
morning.  
PNV/iron/folic acid   
(PRENATAL   
VITAMIN-IRON-FA ORAL)   
Take 1 tablet by mouth  
once daily.  
albuterol HFA   
(PROVENTIL HFA,   
VENTOLIN HFA) 90   
mcg/actuation inhaler   
Inhale 2  
Puffs as instructed   
every 4 hours as   
needed. 1 Inhaler 0  
No current   
facility-administered   
medications for this   
visit.  
Occupation: bank  
Contraception: none  
/82   Pulse 80     
Wt 167 lb (75.8 kg)     
LMP 2024   SpO2   
98%    
BMI 28.67 kg/m?  
Assessment/Plan:  
Don Mittal is a 24   
year old yo female   
with overweight   
(pre-obesity) who  
presented today for   
follow up for   
supervised weight loss   
to treat PCOS, IFG,  
elevated LDL, anxiety   
and depression and   
prevent related   
co-morbidities.  
ASSESSMENT/PLAN:  
1. PCOS (polycystic   
ovarian syndrome) -   
ICD9: 256.4, ICD10:   
E28.2 (primary  
diagnosis)  
- Whole food balanced   
protein low-carb   
nutrition  
- METFORMIN 500 MG   
TABLET  
- DESOGESTREL 0.15   
MG-ETHINYL ESTRADIOL   
0.03 MG TABLET  
- NALTREXONE 50 MG   
TABLET  
2. Irregular menses -   
ICD9: 626.4, ICD10:   
N92.6  
- DESOGESTREL 0.15   
MG-ETHINYL ESTRADIOL   
0.03 MG TABLET  
3. Anxiety and   
depression - ICD9:   
300.00, 311, ICD10:   
F41.9, F32.A  
- Continue bupropion   
prescribed by PCP- she   
plans to discuss   
increasing dose  
with PCP  
4. History of bulimia   
- ICD9: V11.8, ICD10:   
Z86.59  
- in remission since   
age 15  
5. History of anorexia   
nervosa - ICD9: V11.8,   
ICD10: Z86.59  
- in remission since   
age 15  
6. Provided repeat   
prescription for oral   
contraceptive - ICD9:   
V25.41, ICD10:  
Z30.41  
- RX for Apri given   
today.  
- discussed with   
patient on how to take   
OCP's.Given written   
information  
- counseled on   
benefits, risks and   
possible severe side   
effects of OCP's.  
- discussed need to   
use Condoms to help to   
prevent STD's   
including HIV etc.  
- DESOGESTREL 0.15   
MG-ETHINYL ESTRADIOL   
0.03 MG TABLET  
7. Overweight with   
body mass index (BMI)   
of 29 to 29.9 in adult   
- ICD9: 278.02,  
V85.25, ICD10: E66.3,   
Z68.29  
- METFORMIN 500 MG   
TABLET - decrease to   
500 mg with breakfast   
and 1 gm with  
dinner due to diarrhea   
with 1 gm twice daily  
- DESOGESTREL 0.15   
MG-ETHINYL ESTRADIOL   
0.03 MG TABLET  
- NALTREXONE 50 (more   
content not   
included)...        Normal                                  [facility]  
   
                                                    CNOVon 2024   
   
                                        CNOV                Office Visit (OBGYWM  
)  
----------------------  
--------------  
DON MITTAL   
([id number]) 2000   
F  
Date Time Provider   
Department  
24 7:00 AM   
ERNESTINE SHER  
During your visit   
today, we recorded the   
following information   
about you:  
Blood pressure Weight   
Last Period  
116/84 76.7 kg   
24  
Ernestine Sher APRN.CNP   
2024 7:59 PM   
Signed  
Some documentation   
from previous visit of   
2023 was copied   
and pasted,  
documentation has been   
reviewed and edited as   
necessary for today's   
visit.  
Patient Summary:   
Don is a 23 year   
old female who   
presents for follow-up  
evaluation of her   
obesity/weight   
management to treat   
PCOS, IFG, elevated   
LDL,  
anxiety and depression   
and prevent   
relatedco-morbidities.   
In our previous  
visits we have   
discussed lifestyle   
intervention including   
a nutrition  
recommendations and   
physical activity   
optimization. Her last   
office visit was 1  
month ago.  
Irregular menses -   
Menses 2023 LMP   
1/2-3 spotting  
20 lb weight gain with   
Lexapro for anxiety.   
Changed to bupropion   
by PCP.  
Assessment/plan from   
last visit:  
Metformin 500 mg twice   
a day with meals -   
tried 1 gm at dinner   
but had nausea  
and mild diarrhea   
(actually took at   
bedtime)  
Bupropion 150 mg 24/hr   
tab for anxiety and   
depression  
Was in [place] with   
 who races -   
very hectic travel.   
Tried to eat  
healthier.  
Quit second job so   
life is becoming more   
calm  
History of anorexia   
and bulimia in HS - no   
treatment, Dad helped   
her and she  
started adding protein   
shakes.  
Interval History -   
changes made in past   
month  
Awake - 0700 but is   
changing to 0520 to   
exercise before work  
B - 0745 Premier   
Protein shake  
S - none  
L - SKIP  
S - none  
D - 1830 protein,   
pasta/potato/rice/swee  
t potato and veg -   
asparagus, cucumber  
salad, winter and   
summer squash  
S - none or Skinny   
popcorn  
Fluids - water, zero   
sugar electrolyte   
drink mixes,   
unsweetened tea  
Bedtime -   
She feels the   
medication is helping   
to lessen hunger and   
craving to candy  
Exercise: stable   
sedentary job at bank.   
Just got gym   
membership - walking  
and free weights  
Stress: decreased -   
 races and   
season has not started   
yet and decreased  
stress after quitting   
second job  
Sleep: 7 hours, well   
rested LASHAWN -no  
Weight gain since last   
vist: 3 lbs since last   
visit  
2024 169 lb  
2024 166 lb   
metformin  
CrCl cannot be   
calculated (Patient's   
most recent lab result   
is older than the  
maximum 180 days   
allowed.).  
PAST MEDICAL HISTORY  
Diagnosis Date  
Exertional asthma   
2012  
Generalized anxiety   
disorder  
IFG (impaired fasting   
glucose) 2024  
Medial meniscus tear   
2012  
PCOS (polycystic   
ovarian syndrome)   
Unspecified otitis   
media  
Recurrent otitis  
Vocal cord dysfunction   
05/15/2012  
Current Outpatient   
Medications  
Medication Sig   
Dispense Refill  
buPROPion XL   
(WELLBUTRIN XL) 150 mg   
24 hr tablet Take 150   
mg by mouth every  
morning.  
PNV/iron/folic acid   
(PRENATAL   
VITAMIN-IRON-FA ORAL)   
Take 1 tablet by mouth  
once daily.  
metFORMIN (GLUCOPHAGE)   
500 mg tablet Take 1   
tablet by mouth two   
times a day  
with meals. 180 tablet   
0  
albuterol HFA   
(PROVENTIL HFA,   
VENTOLIN HFA) 90   
mcg/actuation inhaler   
Inhale 2  
Puffs as instructed   
every 4 hours as   
needed. 1 Inhaler 0  
No current   
facility-administered   
medications for this   
visit.  
/84   Wt 169 lb   
(76.7 kg)   LMP   
2024   BMI 29.01   
kg/m?  
Assessment/Plan:  
Don Mittal is a 23   
year old yo female   
with overweight   
(pre-obesity) who  
presented today for   
follow up for   
supervised weight loss   
to treat PCOS, IFG,  
elevated LDL, anxiety   
and depression and   
prevent related   
co-morbidities.  
ASSESSMENT/PLAN:  
1. PCOS (polycystic   
ovarian syndrome) -   
ICD9: 256.4, ICD10:   
E28.2 (primary  
diagnosis)  
- Whole food balanced   
protein low-carb   
nutrition  
- METFORMIN 500 MG   
TABLET  
2. Elevated LDL   
cholesterol level -   
ICD9: 272.0, ICD10:   
E78.00  
- benefits of weight   
loss discussed  
3. IFG (impaired   
fasting glucose) -   
ICD9: 790.21, ICD10:   
R73.01  
- METFORMIN 500 MG   
TABLET  
4. Irregular menses -   
ICD9: 626.4, ICD10:   
N92.6  
- METFORMIN 500 MG   
TABLET  
5. Anxiety and   
depression - ICD9:   
300.00, 311, ICD10:   
F41.9, F32.A  
- well-controlled with   
bupropion  
6. History of bulimia   
- ICD9: V11.8, ICD10:   
Z86.59  
- in remission  
7. History of anorexia   
nervosa - ICD9: V11.8,   
ICD10: Z86.59  
- in remission  
8. Overweight with   
body mass index (BMI)   
of 29 to 29.9 in adult   
- ICD9: 278.02,  
V85.25, ICD10: E66.3,   
Z68.29  
- METFORMIN 500 MG   
TABLET - continue to   
increase dose as   
tolerated  
- Recommended whole   
food low-carb diet   
with 30 g of protein 3   
times a day and  
30 g of carbs at lunch   
and dinner only. Given   
tracking log.  
- Given 15 gram carb   
whole food and protein   
suggestion list.  
- Given protein snack   
ideas  
- continue exercise  
Lengthy discussion   
regarding history of   
anorexia and bulimia   
which is in  
remission. Discussed   
appropriate amount of   
exercise. We agreed   
that in-office  
visits (more content   
not included)...    Normal                                  [facility]  
   
                                                    Office Visiton 2024   
   
                                        Follow-up visit     [id number] Lux Mittal   
2000 F  
Date Provider   
Department Center  
2024   
[id number]ON[facility]   
[facility]  
Family History  
Problem Relation Age   
of Onset  
Thyroid disease Mother  
Perkins syndrome Father  
Asthma Sister  
Breast cancer Maternal   
Grandmother  
Cancer Maternal   
Grandfather  
Cancer Paternal   
Grandfather  
Family Status -   
Relation Status Age at   
Death  
Mother Alive  
Father Alive  
Sister  
Sister   
Maternal Grandmother   
  
Maternal Grandfather   
  
Paternal Grandmother   
Alive  
Paternal Grandfather   
  
Level of Service:18728   
[place] OFFICE/OUTPATIENT   
ESTABLISHED MOD MDM 30   
MIN  
Reason for Visit and   
Comments:  
Medication Check   
[[id number]]        [facility]  
   
                                                    PATINSon 2024   
   
                                        PATINS              Asked about extended  
   
release metformin.  
Give update in about a   
month- if you want to   
increase dose of   
Wellbutrin or not   Normal                                  [facility]  
   
                                                    Progress Noteon 2024   
   
                                        Progress Note       Recommend following   
up   
with OB/GYN regarding   
side effects of   
metformin and  
requesting if she can   
take extended release   
metformin to see if   
that is better  
tolerated.          [facility]  
   
                                        Progress Note       Denies any suicidal   
or   
homicidal ideation.   
Anxiety and depression   
have improved  
we will continue   
Wellbutrin 150 mg   
daily, patient to give   
us an update in about  
1 month via [employer] if   
she would like to   
increase the dose to   
300 mg. We will  
schedule her for   
follow-up in 3 months [facility]  
   
                                        Progress Note       Patient was identifi  
ed   
by name and Date of   
Birth.              [facility]  
   
                                        Progress Note       2024  
Don Mittal (:   
2000) is a 23   
y.o. female ,   
Established patient,   
here  
for evaluation of the   
following chief   
complaint(s):  
Medication Check  
ASSESSMENT/PLAN:  
1. Anxiety and   
depression  
Assessment & Plan:  
Denies any suicidal or   
homicidal ideation.   
Anxiety and depression   
have improved  
we will continue   
Wellbutrin 150 mg   
daily, patient to give   
us an update in about  
1 month via [employer] if   
she would like to   
increase the dose to   
300 mg. We will  
schedule her for   
follow-up in 3 months  
2. PCOS (polycystic   
ovarian syndrome)  
Assessment & Plan:  
Recommend following up   
with OB/GYN regarding   
side effects of   
metformin and  
requesting if she can   
take extended release   
metformin to see if   
that is better  
tolerated.  
Follow up for 3 month   
[facility].  
SUBJECTIVE/OBJECTIVE:  
HPI -  
Don Mittal (:   
2000) is a 23   
y.o. female ,   
Established patient,   
here  
for the evaluation of   
the following chief   
complaint(s):  
Medication Check  
RACING WITH  IN   
[place] WENT WELL, WAS   
SUPER BUSY. Did not   
get to do any  
sightseeing as they   
were busy at the track   
every day. Got back   
from [place] on  
2024.   
Patient reports she  
went to ob/gyn for   
pcos- was started on   
metformin and reports   
that she has been  
having nausea and mild   
diarrhea with it. She   
has a follow-up with   
them  
tomorrow.  
Reports that the   
wellbutrin has helped   
anxiety and mood. Is   
doing better  
handling things. Is   
not. Feeling as   
overwhelmed, reports   
sleeping is getting  
back on schedule.  
Still doing shreya and   
yoga. Most days.  
Would like to keep the   
Wellbutrin at the   
current dose for now  
Prior to Admission   
medications  
Medication Sig Start   
Date End Date Taking?   
Authorizing Provider  
buPROPion XL   
(Wellbutrin XL) 150 MG   
24 hr tablet Take 1   
tablet (150 mg) by   
mouth  
every morning. Do not   
crush, chew, or split.   
1/22/24 3/22/24 Yes   
SHANEKA Chacon CNP  
metFORMIN (Glucophage)   
500 MG tablet Take 1   
tablet by mouth in the   
morning and 1  
tablet in the evening.   
Take with meals.   
24 Yes   
Historical Provider,  
MD  
Prenatal   
Multivit-Min-Fe-FA   
(PRENATAL/IRON PO)   
Take 1 tablet by mouth   
in the  
morning. Yes   
Historical Provider,   
MD  
Prenatal MV-Min-Fe   
Fum-FA-DHA (PRENATAL 1   
PO) Take by mouth. Yes   
Historical  
Provider, MD  
Review of Systems  
Constitutional:   
Negative for activity   
change, chills,   
fatigue and fever.  
HENT: Negative.   
Negative for   
congestion.  
Respiratory: Negative.  
Cardiovascular:   
Negative.  
Gastrointestinal:   
Positive for diarrhea   
and nausea. Negative   
for abdominal pain  
and vomiting.  
Genitourinary:   
Positive for menstrual   
problem (Irregular   
menses due to PCOS).  
Neurological:   
Negative.  
Vitals:  
24 0735  
BP: 112/69  
Pulse: 94  
Resp: 18  
Temp: 37 ?C (98.6 ?F)  
TempSrc: Infrared  
SpO2: 98%  
Weight: 170 lb 3.2 oz   
(77.2 kg)  
Physical Exam  
Constitutional:  
Appearance: Normal   
appearance.  
HENT:  
Head: Normocephalic   
and atraumatic.  
Mouth/Throat:  
Mouth: Mucous   
membranes are moist.  
Pharynx: Oropharynx is   
clear. No posterior   
oropharyngeal   
erythema.  
Cardiovascular:  
Rate and Rhythm:   
Normal rate and   
regular rhythm.  
Pulses: Normal pulses.  
Heart sounds: Normal   
heart sounds.  
Pulmonary:  
Effort: Pulmonary   
effort is normal.  
Breath sounds: Normal   
breath sounds.  
Skin:  
General: Skin is warm   
and dry.  
Neurological:  
Mental Status: She is   
alert and oriented to   
person, place, and   
time.  
Psychiatric:  
Mood and Affect: Mood   
normal.  
Behavior: Behavior   
normal.  
Thought Content:   
Thought content   
normal.  
Judgment: Judgment   
normal.  
An electronic   
signature was used to   
authenticate this   
note.  
SHANEKA Chacon CNP  
2024  
9:35 AM             Normal                                  [facility]  
   
                                                    CNOVon 2024   
   
                                        CNOV                Office Visit (OBGYWM  
)  
----------------------  
--------------  
DON MITTAL   
([id number]) 2000   
F  
Date Time Provider   
Department  
24 7:00 AM   
ERNESTINE SHER OBTIFFANYWVENICE  
During your visit   
today, we recorded the   
following information   
about you:  
Blood pressure Weight   
Last Period  
100/72 75.3 kg   
24  
Ernestine Sher APRN.CNP   
2024 1:09 PM   
Signed  
Don GRIMES Hernan is a 23   
year old female who   
presents for problem   
visit missed  
menses x 2 months and   
multiple negative home   
pregnancy tests.  
HPI: Stopped OCP to   
attempt pregnancy in   
2023. Last   
menses in  
November. Multiple   
home pregnancy tests   
have all been   
negative.  
Has PCOS - recently   
noticing more facial   
hair although acne   
improving. 20 lb  
weight gain with   
Lexapro for anxiety.   
Changed to bupropion a   
few days ago by  
PCP.  
No previous   
pregnancies. Partner   
has 3 children.  
Shreya and yoga daily   
for exercise. Trying   
to eat healthy.  
OB History  
 T0  L0  
SAB0 IAB0 Ectopic0   
Multiple0 Live Births0  
Comment: 3   
stepchildren  
Gyn History  
LMP: 2024,   
Having periods  
Age at Menarche:  
Age at First   
Pregnancy:  
Age at Menopause:  
Gyn History Comments:  
Sexual Activity: Yes;   
Male; sexually active   
with   
Contraception: Pill  
PAST MEDICAL HISTORY  
Diagnosis Date  
Exertional asthma   
2012  
Generalized anxiety   
disorder  
Medial meniscus tear   
2012  
PCOS (polycystic   
ovarian syndrome)   
Unspecified otitis   
media  
Recurrent otitis  
Vocal cord dysfunction   
05/15/2012  
PAST SURGICAL HISTORY  
Procedure Laterality   
Date  
ANKLE ARTHROSCOPY   
Right 16  
Dr. Yeung  
FAMILY HISTORY  
Problem Relation Age   
of Onset  
other (Other [Other])   
Paternal Grandfather  
leukemia  
Cancer Maternal   
Grandmother  
breast  
Asthma Sister  
Social History  
Tobacco Use  
Smoking status: Never  
Smokeless tobacco:   
Never  
Vaping Use  
Vaping Use: Never used  
Substance Use Topics  
Alcohol use: Yes  
Drug use: No  
Current Outpatient   
Medications  
Medication Sig  
buPROPion XL   
(WELLBUTRIN XL) 150 mg   
24 hr tablet Take 150   
mg by mouth every  
morning.  
PNV/iron/folic acid   
(PRENATAL   
VITAMIN-IRON-FA ORAL)   
Take 1 tablet by mouth  
once daily.  
albuterol HFA   
(PROVENTIL HFA,   
VENTOLIN HFA) 90   
mcg/actuation inhaler   
Inhale 2  
Puffs as instructed   
every 4 hours as   
needed.  
Drospirenone-Ethinyl   
Estradiol (SINGH,   
28,) 3-0.03 mg per   
tablet Take 1  
tablet by mouth once   
daily. Take one active   
pill continuously x 3   
months-  
discard placebo pills   
(Patient not taking:   
Reported on 2024)  
No current   
facility-administered   
medications for this   
visit.  
Allergies As of Date:   
2024  
Allergen Noted   
Reaction  
GRASS POLLEN   
2013 Unknown  
SEASONAL ALLERGIES   
2013 Unknown  
TREES 2013   
Unknown  
Fully Assessed   
2024  
REVIEW OF SYSTEMS  
Abdomen: No bloating,   
early satiety,   
indigestion, or   
increased flatulence.   
No  
abdominal pain,   
nausea, vomiting,   
diarrhea, or   
constipation.  
Bladder: No dysuria,   
gross hematuria,   
urinary frequency,   
urinary urgency, or  
incontinence.  
Allergies and current   
medication updated:Yes  
EXAM: /72   Wt   
166 lb (75.3kg)   LMP   
2024  
GENERAL: pleasant,   
 female in no   
apparent distress  
CHEST: Normal   
inspiratory effort  
NEURO: alert and   
oriented x3,exam   
grossly non-focal  
ASSESSMENT/PLAN:  
1. Irregular menses -   
ICD9: 626.4, ICD10:   
N92.6 (primary   
diagnosis)  
- HCG QUAL UR B/O -   
negative  
- METFORMIN 500 MG   
TABLET  
- HGB A1C  
- INSULIN ASSAY BLOOD  
- GLUCOSE FASTING BLD  
2. PCOS (polycystic   
ovarian syndrome) -   
ICD9: 256.4, ICD10:   
E28.2  
- METFORMIN 500 MG   
TABLET  
Agreeable to begin   
Metformin 500 mg with   
dinner daily x 1 week.   
If tolerating  
will increase to 2   
tablets with dinner   
daily.  
We discussed common   
side effects of this   
medication including   
nausea, changes  
in bowel habits,   
abdominal discomfort,   
and flatulence.   
Discussed taking it   
with  
food and complication   
of lactic acidosis and   
signs/symptoms and   
medication  
handout given. Further   
instructed that if she   
experiences malaise,   
muscle  
aches, difficulty   
breathing, or severe   
abdominal pain to seek   
immediate medical  
attention.  
- HGB A1C  
- INSULIN ASSAY BLOOD  
- GLUCOSE FASTING BLD  
- - discussed with pt   
- review of PCOS with   
signs and symptoms.   
Increased risk  
of DMT2, CAD,   
infertility. Treatment   
discussed: weight loss   
to restore  
ovulatory cycles and   
reduce androgen   
concentrations,   
exercise, cyclic  
medroxyprogesterone to   
induce menses.   
Metformin discussed -   
explained that it  
is not considered a   
first-line treatment   
as it does not change   
pregnancy  
outcomes but that it   
will decrease HgbA1c   
although weight loss   
will do the  
same. Discussed that   
she may need   
medication to induce   
ovulation if she does  
not become pregnant   
with lifestyle changes   
and weight loss.  
- Eat primarily whole   
foods. Limit carbs,   
especially processed   
carbs.  
- Do not drink your   
calories  
- 30 grams of protein   
for your first meal of   
the day decreases your   
hu (more content not   
included)...        Normal                                  [facility]  
   
                                                    Glucose p fast SerPl-mCncon   
2024   
   
                                                    Glucose post fast   
[Mass/Vol]      101 mg/dL       High            74-99           [facility]  
   
                                        Comment on above:   Order Comment: Speci  
men Type: BLOOD SPECIMEN  
Ordering Facility: [facility]  
Address: [address] [place] [address]   
   
                                                            Result Comment: Amer  
ican Diabetes Association guidelines state that   
a diabetes mellitus diagnosis is preliminarily made when the   
fasting plasma glucose meets or exceeds 126 mg/dL. In the absence   
of unequivocal hyperglycemia, results should be confirmed with   
repeat testing. Patients are at increased risk for diabetes   
mellitus (prediabetes) when the fasting glucose is 100 to 125   
mg/dL.   
   
                                                            Performed By: #### 1  
[id number] ####  
[facility]  
CLIA [id number]  
[address] [place] [address] [place]   
   
                                                    HbA1c (Bld)on 2024   
   
                                                    Average glucose   
Estimated from   
glycated   
hemoglobin (Bld)   
[Mass/Vol]      111 mg/dL       Normal                          [facility]  
   
                                        Comment on above:   Order Comment: Speci  
men Type: BLOOD SPECIMEN  
Ordering Facility: [facility]  
Address: [address] [place] [address]   
   
                                                            Result Comment: eAG:  
 (Estimated average glucose) is a calculated   
value from HgbA1c and is representative of the average blood   
glucose level in the last 2-3 month period.   
   
                                                            Performed By: #### 5  
[id number] ####  
[facility]  
CLIA [id number]  
[address] [place] [address] [place]   
   
                                                    HbA1c (Bld) [Mass   
fraction]       5.5 %           Normal          4.3-5.6         [facility]  
   
                                        Comment on above:   Order Comment: Speci  
men Type: BLOOD SPECIMEN  
Ordering Facility: [facility]  
Address: [address] [place] [address]   
   
                                                            Result Comment: Amer  
ican Diabetes Association guidelines indicate   
that patients with HgbA1c in the range 5.7-6.4% are at increased   
risk for development of diabetes, and intervention by lifestyle   
modification may be beneficial. HgbA1c greater or equal to 6.5% is   
considered diagnostic of diabetes.   
   
                                                            Performed By: #### 5  
[id number] ####  
[facility]  
CLIA [id number]  
[address] [place] [address] [place]   
   
                                                    Insulin SerPl-aCncon   
024   
   
                      Insulin Qn 13.0 u[IU]/mL Normal     3.0-25.0   [facility]  
   
                                        Comment on above:   Order Comment: Speci  
men Type: BLOOD SPECIMEN  
Ordering Facility: [facility]  
Address: [address] [place] [address]   
   
                                                            Performed By: #### 2  
[id number] ####  
[facility]  
CLIA [id number]  
[address] [place] [address] [place]   
   
                                                    Office Visiton 2024   
   
                                        Follow-up visit     [id number] Lux Mittal   
2000 F  
Date Provider   
[facility] Center  
2024   
[place] [facility] [place]   
[facility]  
Family History  
Problem Relation Age   
of Onset  
Thyroid disease Mother  
Perkins syndrome Father  
Asthma Sister  
Breast cancer Maternal   
Grandmother  
Cancer Maternal   
Grandfather  
Cancer Paternal   
Grandfather  
Family Status -   
Relation Status Age at   
Death  
Mother Alive  
Father Alive  
Sister  
Sister   
Maternal Grandmother   
  
Maternal Grandfather   
  
Paternal Grandmother   
Alive  
Paternal Grandfather   
  
Level of Service:65588   
[place] OFFICE/OUTPATIENT   
ESTABLISHED MOD MDM 30   
MIN  
Reason for Visit and   
Comments:  
Medication Check   
[[id number]]  
Weight Gain [180]   Normal                                  [facility]  
   
                                                    PATINSon 2024   
   
                                        PATINS              Start wellbutrin and  
   
decrease lexapro to 10   
mg daily x 7 days,   
then stop the  
lexapro.  
Check [employer] james   
for meditation      Normal                                  [facility]  
   
                                                    Progress Noteon 2024   
   
                                        Progress Note       Will have her follow  
   
up with her ob/gyn,   
suspect gain posssilby   
from pcos.  
Continue exercise,   
healthy eating and   
portion control. Will   
stop lexapro  
(possible weight gain)   
and switch to   
wellbutrin.         Normal                                  [facility]  
   
                                        Progress Note       Denies si/hi. Anxiet  
y   
and depression   
symptoms better but   
still is having   
trouble  
focusing. Will taper   
discontinue lexapro   
and start wellbutrin.   
Follow up in  
about 1 month       Normal                                  [facility]  
   
                                        Progress Note       Patient was identifi  
ed   
by name and Date of   
Birth.              Normal                                  [facility]  
   
                                        Progress Note       2024  
Don Mittal (:   
2000) is a 23   
y.o. female ,   
Established patient,   
here  
for evaluation of the   
following chief   
complaint(s):  
Medication Check and   
Weight Gain  
ASSESSMENT/PLAN:  
1. Anxiety and   
depression  
Assessment & Plan:  
Denies si/hi. Anxiety   
and depression   
symptoms better but   
still is having   
trouble  
focusing. Will taper   
discontinue lexapro   
and start wellbutrin.   
Follow up in  
about 1 month  
Orders:  
- buPROPion XL   
(Wellbutrin XL) 150 MG   
24 hr tablet; Take 1   
tablet (150 mg)  
by mouth every   
morning. Do not crush,   
chew, or split.,   
Starting Mon   
2024,  
Until Fri 3/22/2024,   
Normal  
2. Weight gain  
Assessment & Plan:  
Will have her follow   
up with her ob/gyn,   
suspect gain posssilby   
from pcos.  
Continue exercise,   
healthy eating and   
portion control. Will   
stop lexapro  
(possible weight gain)   
and switch to   
wellbutrin.  
Follow up in about 1   
month (around   
2024).  
SUBJECTIVE/OBJECTIVE:  
HPI -  
Don Mittal (:   
2000) is a 23   
y.o. female ,   
Established patient,   
here  
for the evaluation of   
the following chief   
complaint(s):  
Medication Check and   
Weight Gain  
Stopped nicotine and   
stopped etoh since we   
last met. . Has gained   
some weight.  
She thinks it may be   
from going off of her   
birth control   
medication, Is working  
out with online [employer]   
classes daily,   
watching what she is   
eating. Is doing well  
as far as eating and   
exercising. She also   
is doing yoga.  
Stopped birth control.   
Has not had period   
since sept and   
November and has been  
pregnancy testing. Is   
trying to get   
pregnant. Will be   
following up with her  
ob/gyn.  
Reports anxiety is   
better, but is not   
able to focus well   
still. Denies si/hi.  
Her and her    
are getting ready to   
go to [place] for car   
racing for 11  
days. Her    
races cars. States she   
is looking forward to   
going.  
Prior to Admission   
medications  
Medication Sig Start   
Date End Date Taking?   
Authorizing Provider  
escitalopram (Lexapro)   
20 MG tablet Take 1   
tablet (20 mg) by   
mouth daily.  
12/15/23 3/14/24 Yes   
SHANEKA Chacon - CNP  
Prenatal MV-Min-Fe   
Fum-FA-DHA (PRENATAL 1   
PO) Take by mouth. Yes   
Historical  
Provider, MD  
Review of Systems  
Constitutional:   
Negative.  
HENT: Negative.  
Respiratory: Negative.  
Cardiovascular:   
Negative.  
Gastrointestinal:   
Negative.  
Genitourinary:   
Negative.  
Musculoskeletal:   
Negative.  
Neurological:   
Negative.  
Psychiatric/Behavioral  
: Positive for   
decreased   
concentration.   
Negative for  
self-injury, sleep   
disturbance and   
suicidal ideas. The   
patient is  
nervous/anxious.  
Vitals:  
24 1346  
BP: 133/89  
Pulse: 98  
Resp: 18  
Temp: 36.7 ?C (98.1   
?F)  
TempSrc: Infrared  
SpO2: 97%  
Weight: 164 lb (74.4   
kg)  
Physical Exam  
Constitutional:  
General: She is not in   
acute distress.  
Appearance: Normal   
appearance. She is not   
ill-appearing.  
HENT:  
Head: Normocephalic   
and atraumatic.  
Right Ear: Tympanic   
membrane normal.  
Left Ear: Tympanic   
membrane normal.  
Nose: No congestion or   
rhinorrhea.  
Mouth/Throat:  
Mouth: Mucous   
membranes are moist.  
Pharynx: Oropharynx is   
clear. No posterior   
oropharyngeal   
erythema.  
Eyes:  
Conjunctiva/sclera:   
Conjunctivae normal.  
Cardiovascular:  
Rate and Rhythm:   
Normal rate and   
regular rhythm.  
Pulmonary:  
Effort: Pulmonary   
effort is normal.  
Breath sounds: Normal   
breath sounds.  
Lymphadenopathy:  
Cervical: No cervical   
adenopathy.  
Skin:  
General: Skin is warm   
and dry.  
Neurological:  
Mental Status: She is   
alert and oriented to   
person, place, and   
time.  
Psychiatric:  
Mood and Affect: Mood   
normal.  
Behavior: Behavior   
normal.  
Thought Content:   
Thought content   
normal.  
Judgment: Judgment   
normal.  
An electronic   
signature was used to   
authenticate this   
note.  
Monica Laura,   
APRN - CNP  
2024  
4:24 PM             [facility]  
   
                                                    Office Visiton 2023   
   
                                        Follow-up visit     [id number] Lux Mittal   
2000 F  
Date Provider   
[facility] Center  
2023   
[id number]  
Family History  
Problem Relation Age   
of Onset  
Thyroid disease Mother  
Perkins syndrome Father  
Asthma Sister  
Breast cancer Maternal   
Grandmother  
Cancer Maternal   
Grandfather  
Cancer Paternal   
Grandfather  
Family Status -   
Relation Status Age at   
Death  
Mother Alive  
Father Alive  
Sister  
Sister   
Maternal Grandmother   
  
Maternal Grandfather   
  
Paternal Grandmother   
Alive  
Paternal Grandfather   
  
Level of Service:52390   
[place] PERIODIC PREVENTIVE   
MED EST PATIENT 18-39   
YRS  
Reason for Visit and   
Comments:  
Follow-up [402865]  
Health Maintenance   
[872] - Lipid-pended  
HIV/Hep  
C-declined  
Covid-declined  
PHQ-completed  
Pap-[facility] (will scan  
in)  
Tdap-declined  
Influenza-declined  
?                   Normal                                  [facility]  
   
                                                    PATINSon 2023   
   
                                        PATINS              Melatonin 1- 5 mg   
nightly to help with   
sleep.  
  
Send update via   
[email or web address] to provider in   
1 month on how you are   
doing on the  
lexapro 10 mg daily. Normal                                  [facility]  
   
                                                    Progress Noteon 2023   
   
                                        Progress Note       Denies any suicidal   
or   
homicidal ideation.   
Reports improved   
symptoms. We will  
continue on Lexapro 10   
mg daily she is to   
provide an update   
through [employer] in  
approximately 4 weeks.   
Consider up titration   
if needed at that   
point.              Normal                                  [facility]  
   
                                        Progress Note       Patient was identifi  
ed   
by name and Date of   
Birth.  
  
Health Main:  
  
Lipid-pended  
HIV/Hep C-declined  
Covid-declined  
PHQ-completed  
Pap-[facility] (will scan in)  
Tdap-declined  
Influenza-declined  Normal                                  [facility]  
   
                                        Progress Note       2023  
Don Mittal (:   
2000) is a 23   
y.o. female ,   
Established patient,   
here  
for evaluation of the   
following chief   
complaint(s):  
Follow-up and Health   
Maintenance   
(Lipid-pended/HIV/Hep  
C-declined/Covid-decli  
edin/PHQ-completed/Pap-  
[facility] (will scan  
in)/Tdap-declined/Infl  
uenza-declined/ )  
ASSESSMENT/PLAN:  
1. Annual physical   
exam  
- Comprehensive   
metabolic panel  
- CBC  
- Lipid panel  
2. Screening for   
deficiency anemia  
- CBC  
3. Screening for   
cholesterol level  
- Lipid panel  
4. Anxiety and   
depression  
Assessment & Plan:  
Denies any suicidal or   
homicidal ideation.   
Reports improved   
symptoms. We will  
continue on Lexapro 10   
mg daily she is [facility]   
[facility]  
[facility] weeks.   
Consider up titration   
if needed at that   
point.  
Follow up in about 3   
months (around   
2024).  
SUBJECTIVE/OBJECTIVE:  
HPI -  
Don Mittal (:   
2000) is a 23   
y.o. female ,   
Established patient,   
here  
for the evaluation of   
the following chief   
complaint(s):  
Follow-up and Health   
Maintenance   
(Lipid-pended/HIV/Hep  
C-declined/Covid-decli  
edin/PHQ-completed/Pap-  
[facility] (will scan  
in)/Tdap-declined/Infl  
uenza-declined/ )  
Recently newly   
established patient to   
us about 2 weeks ago   
who had presented for  
an acute complaint of   
anxiety and   
depression. She was   
started on Lexapro 10   
mg  
at that time and   
recommended to get set   
up with a counselor.   
No other acute  
complaints today.  
Anxiety/dep- feels   
like medication is   
helping some. Is able   
to think clearer. Is  
processing thoughts   
better. States that   
she has not broke down   
and cried like  
she was before. Denies   
any suicidal or   
homicidal ideation.  
She has yet to set up   
with a counselor.  
Has OB/GYN which she   
follows for women's   
health.  
Prior to Admission   
medications  
Medication Sig Start   
Date End Date Taking?   
Authorizing Provider  
escitalopram (Lexapro)   
10 MG tablet Take 1   
tablet (10 mg) by   
mouth daily.  
23 Yes   
SHANEKA Chacon - CNP  
Prenatal MV-Min-Fe   
Fum-FA-DHA (PRENATAL 1   
PO) Take by mouth. Yes   
Historical  
Provider, MD  
Review of Systems  
Constitutional:   
Negative.  
HENT: Negative.  
Respiratory: Negative.  
Cardiovascular:   
Negative.  
Gastrointestinal:   
Negative.  
Genitourinary:   
Negative.  
Musculoskeletal:   
Negative.  
Neurological:   
Negative.  
Psychiatric/Behavioral  
: Positive for   
decreased   
concentration and   
sleep  
disturbance (still not   
sleeping well.).   
Negative for   
self-injury and   
suicidal  
ideas. The patient is   
nervous/anxious.  
Vitals:  
23 0929  
BP: 116/77  
Pulse: 98  
Resp: 18  
Temp: 36.4 ?C (97.6   
?F)  
TempSrc: Infrared  
SpO2: 99%  
Weight: 151 lb (68.5   
kg)  
Physical Exam  
Constitutional:  
General: She is not in   
acute distress.  
Appearance: Normal   
appearance. She is not   
ill-appearing.  
HENT:  
Head: Normocephalic   
and atraumatic.  
Right Ear: Tympanic   
membrane normal.  
Left Ear: Tympanic   
membrane normal.  
Mouth/Throat:  
Mouth: Mucous   
membranes are moist.  
Pharynx: Oropharynx is   
clear. No posterior   
oropharyngeal   
erythema.  
Eyes:  
Conjunctiva/sclera:   
Conjunctivae normal.  
Cardiovascular:  
Rate and Rhythm:   
Normal rate and   
regular rhythm.  
Pulses: Normal pulses.  
Heart sounds: Normal   
heart sounds.  
Musculoskeletal:  
Right lower leg: No   
edema.  
Left lower leg: No   
edema.  
Skin:  
General: Skin is warm   
and dry.  
Neurological:  
Mental Status: She is   
alert and oriented to   
person, place, and   
time.  
Psychiatric:  
Mood and Affect: Mood   
normal.  
Behavior: Behavior   
normal.  
Thought Content:   
Thought content   
normal.  
Judgment: Judgment   
normal.  
An electronic   
signature was used to   
authenticate this   
note.  
SHANEKA Chacon - JAMES  
2023  
1:22 PM             Normal                                  [facility]  
   
                                                    Office Visiton 2023   
   
                                        Follow-up visit     [id number] HernanLux   
2000 F  
Date Provider   
[facility] Center  
2023   
[id number]  
Family History  
Problem Relation Age   
of Onset  
Thyroid disease Mother  
Perkins syndrome Father  
Asthma Sister  
Breast cancer Maternal   
Grandmother  
Cancer Maternal   
Grandfather  
Cancer Paternal   
Grandfather  
Family Status -   
Relation Status Age at   
Death  
Mother Alive  
Father Alive  
Sister  
Sister   
Maternal Grandmother   
  
Maternal Grandfather   
  
Paternal Grandmother   
Alive  
Paternal Grandfather   
  
Level of Service:37376   
[place] OFFICE/OUTPATIENT   
NEW MODERATE MDM 45-59   
MINUTES  
Reason for Visit and   
Comments:  
New Patient [542]   Normal                                  [facility]  
   
                                                    PATINSon 2023   
   
                                        PATINS              Start with 1/2 tab   
daily x 1 week, then   
increase to 1 whole   
tablet  
If you or someone you   
know needs support   
now, call or text [phone]   
or chat  
[email or web address]  
----------------------  
--------------  
----------------------  
----------------------  
---  
To find providers in   
your area for mental   
health services  
https://findtreatment.  
gov/  
To find additional   
support and   
information regarding   
mental health services   
and  
substance abuse  
https://www.samhsa.gov  
/  
Mental Health and   
Psychiatry Resources  
Counseling  
Family [address]  
[phone]  
[address]  
[phone]  
[facility]   
[facility]   
Office  
[address],   
[place]  
[phone]  
[facility] Counseling   
and Consultation   
Services (LGBTQIA+   
inclusive)  
[phone]  
[place] [facility]  
[address]   
[address]  
[place]   
[address]  
[place] [facility]  
[address] [place] [id number]  
[email or web address]  
[facility]   
Counseling Center  
[facility] [place], [place]  
[phone]  
Psychiatry  
[facility]  
[facility]  
[phone]  
[email or web address]  
CHRISTIE Sumner  
(commercial insurance   
only)  
Behavioral Health   
Services  
[facility]  
[phone]  
Counseling and   
Psychiatry  
[facility]  
[address] [place] [address] Drive   
[address], [place]  
[phone]  
[facility]  
[facility]   
[facility]: [phone]  
[facility]   
[facility]: [phone]  
[facility]   
Outpatient Clinic:   
[phone]  
[facility]   
Services, [address]   
[place] (OPEN   
): [phone]  
[facility] Behavioral   
Health, Psychiatry and   
Traumatic Stress   
Center  
[facility] Behavioral   
Health [address]   
[address]  
[phone]  
[facility]  
Multiple locations in   
[place]  
Go to [email or web address] Normal                                  [facility]  
   
                                                    Progress Noteon 2023   
   
                                        Progress Note       Denies any active   
suicidal or homicidal   
ideation. Symptoms are   
poorly  
controlled. Will start   
Lexapro 10 mg daily,   
recommend that she   
start cognitive  
behavioral   
therapy-resources   
provided. Close   
follow-up in 2 weeks   
sooner for  
any worsening symptoms Normal                                  [facility]  
   
                                        Progress Note       Follow-up with OB/GY  
N   
as directed         Normal                                  [facility]  
   
                                        Progress Note       Symptoms are   
controlled.         Normal                                  [facility]  
   
                                        Progress Note       Controlled. Has not   
needed her rescue   
inhaler over a year,   
continue albuterol  
as needed as needed Normal                                  [facility]  
   
                                        Progress Note       2023  
Don Mittal (:   
2000) is a 23   
y.o. female , New   
patient, here for  
evaluation of the   
following chief   
complaint(s):  
New Patient  
ASSESSMENT/PLAN:  
1. Anxiety and   
depression  
Assessment & Plan:  
Denies any active   
suicidal or homicidal   
ideation. Symptoms are   
poorly  
controlled. Will start   
Lexapro 10 mg daily,   
recommend that she   
start cognitive  
behavioral   
therapy-resources   
provided. Close   
follow-up in 2 weeks   
sooner for  
any worsening symptoms  
Orders:  
- escitalopram   
(Lexapro) 10 MG   
tablet; Take 1 tablet   
(10 mg) by mouth  
daily., Starting 2023, Until 2024, Normal  
2. Mild intermittent   
asthma, unspecified   
whether complicated  
Assessment & Plan:  
Controlled. Has not   
needed her rescue   
inhaler over a year,   
continue albuterol  
as needed as needed  
3. Vocal cord   
dysfunction  
Assessment & Plan:  
Symptoms are   
controlled.  
4. PCOS (polycystic   
ovarian syndrome)  
Assessment & Plan:  
Follow-up with OB/GYN   
as directed  
Follow up in about 2   
weeks (around   
2023) for   
Recheck.  
SUBJECTIVE/OBJECTIVE:  
HPI -  
Don Mittal presents   
as new patient,   
previous primary care   
provider Virginie Mann III, last seen 10   
years by previous   
provider.  
Specialists/other   
providers? Yes,   
describe: ob/gyn.  
Chief complaint(s):   
New Patient  
Patient presents today   
to establish and for   
an acute complaint of   
anxiety and  
depression symptoms.   
Reports these have   
been worsening over   
the past few months  
and is not sure if it   
is related to the   
amount of stressors   
that she is  
currently   
experiencing. States   
she has a history of   
some trauma in her  
childhood. Her father   
is currently   
estranged, has a good   
relationship now with  
her mother.  
Is working full-time   
at [employer]   
and then part-time at   
[employer].  
She also helps with   
administrative and   
business aspects with   
her  and  
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   
business. Was    
2 years ago to her   
 and is  
now his stepmom to 3   
young children, they   
have them usually   
every other  
weekend. She reports   
this is stressful for   
her and she tries the   
best she can  
but struggles with   
figuring out how to be   
a good mom. There are   
some stressors  
with their biological   
mother whom may or may   
not have bipolar   
disorder or some  
type of mood disorder   
as she is not always   
consistent with   
behaviors/attitude  
toward her.  
States that she has   
struggled with anxiety   
and some depression   
symptoms since  
childhood but has   
never been on any   
medications and only   
briefly saw a   
counselor  
1 time about 6 years   
ago. Reports daily   
symptoms of feeling   
overwhelmed,  
difficulty   
concentrating, and   
difficulty in daily   
activities. Reports   
fleeting  
thoughts of suicide   
when feeling   
overwhelmed with no   
plan. States she would  
never act on any of   
those thoughts due to   
her stepchildren and   
has been.  
Vocal cord   
dysfunction-reports   
diagnosed as a child   
and has gone through   
speech  
therapy for this which   
has helped. States she   
has not had any   
flareups. Also  
thinks that it helped   
after she quit vaping.  
Asthma,-reports   
symptoms controlled.   
Has not needed her   
rescue inhaler for a  
very long time, no   
hospitalizations  
PCOS-was briefly on   
birth control through   
her OB/GYN however   
decided not to take  
it any longer . Is   
currently taking   
prenatal vitamins   
states she would be   
okay  
if she got pregnant  
Past Medical History:  
Diagnosis Date  
Asthma 2010  
Past Surgical History:  
Procedure Laterality   
Date  
ANKLE SURGERY Right  
x 3. Dr. Reza Verdin[facility]   
[facility]   
[facility]- last  
sx 2019  
WISDOM TOOTH   
EXTRACTION Bilateral  
2015  
Family History  
Problem Relation Name   
Age of Onset  
Thyroid disease Mother  
Perkins syndrome Father  
Asthma Sister Karo Kincaid  
Breast cancer Maternal   
Grandmother  
Cancer Maternal   
Grandfather  
Cancer Paternal   
Grandfather  
Social History  
Socioeconomic History  
Marital status:   
  
Spouse name: Not on   
file  
Number of children:   
Not on file  
Years of education:   
Not on file  
Highest education   
level: Not on file  
Occupational History  
Not on file  
Tobacco Use  
Smoking status: Never  
Smokeless tobacco:   
Current  
Types: Chew  
Tobacco comments:  
Nicotine pouch,  
Vaping Use  
Vaping Use: Former  
Substance and Sexual   
Activity  
Alcohol use: Yes  
Alcohol/week: 5.0   
standard drinks of   
alcohol  
Types: 5 Shots of   
liquor per week  
Comment: occ.  
Drug use: Never  
Sexual activity: Yes  
Partners: Male  
Birth   
control/protection:   
None  
Other Topics Concern  
Not on file  
Social History   
Narrative  
Lives with -   
padmini, 2 dogs- nancy and   
lev, dalmation and   
rott- both 2 yo.  has 3   
kids- live wth them   
occasionally- every   
other weekend (10-  
Cristian boy,8- Jenny-   
girl, 2- Akash- boy)  
Works at [employer]   
bank full time and   
part time [employer]   
[employer]  
 races cars.   
Rents home in [place]   
and then lives in   
motor home when he  
is racing.  
Social Determinants of   
Health  
Financial Resource   
Strain: Not on file  
Food Insecurity: Not   
on file  
Transp (more content   
not included)...    [facility] 2023   
   
                                        OV                Office Visit (OB[facility]  
)  
----------------------  
--------------  
DON MITTAL   
([id number]) 2000   
F  
Date Time Provider   
Department  
23 2:30 PM AWILDA BELL   
[facility]  
During your visit   
today, we recorded the   
following information   
about you:  
Blood pressure Weight   
Height Last Period  
118/68 67.6 kg 1.626 m   
23  
Awilda Bell MD 2023   
3:09 PM Signed  
Chaperone offered:   
Patient declines.  
Don is a 23 year   
old  who   
presents for an annual   
gynecologic exam  
without complaints.   
Does have irregular   
bleeding with pills.   
 does  
sprint car racing. Has   
three step children.   
Thinking about kids.  
Menses: cycles every   
28 days and 5-8 days   
but does have a lot of   
irregular  
bleeding for 3-4 days   
per month- not as   
heavy. Does not miss   
pills or take them  
late.  
Contraception:   
combined hormonal   
contraceptives  
HPV vaccine: Yes  
Last Pap: 2022   
normal  
HPV: N/A  
History of abnormal   
pap: No  
Last mammogram: never  
Sexually active: Yes  
History of STDS: None  
Patient concerns for   
STD exposure: No.  
Pain with intercourse:   
No  
Postcoital bleeding:   
No  
Exercise:active   
walking  
Diet: balanced  
OB History  
 T0  L0  
SAB0 IAB0 Ectopic0   
Multiple0 Live Births0  
Comment: 3   
stepchildren  
Gyn History  
LMP: 2022,   
Having periods  
Age at Menarche:  
Age at First   
Pregnancy:  
Age at Menopause:  
Gyn History Comments:  
Sexual Activity: Yes;   
Male; sexually active   
with   
Contraception: Pill  
PAST MEDICAL HISTORY  
Diagnosis Date  
Exertional asthma   
2012  
Medial meniscus tear   
2012  
Unspecified otitis   
media  
Recurrent otitis  
Vocal cord dysfunction   
5/15/2012  
PAST SURGICAL HISTORY  
Procedure Laterality   
Date  
ANKLE ARTHROSCOPY   
Right 16  
Dr. Yeung  
FAMILY HISTORY  
Problem Relation Age   
of Onset  
other (Other [Other])   
Paternal Grandfather  
leukemia  
Cancer Maternal   
Grandmother  
breast  
Asthma Sister  
SOCIAL HISTORY  
Social History  
Tobacco Use  
Smoking status: Never  
Smokeless tobacco:   
Never  
Vaping Use  
Vaping Use: Never used  
Substance Use Topics  
Alcohol use: Yes  
Drug use: No  
REVIEW OF SYSTEMS  
Abdomen: No abdominal   
pain, nausea,   
vomiting, diarrhea, or   
constipation. No  
bloating, early   
satiety, indigestion,   
or increased   
flatulence.  
Bladder: No dysuria,   
gross hematuria,   
urinary frequency,   
urinary urgency, or  
incontinence.  
Breast: No breast   
lumps, nipple d/c,   
overlying skin   
changes, redness or   
skin  
retraction.  
Allergies and current   
medication updated:Yes  
EXAM: /68   Ht   
5' 4  (1.63m)   Wt 149   
lb (67.6kg)   LMP   
2023   BMI  
25.56 kg/(m2).  
GENERAL: pleasant,   
 female in no   
apparent distress  
HEENT: Normocephalic,   
atraumatic, mucus   
membranes moist, and   
no lesions  
NECK: Supple, full   
range of motion, no   
adenopathy, and   
thyroid normal  
DERMATOLOGY: Normal,   
without lesions,   
non-icteric, and   
non-hirsute  
BREAST: soft,   
non-tender, symmetric,   
no dominant mass,   
normal nipple-areolar  
complex, no   
lymphadenopathy, and   
no nipple discharge  
ABDOMEN: soft,   
non-tender, and no   
masses  
PELVIC: external   
genitalia normal,   
normal Bartholin's   
glands, urethra,   
Jenison's  
glands, no vulvar   
lesions, no cervical   
lesions, good vaginal   
support,  
physiologic discharge   
present, normal   
appearing perineal   
body and perianal  
region  
BIMANUAL: uterus   
normal size, shape and   
consistency, no   
adnexal masses, and  
non-tender  
RECTOVAGINAL:   
deferred.  
NEURO: alert and   
oriented x3,exam   
grossly non-focal  
EXTREMITIES: normal  
ASSESSMENT/PLAN:  
1) Health maintenance:  
Pap/HPV up to date.  
Mammogram starting age   
38.  
Nutrition, exercise   
and routine health   
maintenance exams   
reviewed.  
Calcium/Vitamin D   
supplementation   
information provided.  
2) Contraception:   
combined hormonal   
contraceptives.   
Contraceptive options  
reviewed and   
information provided.   
Changed to singh  
3) STD screening:   
Declined STD check.  
4) Follow up one year   
or sooner as needed  
Awilda Schulz MD  
Allergies As of Date:   
2023 Noted   
Allergy Reaction  
GRASS POLLEN   
2013 16 -   
Unknown  
SEASONAL ALLERGIES   
2013 16 -   
Unknown  
Comments: Weeds  
TREES 2013 16 -   
Unknown  
Date Reviewed:   
2023  
Reviewed by: Queta Tejada Ma - Fully   
Assessed  
Reason for Visit:  
Yearly Exam [187]  
Primary Visit   
Diagnosis:Encounter   
for gynecological   
examination (general)  
(routine) without   
abnormal findings   
[Z01.419]  
Other Visit   
Diagnosis:PCOS   
(polycystic ovarian   
syndrome) [E28.2]  
Order(s):Drospirenone-  
Ethinyl Estradiol   
(SINGH, 28,) 3-0.03   
mg per tabletTake  
1 tablet by mouth once   
daily. Take one active   
pill continuously x 3  
months- discard   
placebo pillsDisp: 112   
tabletRfl: 3  
Prescriptions as of   
2023  
- Drospirenone-Ethinyl   
Estradiol (SINGH,   
28,) 3-0.03 mg per   
tablet  
Take 1 tablet by mouth   
once daily. Take one   
active pill   
continuously x 3  
months- discard   
placebo pills  
- albuterol HFA   
(PROVENTIL HFA,   
VENTOLIN HFA) 90   
mcg/actuatio (more   
content not   
included)...        Normal                                  [facility]  
   
                                                    CBC W Auto Differential pane  
l (Bld)on 2022   
   
                      Abs Immature Gran <0.03                 <0.10 k/uL [facility]  
   
                                                    Basophils (Bld)   
[#/Vol]         0.03 10*3/uL                    <0.11 k/uL      [facility]  
   
                                                    Basophils/100 WBC   
(Bld)           0.4 %                                           [facility]  
   
                                                    Differential cell   
count method Nom   
(Bld)           Auto                                            [facility]  
   
                                                    Eosinophils (Bld)   
[#/Vol]         0.18 10*3/uL                    <0.46 k/uL      [facility]  
   
                                                    Eosinophils/100   
WBC (Bld)       2.5 %                                           [facility]  
   
                                                    Erythrocyte   
distribution width   
(RBC) [Ratio]   13.0 %                          11.5 - 15.0 %   [facility]  
   
                                                    Hematocrit (Bld)   
[Volume fraction] 37.3 %                          36.0 - 46.0 %   [facility]  
   
                                                    Hemoglobin (Bld)   
[Mass/Vol]          12.3 g/dL                               11.5 - 15.5   
g/dL                                    [facility]  
   
                      Immature Gran % 0.1 %                            [facility]  
   
                                                    Lymphocytes (Bld)   
[#/Vol]             2.81 10*3/uL                            1.00 - 4.00   
k/uL                                    [facility]  
   
                                                    Lymphocytes/100   
WBC (Bld)       39.7 %                                          [facility]  
   
                                                    MCH (RBC) [Entitic   
mass]           28.6 pg                         26.0 - 34.0 pg  [facility]  
   
                                                    MCHC (RBC)   
[Mass/Vol]          33.0 g/dL                               30.5 - 36.0   
g/dL                                    [facility]  
   
                                                    MCV (RBC) [Entitic   
vol]            86.7 fL                         80.0 - 100.0 fL [facility]  
   
                                                    Monocytes (Bld)   
[#/Vol]         0.46 10*3/uL                    <0.87 k/uL      [facility]  
   
                                                    Monocytes/100 WBC   
(Bld)           6.5 %                                           [facility]  
   
                                                    Neutrophils (Bld)   
[#/Vol]             3.58 10*3/uL                            1.45 - 7.50   
k/uL                                    [facility]  
   
                                                    Neutrophils/100   
WBC (Bld)       50.8 %                                          [facility]  
   
                                                    Nucleated RBC   
(Bld) [#/Vol]   10*3/uL                         <0.01 k/uL      [facility]  
   
                                                    Nucleated RBC/100   
WBC (Bld) [Ratio] 0.0 /100 WBC                                    [facility]  
   
                                                    Platelet mean   
volume (Bld)   
[Entitic vol]   11.4 fL                         9.0 - 12.7 fL   [facility]  
   
                                                    Platelets (Bld)   
[#/Vol]         276 10*3/uL                     150 - 400 k/uL  [facility]  
   
                          RBC (Bld) [#/Vol] 4.30 10*6/uL              3.90 - 5.2  
0   
m/uL                                    [facility]  
   
                          WBC (Bld) [#/Vol] 7.07 10*3/uL              3.70 - 11.  
00   
k/uL                                    [facility]  
   
                                                    HCG QUAL UR B/Oon 2022  
   
   
                      Pregnancy status Negative              neg - pos  [facility]   
[facility]  
   
                      Quality Check Yes                              [facility]  
  
  
  
Vital Signs  
  
  
                      Date Time  Vital Sign Value      Performing Clinician Rody gould  
   
                                                    2024   
13:                              Body mass index (BMI)   
[Ratio]                   27.74 kg/m2               Monica Bridenthal   
APRN - CNP  
Work Phone:   
[phone]                          [facility] [employer]  
   
                                                    2024   
13:          Body temperature    98.4 [degF]         Monica Bridenthal   
APRN - CNP  
Work Phone:   
[phone]                          [facility] [employer]  
   
                                                    2024   
13:          Body weight         73.3 kg             Monica Bridenthal   
APRN - CNP  
Work Phone:   
[phone]                          [facility] [employer]  
   
                                                    2024   
13:                              Diastolic blood   
pressure                  85 mm[Hg]                 Monica Bridenthal   
APRN - CNP  
Work Phone:   
[phone]                          [facility] [employer]  
   
                                                    2024   
13:          Heart rate          102 /min            Monica Bridenthal   
APRN - CNP  
Work Phone:   
[phone]                          [facility] [employer]  
   
                                                    2024   
13:          Respiratory rate    18 /min             Monica Bridenthal   
APRN - CNP  
Work Phone:   
[phone]                          [facility] [employer]  
   
                                                    2024   
13:                              SaO2% (BldA) [Mass   
fraction]                 97 %                      Monica Bridenthal   
APRN - CNP  
Work Phone:   
[phone]                          [facility] [employer]  
   
                                                    2024   
13:                              Systolic blood   
pressure                  129 mm[Hg]                Monica Bridenthal   
APRN - CNP  
Work Phone:   
[phone]                          [facility] [employer]  
   
                                                    2024   
07:          Body weight         75.75 kg            Ernestine Sher APRN.CNP  
Work Phone:   
[phone]                          [facility]  
   
                                                    2024   
07:                              Diastolic blood   
pressure                  82 mm[Hg]                 Ernestine Sher APRN.CNP  
Work Phone:   
[phone]                          [facility]  
   
                                                    2024   
07:          Heart rate          80 /min             Ernestine ALANISCNP  
Work Phone:   
[phone]                          [facility]  
   
                                                    2024   
07:                              SaO2% (BldA) [Mass   
fraction]                 98 %                      Ernestine ALANISCNP  
Work Phone:   
[phone]                          [facility]  
   
                                                    2024   
07:                              Systolic blood   
pressure                  126 mm[Hg]                Ernestine MUNROE.CNP  
Work Phone:   
[phone]                          [facility]  
   
                                                    2024   
06:          Body weight         76.66 kg            Ernestine MUNROE.CNP  
Work Phone:   
[phone]                          [facility]  
   
                                                    2024   
06:                              Diastolic blood   
pressure                  84 mm[Hg]                 Ernestine MUNROE.CNP  
Work Phone:   
[phone]                          [facility]  
   
                                                    2024   
06:                              Systolic blood   
pressure                  116 mm[Hg]                Ernestine MUNROE.CNP  
Work Phone:   
[phone]                          [facility]  
   
                                                    2024   
07:                              Body mass index (BMI)   
[Ratio]                   29.21 kg/m2               Monica Bridenthal   
APRN - CNP  
Work Phone:   
[phone]                          [facility] [employer]  
   
                                                    2024   
07:          Body temperature    98.6 [degF]         Monica Bridenthal   
APRN - CNP  
Work Phone:   
[phone]                          [facility] [employer]  
   
                                                    2024   
07:          Body weight         77.2 kg             Monica Bridenthal   
APRN - CNP  
Work Phone:   
[phone]                          [facility] [employer]  
   
                                                    2024   
07:                              Diastolic blood   
pressure                  69 mm[Hg]                 Monica Bridenthal   
APRN - CNP  
Work Phone:   
[phone]                          [facility] [employer]  
   
                                                    2024   
07:          Heart rate          94 /min             Monica Bridenthal   
APRN - CNP  
Work Phone:   
[phone]                          [facility] [employer]  
   
                                                    2024   
07:          Respiratory rate    18 /min             Monica Bridenthal   
APRN - CNP  
Work Phone:   
[phone]                          [employer]  
   
                                                    2024   
07:                              SaO2% (BldA) [Mass   
fraction]                 98 %                      Monica Luis Antonioenthal   
APRN - CNP  
Work Phone:   
[phone]                          [employer]  
   
                                                    2024   
07:                              Systolic blood   
pressure                  112 mm[Hg]                Monica Luis Antonioenthal   
APRN - CNP  
Work Phone:   
[phone]                          [employer]  
   
                                                    2024   
13:                              Body mass index (BMI)   
[Ratio]                   28.15 kg/m2               Monica Bridenthal   
APRN - CNP  
Work Phone:   
[phone]                          [employer]  
   
                                                    2024   
13:          Body temperature    98.1 [degF]         Monica Luis Antonioenthal   
APRN - CNP  
Work Phone:   
[phone]                          [employer]  
   
                                                    2024   
13:          Body weight         74.39 kg            Monica Luis Antonioenthal   
APRN - CNP  
Work Phone:   
[phone]                          [employer]  
   
                                                    2024   
13:                              Diastolic blood   
pressure                  89 mm[Hg]                 Monica Bridenthal   
APRN - CNP  
Work Phone:   
[phone]                          [employer]  
   
                                                    2024   
13:          Heart rate          98 /min             Monica Luis Antonioenthal   
APRN - CNP  
Work Phone:   
[phone]                          [employer]  
   
                                                    2024   
13:          Respiratory rate    18 /min             Monica Bridenthal   
APRN - CNP  
Work Phone:   
[phone]                          [employer]  
   
                                                    2024   
13:                              SaO2% (BldA) [Mass   
fraction]                 97 %                      Monica Bridenthal   
APRN - CNP  
Work Phone:   
[phone]                          [employer]  
   
                                                    2024   
13:                              Systolic blood   
pressure                  133 mm[Hg]                Monica Luis Antonioenthal   
APRN - CNP  
Work Phone:   
[phone]                          [facility] [employer]  
   
                                                    2023   
09:                              Body mass index (BMI)   
[Ratio]                   25.92 kg/m2               Monica Bridenthal   
APRN - CNP  
Work Phone:   
[phone]                          [facility] [employer]  
   
                                                    2023   
09:          Body temperature    97.59 [degF]        Monica Bridenthal   
APRN - CNP  
Work Phone:   
[phone]                          [facility] [employer]  
   
                                                    2023   
09:          Body weight         68.49 kg            Monica Bridenthal   
APRN - CNP  
Work Phone:   
[phone]                          [facility] [employer]  
   
                                                    2023   
09:                              Diastolic blood   
pressure                  77 mm[Hg]                 Monica Bridenthal   
APRN - CNP  
Work Phone:   
[phone]                          [facility] [employer]  
   
                                                    2023   
09:          Heart rate          98 /min             Monica Bridenthal   
APRN - CNP  
Work Phone:   
[phone]                          [facility] [employer]  
   
                                                    2023   
09:          Respiratory rate    18 /min             Monica Bridenthal   
APRN - CNP  
Work Phone:   
[phone]                          [facility] [employer]  
   
                                                    2023   
09:                              SaO2% (BldA) [Mass   
fraction]                 99 %                      Monica Bridenthal   
APRN - CNP  
Work Phone:   
[phone]                          [facility] [employer]  
   
                                                    2023   
09:                              Systolic blood   
pressure                  116 mm[Hg]                Monica Bridenthal   
APRN - CNP  
Work Phone:   
[phone]                          [facility]  
   
                                                    2023   
14:          Body height         162.6 cm            Awilda Leonard MD  
Work Phone:   
[phone]                          [facility]  
   
                                                    2023   
14:          Body weight         67.59 kg            Awilda Leonard MD  
Work Phone:   
[phone]                          [facility]  
   
                                                    2023   
14:                              Diastolic blood   
pressure                  68 mm[Hg]                 Awilda Leonard MD  
Work Phone:   
[phone]                          [facility]  
   
                                                    2023   
14:                              Systolic blood   
pressure                  118 mm[Hg]                Awilda Leonard MD  
Work Phone:   
[phone]                          [facility]  
   
                                                    2022   
07:240400          Body height         163 cm              Lauryn Ryan MD  
Work Phone:   
[phone]                          [facility]  
   
                                                    2022   
07:          Body weight         67.59 kg            Lauryn Ryan MD  
Work Phone:   
[phone]                          [facility]  
   
                                                    2022   
07:                              Diastolic blood   
pressure                  64 mm[Hg]                 Lauryn Ryan MD  
Work Phone:   
[phone]                          [facility]  
   
                                                    2022   
07:                              Systolic blood   
pressure                  104 mm[Hg]                Lauryn Ryan MD  
Work Phone:   
[phone]                          [facility]  
  
  
  
Encounters  
  
  
                          Encounter Date Encounter Type Care Provider Facility  
   
                                                    Start: 2024  
End: 2024                         Office outpatient visit 15   
minutes                                 Monica Bridmorrisal   
APRN - CNP  
Work Phone:   
[phone]                          [facility]  
   
                                        Comment on above:   Anxiety and depressi  
on (Primary Dx);  
Skin sensation disturbance   
   
                                                    Start: 2024  
End: 2024     ambulatory          MONICA BRIDENTHAL  [facility]  
   
                                                    Start: 2024  
End: 2024     ambulatory          MONICA BRIDENTHAL  Facility:[facility]  
   
                                                    Start: 2024  
End: 2024                         Patient encounter   
procedure                               Ernestine MUNROE.CNP  
Work Phone:   
[phone]                          OB/Gynecology  
   
                                        Comment on above:   PCOS (polycystic ova  
cheo syndrome) (Primary Dx);  
Irregular menses;  
Anxiety and depression;  
History of bulimia;  
History of anorexia nervosa;  
Provided repeat prescription for oral contraceptive;  
Overweight with body mass index (BMI) of 29 to 29.9 in adult   
   
                                                    Start: 2024  
End: 2024     ambulatory          MONICA BRIDENTHAL  Facility:[facility]  
   
                                                    Start: 2024  
End: 2024                         Patient encounter   
procedure                               Ernestine MUNROE.CNP  
Work Phone:   
[phone]                          OB/Gynecology  
   
                                        Comment on above:   PCOS (polycystic ova  
cheo syndrome) (Primary Dx);  
Elevated LDL cholesterol level;  
IFG (impaired fasting glucose);  
Irregular menses;  
Anxiety and depression;  
History of bulimia;  
History of anorexia nervosa;  
Overweight with body mass index (BMI) of 29 to 29.9 in adult   
   
                                                    Start: 2024  
End: 2024                         Office outpatient visit 15   
minutes                                 Monica Bridenthal   
APRN - CNP  
Work Phone:   
[phone]                          [facility]   
[facility]  
   
                                        Comment on above:   Anxiety and depressi  
on (Primary Dx);  
PCOS (polycystic ovarian syndrome)   
   
                                                    Start: 2024  
End: 2024                         Office outpatient visit 25   
minutes                                 Monica Bridenthal   
APRN - CNP  
Work Phone:   
[phone]                          [facility]   
[facility]  
   
                                        Comment on above:   Anxiety and depressi  
on (Primary Dx);  
PCOS (polycystic ovarian syndrome)   
   
                                                    Start: 2024  
End: 2024     ambulatory          [facility]  
   
                                                    Start: 2024  
End: 2024     ambulatory          ERNESTINE SHER         Facility:[facility]  
   
                                                    Start: 2024  
End: 2024                         Office outpatient visit 15   
minutes                                 Monica Bridenthal   
APRN - CNP  
Work Phone:   
[phone]                          [facility]  
   
                                        Comment on above:   Anxiety and depressi  
on (Primary Dx);  
Weight gain   
   
                                                    Start: 2024  
End: 2024                         Office outpatient visit 25   
minutes                                 Monica Bridenthal   
APRN - CNP  
Work Phone:   
[phone]                          [facility]   
[facility]  
   
                                        Comment on above:   Anxiety and depressi  
on (Primary Dx);  
Weight gain   
   
                                                    Start: 2024  
End: 2024     ambulatory          [facility]  
   
                                                    Start: 2023  
End: 2023                         Patient encounter   
procedure                               Monica Bridenthal   
APRN - CNP  
Work Phone:   
[phone]                          [facility] [employer]  
Work Phone:   
[phone]  
   
                                                    Start: 2023  
End: 2023                         Periodic preventive med   
est patient 18-39 yrs                   Monica Bridenthal   
APRN - CNP  
Work Phone:   
[phone]                          [facility]  
   
                                        Comment on above:   Annual physical exam  
 (Primary Dx);  
Screening for deficiency anemia;  
Screening for cholesterol level;  
Anxiety and depression   
   
                                                    Start: 2023  
End: 2023     ambulatory          [facility]  
   
                                                    Start: 2023  
End: 2023                         Encounter for general   
adult medical examination   
without abnormal findings [facility]  
   
                                                    Start: 2023  
End: 2023     ambulatory          [facility]  
   
                                                    Start: 2023  
End: 2023           ambulatory                AWILDA LEONARD                                Facility:[facility]  
   
                                                    Start: 2023  
End: 2023                         Patient encounter   
procedure                               Awilda Leonard MD  
Work Phone:   
[phone]                          OB/Gynecology  
   
                                        Comment on above:   Encounter for gyneco  
logical examination (general) (routine)   
without   
abnormal findings (Primary Dx);  
PCOS (polycystic ovarian syndrome)   
   
                                                    Start: 2023  
End: 2023           Patient encounter status  Awilda Leonard MD  
Work Phone:   
[phone]                          [facility]  
   
                                Start: 2023 Refill          Rachel small APRN.CNM  
Work Phone:   
[phone]                          OB/Gynecology  
   
                                        Comment on above:   Refill Request; Refi  
ll Request   
   
                                Start: 2023 ambulatory      Lauryn CHAPA  
Work Phone:   
[phone]                          OB/Gynecology  
   
                                        Comment on above:   Birth Control   
   
                                Start: 2023 Telephone encounter Lauryn grady MD  
Work Phone:   
[phone]                          OB/Gynecology  
   
                                        Comment on above:   Orders   
   
                                Start: 2023 ambulatory      Lauryn CHAPA  
Work Phone:   
[phone]                          OB/Gynecology  
   
                                        Comment on above:   Period Update   
   
                                Start: 2022 ambulatory      Lauryn CHAPA  
Work Phone:   
[phone]                          OB/Gynecology  
   
                                        Comment on above:   Period/Pregnancy Birdie  
t Update   
   
                                Start: 2022 Telephone encounter Lauryn grady MD  
Work Phone:   
[phone]                          [facility]Gy[facility]cology  
   
                                        Comment on above:   Follow Up   
   
                                                    Start: 2022  
End: 2022           ambulatory                Lauryn Ryan MD  
Work Phone:   
[phone]                          [facility]/Gynecology  
   
                                        Comment on above:   PCOS (polycystic ova  
cheo syndrome) (Primary Dx);  
Missed menses   
   
                                                    Start: 2022  
End: 2022                         Telemedicine consultation   
with patient                            Lauryn Ryan MD  
Work Phone:   
[phone]                          [facility]  
   
                                Start: 2022 Telephone encounter Lauryn grady MD  
Work Phone:   
[phone]                          OB/Gynecology  
   
                                        Comment on above:   Patient Question   
   
                                Start: 2022 Telephone encounter Lauryn grady MD  
Work Phone:   
[phone]                          [facility]/Gynecology  
   
                                        Comment on above:   Patient Question   
   
                                Start: 2022 ambulatory      Awilda Leonard MD  
Work Phone:   
[phone]                          [facility]  
   
                                Start: 2022 Manual pelvic examination Deid  
chuyita Leonard MD  
Work Phone:   
[phone]                          OB/Gynecology  
   
                                        Comment on above:   Question regarding P  
[facility]   
   
                                                    Start: 2022  
End: 2022           ambulatory                Awilda Leonard MD  
Work Phone:   
[phone]                          [facility]/Gynecology  
   
                                        Comment on above:   GYN Ultrasound   
   
                                                    Start: 2022  
End: 2022                         Patient encounter   
procedure                               Awilda Leonard MD  
Work Phone:   
[phone]                          [facility]  
   
                                                    Start: 2022  
End: 2022                         Patient encounter   
procedure                               Lauryn Ryan MD  
Work Phone:   
[phone]                          OB/Gynecology  
   
                                        Comment on above:   Encounter for gyneco  
logical examination without abnormal   
finding   
(Primary Dx);  
Encounter for screening for malignant neoplasm of cervix;  
Irregular intermenstrual bleeding   
   
                                                    Start: 2022  
End: 2022           Patient encounter status  Lauryn Ryan MD  
Work Phone:   
[phone]                          OB/Gynecology  
  
  
  
Procedures  
  
  
                          Date         Procedure    Procedure Detail Performing   
Clinician  
   
                                        Start: 2023   Lipid 1996 panel - S  
eun   
or Plasma                                           Monica Bridenthal APRN   
- CNP  
Work Phone:   
[phone]  
   
                                        Start: 2022   Urine pregnancy test  
   
visual color cmprsn meths                           Lauryn Ryan MD  
Work Phone:   
[phone]  
   
                                        Start: 2022   Microscopic observat  
ion   
[Identifier] in Cervix by   
Cyto stain                                          Monica Laura APRN   
- CNP  
Work Phone:   
[phone]  
   
                                        Start: 2017   Adult depression scr  
eening   
assessment                                          Lauryn Ryan MD  
Work Phone:   
[phone]  
  
  
  
Plan of Treatment  
  
  
                          Date         Care Activity Detail       Author  
   
                                        Start: 2058   RSV Immunization age  
d   
60 or older (1 - 1-dose   
60+ series)                             RSV Immunization aged 60   
or older (1 - 1-dose 60+   
series)                                 [facility]  
   
                                        Start: 2048   Zoster Vaccines (1 o  
f   
2)                        Zoster Vaccines (1 of 2)  [facility]  
   
                          Start: 2028 Lipid panel  Lipid Panel  [facility]  
th  
   
                          Start: 2025 PAP TESTING  PAP TESTING  [facility]  
   
                                        Start: 2025   Screening for malign  
ant   
neoplasm of cervix                                  [facility]  
   
                          Start: 2024 COVID-19 Vaccine (#1) COVID-19 Vacci  
ne (#1) [facility]  
   
                                        Comment on above:   Postponed from  (Patient Refused)   
   
                                        Start: 2024   COVID-19 Vaccine ( season)                         COVID-19 Vaccine ( season)                         [facility]  
   
                                        Comment on above:   Postponed from  (Patient Refused)   
   
                                        Start: 2024   DTaP/Tdap/Td Vaccine  
s   
(7 - Td or Tdap)                        DTaP/Tdap/Td Vaccines (7   
- Td or Tdap)                           [facility]  
   
                                        Comment on above:   Postponed from  (Patient Refused)   
   
                          Start: 2024 Hepatitis C screening Hepatitis C Sc  
reening [facility]  
   
                                        Comment on above:   Postponed from  (Patient Refused)   
   
                          Start: 2024 HIV screening HIV Screening [facility]  
   
                                        Comment on above:   Postponed from  (Patient Refused)   
   
                                                    Start: 2024  
End: 2024                         Patient encounter   
procedure                               2024 7:40 AM EST   
Office Visit [facility]   
Family Medicine [address]   
[place] [address] [phone]   
Bridenthal Monica,   
APRN - CNP [address]   
[address] [place]   
[address] [phone]   
(Work) [phone]   
(Fax)                                   [facility]   
Medicine  
   
                                Start: 2024 Influenza vaccination Influenz  
a Vaccine   
(Season Ended)                          [facility]  
   
                          Start: 2024 Influenza vaccination Influenza Vacc  
ine (#1) [facility]  
   
                                        Comment on above:   Postponed from  (Patient Refused)   
   
                          Start: 2024 Depression Monitoring Depression Mon  
itoring [facility]  
   
                          Start: 2024 Depresssion Monitoring Depresssion M  
onitoring [facility]  
   
                                                    Start: 2024  
End: 2024                         Patient encounter   
procedure                               2024 1:00 PM EDT   
Office Visit [facility] [address] [place]   
[place] [address] [phone]   
Bridenthal Monica,   
APRN - CNP [address] [place]   
[address] [phone]   
(Work) [phone]   
(Fax)                                   [facility]   
Medicine  
   
                                                    Start: 2024  
End: 2024                         Patient encounter   
procedure                               2024 2:00 PM EST   
Office Visit [facility] [address] [place] [address] [phone]   
Bridenthal, Monica,   
APRN - CNP [address]   
[address] [place]   
[address] [phone]   
(Work) [phone]   
(Fax)                                   [facility]   
Medicine  
   
                                                    Start: 2024  
End: 2024                         Patient encounter   
procedure                               2024 7:40 AM EST   
Office Visit [facility] [address]   
[place] [address] [place] [address] [phone]   
Bridenthal, Monica,   
APRN - CNP [address]   
[address] [place]   
[address] [phone]   
(Work) [phone]   
(Fax)                                   [facility]   
[facility]  
   
                          Start: 2024 Depression Assessment [facility] Ass  
[facility]  
   
                                                    Start: 2023  
End: 2024                         CBC panel - Blood by   
Automated count                         CBC Lab Routine   
Screening for deficiency   
anemia Annual physical   
exam Expected:   
2023   
(Approximate), Expires:   
2024                              [facility]  
   
                                        Comment on above:   Expected: 2023  
 (Approximate), Expires: 2024   
   
                                                    Start: 2023  
End: 2024                         Comprehensive metabolic   
1998 panel - Serum or   
Plasma                                  Comprehensive metabolic   
panel Lab Routine Annual   
physical exam Expected:   
2023   
(Approximate), Expires:   
2024                              [facility]  
Work Phone:   
[phone]  
   
                                        Comment on above:   Expected: 2023  
 (Approximate), Expires: 2024   
   
                                                    Start: 2023  
End: 2024                         Lipid 1996 panel -   
Serum or Plasma                         Lipid panel Lab Routine   
Screening for   
cholesterol level Annual   
physical exam Expected:   
2023   
(Approximate), Expires:   
2024                              [facility]  
   
                                        Comment on above:   Expected: 2023  
 (Approximate), Expires: 2024   
   
                                        Start: 2023   Covid-19 Vaccine ( season)                         Covid-19 Vaccine ( season)                         [facility]  
   
                          Start: 2023 Influenza vaccination              [facility]  
   
                                                    Start: 2023  
End: 2023                         Progesterone   
[Mass/volume] in Serum   
or Plasma                               PROGESTERONE BLD Lab   
Routine Anovulation   
Expected: 2023,   
Expires: 2023                     [facility]  
Work Phone:   
[phone]  
   
                                        Comment on above:   Expected: 2023  
, Expires: 2023   
   
                          Start: 2023 DEPRESSION ASSESSMENT [facility] ASS  
[facility]  
   
                          Start: 2022 PAP TESTING  PAP TESTING  [facility]  
   
                                                    Start: 2022  
End: 2023                         Choriogonadotropin.beta   
subunit [Units/volume]   
in Serum or Plasma                      HCG QUANTITATIVE Lab   
Routine Missed menses   
Expected: 2022,   
Expires: 2023                     [facility]  
Work Phone:   
[phone]  
   
                                        Comment on above:   Expected: 2022  
, Expires: 2023   
   
                          Start: 2022 Influenza vaccination              [facility]  
   
                                                    Start: 2022  
End: 2022                         Thyrotropin   
[Units/volume] in Serum   
or Plasma                                           [facility]  
Work Phone:   
[phone]  
   
                                        Comment on above:   Expected: 2022  
, Expires: 2022   
   
                          Start: 2022 DEPRESSION ASSESSMENT DEPRESSION ASS  
ESSMENT [facility]  
   
                                        Start: 2021   COVID-19 VACCINE (2   
-   
Booster for Gilbert   
series)                                 COVID-19 VACCINE (2 -   
Booster for Gilbert   
series)                                 [facility]  
   
                                        Start: 2020   Urine microalbumin   
profile                                             [facility]  
   
                                        Start: 2018   Adult depression   
screening assessment      DEPRESSION SCREENING      [facility]  
   
                          Start: 2016 HEPATITIS C SCREENING HEPATITIS C [facility]  
   
                          Start: 2016 Hepatitis C screening Hepatitis C [facility]  
   
                          Start: 2016 HIV SCREENING HIV SCREENING [facility]  
   
                          Start: 2016 HIV screening HIV Screening [facility]  
d [facility]  
   
                                        Start: 2012   PEDS TO ADULT   
TRANSITION ANNUAL   
ASSESSMENT                              PEDS TO ADULT TRANSITION   
ANNUAL ASSESSMENT                       [facility]  
   
                                        Start: 2010   PEDS TO ADULT   
TRANSITION INITIAL   
DISCUSSION                              PEDS TO ADULT TRANSITION   
INITIAL DISCUSSION                      [facility]  
   
                                        Start: 2008   MENINGOCOCCAL B:   
Consider based on risk   
(1 of 2 - Risk Bexsero   
2-dose series)                          MENINGOCOCCAL B:   
Consider based on risk   
(1 of 2 - Risk Bexsero   
2-dose series)                          [facility]  
   
                                        Start: 2004   Pneumococcal Vaccine  
:   
Pediatrics (0 to 5   
Years) and At-Risk   
Patients (6 to 64   
Years) (1 of 2 - PCV)                   Pneumococcal Vaccine:   
Pediatrics (0 to 5   
Years) and At-Risk   
Patients (6 to 64 Years)   
(1 of 2 - PCV)                          [facility]  
   
                          Start: 1998 Lipid panel  Lipid Panel  [facility]  
th  
   
                                                            PAP FLUID CERVICAL   
SCREENING                               PAP FLUID CERVICAL   
SCREENING Lab Routine   
Encounter for screening   
for malignant neoplasm   
of cervix 2022   
8:07 AM EDT                             [facility]  
Work Phone:   
[phone]  
   
                                                PELVIC US WHI   PELVIC US WHI An  
c   
Imaging Routine   
Irregular intermenstrual   
bleeding Ordered:   
2022                              [facility]  
Work Phone:   
[phone]  
   
                                        Comment on above:   Ordered: 2022   
   
                                                                 [facility]  
c  
   
                                                                 [facility]  
c  
   
                                                                 [facility]  
c  
   
                                                                 [facility]  
c  
   
                                                                 [facility]  
c  
   
                                                                 [facility]  
c  
   
                                                                 [facility]  
c  
   
                                                                 [facility]  
c  
   
                                                                 [facility]  
  
  
  
  
Immunizations  
  
  
                      Immunization Date Immunization Notes      Care Provider Fa  
benjamin  
   
                                        2021          Gilbert SARS-CoV-2   
Vaccination                                         Monica Bridalyssa   
APRN - CNP  
Work Phone:   
[phone]                          [facility]  
   
                                        2017          influenza, injectabl  
e,   
quadrivalent, contains   
preservative                                        Lauryn Ryan MD  
Work Phone:   
[phone]                          [facility]  
   
                                        2017          influenza virus vacc  
ine,   
unspecified formulation                             Monica Sanchez   
APRN - CNP  
Work Phone:   
[phone]                          [facility]  
   
                                        2014          meningococcal   
polysaccharide (groups   
A, C, Y and W-135)   
diphtheria toxoid   
conjugate vaccine   
(MCV4P)                                             Lauryn Ryan MD  
Work Phone:   
[phone]                          [facility]  
   
                                        2013          human papilloma viru  
s   
vaccine, quadrivalent                               Lauryn Ryan MD  
Work Phone:   
[phone]                          [facility]  
   
                                        2013          human papilloma viru  
s   
vaccine, quadrivalent                               Lauryn Ryan MD  
Work Phone:   
[phone]                          [facility]  
   
                                        2013          human papilloma viru  
s   
vaccine, quadrivalent                               Lauryn Ryan MD  
Work Phone:   
[phone]                          [facility]  
   
                                        2010          Meningococcal, MCV4,  
   
unspecified conjugate   
formulation(groups A, C,   
Y and W-135)                                        Lauryn Ryan MD  
Work Phone:   
[phone]                          [facility]  
   
                                        2010          tetanus toxoid, redu  
pastor   
diphtheria toxoid, and   
acellular pertussis   
vaccine, adsorbed                                   Lauryn Ryan MD  
Work Phone:   
[phone]                          [facility]  
   
                          2009   varicella virus vaccine              Marianne Ryan MD  
Work Phone:   
[phone]                          [facility]  
   
                                        2005          poliovirus vaccine,   
inactivated                                         Lauryn Ryan MD  
Work Phone:   
[phone]                          [facility]  
Work Phone:   
[phone]  
   
                                        2004          measles, mumps and   
rubella virus vaccine                               Lauryn Ryan MD  
Work Phone:   
[phone]                          [facility]  
Work Phone:   
[phone]  
   
                                        2004          diphtheria, tetanus   
toxoids and acellular   
pertussis vaccine                                   Lauryn Ryan MD  
Work Phone:   
[phone]                          [facility]  
Work Phone:   
[phone]  
   
                                        1999          DTaP-Haemophilus   
influenzae type b   
conjugate vaccine                                   Lauryn Ryan MD  
Work Phone:   
[phone]                          [facility]  
   
                                        1999          poliovirus vaccine,   
inactivated                                         Lauryn Ryan MD  
Work Phone:   
[phone]                          [facility]  
   
                          06-   varicella virus vaccine              Marianne Ryan MD  
Work Phone:   
[phone]                          [facility]  
   
                                        1999          measles, mumps and   
rubella virus vaccine                               Lauryn Ryan MD  
Work Phone:   
[phone]                          [facility]  
   
                                        1998          hepatitis B vaccine,  
   
pediatric or   
pediatric/adolescent   
dosage                                              Lauryn Ryan MD  
Work Phone:   
[phone]                          [facility]  
   
                                        1998          DTaP-Haemophilus   
influenzae type b   
conjugate vaccine                                   Lauryn Ryan MD  
Work Phone:   
[phone]                          [facility]  
   
                                        1998          poliovirus vaccine,   
inactivated                                         Lauryn Ryan MD  
Work Phone:   
[phone]                          [facility]  
   
                                        1998          DTaP-Haemophilus   
influenzae type b   
conjugate vaccine                                   Lauryn Ryan MD  
Work Phone:   
[phone]                          [facility]  
   
                                        1998          poliovirus vaccine,   
inactivated                                         Lauryn Ryan MD  
Work Phone:   
[phone]                          [facility]  
   
                                        1998          DTaP-Haemophilus   
influenzae type b   
conjugate vaccine                                   Lauryn Ryan MD  
Work Phone:   
[phone]                          [facility]  
   
                                        1998          hepatitis B vaccine,  
   
pediatric or   
pediatric/adolescent   
dosage                                              Lauryn Ryan MD  
Work Phone:   
[phone]                          [facility]  
   
                                        1998          poliovirus vaccine,   
inactivated                                         Lauryn Ryan MD  
Work Phone:   
[phone]                          [facility]  
   
                                        1998          hepatitis B vaccine,  
   
pediatric or   
pediatric/adolescent   
dosage                                              Lauryn Ryan MD  
Work Phone:   
[phone]                          [facility]  
  
  
  
Payers  
  
  
                          Date         Payer Category Payer        Policy ID  
   
                          2023   Unknown                   [id number]  
   
                                2022      Unknown         [employer]  
 PLUS [id number]   
3/12/2022-Present [phone]   
[address] [place]   
[address] PPO                          [id number]   
[id number]  
[id number]  
   
                          2022   Unknown                   [id number]  
[id number]  
[id number]  
  
  
  
Social History  
  
  
                          Date         Type         Detail       Facility  
   
                                                    Start:   
08-  
End: 2023                         Tobacco smoking status   
NHIS                      Never smoked tobacco      [facility]  
Work Phone:   
[phone]  
   
                                                    Start:   
2022  
End: 2024           Alcohol intake            Current drinker of   
alcohol (finding)                       [facility]  
   
                                                    Start:   
05-                              History SDOH Alcohol   
Frequency                 3                         [facility]  
   
                                                    Start:   
05-                              History SDOH Alcohol Std   
Drinks                    1                         [facility]  
   
                                                    Start:   
05-          History SDOH Alcohol Binge 2                   [facility]  
rob  
   
                                                    Start:   
05-                              History SDOH Social   
Connections Phone         5                         [facility]  
   
                                                    Start:   
05-                              History SDOH Social   
Connections Living        7                         [facility]  
   
                                                    Start:   
05-                              History SDOH Physical   
Activity DPW              4                         [facility]  
   
                                                    Start:   
05-                              History SDOH Physical   
Activity MPS              6                         [facility]  
   
                                                    Start:   
05-          Education           14                  [facility]  
   
                                                    Start:   
1998          Sex Assigned At Birth Female              [facility]  
   
                                                    Start:   
2022  
End: 2022                         Exposure to SARS-CoV-2   
(event)                   Not sure                  [facility]  
   
                                                    Start:   
08-                Tobacco use and exposure  Smokeless tobacco   
non-user                                [facility]  
   
                                                    Start:   
05-  
End: 2023     History of Social function                     [facility]  
rob  
   
                                                    Start:   
05-  
End: 2023                         Social connection and   
isolation panel                                     [facility]  
   
                                                            Do you belong to any  
 clubs   
or organizations such as   
[employer] groups, unions,   
fraternal or athletic   
groups, or school groups? Yes                       [facility]  
   
                                                            Are you now ,  
   
, ,   
, never    
or living with a partner? Never              [facility]  
   
                                                            How often to you hav  
e a   
drink containing alcohol? 2-4 times a month         [facility]  
   
                                                            How many standard dr  
inks   
containing alcohol do you   
have on a typical day?    1 or 2                    [facility]  
   
                                                            How often do you hav  
e 6 or   
more drinks on 1 occasion? Less than monthly         [facility]  
   
                                                            How hard is it for y  
ou to   
pay for the very basics   
like food, housing,   
medical care, and heating Not very hard             [facility]  
   
                                                            Do you feel stress -  
   
tense, restless, nervous,   
or anxious, or unable to   
sleep at night because   
your mind is troubled all   
the time - these days   
[OSQ]                     Rather much               [facility]  
   
                                                            (I/We) worried angelica  
er   
(my/our) food would run   
out before (I/we) got   
money to buy more.        Never true                [facility]  
   
                                                            In the past 12 month  
s, has   
lack of transportation   
kept you from medical   
appointments or from   
getting medications?      No                        [facility]  
   
                                                            In the past 12 month  
s, was   
there a time when you were   
not able to pay the   
mortgage or rent on time? No                        [facility]  
   
                                                    Start:   
2022                Gender identity           Identifies as female   
gender (finding)                        [facility]  
   
                                                    Start:   
2022          Sexual orientation  Heterosexual (finding) [facility]  
   
                                                    Start:   
2023                Tobacco use and exposure  User of smokeless   
tobacco                                 [facility]  
   
                                       History of tobacco use [facility]  
[facility]  
   
                                                    Start:   
2023          Tobacco Comment     Nicotine pouch,     [facility]  
   
                                                    Start:   
2023          Alcohol Comment     occ.                [facility]  
  
  
  
Clinical Notes 2013 to 2024  
  Assessment & Plan Note - SHANEKA Chacon CNP - 2024 4:40 PM   
EDTAssessment & Plan Note - SHANEKA Chacon CNP - 2024 4:40 PM 
EDTPatient Instructions  
  
                                Note Date & Type Note            Facility  
   
                                                    2024 Evaluation + Plan  
   
note                                    Associated Problem(s): Anxiety and   
depression  
Formatting of this note might be   
different from the original.  
Stable. Continue Wellbutrin 300 mg   
daily  
Electronically signed by SHANEKA Chacon CNP at   
2024 4:40 PM EDT  
                                        [facility]  
   
                                                    2024 Evaluation + Plan  
   
note                                    Associated Problem(s): Skin   
sensation disturbance  
Formatting of this note might be   
different from the original.  
Left foot exam unremarkable and   
currently asymptomatic. Likely   
superficial nerve inflammation.   
Recommend avoiding shoes that are   
tight across the top of the foot,   
ice and elevate 2-3 times daily   
and follow-up if fails to resolve.   
Unknown etiology at this time   
otherwise  
Electronically signed by SHANEKA Chacon CNP at   
2024 4:40 PM EDT  
                                        [facility]  
   
                                                    2024 Miscellaneous   
Notes                                   Associated Problem(s): Anxiety and   
depression  
Formatting of this note might be   
different from the original.  
Stable. Continue Wellbutrin 300 mg   
daily  
Electronically signed by SHANEKA Chacon CNP at   
2024 4:40 PM EDT  
Associated Problem(s): Skin   
sensation disturbance  
Formatting of this note might be   
different from the original.  
Left foot exam unremarkable and   
currently asymptomatic. Likely   
superficial nerve inflammation.   
Recommend avoiding shoes that are   
tight across the top of the foot,   
ice and elevate 2-3 times daily   
and follow-up if fails to resolve.   
Unknown etiology at this time   
otherwise  
Electronically signed by SHANEKA Chacon CNP at   
2024 4:40 PM EDT  
documented in this encounter            [facility]  
   
                                                    2024 History of   
Present illness Narrative               Formatting of this note might be   
different from the original.  
Patient was identified by name and   
Date of Birth.  
  
Electronically signed by Mandi D'Amico at 2024 4:41 PM EDT  
Formatting of this note is   
different from the original.  
Images from the original note were   
not included.  
  
  
2024  
  
Don Mittal (: 2000) is a   
24 y.o. female , Established   
patient, here for evaluation of   
the following chief complaint(s):  
Medication Check and Foot Problem  
  
ASSESSMENT/PLAN:  
  
1. Anxiety and depression  
Assessment & Plan:  
Stable. Continue Wellbutrin 300 mg   
daily  
2. Skin sensation disturbance  
Assessment & Plan:  
Left foot exam unremarkable and   
currently asymptomatic. Likely   
superficial nerve inflammation.   
Recommend avoiding shoes that are   
tight across the top of the foot,   
ice and elevate 2-3 times daily   
and follow-up if fails to resolve.   
Unknown etiology at this time   
otherwise  
  
Follow up in about 6 months   
(around 2024) for Yearly   
Wellness Visit.  
  
SUBJECTIVE/OBJECTIVE:  
  
[facility] -  
Don Mittal (: 2000) is a   
24 y.o. female , Established   
patient, here for the evaluation   
of the following chief   
complaint(s):  
Medication Check and Foot Problem  
  
Anxiety/depression- Doing pretty   
well, wellbutrin  mg daily.   
Denies any SI or HI. Not doing   
counseling. Reports being able to   
handle stress better and not   
feeling as overwhelmed.  
  
Left foot problem:  
Is having a hot sensation/tingling   
to the top of the left foot and   
numbness. No known trigger. No   
known injury. Happening for about   
1.5 weeks, will happen   
intermittently through the day   
(?50 times or so)- lasts only for   
a few seconds or so.  
No swelling or redness noted.  
Currently asymptomatic  
  
Prior to Admission medications  
Medication Sig Start Date End Date   
Taking? Authorizing Provider  
buPROPion XL (Wellbutrin XL) 300   
MG 24 hr tablet take 1 tablet by   
mouth every morning DO NOT CRUSH,   
CHEW, AND/OR DIVIDE 24 Yes   
SHANEKA Phan - CNP  
Juleber 0.15-30 MG-MCG tablet Take   
1 tablet by mouth daily. Yes   
Historical Provider, MD  
metFORMIN (Glucophage) 500 MG   
tablet Take 1 tablet by mouth in   
the morning and 1 tablet in the   
evening. Take with meals. 24 Yes Historical Provider,   
MD  
naltrexone (Depade) 50 MG tablet   
Take 25 mg by mouth in the morning   
and 25 mg in the evening. Yes   
Historical Provider, MD  
Prenatal Multivit-Min-Fe-FA   
(PRENATAL/IRON PO) Take 1 tablet   
by mouth in the morning. Yes   
Historical Provider, MD  
Prenatal MV-Min-Fe Fum-FA-DHA   
(PRENATAL 1 PO) Take by mouth.   
Historical Provider, MD  
  
  
  
Review of Systems  
Constitutional: Negative for   
activity change, chills, fatigue   
and fever.  
Respiratory: Negative.  
Cardiovascular: Negative.  
Gastrointestinal: Negative.  
Genitourinary: Negative for   
difficulty urinating.  
Musculoskeletal:  
Left foot.  
Psychiatric/Behavioral: Positive   
for decreased concentration.   
Negative for agitation, dysphoric   
mood and sleep disturbance. The   
patient is not nervous/anxious.  
  
Vitals:  
24 1307  
BP: 129/85  
Pulse: 102  
Resp: 18  
Temp: 36.9 C (98.4 F)  
TempSrc: Infrared  
SpO2: 97%  
Weight: 161 lb 9.6 oz (73.3 kg)  
  
Physical Exam  
Constitutional:  
General: She is not in acute   
distress.  
Appearance: Normal appearance. She   
is not ill-appearing.  
HENT:  
Head: Normocephalic and   
atraumatic.  
Cardiovascular:  
Rate and Rhythm: Normal rate and   
regular rhythm.  
Pulses: Normal pulses.  
Heart sounds: Normal heart sounds.  
Pulmonary:  
Effort: Pulmonary effort is   
normal.  
Breath sounds: Normal breath   
sounds.  
Musculoskeletal:  
Right foot: Normal.  
Left foot: Normal.  
Comments: Left foot exam normal  
Skin:  
General: Skin is warm and dry.  
Neurological:  
Mental Status: She is alert and   
oriented to person, place, and   
time.  
Psychiatric:  
Mood and Affect: Mood normal.  
Behavior: Behavior normal.  
Thought Content: Thought content   
normal.  
Judgment: Judgment normal.  
  
An electronic signature was used   
to authenticate this note.  
  
SHANEKA Rao CNP  
2024  
4:40 PM  
Electronically signed by SHANEKA Chacon CNP at   
2024 4:41 PM EDT  
documented in this encounter            [facility] [employer]  
   
                                        2024 Instructions   
  
  
SHANEKA Chacon CNP -   
2024 1:00 PM EDTFormatting   
of this note might be different   
from the original.  
Ice and elevate foot couple times   
a day, be careful not to wear   
shoes that pinch/pressure to much   
on top of foot.  
Electronically signed by SHANEKA Chacon CNP at   
2024 1:25 PM EDT  
  
documented in this encounter            [facility] [employer]  
   
                                        2024 Note     HNO ID: [id number]  
Author: ERNESTINE SHER APRN.CNP  
Service: ?  
Author Type: Nurse Practitioner  
Type: Progress Notes  
Filed: 2024 08:47  
Note Text:  
Some documentation from previous   
visit of 2024 was copied and   
pasted,  
documentation has been reviewed   
and edited as necessary for   
today's visit.  
Patient Summary: Don is a 24   
year old female who presents for   
follow-up  
evaluation of her obesity/weight   
management to treat PCOS, IFG,   
elevated LDL,  
anxiety and depression and prevent   
relatedco-morbidities. In our   
previous  
visits we have discussed lifestyle   
intervention including a nutrition  
recommendations and physical   
activity optimization. Her last   
office visit was 1  
month ago.  
Irregular menses - Menses   
2023 LMP 1/2-3 spotting  
No contraception.  
20 lb weight gain with Lexapro for   
anxiety. Changed to bupropion by   
PCP plans  
to discuss increasing dose with   
PCP  
Assessment/plan from last visit:  
Metformin 1 gm twice a day with   
meals - Adjusted to 500 mg at   
breakfast and 1 gm  
at dinner but has had frequent   
diarrhea since increasing to 1 gm   
twice a day 3  
weeks ago.  
History of anorexia and bulimia in   
[place] - no treatment, In remission   
since age 15.  
Dad helped her and she started   
adding protein shakes.  
Interval History -  
Awake - 0700  
B - 0745 Premier Protein shake  
S - none  
L - 1/2 cup cottage cheese with   
fruit  
S - none  
D -  protein,   
pasta/potato/rice/sweet potato and   
veg - asparagus, cucumber  
salad, winter and summer   
squash/salad  
S - none  
Fluids - water, unsweetened tea  
Bedtime -   
She feels the medication is   
helping to lessen hunger and   
craving to candy  
controlled  
Exercise: stable sedentary job at   
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  
Stress: increased -    
wearing heart monitor  
Sleep: 7 hours, up to go to   
bathroom a few times LASHAWN -no  
Weight gain since last vist: 2 lbs   
since last visit  
3/21/2024 167 lb BMI: 28.67  
2024 169 lb  
2024 166 lb metformin  
CrCl cannot be calculated   
(Patient's most recent lab result   
is older than the  
maximum 180 days allowed.).  
PAST MEDICAL HISTORY  
Diagnosis Date  
Exertional asthma 2012  
Generalized anxiety disorder  
IFG (impaired fasting glucose)   
2024  
Medial meniscus tear 2012  
PCOS (polycystic ovarian syndrome)   
  
Unspecified otitis media  
Recurrent otitis  
Vocal cord dysfunction 05/15/2012  
Current Outpatient Medications  
Medication Sig Dispense Refill  
metFORMIN (GLUCOPHAGE) 500 mg   
tablet Take 2 tablets by mouth two   
times a day  
with meals. 360 tablet 0  
buPROPion XL (WELLBUTRIN XL) 150   
mg 24 hr tablet Take 150 mg by   
mouth every  
morning.  
PNV/iron/folic acid (PRENATAL   
VITAMIN-IRON-FA ORAL) Take 1   
tablet by mouth once  
daily.  
albuterol HFA (PROVENTIL HFA,   
VENTOLIN HFA) 90 mcg/actuation   
inhaler Inhale 2  
Puffs as instructed every 4 hours   
as needed. 1 Inhaler 0  
No current facility-administered   
medications for this visit.  
Occupation: bank  
Contraception: none  
/82   Pulse 80   Wt 167 lb   
(75.8 kg)   LMP 2024   SpO2   
98%    
BMI 28.67 kg/m?  
Assessment/Plan:  
Don Mittal is a 24 year old yo   
female with overweight   
(pre-obesity) who  
presented today for follow up for   
supervised weight loss to treat   
PCOS, IFG,  
elevated LDL, anxiety and   
depression and prevent related   
co-morbidities.  
ASSESSMENT/PLAN:  
1. PCOS (polycystic ovarian   
syndrome) - ICD9: 256.4, ICD10:   
E28.2 (primary  
diagnosis)  
- Whole food balanced protein   
low-carb nutrition  
- METFORMIN 500 MG TABLET  
- DESOGESTREL 0.15 MG-ETHINYL   
ESTRADIOL 0.03 MG TABLET  
- NALTREXONE 50 MG TABLET  
2. Irregular menses - ICD9: 626.4,   
ICD10: N92.6  
- DESOGESTREL 0.15 MG-ETHINYL   
ESTRADIOL 0.03 MG TABLET  
3. Anxiety and depression - ICD9:   
300.00, 311, ICD10: F41.9, F32.A  
- Continue bupropion prescribed by   
PCP- she plans to discuss   
increasing dose  
with PCP  
4. History of bulimia - ICD9:   
V11.8, ICD10: Z86.59  
- in remission since age 15  
5. History of anorexia nervosa -   
ICD9: V11.8, ICD10: Z86.59  
- in remission since age 15  
6. Provided repeat prescription   
for oral contraceptive - ICD9:   
V25.41, ICD10:  
Z30.41  
- RX for Apri given today.  
- discussed with patient on how to   
take OCP's.Given written   
information  
- counseled on benefits, risks and   
possible severe side effects of   
OCP's.  
- discussed need to use Condoms to   
help to prevent STD's including   
HIV etc.  
- DESOGESTREL 0.15 MG-ETHINYL   
ESTRADIOL 0.03 MG TABLET  
7. Overweight with body mass index   
(BMI) of 29 to 29.9 in adult -   
ICD9: 278.02,  
V85.25, ICD10: E66.3, Z68.29  
- METFORMIN 500 MG TABLET -   
decrease to 500 mg with breakfast   
and 1 gm with  
dinner due to diarrhea with 1 gm   
twice daily  
- DESOGESTREL 0.15 MG-ETHINYL   
ESTRADIOL 0.03 MG TABLET  
- NALTREXONE 50 MG TABLET  
Agreeable to adding naltrexone to   
Wellbutrin to mimic the effect of   
Contrave.  
Confirmed no   
allergies/contraindications to   
naltrexone. Reviewed potential   
side  
effects. Patient will notify me if   
there are any adverse effec (more   
content not included)...                [facility]  
   
                                        2024 Instructions   
  
  
Ernestine Sher APRN.CNP -   
2024 8:28 AM EDTFormatting   
of this note is different from the   
original.  
  
The addition of naltrexone to your   
current buprioprion prescription   
will mimic the brand name weight   
loss drug called Contrave.  
  
Here is a link to the brand name   
medication:  
https://contrave.com/about/  
  
AM PM (early afternoon)  
Week 1 Naltrexone 12.5 mg (1/4   
tablet) None  
Week 2 Naltrexone 12.5 mg (1/4   
tablet) Naltrexone 12.5 mg (1/4   
tablet)  
Week 3 Naltrexone 25 mg (1/2   
tablet) Naltrexone 25 mg (1/2   
tablet)  
Week 4 Naltrexone 25 mg (1/2   
tablet) Naltrexone 25 mg (1/2   
tablet)  
  
- Please take your bupropion as   
usual, these medications need to   
work together to formulate the   
desired effect.  
- If at anytime you feel a   
positive effect, you can stay at   
that dose and do not need to   
increase.  
- Please stop the medication if   
you develop symptoms that are   
concerning.  
  
Take low dose naltrexone to:  
  
Regulate appetite: Naltrexone   
helps normalize your metabolism,   
matching your appetite to resting   
energy expenditures.  
  
Reduce insulin resistance:   
Naltrexone modulates cellular   
resistance to insulin, which may   
lead to weight loss.  
  
Improve sleep: Lack of sleep has   
negative effects on your body s   
hormonal system, which can lead to   
weight gain. Naltrexone combats   
this unhealthy cycle.  
  
Improve mood: Combination LDN   
weight loss medications trigger an   
increase in serotonin and dopamine   
production, which decreases   
anxiety and stress and reduces   
emotional eating.  
  
https://www.[employer].Synchronicity.co/na  
ltrexone#:~:text=Regulate%20appeti  
te%3A%20Naltrexone%20helps%20norma  
lize,may%20lead%20to%20weight%20lo  
ss.  
  
Does Wellbutrin cause weight loss?  
It can. Bupropion (the generic   
form of Wellbutrin) was initially   
prescribed as an antidepressant.   
It is the only antidepressant   
associated with weight loss   
(Carolyne, 2019). Healthcare   
providers noticed that mostly   
pleasant side effect, and today   
bupropion is sometimes prescribed   
as part of a medication for weight   
loss (naltrexone/bupropion, brand   
name Contrave), as well as a   
stop-smoking aid (brand name   
Zyban).  
  
As far as the evidence that   
bupropion by itself causes weight   
loss:  
  
A 2016 study that analyzed the   
long-term weight loss effect of   
various antidepressant medications   
found that non-smokers who took   
bupropion lost 7.1 pounds over two   
years. (This effect was not seen   
in smokers). Users of the other   
antidepressants in the study   
gained weight (Karine, 2016).  
Bupropion seems to be effective   
for weight-loss maintenance as   
well. A 2012 study found that   
obese adults who took bupropion SR   
(standard release) in 300mg or   
400mg doses lost 7.2% and 10% of   
their body weight, respectively,   
over 24 weeks and maintained that   
weight loss at 48 weeks (Zurdo,   
2012).  
And a 2019 review of 27 studies on   
antidepressants and weight gain   
found that antidepressant use   
increases body weight by an   
average of 5%--except bupropion,   
which is associated with weight   
loss (Raudel-Pedremanuela, 2019).  
  
https://ro.co/health-guide/wellbut  
rin-for-weight-loss/  
  
Bupropion and naltrexone: Patient   
drug information  
Access [employer] for   
additional drug information,   
tools, and databases.  
Copyright 9622-7742 [employer]   
All rights reserved.  
(For additional information see   
 Bupropion and naltrexone: Drug   
information )  
  
Contrave (naltrexone/bupropion)   
Patient Information  
  
Who Is Contrave For?  
Contrave is a medication for   
chronic weight management. It is   
for people with overweight and   
weight-related complications or   
obesity. It is meant to be used   
together with a lifestyle therapy   
regimen involving a reduced   
calorie diet and increased   
physical activity.  
  
How Does Contrave Work?  
Contrave works in the brain as an   
appetite suppressant.  
  
Who Should Not Take Contrave?   
Women who are pregnant, nursing,   
or planning to become pregnant  
People who have uncontrolled high   
blood pressure  
People who have or have had   
seizures  
People with a history of eating   
disorders such as anorexia nervosa   
or bulimia  
People who have glaucoma or are at   
risk for glaucoma  
People who used to drink a lot of   
alcohol and abruptly stop drinking  
People who use other medications   
containing bupropion, such as   
Wellbutrin or  
Aplenzin  
People who are taking opioid pain   
relievers or are in opioid   
withdrawal, or use  
medicines to help stop taking   
opioids such as methadone or   
buprenorphine  
People who are taking a monoamine   
oxidase inhibitor (MAOI) now or   
have taken one  
within the past 14 days  
  
Do not take Contrave with a high   
fat meal.  
Swallow Contrave tablets whole. Do   
not cut, chew, or crush Contrave   
tablets.  
If you miss a dose of Contrave,   
wait until your next regular time   
to take it. Do not take more than   
1 dose of Contrave at a time.  
  
Is Contrave a Controlled   
Substance?  
No, Contrave is not a controlled   
substance.  
Which Medications Might Not Be   
Safe to Use with Contrave?  
Contrave can affect how other   
medicines work in your body, and   
other medicines can affect how   
Contrave works. Tell your doctor   
about all the medicines and   
supplements you take, especially:  
Carbamazepine, phenobarbital, or   
phenytoin--usually given to treat   
seizures  
Efavirenz, lopinavir, or   
ritonavir--used to treat HIV   
infection Antidepressants  
Pain medications  
What Are the Most Common Side   
Effects of Contrave?  
Nausea or vomiting  
Dizziness  
Changes in the way foods taste or   
loss of taste  
Trouble sleeping  
Constipation or diarrhea  
Headache  
Dry mouth  
  
What Are the Possible Serious Side   
Effects of Contrave?  
Suicidal Thoughts or Actions  
One of the ingredients in Contrave   
is bupropion, which has caused   
some people to have suicidal   
thoughts or actions or unusual   
changes in behavior, whether or   
not they are taking medicines used   
to treat depression.  
If you already have depression or   
another mental illness, taking   
bupropion may cause your condition   
to get worse, especially within   
the first few months of treatment.   
Let your doctor know if you   
experience an increase in symptoms   
of depression, anxiety,   
irritability, suicidal thoughts,   
agitation, anger, or other unusual   
changes in behavior or mood.  
Seizures  
There is a risk of having a   
seizure when you take Contrave.   
The risk of seizure is higher in   
people who take higher doses of   
Contrave, have certain medical   
conditions, or take Contrave with   
certain other medicines. If you   
have a seizure while taking   
Contrave, stop taking Contrave and   
call your doctor right away. Do   
not take Contrave again if you   
have a seizure.  
2  
  
Contrave (naltrexone/bupropion)   
Patient Information  
  
Risk of Opioid Overdose  
One of the ingredients in Contrave   
(naltrexone) can increase your   
chance of having an opioid   
overdose if you take opioid   
medicines while taking Contrave.   
Opioids are common pain   
medications. Do not use any opioid   
medications while taking Contrave.   
Using opioids in the 7 to 10 days   
before you start taking Contrave   
may cause you to suddenly have   
symptoms of opioid withdrawal when   
you take it. Tell your doctor if   
you have used these medications in   
the past 10 days. Inform your   
doctor that you are taking   
Contrave before any medical   
procedure or surgery.  
  
Severe Allergic Reactions  
Some people have had a severe   
allergic reaction to bupropion,   
one of the ingredients in   
Contrave. Stop taking Contrave and   
call your doctor or go to the   
nearest hospital emergency room   
right away if you have signs and   
symptoms of an allergic reaction   
such as rash, itching, swelling of   
the lips or tongue, fever, chest   
pain, or trouble breathing.  
Increases in Blood Pressure or   
Heart Rate  
Some people may get high blood   
pressure or have a higher heart   
rate when taking Contrave. Your   
doctor should check your blood   
pressure and heart rate before you   
start taking Contrave and while   
you take it.  
  
Liver Damage or Hepatitis  
One of the ingredients in   
Contrave--naltrexone--can cause   
liver damage or hepatitis. Stop   
taking Contrave and tell your   
doctor if you have any signs or   
symptoms of liver problems such as   
stomach pain, dark urine, yellow   
eyes (jaundice), or extreme   
fatigue.  
  
Manic Episodes  
One of the ingredients in   
Contrave--bupropion--can cause   
some people who were manic or   
depressed in the past, or who have   
bipolar disease, to become manic   
or depressed again.  
Vision Problems (Angle-Closure   
Glaucoma)  
One of the ingredients in   
Contrave--bupropion--can cause   
some people to have problems with   
their vision (angle-closure   
glaucoma). Signs and symptoms of   
angle-closure glaucoma may include   
eye pain, vision changes, or   
swelling or redness in or around   
the eye. Ask your eye doctor if   
you are at risk for angle-closure   
glaucoma and do not use Contrave   
if you are at risk.  
  
Low Blood Sugar (Hypoglycemia)  
Weight loss can cause low blood   
sugar in people with type 2   
diabetes who also take medicines   
used to treat type 2 diabetes   
(such as insulin or   
sulfonylureas). You should check   
your blood sugar before you start   
taking Contrave and while you take   
Contrave.  
  
This is not intended to be a   
complete list. For additional   
information please see the   
 s website:   
https://www.[employer].Synchronicity.co.  
  
Oral Contraceptives: The Pill  
  
Beginning the Pill  
  
Pills come in either a 21 day pack   
or a 28 day pack. With the 21 day   
pack you will take one pill for 21   
days then no pill for 7 days,   
during which time you will have   
what is known as withdrawal   
bleeding. The 28 day pack allows   
you to take a pill every day of   
the cycle with no interruptions.   
The first 21 pills are the pills   
with the active ingredients and   
the last 7 are the nonmedical   
pills (placebo) or they may   
contain iron. There will be   
bleeding during the week you are   
taking the nonmedical pills. The   
advantage to the 28 day pack is   
that you don t have to keep track   
of when you stopped the pill.   
There are a group of 28 day pills   
that contain 24 active pills and   
only 4 placebo pills. These are   
formulated to give you a lighter   
period.  
  
Unless otherwise instructed, you   
should start your pills the    
following your first day of   
bleeding with your next period (if   
your period starts on a ,   
you should start pills the same   
day)  
Read your information packet that   
comes with the pills.  
  
Pill Benefits  
  
The pill is the most popular   
method of reversible birth control   
being used today. Millions of   
women rely on oral contraceptives   
as their birth control method. It   
is important to have an   
examination by your physician to   
determine if the pill is safe for   
you. There are several advantages   
associated with the pill: it is   
97-98% effective when used   
correctly; may improve acne;   
periods are more regular and less   
painful; there is less iron   
deficiency anemia in pill users.   
Long term use is associated with a   
decreased incidence of ovarian and   
uterine cancer. There is also no   
evidence that the pill increases   
the incidence of any cancer.  
  
How Oral Contraceptives Work  
  
Oral contraceptives come in two   
varieties. One is the combination   
pill which contains both estrogen   
and progesterone. Combination   
pills are considered 98-99%   
effective in preventing pregnancy.   
This pill comes in either   
monophasic, which delivers the   
same amount of estrogen and   
progesterone throughout the cycle;   
and triphasic, which try tries to   
mimic the normal hormone cycle by   
changing the levels of the   
hormones in the pills during the   
month. There is no real advantage   
to taking the one over the other.   
The other type of pill only   
contains progesterone. It is best   
used for women who can t take   
estrogen. This type of pill is   
slightly less effective than the   
combination pill in preventing   
pregnancy. It is VERY important to   
take the progesterone only pill at   
the same time every day. Oral   
contraceptives prevent ovulation   
(release of an egg from the ovary)   
by suppressing the pituitary gland   
s action. The pill does NOT   
prevent sexually transmitted   
disease.  
  
Obtaining a Prescription  
It is important to see your doctor   
before starting oral   
contraceptives so that you can   
have a full medical history taken   
and a physical examination given.   
Certain medical conditions may   
make the pill inappropriate for   
you, therefore it is very   
important to be honest and as   
complete as possible with the   
information you share with your   
doctor.  
  
The types of predisposing factors   
which would make the pill a poor   
choice of birth control would   
include:  
History of blood clots  
Stroke  
Serious liver disease or impaired   
liver function  
Unexplained vaginal bleeding or   
pregnancy  
Cancer of the reproductive system  
Active gall bladder disease  
Hypertension  
  
Possible Side Effects  
  
It can take up to three months for   
your body to become adjusted to   
the pill. The more common side   
effects experienced at this time   
are: breakthrough spotting or   
bleeding, which is bleeding at any   
other time other than when you   
should be having a period; nausea   
or vomiting; breast tenderness;   
and mild fluid retention. There is   
no long term weight gain with the   
use of the pill. Breakthrough   
bleeding is the most common   
complaint of new pill users. There   
is no way to predict who will have   
it and there is no way of   
preventing it. Breakthrough   
bleeding usually subsides on its   
own with no further treatment   
after the first three months of   
taking the pill. If these symptoms   
continue to occur after the first   
three months you should check with   
your physician to see if there is   
any physical cause and possibly   
change to another birth control   
pill.  
  
Problems:  
  
Missed 1 pill: Take 2 pills the   
next day.  
Missed 2 pills: Take 2 pills the   
next day and 2 pills the following   
day. Also use another form of   
birth control (condoms) along with   
the pill for the rest of the   
month.  
Missed 3 or more pills: You have   
two choices. You can take two   
pills each day until you are on   
schedule, plus use an additional   
form of birth control along with   
the pill for the rest of the   
month. Or you can stop the pill   
and start a completely new pack of   
pills the next . You must   
use another form of birth control   
with the pill for at least the   
first two weeks of the new pack.  
You re ill and you have been   
vomiting or have diarrhea: You   
must use another form of birth   
control with the pill since the   
pill may not be fully absorbed   
during your illness. Continue to   
use the added birth control until   
the end of the cycle.  
Desire to become pregnant: Stop   
using the pill for one month   
before trying to become pregnant.  
Taking other medications: The   
birth control pill is less   
effective when you take the   
antibiotic Rifampin, epilepsy   
(seizure) drugs such as phenytoin,   
carbamazepine, phenobarbital,   
topiramate and some medications   
for HIV. Let your doctor know if   
you start taking any of these   
medications while on the pill.  
  
Symptoms to Notify Your Doctor   
with Immediately:  
  
Pain in your chest or legs  
Continuous blurred vision  
Severe headaches  
Slurred speech  
Tingling or weakness on one side   
of your body  
Shortness of breath  
Swelling of one leg  
  
Refills of Birth Control Pills  
  
You need to see a doctor every   
year for a refill of your   
prescription. This is necessary in   
order that your health can be   
monitored closely while you are   
taking birth control pills. If   
your prescription should    
before your next scheduled   
appointment you can usually get a   
one month extension from your   
doctors office if you call during   
regular business hours about one   
week before you need to start the   
new package of pills. This allows   
the physician to refer to your   
chart for necessary health   
information.  
  
Bupropion: Patient drug   
information  
  
Access [employer] for   
additional drug information,   
tools, and databases.  
Copyright 8271-7102 [employer]   
All rights reserved.  
Contributor Disclosures  
(For additional information see   
 Bupropion: Drug information  and   
see  Bupropion: Pediatric drug   
information )  
  
You must carefully read the   
 Consumer Information Use and   
Disclaimer  below in order to   
understand and correctly use this   
information.  
  
Brand Names: [place]  
Aplenzin;  
Forfivo XL;  
Wellbutrin SR;  
Wellbutrin XL;  
Zyban [DSC]  
  
Brand Names: Yenny  
MYLAN-BuPROPion XL;  
ODAN Bupropion SR;  
PMS-BuPROPion SR;  
RATIO-BuPROPion SR [DSC];  
TARO-Bupropion XL;  
TEVA-Bupropion XL;  
Wellbutrin SR;  
Wellbutrin XL;  
Zyban  
  
Warning  
Drugs like this one have raised   
the chance of suicidal thoughts or   
actions in children and young   
adults. The risk may be greater in   
people who have had these thoughts   
or actions in the past. All people   
who take this drug need to be   
watched closely. Call the doctor   
right away if signs like low mood   
(depression), nervousness,   
restlessness, grouchiness, panic   
attacks, or changes in mood or   
actions are new or worse. Call the   
doctor right away if any thoughts   
or actions of suicide occur.  
  
What is this drug used for?  
It is used to treat low mood   
(depression).  
It is used to prevent seasonal   
affective disorder (SAD).  
It is used to help you stop   
smoking.  
It may be given to you for other   
reasons. Talk with the doctor.  
  
What do I need to tell my doctor   
BEFORE I take this drug?  
If you are allergic to this drug;   
any part of this drug; or any   
other drugs, foods, or substances.   
Tell your doctor about the allergy   
and what signs you had.  
If you have ever had seizures.  
If you drink a lot of alcohol and   
you stop drinking all of a sudden.  
If you use certain other drugs   
like drugs for seizures or anxiety   
and you stop using them all of a   
sudden.  
If you have ever had an eating   
problem like anorexia or bulimia.  
If you have any of these health   
problems: Kidney disease or liver   
disease.  
If you have taken certain drugs   
for depression or Parkinson's   
disease in the last 14 days. This   
includes isocarboxazid,   
phenelzine, tranylcypromine,   
selegiline, or rasagiline. Very   
high blood pressure may happen.  
If you are taking any of these   
drugs: Linezolid or methylene   
blue.  
If you are taking another drug   
that has the same drug in it.  
This is not a list of all drugs or   
health problems that interact with   
this drug.  
Tell your doctor and pharmacist   
about all of your drugs   
(prescription or OTC, natural   
products, vitamins) and health   
problems. You must check to make   
sure that it is safe for you to   
take this drug with all of your   
drugs and health problems. Do not   
start, stop, or change the dose of   
any drug without checking with   
your doctor.  
  
What are some things I need to   
know or do while I take this drug?  
For all patients taking this drug:  
Tell all of your health care   
providers that you take this drug.   
This includes your doctors,   
nurses, pharmacists, and dentists.  
Avoid driving and doing other   
tasks or actions that call for you   
to be alert or have clear eyesight   
until you see how this drug   
affects you.  
This drug may affect certain lab   
tests. Tell all of your health   
care providers and lab workers   
that you take this drug.  
Do not stop taking this drug all   
of a sudden without calling your   
doctor. You may have a greater   
risk of side effects. If you need   
to stop this drug, you will want   
to slowly stop it as ordered by   
your doctor.  
High blood pressure has happened   
with this drug. Have your blood   
pressure checked as you have been   
told by your doctor.  
This drug may raise the chance of   
seizures. The risk may be higher   
in people who take higher doses,   
have certain health problems, or   
take certain other drugs. People   
who suddenly stop drinking a lot   
of alcohol or suddenly stop taking   
certain drugs (like drugs used for   
anxiety, sleep, or seizures) may   
also have a higher risk. Talk to   
your doctor to see if you have a   
greater chance of seizures.  
Avoid drinking alcohol while   
taking this drug.  
Talk with your doctor before you   
use marijuana, other forms of   
cannabis, or prescription or OTC   
drugs that may slow your actions.  
It may take several weeks to see   
the full effects.  
This drug is not approved for use   
in children. Talk with the doctor.  
If you are 65 or older, use this   
drug with care. You could have   
more side effects.  
Tell your doctor if you are   
pregnant, plan on getting   
pregnant, or are breast-feeding.   
You will need to talk about the   
benefits and risks to you and the   
baby.  
If you smoke:  
Not all products are approved for   
use to help stop smoking. Talk   
with the doctor to make sure that   
you have the right product.  
New or worse mental, mood, or   
behavior problems have happened   
when bupropion has been used to   
stop smoking. These problems   
include thoughts of suicide or   
murder, depression, forceful   
actions, fury, anxiety, and anger.   
These problems have happened in   
people with and without a history   
of mental or mood problems. Talk   
with the doctor.  
  
What are some side effects that I   
need to call my doctor about right   
away?  
WARNING/CAUTION: Even though it   
may be rare, some people may have   
very bad and sometimes deadly side   
effects when taking a drug. Tell   
your doctor or get medical help   
right away if you have any of the   
following signs or symptoms that   
may be related to a very bad side   
effect:  
Signs of an allergic reaction,   
like rash; hives; itching; red,   
swollen, blistered, or peeling   
skin with or without fever;   
wheezing; tightness in the chest   
or throat; trouble breathing,   
swallowing, or talking; unusual   
hoarseness; or swelling of the   
mouth, face, lips, tongue, or   
throat.  
Signs of high blood pressure like   
very bad headache or dizziness,   
passing out, or change in   
eyesight.  
Feeling confused, not able to   
focus, or change in behavior.  
Hallucinations (seeing or hearing   
things that are not there).  
If seizures are new or worse after   
starting this drug.  
Chest pain or pressure, a fast   
heartbeat, or an abnormal   
heartbeat.  
Swelling.  
Shortness of breath.  
Change in hearing.  
Ringing in ears.  
Passing urine more often.  
Swollen gland.  
Trouble moving around.  
Some people may have a higher   
chance of eye problems with this   
drug. Your doctor may want you to   
have an eye exam to see if you   
have a higher chance of these eye   
problems. Call your doctor right   
away if you have eye pain, change   
in eyesight, or swelling or   
redness in or around the eye.  
A severe skin reaction   
(Amador-Jl syndrome/toxic   
epidermal necrolysis) may happen.   
It can cause severe health   
problems that may not go away, and   
sometimes death. Get medical help   
right away if you have signs like   
red, swollen, blistered, or   
peeling skin (with or without   
fever); red or irritated eyes; or   
sores in your mouth, throat, nose,   
or eyes.  
  
What are some other side effects   
of this drug?  
All drugs may cause side effects.   
However, many people have no side   
effects or only have minor side   
effects. Call your doctor or get   
medical help if any of these side   
effects or any other side effects   
bother you or do not go away:  
All products:  
Dizziness or headache.  
Constipation, diarrhea, stomach   
pain, upset stomach, throwing up,   
or feeling less hungry.  
Shakiness.  
Feeling nervous and excitable.  
Strange or odd dreams.  
Gas.  
Dry mouth.  
Trouble sleeping.  
Muscle or joint pain.  
Nose or throat irritation.  
Sweating a lot.  
A change in weight without trying.  
Extended-release tablets:  
For some brands, you may see the   
tablet shell in your stool. For   
these brands, this is normal and   
not a cause for concern. If you   
have questions, talk with your   
doctor.  
These are not all of the side   
effects that may occur. If you   
have questions about side effects,   
call your doctor. Call your doctor   
for medical advice about side   
effects.  
You may report side effects to   
your national health agency.  
  
How is this drug best taken?  
Use this drug as ordered by your   
doctor. Read all information given   
to you. Follow all instructions   
closely.  
For all uses of this drug:  
Do not take this drug more often   
than you are told. This may raise   
the risk of seizures. Be sure you   
know how far apart to take your   
doses.  
Take in the morning if taking once   
a day.  
Take with or without food.  
If you are not able to sleep, do   
not take this drug too close to   
bedtime. Talk with your doctor.  
Swallow whole. Do not chew, break,   
or crush.  
Keep taking this drug as you have   
been told by your doctor or other   
health care provider, even if you   
feel well.  
If you have trouble swallowing,   
talk with your doctor.  
For stopping smoking:  
You may take this drug for 1 week   
before you stop smoking.  
Nicotine products and counseling   
may be used at the same time for   
best results.  
If you have not been able to quit   
smoking after taking this drug for   
12 weeks, talk with your doctor.  
You may have signs of nicotine   
withdrawal when you try to quit   
smoking even when using drugs like   
this one to help you quit smoking.   
There are many signs of nicotine   
withdrawal. Rarely depression and   
suicidal thoughts have happened in   
people trying to quit smoking.   
Talk with your doctor.  
  
What do I do if I miss a dose?  
Skip the missed dose and go back   
to your normal time.  
Do not take 2 doses at the same   
time or extra doses.  
  
How do I store and/or throw out   
this drug?  
Store at room temperature   
protected from light. Store in a   
dry place. Do not store in a   
bathroom.  
Keep all drugs in a safe place.   
Keep all drugs out of the reach of   
children and pets.  
Throw away unused or    
drugs. Do not flush down a toilet   
or pour down a drain unless you   
are told to do so. Check with your   
pharmacist if you have questions   
about the best way to throw out   
drugs. There may be drug take-back   
programs in your area.  
  
General drug facts  
If your symptoms or health   
problems do not get better or if   
they become worse, call your   
doctor.  
Do not share your drugs with   
others and do not take anyone   
else's drugs.  
Some drugs may have another   
patient information leaflet. If   
you have any questions about this   
drug, please talk with your   
doctor, nurse, pharmacist, or   
other health care provider.  
If you think there has been an   
overdose, call your poison control   
center or get medical care right   
away. Be ready to tell or show   
what was taken, how much, and when   
it happened.  
  
Last Reviewed [id number]  
Consumer Information Use and   
Disclaimer  
This generalized information is a   
limited summary of diagnosis,   
treatment, and/or medication   
information. It is not meant to be   
comprehensive and should be used   
as a tool to help the user   
understand and/or assess potential   
diagnostic and treatment options.   
It does NOT include all   
information about conditions,   
treatments, medications, side   
effects, or risks that may apply   
to a specific patient. It is not   
intended to be medical advice or a   
substitute for the medical advice,   
diagnosis, or treatment of a   
health care provider based on the   
health care provider's examination   
and assessment of a patient's   
specific and unique circumstances.   
Patients must speak with a health   
care provider for complete   
information about their health,   
medical questions, and treatment   
options, including any risks or   
benefits regarding use of   
medications. This information does   
not endorse any treatments or   
medications as safe, effective, or   
approved for treating a specific   
patient. [employer] and its   
affiliates disclaim any warranty   
or liability relating to this   
information or the use thereof.   
The use of this information is   
governed by the Terms of Use,   
available at   
https://www.[employer].co/en/k  
now/clinical-effectiveness-terms.  
  
 [employer] and its   
affiliates and/or licensors. All   
rights reserved.  
  
  
Electronically signed by Ernestine Sher APRN.CNP at 2024 8:29   
AM EDT  
  
documented in this encounter            [facility]  
   
                                                    2024 History of   
Present illness Narrative               Formatting of this note is   
different from the original.  
Some documentation from previous   
visit of 2024 was copied and   
pasted, documentation has been   
reviewed and edited as necessary   
for today's visit.  
  
Patient Summary: Don is a 24   
year old female who presents for   
follow-up evaluation of her   
obesity/weight management to treat   
PCOS, IFG, elevated LDL, anxiety   
and depression and prevent   
relatedco-morbidities. In our   
previous visits we have discussed   
lifestyle intervention including a   
nutrition recommendations and   
physical activity optimization.   
Her last office visit was 1 month   
ago.  
  
Irregular menses - Menses   
2023 LMP 1/2-3 spotting  
No contraception.  
20 lb weight gain with Lexapro for   
anxiety. Changed to bupropion by   
PCP plans to discuss increasing   
dose with PCP  
  
Assessment/plan from last visit:  
Metformin 1 gm twice a day with   
meals - Adjusted to 500 mg at   
breakfast and 1 gm at dinner but   
has had frequent diarrhea since   
increasing to 1 gm twice a day 3   
weeks ago.  
History of anorexia and bulimia in   
[place] - no treatment, In remission   
since age 15. Dad helped her and   
she started adding protein shakes.  
  
Interval History -  
Awake - 0700  
B - 0745 Premier Protein shake  
S - none  
L - 1/2 cup cottage cheese with   
fruit  
S - none  
D - 183 protein,   
pasta/potato/rice/sweet potato and   
veg - asparagus, cucumber salad,   
winter and summer squash/salad  
S - none  
Fluids - water, unsweetened tea  
Bedtime -   
  
She feels the medication is   
helping to lessen hunger and   
craving to candy controlled  
  
Exercise: stable sedentary job at   
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  
  
Stress: increased -    
wearing heart monitor  
  
Sleep: 7 hours, up to go to   
bathroom a few times LASHAWN -no  
  
Weight gain since last vist: 2 lbs   
since last visit  
  
3/21/2024 167 lb BMI: 28.67  
2024 169 lb  
2024 166 lb metformin  
  
CrCl cannot be calculated   
(Patient's most recent lab result   
is older than the maximum 180 days   
allowed.).  
  
PAST MEDICAL HISTORY  
Diagnosis Date  
Exertional asthma 2012  
Generalized anxiety disorder  
IFG (impaired fasting glucose)   
2024  
Medial meniscus tear 2012  
PCOS (polycystic ovarian syndrome)   
  
Unspecified otitis media  
Recurrent otitis  
Vocal cord dysfunction 05/15/2012  
  
Current Outpatient Medications  
Medication Sig Dispense Refill  
metFORMIN (GLUCOPHAGE) 500 mg   
tablet Take 2 tablets by mouth two   
times a day with meals. 360 tablet   
0  
buPROPion XL (WELLBUTRIN XL) 150   
mg 24 hr tablet Take 150 mg by   
mouth every morning.  
PNV/iron/folic acid (PRENATAL   
VITAMIN-IRON-FA ORAL) Take 1   
tablet by mouth once daily.  
albuterol HFA (PROVENTIL HFA,   
VENTOLIN HFA) 90 mcg/actuation   
inhaler Inhale 2 Puffs as   
instructed every 4 hours as   
needed. 1 Inhaler 0  
  
No current facility-administered   
medications for this visit.  
  
Occupation: bank  
Contraception: none  
  
/82   Pulse 80   Wt 167 lb   
(75.8 kg)   LMP 2024   SpO2   
98%   BMI 28.67 kg/m  
  
Assessment/Plan:  
Don Mittal is a 24 year old yo   
female with overweight   
(pre-obesity) who presented today   
for follow up for supervised   
weight loss to treat PCOS, IFG,   
elevated LDL, anxiety and   
depression and prevent related   
co-morbidities.  
  
ASSESSMENT/PLAN:  
1. PCOS (polycystic ovarian   
syndrome) - ICD9: 256.4, ICD10:   
E28.2 (primary diagnosis)  
- Whole food balanced protein   
low-carb nutrition  
- METFORMIN 500 MG TABLET  
- DESOGESTREL 0.15 MG-ETHINYL   
ESTRADIOL 0.03 MG TABLET  
- NALTREXONE 50 MG TABLET  
  
2. Irregular menses - ICD9: 626.4,   
ICD10: N92.6  
- DESOGESTREL 0.15 MG-ETHINYL   
ESTRADIOL 0.03 MG TABLET  
  
3. Anxiety and depression - ICD9:   
300.00, 311, ICD10: F41.9, F32.A  
- Continue bupropion prescribed by   
PCP- she plans to discuss   
increasing dose with PCP  
  
4. History of bulimia - ICD9:   
V11.8, ICD10: Z86.59  
- in remission since age 15  
  
5. History of anorexia nervosa -   
ICD9: V11.8, ICD10: Z86.59  
- in remission since age 15  
  
6. Provided repeat prescription   
for oral contraceptive - ICD9:   
V25.41, ICD10: Z30.41  
- RX for Apri given today.  
- discussed with patient on how to   
take OCP's.Given written   
information  
- counseled on benefits, risks and   
possible severe side effects of   
OCP's.  
- discussed need to use Condoms to   
help to prevent STD's including   
HIV etc.  
- DESOGESTREL 0.15 MG-ETHINYL   
ESTRADIOL 0.03 MG TABLET  
  
7. Overweight with body mass index   
(BMI) of 29 to 29.9 in adult -   
ICD9: 278.02, V85.25, ICD10:   
E66.3, Z68.29  
- METFORMIN 500 MG TABLET -   
decrease to 500 mg with breakfast   
and 1 gm with dinner due to   
diarrhea with 1 gm twice daily  
- DESOGESTREL 0.15 MG-ETHINYL   
ESTRADIOL 0.03 MG TABLET  
- NALTREXONE 50 MG TABLET  
Agreeable to adding naltrexone to   
Wellbutrin to mimic the effect of   
Contrave. Confirmed no   
allergies/contraindications to   
naltrexone. Reviewed potential   
side effects. Patient will notify   
me if there are any adverse   
effects with the medication.   
Prescription provided today and   
dosing schedule provided. Patient   
advised to increase dose only if   
tolerated. Will remain on lower   
dose if effective.  
Discussed increased risk for   
opioid overdose if opioid   
medicines are taken while taking   
naltrexone. Aware to not use any   
opioid medications while taking   
this medication. Has not taken any   
opioid medication in the last 7-10   
days. Will discontinue naltrexone   
before any medical procedure or   
surgery.  
- Continue bupropion prescribed by   
PCP- she plans to discuss   
increasing dose with PCP  
  
- Continue whole food low-carb   
diet with 30 g of protein 3 times   
a day and 30 g of carbs at lunch   
and dinner only.  
- continue exercise. Discussed   
benefits of walking 15 minutes   
immediately after meals  
  
Due to history of anorexia and   
bulimia, we agreed that in-office   
visits would be best for now.  
  
Follow up in 6 weeks  
  
Ernestine Sher APRN.CNP  
Advanced Education from the   
[employer]  
  
Medical Decision Making:  
  
Problems:  
Moderate: 1+ chronic illnesses   
with change  
Risk:  
Moderate: Drug management and   
Moderate risk from   
testing/treatment  
  
Medical Decision Making Level: 4 -   
Moderate  
  
  
Electronically signed by Ernestine Sher APRN.CNP at 2024 8:47   
AM EDT  
documented in this encounter            [facility]  
   
                                        2024 Note     HNO ID: [id number]  
Author: ERNESTINE SHER APRN.CNP  
Service: ?  
Author Type: Nurse Practitioner  
Type: Progress Notes  
Filed: 2024 19:59  
Note Text:  
Some documentation from previous   
visit of 2023 was copied and   
pasted,  
documentation has been reviewed   
and edited as necessary for   
today's visit.  
Patient Summary: Don is a 23   
year old female who presents for   
follow-up  
evaluation of her obesity/weight   
management to treat PCOS, IFG,   
elevated LDL,  
anxiety and depression and prevent   
relatedco-morbidities. In our   
previous  
visits we have discussed lifestyle   
intervention including a nutrition  
recommendations and physical   
activity optimization. Her last   
office visit was 1  
month ago.  
Irregular menses - Menses   
2023 LMP 1/2-3 spotting  
20 lb weight gain with Lexapro for   
anxiety. Changed to bupropion by   
PCP.  
Assessment/plan from last visit:  
Metformin 500 mg twice a day with   
meals - tried 1 gm at dinner but   
had nausea  
and mild diarrhea (actually took   
at bedtime)  
Bupropion 150 mg 24/hr tab for   
anxiety and depression  
Was in [place] with  who   
races - very hectic travel. Tried   
to eat  
healthier.  
Quit second job so life is   
becoming more calm  
History of anorexia and bulimia in   
HS - no treatment, Dad helped her   
and she  
started adding protein shakes.  
Interval History - changes made in   
past month  
Awake - 0700 but is changing to   
0520 to exercise before work  
B - 0745 Premier Protein shake  
S - none  
L - SKIP  
S - none  
D - 1830 protein,   
pasta/potato/rice/sweet potato and   
veg - asparagus, cucumber  
salad, winter and summer squash  
S - none or Skinny popcorn  
Fluids - water, zero sugar   
electrolyte drink mixes,   
unsweetened tea  
Bedtime -   
She feels the medication is   
helping to lessen hunger and   
craving to candy  
Exercise: stable sedentary job at   
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   
walking and  
free weights  
Stress: decreased -  races   
and season has not started yet and   
decreased  
stress after quitting second job  
Sleep: 7 hours, well rested LASHAWN   
-no  
Weight gain since last vist: 3 lbs   
since last visit  
2024 169 lb  
2024 166 lb metformin  
CrCl cannot be calculated   
(Patient's most recent lab result   
is older than the  
maximum 180 days allowed.).  
PAST MEDICAL HISTORY  
Diagnosis Date  
Exertional asthma 2012  
Generalized anxiety disorder  
IFG (impaired fasting glucose)   
2024  
Medial meniscus tear 2012  
PCOS (polycystic ovarian syndrome)   
  
Unspecified otitis media  
Recurrent otitis  
Vocal cord dysfunction 05/15/2012  
Current Outpatient Medications  
Medication Sig Dispense Refill  
buPROPion XL (WELLBUTRIN XL) 150   
mg 24 hr tablet Take 150 mg by   
mouth every  
morning.  
PNV/iron/folic acid (PRENATAL   
VITAMIN-IRON-FA ORAL) Take 1   
tablet by mouth once  
daily.  
metFORMIN (GLUCOPHAGE) 500 mg   
tablet Take 1 tablet by mouth two   
times a day  
with meals. 180 tablet 0  
albuterol HFA (PROVENTIL HFA,   
VENTOLIN HFA) 90 mcg/actuation   
inhaler Inhale 2  
Puffs as instructed every 4 hours   
as needed. 1 Inhaler 0  
No current facility-administered   
medications for this visit.  
/84   Wt 169 lb (76.7 kg)     
LMP 2024   BMI 29.01 kg/m?  
Assessment/Plan:  
Don Mittal is a 23 year old yo   
female with overweight   
(pre-obesity) who  
presented today for follow up for   
supervised weight loss to treat   
PCOS, IFG,  
elevated LDL, anxiety and   
depression and prevent related   
co-morbidities.  
ASSESSMENT/PLAN:  
1. PCOS (polycystic ovarian   
syndrome) - ICD9: 256.4, ICD10:   
E28.2 (primary  
diagnosis)  
- Whole food balanced protein   
low-carb nutrition  
- METFORMIN 500 MG TABLET  
2. Elevated LDL cholesterol level   
- ICD9: 272.0, ICD10: E78.00  
- benefits of weight loss   
discussed  
3. IFG (impaired fasting glucose)   
- ICD9: 790.21, ICD10: R73.01  
- METFORMIN 500 MG TABLET  
4. Irregular menses - ICD9: 626.4,   
ICD10: N92.6  
- METFORMIN 500 MG TABLET  
5. Anxiety and depression - ICD9:   
300.00, 311, ICD10: F41.9, F32.A  
- well-controlled with bupropion  
6. History of bulimia - ICD9:   
V11.8, ICD10: Z86.59  
- in remission  
7. History of anorexia nervosa -   
ICD9: V11.8, ICD10: Z86.59  
- in remission  
8. Overweight with body mass index   
(BMI) of 29 to 29.9 in adult -   
ICD9: 278.02,  
V85.25, ICD10: E66.3, Z68.29  
- METFORMIN 500 MG TABLET -   
continue to increase dose as   
tolerated  
- Recommended whole food low-carb   
diet with 30 g of protein 3 times   
a day and  
30 g of carbs at lunch and dinner   
only. Given tracking log.  
- Given 15 gram carb whole food   
and protein suggestion list.  
- Given protein snack ideas  
- continue exercise  
Lengthy discussion regarding   
history of anorexia and bulimia   
which is in  
remission. Discussed appropriate   
amount of exercise. We agreed that   
in-office  
visits would be best for now.  
Follow up in 4 weeks  
SHANEKA Wan.CNP  
Advanced Education from the   
Obesity Medicine Association  
Medical Decision Making:  
Problems:  
Moderate: 1+ chronic illnesses   
with change  
Risk:  
Moderate: Drug man (more content   
not included)...                        [facility]  
   
                                        2024 Instructions   
  
  
Ernestine Sher APRN.CNP -   
2024 7:10 AM ESTFormatting   
of this note is different from the   
original.  
- Whole food low-carb diet with 30   
g of protein 3 times a day and up   
to 30 g of carbs at lunch and   
dinner only.  
Meals - protein is a goal and   
carbohydrates are a limit.  
Snacks - all protein or more   
protein than carbs  
Use tracking log as a worksheet   
and bring with you to your next   
appointment.  
  
Protein - no carbs  
Egg 1 large - 6g  
Egg white 1 large 3.6g  
  
3 oz is approximately the size of   
a deck of cards and equals 21 g   
protein  
Beef, Chicken, Turkey, Pork, Lamb   
1 oz 7g  
Fish, Tuna Fish 1 oz 7g (Starkist   
tuna packet 2.6 oz 17 gm protein)  
Seafood (Crabmeat, Shrimp,   
Lobster) 1 oz 6g  
  
Protein shakes (read labels)  
Premier Protein or generic [employer]   
Equate, Aldi Elevate, Meijer High   
Performance- 30g protein & 1g carb   
- meal replacement  
Premier Protein plant protein   
powder - 25 gm protein, 0 suger/2   
carb Vanilla and chocolate (not a   
meal replacement)  
Fairlife 30 gram protein - 30g   
protein & 3g carb  
BOOST Glucose Control Max 30g   
Protein Nutritional Drink - 30g   
protein & 1 carb - meal   
replacement  
Slimfast High Protein - 20g   
protein & 1g carb  
Ensure Max Protein Nutrition Shake   
30g protein & 2 carb  
__________________________________  
_______________  
Protein AND carbs  
Beef/Turkey Jerky 1 oz dried   
10-15g protein - check carb count,   
can be high if sugar added  
Slim Thompson - 6 gm protein and 4 net   
carb  
Great Value original turkey   
sausage sticks - 7 gm protein and   
2 gm carb  
Imitation Crab Meat 1 oz - 2g   
protein & 4g carb  
  
Milk, skim 2% or 1% 8 oz - 8g   
protein & 12g carb  
  
Greek yogurt  
Full Fat Greek Yogurt 1 cup -   
20.4g protein & 9.1g carb  
2% Greek Yogurt 1 cup - 22.7g   
protein & 9.1g carb  
0% (fat-free) Greek Yogurt - 1 cup   
24g protein & 9.3g carb  
:ratio, KETO Friendly Dairy Snack   
1 single svg - 15g protein & 2g   
carb  
:ratio Protein 1 single svg - 25g   
protein & 8g carb  
Dannon Light + Fit 1 single csvg -   
12g protein & 9g carb  
Two Good Lowfat Greek Yogurt,   
Strawberry, Lower Sugar - 12g   
protein & 2g carb  
Oikos Triple Zero Greek Nonfat   
Yogurt 1 single svg - 15g protein   
& 7g carb  
Aldi Greek yogurt 10g protein & 4   
g carb  
  
Cheese each oz  
Brie 5.9g protein & 0.1g carb  
Cheddar Cheese 7g protein & 0.4g   
carb  
Mozzarella Cheese 6.3g protein &   
0.6g carb  
Bassem Cheese 6.7g protein & 0.7g   
carb  
Parmesan Cheese 10g protein & 0.9g   
carb  
Cream Cheese 1.7g protein & 1.2g   
carb  
Feta 4g protein & 1.2g carb  
Swiss Cheese 7.6g protein & 1.5g   
carb  
  
Franklin s Low Fat Cottage Cheese   
1/2cup 12g protein & 4g carb  
  
Legumes  
Lentils cup 9g protein & 20g carb  
Lima beans cup 7g protein & 20g   
carb  
Kidney, Black, Navy, Cannellini   
beans cup 8g protein & 20g carb  
Soybeans 1/2 c 14g protein & 8.5g   
carb  
Peanut butter, natural 2 Tbsp 7-8g   
protein & 4g net carbs, 190   
calories  
Kennan milk, unsweetened 8 oz 1g   
protein & 2g carb  
Soy milk 8 oz 3.5g protein & 1.6g   
carb  
Tofu 1/2 cup 10g protein & 2.3g   
carb  
  
Nuts and Seeds per oz  
Pumpkin Seeds - 6.9g protein & 5g   
carb  
Almonds - 5.9g protein & 6.1g carb  
Sunflower Seeds - 5.8g protein &   
5.6g carb  
Pistachios - 5.8g protein & 7.8g   
carb  
Cashews - 5.1g protein & 9.2g carb  
Walnuts - 4.3g protein & 3.8g carb  
Hazelnuts - 4.2g protein & 4.7g   
carb  
Brazil Nuts - 4.0g protein & 3.4g   
carb  
Pecans - 2.6g protein & 3.9g carb  
Peanuts - 7g protein & 4.6g carb  
  
<15 gram carb fruit options  
Berries have the lowest sugar   
content  
  
1/2 cup diced honeydew melon - 8   
carbs  
1/2 cup diced watermelon - 6 carbs  
One half medium grapefruit - 10.5   
carbs  
1 medium orange -15.5 carbs  
1 medium peach -14.5 carbs  
1/2 cup fresh cranberries - 6.5   
carbs  
1 medium plum -7.5 carbs  
1/2 cup raspberries -7.5 carbs  
1 medium Mona -9 carbs  
1/2 cup fresh pineapple -11 carbs  
1 medium nectarine - 15 carbs  
1/2 cup blueberries - 11 carbs -   
may actually help you lose weight  
1 medium kiwi without skin - 11   
carbs  
1/2 cup fresh cherries -11 carbs  
1 medium tangerine -12 carbs  
1/2 cup sliced franky -14 carbs  
1/2 medium banana  
1/2 c grapes  
1/2 medium apple - 12.5 carbs  
1/2 c strawberries - 12.7 carbs  
  
5 (FIVE) gram carb vegetable   
options  
  
1 cup raw OR cup cooked:  
Asparagus  
Cabbage  
Spinach  
Peppers  
Green beans  
Carrots  
Tomato  
Cucumber  
Bean sprouts  
Cauliflower  
Lettuce  
Snap peas  
Broccoli  
Eggplant  
Zucchini  
Turnips  
Spaghetti squash  
Brussel sprouts  
  
15 gram carb vegetable options  
  
cup cooked green peas  
cup cooked corn or hominy  
corn on the cob, large (5 oz)  
cup cooked sweet potato, plain  
cup cooked potato, plain  
1 small potato or sweet potato  
1 cup winter squash (pumpkin,  
acorn, butternut)  
1 cup marinara or pasta sauce -   
check label  
cup tomato juice  
cup tomato puree  
Beans, Seeds, Nuts  
cup cooked beans (kidney, elizabeth,  
red, green, etc.)  
cup cooked lentils  
cup baked beans  
4 tablespoons nut butter  
  
Grains  
White long-grain rice 1/2 c 2g   
protein 22.5 carb  
Brown rice 1/2 c 5.5g protein 24   
carb  
Quinoa 1/2 c 4 gm complete protein   
25 carbs  
Oatmeal  
  
30 High Protein Snack Ideas  
  
1. Jerky  
2. Trail mix without or minimal   
dried fruit  
3. Turkey roll-ups  
4. Greek yogurt  
5. Veggies and yogurt dip  
6. Tuna  
7. Hard-boiled eggs  
8. Peanut butter celery sticks  
9. No-bake energy bites  
10. Cheese slices/ Cheese Stick  
11. Handful of almonds  
12. Roasted chickpeas  
13. Hummus and veggies  
14. Cottage Cheese  
15. Celery/fruit with peanut   
butter  
16. Beef sticks (Grass-fed,   
natural ingredients)  
17. Protein bars  
18. Canned [place]  
19. Jose pudding  
20. Homemade granola - rolled   
oats, nuts, and a little sweetener   
- 1/4 cup serving  
21. Pumpkin seeds  
22. Nut butter  
23. Protein shakes  
24. Edamame  
25. Avocado and chicken salad  
26. Fruit and nut bars - natural   
ingredients without added sugar.  
27. Lentil salad  
28. Overnight oatmeal  
29. Egg muffins  
30. Leftover protein or lunch meat  
Electronically signed by Ernestine Sher APRN.CNP at 2024 7:36   
AM EST  
  
documented in this encounter            [facility]  
   
                                                    2024 History of   
Present illness Narrative               Formatting of this note is   
different from the original.  
Some documentation from previous   
visit of 2023 was copied and   
pasted, documentation has been   
reviewed and edited as necessary   
for today's visit.  
  
Patient Summary: Don is a 23   
year old female who presents for   
follow-up evaluation of her   
obesity/weight management to treat   
PCOS, IFG, elevated LDL, anxiety   
and depression and prevent   
relatedco-morbidities. In our   
previous visits we have discussed   
lifestyle intervention including a   
nutrition recommendations and   
physical activity optimization.   
Her last office visit was 1 month   
ago.  
  
Irregular menses - Menses   
2023 LMP 1/2-3 spotting  
20 lb weight gain with Lexapro for   
anxiety. Changed to bupropion by   
PCP.  
  
Assessment/plan from last visit:  
Metformin 500 mg twice a day with   
meals - tried 1 gm at dinner but   
had nausea and mild diarrhea   
(actually took at bedtime)  
Bupropion 150 mg 24/hr tab for   
anxiety and depression  
Was in [place] with  who   
races - very hectic travel. Tried   
to eat healthier.  
Quit second job so life is   
becoming more calm  
History of anorexia and bulimia in   
HS - no treatment, Dad helped her   
and she started adding protein   
shakes.  
  
Interval History - changes made in   
past month  
Awake - 0700 but is changing to   
0520 to exercise before work  
B - 0745 Premier Protein shake  
S - none  
L - SKIP  
S - none  
D - 183 protein,   
pasta/potato/rice/sweet potato and   
veg - asparagus, cucumber salad,   
winter and summer squash  
S - none or Skinny popcorn  
Fluids - water, zero sugar   
electrolyte drink mixes,   
unsweetened tea  
Bedtime -   
  
She feels the medication is   
helping to lessen hunger and   
craving to candy  
Exercise: stable sedentary job at   
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   
walking and free weights  
  
Stress: decreased -  races   
and season has not started yet and   
decreased stress after quitting   
second job  
  
Sleep: 7 hours, well rested LASHAWN   
-no  
  
Weight gain since last vist: 3 lbs   
since last visit  
2024 169 lb  
2024 166 lb metformin  
  
CrCl cannot be calculated   
(Patient's most recent lab result   
is older than the maximum 180 days   
allowed.).  
  
PAST MEDICAL HISTORY  
Diagnosis Date  
Exertional asthma 2012  
Generalized anxiety disorder  
IFG (impaired fasting glucose)   
2024  
Medial meniscus tear 2012  
PCOS (polycystic ovarian syndrome)   
  
Unspecified otitis media  
Recurrent otitis  
Vocal cord dysfunction 05/15/2012  
  
Current Outpatient Medications  
Medication Sig Dispense Refill  
buPROPion XL (WELLBUTRIN XL) 150   
mg 24 hr tablet Take 150 mg by   
mouth every morning.  
PNV/iron/folic acid (PRENATAL   
VITAMIN-IRON-FA ORAL) Take 1   
tablet by mouth once daily.  
metFORMIN (GLUCOPHAGE) 500 mg   
tablet Take 1 tablet by mouth two   
times a day with meals. 180 tablet   
0  
albuterol HFA (PROVENTIL HFA,   
VENTOLIN HFA) 90 mcg/actuation   
inhaler Inhale 2 Puffs as   
instructed every 4 hours as   
needed. 1 Inhaler 0  
  
No current facility-administered   
medications for this visit.  
  
/84   Wt 169 lb (76.7 kg)     
LMP 2024   BMI 29.01 kg/m  
  
Assessment/Plan:  
Don Mittal is a 23 year old yo   
female with overweight   
(pre-obesity) who presented today   
for follow up for supervised   
weight loss to treat PCOS, IFG,   
elevated LDL, anxiety and   
depression and prevent related   
co-morbidities.  
  
ASSESSMENT/PLAN:  
1. PCOS (polycystic ovarian   
syndrome) - ICD9: 256.4, ICD10:   
E28.2 (primary diagnosis)  
- Whole food balanced protein   
low-carb nutrition  
- METFORMIN 500 MG TABLET  
  
2. Elevated LDL cholesterol level   
- ICD9: 272.0, ICD10: E78.00  
- benefits of weight loss   
discussed  
  
3. IFG (impaired fasting glucose)   
- ICD9: 790.21, ICD10: R73.01  
- METFORMIN 500 MG TABLET  
  
4. Irregular menses - ICD9: 626.4,   
ICD10: N92.6  
- METFORMIN 500 MG TABLET  
  
5. Anxiety and depression - ICD9:   
300.00, 311, ICD10: F41.9, F32.A  
- well-controlled with bupropion  
  
6. History of bulimia - ICD9:   
V11.8, ICD10: Z86.59  
- in remission  
  
7. History of anorexia nervosa -   
ICD9: V11.8, ICD10: Z86.59  
- in remission  
  
8. Overweight with body mass index   
(BMI) of 29 to 29.9 in adult -   
ICD9: 278.02, V85.25, ICD10:   
E66.3, Z68.29  
- METFORMIN 500 MG TABLET -   
continue to increase dose as   
tolerated  
- Recommended whole food low-carb   
diet with 30 g of protein 3 times   
a day and 30 g of carbs at lunch   
and dinner only. Given tracking   
log.  
- Given 15 gram carb whole food   
and protein suggestion list.  
- Given protein snack ideas  
- continue exercise  
  
Lengthy discussion regarding   
history of anorexia and bulimia   
which is in remission. Discussed   
appropriate amount of exercise. We   
agreed that in-office visits would   
be best for now.  
  
Follow up in 4 weeks  
  
Ernestine Sher APRN.CNP  
Advanced Education from the   
Obesity Medicine Association  
  
Medical Decision Making:  
  
Problems:  
Moderate: 1+ chronic illnesses   
with change  
Risk:  
Moderate: Drug management and   
Moderate risk from   
testing/treatment  
  
Medical Decision Making Level: 4 -   
Moderate  
  
  
Electronically signed by Ernestine Sher APRN.CNP at 2024 7:59   
PM EST  
documented in this encounter            [facility]  
   
                                                    2024 Evaluation + Plan  
   
note                                    Associated Problem(s): PCOS   
(polycystic ovarian syndrome)  
Formatting of this note might be   
different from the original.  
Recommend following up with OB/GYN   
regarding side effects of   
metformin and requesting if she   
can take extended release   
metformin to see if that is better   
tolerated.  
Electronically signed by SHANEKA Chacon CNP at   
2024 9:35 AM EST  
                                        [facility]  
   
                                                    2024 Miscellaneous   
Notes                                   Associated Problem(s): PCOS   
(polycystic ovarian syndrome)  
Formatting of this note might be   
different from the original.  
Recommend following up with OB/GYN   
regarding side effects of   
metformin and requesting if she   
can take extended release   
metformin to see if that is better   
tolerated.  
Electronically signed by SHANEKA Chacon CNP at   
2024 9:35 AM EST  
Associated Problem(s): Anxiety and   
depression  
Formatting of this note might be   
different from the original.  
Denies any suicidal or homicidal   
ideation. Anxiety and depression   
have improved we will continue   
Wellbutrin 150 mg daily, patient   
to give us an update in about 1   
month via [employer] if she would   
like to increase the dose to 300   
mg. We will schedule her for   
follow-up in 3 months  
Electronically signed by SHANEKA Chacon CNP at   
2024 9:35 AM EST  
documented in this encounter            [facility]  
   
                                                    2024 Miscellaneous   
Notes                                   Associated Problem(s): PCOS   
(polycystic ovarian syndrome)  
Formatting of this note might be   
different from the original.  
Recommend following up with OB/GYN   
regarding side effects of   
metformin and requesting if she   
can take extended release   
metformin to see if that is better   
tolerated.  
Electronically signed by SHANEKA Chacon CNP at   
2024 9:35 AM EST  
Associated Problem(s): Anxiety and   
depression  
Formatting of this note might be   
different from the original.  
Denies any suicidal or homicidal   
ideation. Anxiety and depression   
have improved we will continue   
Wellbutrin 150 mg daily, patient   
to give us an update in about 1   
month via [employer] if she would   
like to increase the dose to 300   
mg. We will schedule her for   
follow-up in 3 months  
Electronically signed by SHANEKA Chacon CNP at   
2024 9:35 AM EST  
Addended by: MONICA SANCHEZ   
on: 2024 08:32 AM  
  
Modules accepted: Level of Service  
  
  
Electronically signed by SHANEKA Chacon CNP at   
2024 8:32 AM EST  
documented in this encounter            [facility]  
   
                                                    2024 Evaluation + Plan  
   
note                                    Associated Problem(s): Anxiety and   
depression  
Formatting of this note might be   
different from the original.  
Denies any suicidal or homicidal   
ideation. Anxiety and depression   
have improved we will continue   
Wellbutrin 150 mg daily, patient   
to give us an update in about 1   
month via [employer] if she would   
like to increase the dose to 300   
mg. We will schedule her for   
follow-up in 3 months  
Electronically signed by SHANEKA Chacon CNP at   
2024 9:35 AM EST  
                                        [facility]  
   
                                                    2024 History of   
Present illness Narrative               Formatting of this note might be   
different from the original.  
Patient was identified by name and   
Date of Birth.  
  
Electronically signed by Mandi D'Amico at 2024 9:37 AM EST  
Formatting of this note is   
different from the original.  
Images from the original note were   
not included.  
  
  
2024  
  
Don Mittal (: 2000) is a   
23 y.o. female , Established   
patient, here for evaluation of   
the following chief complaint(s):  
Medication Check  
  
ASSESSMENT/PLAN:  
  
1. Anxiety and depression  
Assessment & Plan:  
Denies any suicidal or homicidal   
ideation. Anxiety and depression   
have improved we will continue   
Wellbutrin 150 mg daily, patient   
to give us an update in about 1   
month via [employer] if she would   
like to increase the dose to 300   
mg. We will schedule her for   
follow-up in 3 months  
2. PCOS (polycystic ovarian   
syndrome)  
Assessment & Plan:  
Recommend following up with OB/GYN   
regarding side effects of   
metformin and requesting if she   
can take extended release   
metformin to see if that is better   
tolerated.  
  
Follow up for 3 month [facility].  
  
SUBJECTIVE/OBJECTIVE:  
  
HPI -  
Don Mittal (: 2000) is a   
23 y.o. female , Established   
patient, here for the evaluation   
of the following chief   
complaint(s):  
Medication Check  
  
RACING WITH  IN [place]   
WENT WELL, WAS SUPER BUSY. Did not   
get to do any sightseeing as they   
were busy at the track every day.   
Got back from [place] on 2024. Patient reports she  
went to ob/gyn for pcos- was   
started on metformin and reports   
that she has been having nausea   
and mild diarrhea with it. She has   
a follow-up with them tomorrow.  
  
Reports that the wellbutrin has   
helped anxiety and mood. Is doing   
better handling things. Is not.   
Feeling as overwhelmed, reports   
sleeping is getting back on   
schedule.  
Still doing shreya and yoga. Most   
days.  
Would like to keep the Wellbutrin   
at the current dose for now  
Prior to Admission medications  
Medication Sig Start Date End Date   
Taking? Authorizing Provider  
buPROPion XL (Wellbutrin XL) 150   
MG 24 hr tablet Take 1 tablet (150   
mg) by mouth every morning. Do not   
crush, chew, or split. 1/22/24   
3/22/24 Yes SHANEKA Chacon CNP  
metFORMIN (Glucophage) 500 MG   
tablet Take 1 tablet by mouth in   
the morning and 1 tablet in the   
evening. Take with meals. 24 Yes Historical Provider,   
MD  
Prenatal Multivit-Min-Fe-FA   
(PRENATAL/IRON PO) Take 1 tablet   
by mouth in the morning. Yes   
Historical Provider, MD  
Prenatal MV-Min-Fe Fum-FA-DHA   
(PRENATAL 1 PO) Take by mouth. Yes   
Historical Provider, MD  
  
  
  
Review of Systems  
Constitutional: Negative for   
activity change, chills, fatigue   
and fever.  
HENT: Negative. Negative for   
congestion.  
Respiratory: Negative.  
Cardiovascular: Negative.  
Gastrointestinal: Positive for   
diarrhea and nausea. Negative for   
abdominal pain and vomiting.  
Genitourinary: Positive for   
menstrual problem (Irregular   
menses due to PCOS).  
Neurological: Negative.  
  
Vitals:  
24 0735  
BP: 112/69  
Pulse: 94  
Resp: 18  
Temp: 37 C (98.6 F)  
TempSrc: Infrared  
SpO2: 98%  
Weight: 170 lb 3.2 oz (77.2 kg)  
  
Physical Exam  
Constitutional:  
Appearance: Normal appearance.  
HENT:  
Head: Normocephalic and   
atraumatic.  
Mouth/Throat:  
Mouth: Mucous membranes are moist.  
Pharynx: Oropharynx is clear. No   
posterior oropharyngeal erythema.  
Cardiovascular:  
Rate and Rhythm: Normal rate and   
regular rhythm.  
Pulses: Normal pulses.  
Heart sounds: Normal heart sounds.  
Pulmonary:  
Effort: Pulmonary effort is   
normal.  
Breath sounds: Normal breath   
sounds.  
Skin:  
General: Skin is warm and dry.  
Neurological:  
Mental Status: She is alert and   
oriented to person, place, and   
time.  
Psychiatric:  
Mood and Affect: Mood normal.  
Behavior: Behavior normal.  
Thought Content: Thought content   
normal.  
Judgment: Judgment normal.  
  
An electronic signature was used   
to authenticate this note.  
  
SHANEKA Chacon CNP  
2024  
9:35 AM  
Electronically signed by SHANEKA Chacon CNP at   
2024 9:37 AM EST  
documented in this encounter            [facility]  
   
                                                    2024 History of   
Present illness Narrative               Formatting of this note might be   
different from the original.  
Patient was identified by name and   
Date of Birth.  
  
Electronically signed by Mandi D'Amico at 2024 9:37 AM EST  
Formatting of this note is   
different from the original.  
Images from the original note were   
not included.  
  
  
2024  
  
Don Mittal (: 2000) is a   
23 y.o. female , Established   
patient, here for evaluation of   
the following chief complaint(s):  
Medication Check  
  
ASSESSMENT/PLAN:  
  
1. Anxiety and depression  
Assessment & Plan:  
Denies any suicidal or homicidal   
ideation. Anxiety and depression   
have improved we will continue   
Wellbutrin 150 mg daily, patient   
to give us an update in about 1   
month via [employer] if she would   
like to increase the dose to 300   
mg. We will schedule her for   
follow-up in 3 months  
2. PCOS (polycystic ovarian   
syndrome)  
Assessment & Plan:  
Recommend following up with OB/GYN   
regarding side effects of   
metformin and requesting if she   
can take extended release   
metformin to see if that is better   
tolerated.  
  
Follow up for 3 month [facility].  
  
SUBJECTIVE/OBJECTIVE:  
  
HPI -  
Don Mittal (: 2000) is a   
23 y.o. female , Established   
patient, here for the evaluation   
of the following chief   
complaint(s):  
Medication Check  
  
RACING WITH  IN [place]   
WENT WELL, WAS SUPER BUSY. Did not   
get to do any sightseeing as they   
were busy at the track every day.   
Got back from [place] on 2024. Patient reports she  
went to ob/gyn for pcos- was   
started on metformin and reports   
that she has been having nausea   
and mild diarrhea with it. She has   
a follow-up with them tomorrow.  
  
Reports that the wellbutrin has   
helped anxiety and mood. Is doing   
better handling things. Is not.   
Feeling as overwhelmed, reports   
sleeping is getting back on   
schedule.  
Still doing shreya and yoga. Most   
days.  
Would like to keep the Wellbutrin   
at the current dose for now  
Prior to Admission medications  
Medication Sig Start Date End Date   
Taking? Authorizing Provider  
buPROPion XL (Wellbutrin XL) 150   
MG 24 hr tablet Take 1 tablet (150   
mg) by mouth every morning. Do not   
crush, chew, or split. 1/22/24   
3/22/24 Yes SHANEKA Chacon - CNP  
metFORMIN (Glucophage) 500 MG   
tablet Take 1 tablet by mouth in   
the morning and 1 tablet in the   
evening. Take with meals. 24 Yes Historical Provider,   
MD  
Prenatal Multivit-Min-Fe-FA   
(PRENATAL/IRON PO) Take 1 tablet   
by mouth in the morning. Yes   
Historical Provider, MD  
Prenatal MV-Min-Fe Fum-FA-DHA   
(PRENATAL 1 PO) Take by mouth. Yes   
Historical Provider, MD  
  
  
  
Review of Systems  
Constitutional: Negative for   
activity change, chills, fatigue   
and fever.  
HENT: Negative. Negative for   
congestion.  
Respiratory: Negative.  
Cardiovascular: Negative.  
Gastrointestinal: Positive for   
diarrhea and nausea. Negative for   
abdominal pain and vomiting.  
Genitourinary: Positive for   
menstrual problem (Irregular   
menses due to PCOS).  
Neurological: Negative.  
  
Vitals:  
24 0735  
BP: 112/69  
Pulse: 94  
Resp: 18  
Temp: 37 C (98.6 F)  
TempSrc: Infrared  
SpO2: 98%  
Weight: 170 lb 3.2 oz (77.2 kg)  
  
Physical Exam  
Constitutional:  
Appearance: Normal appearance.  
HENT:  
Head: Normocephalic and   
atraumatic.  
Mouth/Throat:  
Mouth: Mucous membranes are moist.  
Pharynx: Oropharynx is clear. No   
posterior oropharyngeal erythema.  
Cardiovascular:  
Rate and Rhythm: Normal rate and   
regular rhythm.  
Pulses: Normal pulses.  
Heart sounds: Normal heart sounds.  
Pulmonary:  
Effort: Pulmonary effort is   
normal.  
Breath sounds: Normal breath   
sounds.  
Skin:  
General: Skin is warm and dry.  
Neurological:  
Mental Status: She is alert and   
oriented to person, place, and   
time.  
Psychiatric:  
Mood and Affect: Mood normal.  
Behavior: Behavior normal.  
Thought Content: Thought content   
normal.  
Judgment: Judgment normal.  
  
An electronic signature was used   
to authenticate this note.  
  
SHANEKA Chacon CNP  
2024  
9:35 AM  
Electronically signed by SHANEKA Chacon CNP at   
2024 9:37 AM EST  
documented in this encounter            [facility]  
   
                                        2024 Instructions   
  
  
SHANEKA Chacon CNP -   
2024 7:40 AM ESTFormatting   
of this note might be different   
from the original.  
Asked about extended release   
metformin.  
Give update in about a month- if   
you want to increase dose of   
Wellbutrin or not  
Electronically signed by SHANEKA Chacon CNP at   
2024 7:56 AM EST  
  
documented in this encounter            [facility]  
   
                                        2024 Instructions   
  
  
SHANEKA Chacon CNP -   
2024 7:40 AM ESTFormatting   
of this note might be different   
from the original.  
Asked about extended release   
metformin.  
Give update in about a month- if   
you want to increase dose of   
Wellbutrin or not  
Electronically signed by SHANEKA Chacon CNP at   
2024 7:56 AM EST  
  
documented in this encounter            [facility]  
   
                                        2024 Note     Addended by: MONICA MCADAMS   
on: 2024 08:32 AM  
  
Modules accepted: Level of Service  
  
  
Electronically signed by SHANEKA Chacon CNP at   
2024 8:32 AM EST  
                                        [facility]  
   
                                        2024 Note     Addended by: MONICA MCADAMS   
on: 2024 08:32 AM  
  
Modules accepted: Level of Service      [facility]  
   
                                        2024 Note     HNO ID: [id number]  
Author: ERNESTINE SHER APRN.CNP  
Service: ?  
Author Type: Nurse Practitioner  
Type: Progress Notes  
Filed: 2024 13:09  
Note Text:  
Don Mittal is a 23 year old   
female who presents for problem   
visit missed  
menses x 2 months and multiple   
negative home pregnancy tests.  
HPI: Stopped OCP to attempt   
pregnancy in 2023. Last   
menses in  
November. Multiple home pregnancy   
tests have all been negative.  
Has PCOS - recently noticing more   
facial hair although acne   
improving. 20 lb  
weight gain with Lexapro for   
anxiety. Changed to bupropion a   
few days ago by  
PCP.  
No previous pregnancies. Partner   
has 3 children.  
Shreya and yoga daily for exercise.   
Trying to eat healthy.  
OB History  
 T0  L0  
SAB0 IAB0 Ectopic0 Multiple0 Live   
Births0  
Comment: 3 stepchildren  
Gyn History  
LMP: 2024, Having periods  
Age at Menarche:  
Age at First Pregnancy:  
Age at Menopause:  
Gyn History Comments:  
Sexual Activity: Yes; Male;   
sexually active with   
Contraception: Pill  
PAST MEDICAL HISTORY  
Diagnosis Date  
Exertional asthma 2012  
Generalized anxiety disorder  
Medial meniscus tear 2012  
PCOS (polycystic ovarian syndrome)   
  
Unspecified otitis media  
Recurrent otitis  
Vocal cord dysfunction 05/15/2012  
PAST SURGICAL HISTORY  
Procedure Laterality Date  
ANKLE ARTHROSCOPY Right 16  
Dr. Yeung  
FAMILY HISTORY  
Problem Relation Age of Onset  
other (Other [Other]) Paternal   
Grandfather  
leukemia  
Cancer Maternal Grandmother  
breast  
Asthma Sister  
Social History  
Tobacco Use  
Smoking status: Never  
Smokeless tobacco: Never  
Vaping Use  
Vaping Use: Never used  
Substance Use Topics  
Alcohol use: Yes  
Drug use: No  
Current Outpatient Medications  
Medication Sig  
buPROPion XL (WELLBUTRIN XL) 150   
mg 24 hr tablet Take 150 mg by   
mouth every  
morning.  
PNV/iron/folic acid (PRENATAL   
VITAMIN-IRON-FA ORAL) Take 1   
tablet by mouth once  
daily.  
albuterol HFA (PROVENTIL HFA,   
VENTOLIN HFA) 90 mcg/actuation   
inhaler Inhale 2  
Puffs as instructed every 4 hours   
as needed.  
Drospirenone-Ethinyl Estradiol   
(SINGH, 28,) 3-0.03 mg per tablet   
Take 1 tablet  
by mouth once daily. Take one   
active pill continuously x 3   
months- discard  
placebo pills (Patient not taking:   
Reported on 2024)  
No current facility-administered   
medications for this visit.  
Allergies As of Date: 2024  
Allergen Noted Reaction  
GRASS POLLEN 2013 Unknown  
SEASONAL ALLERGIES 2013   
Unknown  
TREES 2013 Unknown  
Fully Assessed 2024  
REVIEW OF SYSTEMS  
Abdomen: No bloating, early   
satiety, indigestion, or increased   
flatulence. No  
abdominal pain, nausea, vomiting,   
diarrhea, or constipation.  
Bladder: No dysuria, gross   
hematuria, urinary frequency,   
urinary urgency, or  
incontinence.  
Allergies and current medication   
updated:Yes  
EXAM: /72   Wt 166 lb   
(75.3kg)   LMP 2024  
GENERAL: pleasant,    
female in no apparent distress  
CHEST: Normal inspiratory effort  
NEURO: alert and oriented x3,exam   
grossly non-focal  
ASSESSMENT/PLAN:  
1. Irregular menses - ICD9: 626.4,   
ICD10: N92.6 (primary diagnosis)  
- HCG QUAL UR B/O - negative  
- METFORMIN 500 MG TABLET  
- HGB A1C  
- INSULIN ASSAY BLOOD  
- GLUCOSE FASTING BLD  
2. PCOS (polycystic ovarian   
syndrome) - ICD9: 256.4, ICD10:   
E28.2  
- METFORMIN 500 MG TABLET  
Agreeable to begin Metformin 500   
mg with dinner daily x 1 week. If   
tolerating  
will increase to 2 tablets with   
dinner daily.  
We discussed common side effects   
of this medication including   
nausea, changes in  
bowel habits, abdominal   
discomfort, and flatulence.   
Discussed taking it with  
food and complication of lactic   
acidosis and signs/symptoms and   
medication  
handout given. Further instructed   
that if she experiences malaise,   
muscle aches,  
difficulty breathing, or severe   
abdominal pain to seek immediate   
medical  
attention.  
- HGB A1C  
- INSULIN ASSAY BLOOD  
- GLUCOSE FASTING BLD  
- - discussed with pt - review of   
PCOS with signs and symptoms.   
Increased risk  
of DMT2, CAD, infertility.   
Treatment discussed: weight loss   
to restore ovulatory  
cycles and reduce androgen   
concentrations, exercise, cyclic   
medroxyprogesterone  
to induce menses. Metformin   
discussed - explained that it is   
not considered a  
first-line treatment as it does   
not change pregnancy outcomes but   
that it will  
decrease HgbA1c although weight   
loss will do the same. Discussed   
that she may  
need medication to induce   
ovulation if she does not become   
pregnant with  
lifestyle changes and weight loss.  
- Eat primarily whole foods. Limit   
carbs, especially processed carbs.  
- Do not drink your calories  
- 30 grams of protein for your   
first meal of the day decreases   
your hunger  
during the day by up to 40 %   
Premier Protein or generic 30 gm   
protein 1 gm  
sugar  
- Walk for 15 minutes immediately   
a meal.  
3. Elevated LDL cholesterol level   
- ICD9: 272.0, ICD10: E78.00  
- benefits of weight loss   
discussed  
Fol (more content not included)...      [facility]  
   
                                                    2024 Evaluation + Plan  
   
note                                    Associated Problem(s): Weight gain  
Formatting of this note might be   
different from the original.  
Will have her follow up with her   
ob/gyn, suspect gain posssilby   
from pcos. Continue exercise,   
healthy eating and portion   
control. Will stop lexapro   
(possible weight gain) and switch   
to wellbutrin.  
Electronically signed by SHANEKA Chacon CNP at   
2024 4:24 PM [facility]  
   
                                                    2024 Miscellaneous   
Notes                                   Associated Problem(s): Weight gain  
Formatting of this note might be   
different from the original.  
Will have her follow up with her   
ob/gyn, suspect gain posssilby   
from pcos. Continue exercise,   
healthy eating and portion   
control. Will stop lexapro   
(possible weight gain) and switch   
to wellbutrin.  
Electronically signed by SHANEKA Chacon CNP at   
2024 4:24 PM EST  
Associated Problem(s): Anxiety and   
depression  
Formatting of this note might be   
different from the original.  
Denies si/hi. Anxiety and   
depression symptoms better but   
still is having trouble focusing.   
Will taper discontinue lexapro and   
start wellbutrin. Follow up in   
about 1 month  
Electronically signed by SHANEKA Chacon CNP at   
2024 4:17 PM EST  
documented in this encounter            [facility]  
   
                                                    2024 Miscellaneous   
Notes                                   Associated Problem(s): Weight gain  
Formatting of this note might be   
different from the original.  
Will have her follow up with her   
ob/gyn, suspect gain posssilby   
from pcos. Continue exercise,   
healthy eating and portion   
control. Will stop lexapro   
(possible weight gain) and switch   
to wellbutrin.  
Electronically signed by SHANEKA Chacon CNP at   
2024 4:24 PM EST  
Associated Problem(s): Anxiety and   
depression  
Formatting of this note might be   
different from the original.  
Denies si/hi. Anxiety and   
depression symptoms better but   
still is having trouble focusing.   
Will taper discontinue lexapro and   
start wellbutrin. Follow up in   
about 1 month  
Electronically signed by SHANEKA Chacon CNP at   
2024 4:17 PM EST  
Addended by: MONICA SANCHEZ   
on: 2024 10:57 AM  
  
Modules accepted: Level of Service  
  
  
Electronically signed by SHANEKA Chacon CNP at   
2024 10:57 AM EST  
documented in this encounter            [facility]  
   
                                                    2024 Evaluation + Plan  
   
note                                    Associated Problem(s): Anxiety and   
depression  
Formatting of this note might be   
different from the original.  
Denies si/hi. Anxiety and   
depression symptoms better but   
still is having trouble focusing.   
Will taper discontinue lexapro and   
start wellbutrin. Follow up in   
about 1 month  
Electronically signed by SHANEKA Chacon CNP at   
2024 4:17 PM EST  
                                        [facility]  
   
                                                    2024 History of   
Present illness Narrative               Formatting of this note might be   
different from the original.  
Patient was identified by name and   
Date of Birth.  
  
Electronically signed by Mandi D'Amico at 2024 4:24 PM EST  
Formatting of this note is   
different from the original.  
Images from the original note were   
not included.  
  
  
2024  
  
Don Mittal (: 2000) is a   
23 y.o. female , Established   
patient, here for evaluation of   
the following chief complaint(s):  
Medication Check and Weight Gain  
  
ASSESSMENT/PLAN:  
  
1. Anxiety and depression  
Assessment & Plan:  
Denies si/hi. Anxiety and   
depression symptoms better but   
still is having trouble focusing.   
Will taper discontinue lexapro and   
start wellbutrin. Follow up in   
about 1 month  
Orders:  
- buPROPion XL (Wellbutrin XL) 150   
MG 24 hr tablet; Take 1 tablet   
(150 mg) by mouth every morning.   
Do not crush, chew, or split.,   
Starting Mon 2024, Until Fri   
3/22/2024, Normal  
2. Weight gain  
Assessment & Plan:  
Will have her follow up with her   
ob/gyn, suspect gain posssilby   
from pcos. Continue exercise,   
healthy eating and portion   
control. Will stop lexapro   
(possible weight gain) and switch   
to wellbutrin.  
  
Follow up in about 1 month (around   
2024).  
  
SUBJECTIVE/OBJECTIVE:  
  
HPI -  
Don Mittal (: 2000) is a   
23 y.o. female , Established   
patient, here for the evaluation   
of the following chief   
complaint(s):  
Medication Check and Weight Gain  
  
Stopped nicotine and stopped etoh   
since we last met. . Has gained   
some weight. She thinks it may be   
from going off of her birth   
control medication, Is working out   
with online [employer] classes daily,   
watching what she is eating. Is   
doing well as far as eating and   
exercising. She also is doing   
yoga.  
Stopped birth control. Has not had   
period since sept and November and   
has been pregnancy testing. Is   
trying to get pregnant. Will be   
following up with her ob/gyn.  
Reports anxiety is better, but is   
not able to focus well still.   
Denies si/hi.  
Her and her  are getting   
ready to go to [place] for car   
racing for 11 days. Her    
races cars. States she is looking   
forward to going.  
Prior to Admission medications  
Medication Sig Start Date End Date   
Taking? Authorizing Provider  
escitalopram (Lexapro) 20 MG   
tablet Take 1 tablet (20 mg) by   
mouth daily. 12/15/23 3/14/24 Yes   
SHANEKA Chacon CNP  
Prenatal MV-Min-Fe Fum-FA-DHA   
(PRENATAL 1 PO) Take by mouth. Yes   
Historical Provider, MD  
  
  
  
Review of Systems  
Constitutional: Negative.  
HENT: Negative.  
Respiratory: Negative.  
Cardiovascular: Negative.  
Gastrointestinal: Negative.  
Genitourinary: Negative.  
Musculoskeletal: Negative.  
Neurological: Negative.  
Psychiatric/Behavioral: Positive   
for decreased concentration.   
Negative for self-injury, sleep   
disturbance and suicidal ideas.   
The patient is nervous/anxious.  
  
Vitals:  
24 1346  
BP: 133/89  
Pulse: 98  
Resp: 18  
Temp: 36.7 C (98.1 F)  
TempSrc: Infrared  
SpO2: 97%  
Weight: 164 lb (74.4 kg)  
  
Physical Exam  
Constitutional:  
General: She is not in acute   
distress.  
Appearance: Normal appearance. She   
is not ill-appearing.  
HENT:  
Head: Normocephalic and   
atraumatic.  
Right Ear: Tympanic membrane   
normal.  
Left Ear: Tympanic membrane   
normal.  
Nose: No congestion or rhinorrhea.  
Mouth/Throat:  
Mouth: Mucous membranes are moist.  
Pharynx: Oropharynx is clear. No   
posterior oropharyngeal erythema.  
Eyes:  
Conjunctiva/sclera: Conjunctivae   
normal.  
Cardiovascular:  
Rate and Rhythm: Normal rate and   
regular rhythm.  
Pulmonary:  
Effort: Pulmonary effort is   
normal.  
Breath sounds: Normal breath   
sounds.  
Lymphadenopathy:  
Cervical: No cervical adenopathy.  
Skin:  
General: Skin is warm and dry.  
Neurological:  
Mental Status: She is alert and   
oriented to person, place, and   
time.  
Psychiatric:  
Mood and Affect: Mood normal.  
Behavior: Behavior normal.  
Thought Content: Thought content   
normal.  
Judgment: Judgment normal.  
  
An electronic signature was used   
to authenticate this note.  
  
SHANEKA Chacon CNP  
2024  
4:24 PM  
Electronically signed by SHANEKA Chacon CNP at   
2024 4:24 PM EST  
documented in this encounter            [facility]  
   
                                                    2024 History of   
Present illness Narrative               Formatting of this note might be   
different from the original.  
Patient was identified by name and   
Date of Birth.  
  
Electronically signed by Mandi D'Amico at 2024 4:24 PM EST  
Formatting of this note is   
different from the original.  
Images from the original note were   
not included.  
  
  
2024  
  
Don Mittal (: 2000) is a   
23 y.o. female , Established   
patient, here for evaluation of   
the following chief complaint(s):  
Medication Check and Weight Gain  
  
ASSESSMENT/PLAN:  
  
1. Anxiety and depression  
Assessment & Plan:  
Denies si/hi. Anxiety and   
depression symptoms better but   
still is having trouble focusing.   
Will taper discontinue lexapro and   
start wellbutrin. Follow up in   
about 1 month  
Orders:  
- buPROPion XL (Wellbutrin XL) 150   
MG 24 hr tablet; Take 1 tablet   
(150 mg) by mouth every morning.   
Do not crush, chew, or split.,   
Starting Mon 2024, Until Fri   
3/22/2024, Normal  
2. Weight gain  
Assessment & Plan:  
Will have her follow up with her   
ob/gyn, suspect gain posssilby   
from pcos. Continue exercise,   
healthy eating and portion   
control. Will stop lexapro   
(possible weight gain) and switch   
to wellbutrin.  
  
Follow up in about 1 month (around   
2024).  
  
SUBJECTIVE/OBJECTIVE:  
  
HPI -  
Don Mittal (: 2000) is a   
23 y.o. female , Established   
patient, here for the evaluation   
of the following chief   
complaint(s):  
Medication Check and Weight Gain  
  
Stopped nicotine and stopped etoh   
since we last met. . Has gained   
some weight. She thinks it may be   
from going off of her birth   
control medication, Is working out   
with online [employer] classes daily,   
watching what she is eating. Is   
doing well as far as eating and   
exercising. She also is doing   
yoga.  
Stopped birth control. Has not had   
period since sept and November and   
has been pregnancy testing. Is   
trying to get pregnant. Will be   
following up with her ob/gyn.  
Reports anxiety is better, but is   
not able to focus well still.   
Denies si/hi.  
Her and her  are getting   
ready to go to [place] for car   
racing for 11 days. Her    
races cars. States she is looking   
forward to going.  
Prior to Admission medications  
Medication Sig Start Date End Date   
Taking? Authorizing Provider  
escitalopram (Lexapro) 20 MG   
tablet Take 1 tablet (20 mg) by   
mouth daily. 12/15/23 3/14/24 Yes   
Monica Sanchez, SHANEKA - CNP  
Prenatal MV-Min-Fe Fum-FA-DHA   
(PRENATAL 1 PO) Take by mouth. Yes   
Historical Provider, MD  
  
  
  
Review of Systems  
Constitutional: Negative.  
HENT: Negative.  
Respiratory: Negative.  
Cardiovascular: Negative.  
Gastrointestinal: Negative.  
Genitourinary: Negative.  
Musculoskeletal: Negative.  
Neurological: Negative.  
Psychiatric/Behavioral: Positive   
for decreased concentration.   
Negative for self-injury, sleep   
disturbance and suicidal ideas.   
The patient is nervous/anxious.  
  
Vitals:  
24 1346  
BP: 133/89  
Pulse: 98  
Resp: 18  
Temp: 36.7 C (98.1 F)  
TempSrc: Infrared  
SpO2: 97%  
Weight: 164 lb (74.4 kg)  
  
Physical Exam  
Constitutional:  
General: She is not in acute   
distress.  
Appearance: Normal appearance. She   
is not ill-appearing.  
HENT:  
Head: Normocephalic and   
atraumatic.  
Right Ear: Tympanic membrane   
normal.  
Left Ear: Tympanic membrane   
normal.  
Nose: No congestion or rhinorrhea.  
Mouth/Throat:  
Mouth: Mucous membranes are moist.  
Pharynx: Oropharynx is clear. No   
posterior oropharyngeal erythema.  
Eyes:  
Conjunctiva/sclera: Conjunctivae   
normal.  
Cardiovascular:  
Rate and Rhythm: Normal rate and   
regular rhythm.  
Pulmonary:  
Effort: Pulmonary effort is   
normal.  
Breath sounds: Normal breath   
sounds.  
Lymphadenopathy:  
Cervical: No cervical adenopathy.  
Skin:  
General: Skin is warm and dry.  
Neurological:  
Mental Status: She is alert and   
oriented to person, place, and   
time.  
Psychiatric:  
Mood and Affect: Mood normal.  
Behavior: Behavior normal.  
Thought Content: Thought content   
normal.  
Judgment: Judgment normal.  
  
An electronic signature was used   
to authenticate this note.  
  
SHANEKA Chacon CNP  
2024  
4:24 PM  
Electronically signed by SHANEKA Chacon CNP at   
2024 4:24 PM EST  
documented in this encounter            [facility]  
   
                                        2024 Instructions   
  
  
SHANEKA Chacon CNP -   
2024 1:40 PM ESTFormatting   
of this note might be different   
from the original.  
Start wellbutrin and decrease   
lexapro to 10 mg daily x 7 days,   
then stop the lexapro.  
Check [employer] james for meditation  
Electronically signed by SHANEKA Chacon CNP at   
2024 2:05 PM EST  
  
documented in this encounter            [facility]  
   
                                        2024 Instructions   
  
  
SHANEKA Chacon CNP -   
2024 1:40 PM ESTFormatting   
of this note might be different   
from the original.  
Start wellbutrin and decrease   
lexapro to 10 mg daily x 7 days,   
then stop the lexapro.  
Check [employer] james for meditation  
Electronically signed by SHANEKA Chacon CNP at   
2024 2:05 PM EST  
  
documented in this encounter            [facility]  
   
                                        2024 Note     Addended by: MONICA MCADAMS   
on: 2024 10:57 AM  
  
Modules accepted: Level of Service  
  
  
Electronically signed by SHANEKA Chacon CNP at   
2024 10:57 AM EST  
                                        [facility]  
   
                                        2024 Note     Addended by: MONICA MCADAMS   
on: 2024 10:57 AM  
  
Modules accepted: Level of Service      [facility]  
   
                                                    2023 Evaluation + Plan  
   
note                                    Associated Problem(s): Anxiety and   
depression  
Formatting of this note might be   
different from the original.  
Denies any suicidal or homicidal   
ideation. Reports improved   
symptoms. We will continue on   
Lexapro 10 mg daily she is to   
provide an update through [employer]   
in approximately 4 weeks. Consider   
up titration if needed at that   
point.  
Electronically signed by SHANEKA Chacon CNP at   
2023 1:22 PM EST  
                                        [facility]  
   
                                                    2023 Miscellaneous   
Notes                                   Associated Problem(s): Anxiety and   
depression  
Formatting of this note might be   
different from the original.  
Denies any suicidal or homicidal   
ideation. Reports improved   
symptoms. We will continue on   
Lexapro 10 mg daily she is to   
provide an update through [employer]   
in approximately 4 weeks. Consider   
up titration if needed at that   
point.  
Electronically signed by SHANEKA Chacon CNP at   
2023 1:22 PM EST  
documented in this encounter            [facility]  
   
                                                    2023 History of   
Present illness Narrative               Formatting of this note might be   
different from the original.  
Patient was identified by name and   
Date of Birth.  
  
Health Main:  
  
Lipid-pended  
HIV/Hep C-declined  
Covid-declined  
PHQ-completed  
Pap-[facility] (will scan in)  
Tdap-declined  
Influenza-declined  
  
  
Electronically signed by Mandi D'Amico at 2023 1:22 PM EST  
Formatting of this note is   
different from the original.  
Images from the original note were   
not included.  
  
  
2023  
  
Don Mittal (: 2000) is a   
23 y.o. female , Established   
patient, here for evaluation of   
the following chief complaint(s):  
Follow-up and Health Maintenance   
(Lipid-pended/HIV/Hep   
C-declined/Covid-declined/PHQ-comp  
leted/Pap-[facility] (will scan   
in)/Tdap-declined/Influenza-declin  
ed/ )  
  
ASSESSMENT/PLAN:  
  
1. Annual physical exam  
- Comprehensive metabolic panel  
- CBC  
- Lipid panel  
2. Screening for deficiency anemia  
- CBC  
3. Screening for cholesterol level  
- Lipid panel  
4. Anxiety and depression  
Assessment & Plan:  
Denies any suicidal or homicidal   
ideation. Reports improved   
symptoms. We will continue on   
Lexapro 10 mg daily she is to   
provide an update through [employer]   
in approximately 4 weeks. Consider   
up titration if needed at that   
point.  
  
Follow up in about 3 months   
(around 2024).  
  
SUBJECTIVE/OBJECTIVE:  
  
HPI -  
Don Mittal (: 2000) is a   
23 y.o. female , Established   
patient, here for the evaluation   
of the following chief   
complaint(s):  
Follow-up and Health Maintenance   
(Lipid-pended/HIV/Hep   
C-declined/Covid-declined/PHQ-comp  
leted/Pap-[facility] (will scan   
in)/Tdap-declined/Influenza-declin  
ed/ )  
Recently newly established patient   
to us about 2 weeks ago who had   
presented for an acute complaint   
of anxiety and depression. She was   
started on Lexapro 10 mg at that   
time and recommended to get set up   
with a counselor. No other acute   
complaints today.  
Anxiety/dep- feels like medication   
is helping some. Is able to think   
clearer. Is processing thoughts   
better. States that she has not   
broke down and cried like she was   
before. Denies any suicidal or   
homicidal ideation.  
She has yet to set up with a   
counselor.  
Has OB/GYN which she follows for   
women's health.  
  
Prior to Admission medications  
Medication Sig Start Date End Date   
Taking? Authorizing Provider  
escitalopram (Lexapro) 10 MG   
tablet Take 1 tablet (10 mg) by   
mouth daily. 23 Yes   
SHANEKA Chacon CNP  
Prenatal MV-Min-Fe Fum-FA-DHA   
(PRENATAL 1 PO) Take by mouth. Yes   
Historical Provider, MD  
  
  
  
Review of Systems  
Constitutional: Negative.  
HENT: Negative.  
Respiratory: Negative.  
Cardiovascular: Negative.  
Gastrointestinal: Negative.  
Genitourinary: Negative.  
Musculoskeletal: Negative.  
Neurological: Negative.  
Psychiatric/Behavioral: Positive   
for decreased concentration and   
sleep disturbance (still not   
sleeping well.). Negative for   
self-injury and suicidal ideas.   
The patient is nervous/anxious.  
  
Vitals:  
23 0929  
BP: 116/77  
Pulse: 98  
Resp: 18  
Temp: 36.4 C (97.6 F)  
TempSrc: Infrared  
SpO2: 99%  
Weight: 151 lb (68.5 kg)  
  
Physical Exam  
Constitutional:  
General: She is not in acute   
distress.  
Appearance: Normal appearance. She   
is not ill-appearing.  
HENT:  
Head: Normocephalic and   
atraumatic.  
Right Ear: Tympanic membrane   
normal.  
Left Ear: Tympanic membrane   
normal.  
Mouth/Throat:  
Mouth: Mucous membranes are moist.  
Pharynx: Oropharynx is clear. No   
posterior oropharyngeal erythema.  
Eyes:  
Conjunctiva/sclera: Conjunctivae   
normal.  
Cardiovascular:  
Rate and Rhythm: Normal rate and   
regular rhythm.  
Pulses: Normal pulses.  
Heart sounds: Normal heart sounds.  
Musculoskeletal:  
Right lower leg: No edema.  
Left lower leg: No edema.  
Skin:  
General: Skin is warm and dry.  
Neurological:  
Mental Status: She is alert and   
oriented to person, place, and   
time.  
Psychiatric:  
Mood and Affect: Mood normal.  
Behavior: Behavior normal.  
Thought Content: Thought content   
normal.  
Judgment: Judgment normal.  
  
An electronic signature was used   
to authenticate this note.  
  
SHANEKA Chacon CNP  
2023  
1:22 PM  
Electronically signed by SHANEKA Chacon CNP at   
2023 1:22 PM EST  
documented in this encounter            [facility]  
   
                                        2023 Instructions   
  
  
SHAENKA Chacon CNP -   
2023 9:40 AM ESTFormatting   
of this note might be different   
from the original.  
Melatonin 1- 5 mg nightly to help   
with sleep.  
  
Send update via mychart to   
provider in 1 month on how you are   
doing on the lexapro 10 mg daily.  
Electronically signed by SHANEKA Chacon CNP at   
2023 10:00 AM EST  
  
documented in this encounter            [facility]  
   
                                        2023 Note     HNO ID: [id number]  
Author: Awlida Bell MD  
Service: ?  
Author Type: Physician  
Type: Progress Notes  
Filed: 2023 3:09 PM  
Note Text:  
Chaperone offered: Patient   
declines.  
Don is a 23 year old    
who presents for an annual   
gynecologic  
exam without complaints. Does have   
irregular bleeding with pills.   
  
does sprint car racing. Has three   
step children. Thinking about   
kids.  
Menses: cycles every 28 days and   
5-8 days but does have a lot of   
irregular  
bleeding for 3-4 days per month-   
not as heavy. Does not miss pills   
or take  
them late.  
Contraception: combined hormonal   
contraceptives  
HPV vaccine: Yes  
Last Pap: 2022 normal  
HPV: N/A  
History of abnormal pap: No  
Last mammogram: never  
Sexually active: Yes  
History of STDS: None  
Patient concerns for STD exposure:   
No.  
Pain with intercourse: No  
Postcoital bleeding: No  
Exercise:active walking  
Diet: balanced  
OB History  
 T0  L0  
SAB0 IAB0 Ectopic0 Multiple0 Live   
Births0  
Comment: 3 stepchildren  
Gyn History  
LMP: 2022, Having periods  
Age at Menarche:  
Age at First Pregnancy:  
Age at Menopause:  
Gyn History Comments:  
Sexual Activity: Yes; Male;   
sexually active with   
Contraception: Pill  
PAST MEDICAL HISTORY  
Diagnosis Date  
Exertional asthma 2012  
Medial meniscus tear 2012  
Unspecified otitis media  
Recurrent otitis  
Vocal cord dysfunction 5/15/2012  
PAST SURGICAL HISTORY  
Procedure Laterality Date  
ANKLE ARTHROSCOPY Right 16  
Dr. Yeung  
FAMILY HISTORY  
Problem Relation Age of Onset  
other (Other [Other]) Paternal   
Grandfather  
leukemia  
Cancer Maternal Grandmother  
breast  
Asthma Sister  
SOCIAL HISTORY  
Social History  
Tobacco Use  
Smoking status: Never  
Smokeless tobacco: Never  
Vaping Use  
Vaping Use: Never used  
Substance Use Topics  
Alcohol use: Yes  
Drug use: No  
REVIEW OF SYSTEMS  
Abdomen: No abdominal pain,   
nausea, vomiting, diarrhea, or   
constipation.  
No bloating, early satiety,   
indigestion, or increased   
flatulence.  
Bladder: No dysuria, gross   
hematuria, urinary frequency,   
urinary urgency,  
or incontinence.  
Breast: No breast lumps, nipple   
d/c, overlying skin changes,   
redness or  
skin retraction.  
Allergies and current medication   
updated:Yes  
EXAM: /68   Ht 5' 4  (1.63m)   
  Wt 149 lb (67.6kg)   LMP   
2023    
BMI 25.56 kg/(m2).  
GENERAL: pleasant,    
female in no apparent distress  
HEENT: Normocephalic, atraumatic,   
mucus membranes moist, and no   
lesions  
NECK: Supple, full range of   
motion, no adenopathy, and thyroid   
normal  
DERMATOLOGY: Normal, without   
lesions, non-icteric, and   
non-hirsute  
BREAST: soft, non-tender,   
symmetric, no dominant mass,   
normal  
nipple-areolar complex, no   
lymphadenopathy, and no nipple   
discharge  
ABDOMEN: soft, non-tender, and no   
masses  
PELVIC: external genitalia normal,   
normal Bartholin's glands,   
urethra,  
Jenison's glands, no vulvar lesions,   
no cervical lesions, good vaginal  
support, physiologic discharge   
present, normal appearing perineal   
body and  
perianal region  
BIMANUAL: uterus normal size,   
shape and consistency, no adnexal   
masses,  
and non-tender  
RECTOVAGINAL: deferred.  
NEURO: alert and oriented x3,exam   
grossly non-focal  
EXTREMITIES: normal  
ASSESSMENT/PLAN:  
1) Health maintenance:  
Pap/HPV up to date.  
Mammogram starting age 40.  
Nutrition, exercise and routine   
health maintenance exams reviewed.  
Calcium/Vitamin D supplementation   
information provided.  
2) Contraception: combined   
hormonal contraceptives.   
Contraceptive  
options reviewed and information   
provided. Changed to singh  
3) STD screening: Declined STD   
check.  
4) Follow up one year or sooner as   
needed  
Awilda Schulz MD             [facility]  
   
                                                    2023 History of   
Present illness Narrative               Formatting of this note is   
different from the original.  
Chaperone offered: Patient   
declines.  
  
Don is a 23 year old    
who presents for an annual   
gynecologic exam without   
complaints. Does have irregular   
bleeding with pills.  does   
sprint car racing. Has three step   
children. Thinking about kids.  
  
Menses: cycles every 28 days and   
5-8 days but does have a lot of   
irregular bleeding for 3-4 days   
per month- not as heavy. Does not   
miss pills or take them late.  
Contraception: combined hormonal   
contraceptives  
HPV vaccine: Yes  
Last Pap: 2022 normal  
HPV: N/A  
History of abnormal pap: No  
Last mammogram: never  
Sexually active: Yes  
History of STDS: None  
Patient concerns for STD exposure:   
No.  
Pain with intercourse: No  
Postcoital bleeding: No  
Exercise:active walking  
Diet: balanced  
  
OB History  
 T0  L0  
SAB0 IAB0 Ectopic0 Multiple0 Live   
Births0  
  
Comment: 3 stepchildren  
  
Gyn History  
  
LMP: 2022, Having periods  
Age at Menarche:  
Age at First Pregnancy:  
Age at Menopause:  
Gyn History Comments:  
Sexual Activity: Yes; Male;   
sexually active with   
Contraception: Pill  
  
  
  
PAST MEDICAL HISTORY  
Diagnosis Date  
Exertional asthma 2012  
Medial meniscus tear 2012  
Unspecified otitis media  
Recurrent otitis  
Vocal cord dysfunction 5/15/2012  
  
PAST SURGICAL HISTORY  
Procedure Laterality Date  
ANKLE ARTHROSCOPY Right 16  
Dr. Yeung  
  
FAMILY HISTORY  
Problem Relation Age of Onset  
other (Other [Other]) Paternal   
Grandfather  
leukemia  
Cancer Maternal Grandmother  
breast  
Asthma Sister  
SOCIAL HISTORY  
Social History  
  
Tobacco Use  
Smoking status: Never  
Smokeless tobacco: Never  
Vaping Use  
Vaping Use: Never used  
Substance Use Topics  
Alcohol use: Yes  
Drug use: No  
  
REVIEW OF SYSTEMS  
Abdomen: No abdominal pain,   
nausea, vomiting, diarrhea, or   
constipation. No bloating, early   
satiety, indigestion, or increased   
flatulence.  
Bladder: No dysuria, gross   
hematuria, urinary frequency,   
urinary urgency, or incontinence.  
Breast: No breast lumps, nipple   
d/c, overlying skin changes,   
redness or skin retraction.  
Allergies and current medication   
updated:Yes  
  
EXAM: /68   Ht 5' 4  (1.63m)   
  Wt 149 lb (67.6kg)   LMP   
2023   BMI 25.56 kg/(m^2).  
  
  
GENERAL: pleasant,    
female in no apparent distress  
HEENT: Normocephalic, atraumatic,   
mucus membranes moist, and no   
lesions  
NECK: Supple, full range of   
motion, no adenopathy, and thyroid   
normal  
DERMATOLOGY: Normal, without   
lesions, non-icteric, and   
non-hirsute  
BREAST: soft, non-tender,   
symmetric, no dominant mass,   
normal nipple-areolar complex, no   
lymphadenopathy, and no nipple   
discharge  
ABDOMEN: soft, non-tender, and no   
masses  
PELVIC: external genitalia normal,   
normal Bartholin's glands,   
urethra, Jenison's glands, no vulvar   
lesions, no cervical lesions, good   
vaginal support, physiologic   
discharge present, normal   
appearing perineal body and   
perianal region  
BIMANUAL: uterus normal size,   
shape and consistency, no adnexal   
masses, and non-tender  
RECTOVAGINAL: deferred.  
NEURO: alert and oriented x3,exam   
grossly non-focal  
EXTREMITIES: normal  
  
ASSESSMENT/PLAN:  
1) Health maintenance:  
Pap/HPV up to date.  
Mammogram starting age 40.  
Nutrition, exercise and routine   
health maintenance exams reviewed.  
Calcium/Vitamin D supplementation   
information provided.  
2) Contraception: combined   
hormonal contraceptives.   
Contraceptive options reviewed and   
information provided. Changed to   
singh  
3) STD screening: Declined STD   
check.  
4) Follow up one year or sooner as   
needed  
  
Awilda Schulz MD  
  
Electronically signed by Awilda Bell MD at 2023   
3:09 PM EDT  
documented in this encounter            [facility]  
   
                                                    2023 Miscellaneous   
Notes                                   Formatting of this note is   
different from the original.  
Patient called in requesting   
refill today. Annual scheduled   
with DM 23. No need to call   
back.  
  
Requested Prescriptions  
  
Pending Prescriptions Disp Refills  
Norethindrone Acet-Ethinyl Est   
(JUNEL 1/20, 21,) 1-20 mg-mcg per   
tablet 28 tablet 2  
Sig: TAKE 1 TABLET BY MOUTH DAILY  
  
Shiloh Alvarado RN  
  
  
Electronically signed by Shiloh Alvarado RN at 2023 10:30 AM   
EDT  
documented in this encounter            [facility]  
   
                                                    2023 Miscellaneous   
Notes                                   Addended by: RACHEL NARAYANAN on:   
3/9/2023 03:14 PM  
  
Modules accepted: Orders  
  
  
Electronically signed by Rachel Narayanan APRN.CNM at 2023   
3:14 PM EST  
Addended by: JENNA STEPHENS LPN   
on: 3/9/2023 02:59 PM  
  
Modules accepted: Orders  
  
  
Electronically signed by Jenna Stephens LPN at 2023 2:59 PM   
EST  
Formatting of this note might be   
different from the original.  
Please see pended order below in   
DM absence. Pt is needing this   
today and it went to the wrong   
pharmacy. Call only if problems.   
Jenna Stephens LPN  
  
Electronically signed by Jenna Stephens LPN at 2023 2:59 PM   
EST  
Formatting of this note might be   
different from the original.  
ordered  
Electronically signed by Awilda Leonard MD at 2023   
2:54 PM EST  
Formatting of this note might be   
different from the original.  
Patient needing Rx before [facility]   
returns on Friday.  
Chitra Tijerina RN  
  
Electronically signed by Chitra Tijerina RN at 2023 1:53 PM   
EST  
Formatting of this note might be   
different from the original.  
RX pending. Patient last seen for   
annual 22  
Electronically signed by America Beck RN at 2023 1:15 PM   
EST  
documented in this encounter            [facility]  
   
                                                    2023 Miscellaneous   
Notes                                   Formatting of this note might be   
different from the original.  
Left detailed message on patient's   
voicemail  
Electronically signed by Ronel Diaz LPN at 2023 2:33   
PM EST  
Formatting of this note might be   
different from the original.  
Please let her know that I   
apologize. I thought the order was   
placed. Sorry for her   
inconvenience. Tomorrow morning   
would be fine for her to get her   
blood drawn.  
  
Lauryn Ryan MD  
  
Electronically signed by Lauryn Ryan MD at 2023 2:21 PM   
EST  
Formatting of this note might be   
different from the original.  
Patient was to have her 21 day   
progesterone drawn today. Patient   
went to the lab at 7 AM and there   
was no order. Patient voiced her   
frustration and lack of   
communication. If patient chooses,   
can she have it drawn tomorrow?   
Otherwise she's aware that she   
will have to wait until her next   
cycle. Order pending.  
America Beck RN  
  
Electronically signed by America Beck RN at 2023 2:14 PM   
EST  
documented in this encounter            [facility]  
   
                                                    2023 Miscellaneous   
Notes                                   Formatting of this note might be   
different from the original.  
Please call & explain that day 21   
would be  and   
today is day 1.  
Thanks!  
  
Lauryn Ryan MD  
  
Electronically signed by Lauryn Ryan MD at 2023 10:50 AM   
EST  
documented in this encounter            [facility]  
   
                                                    2022 Miscellaneous   
Notes                                   Formatting of this note might be   
different from the original.  
Menses can take 7-10 days to start   
after finishing provera.  
  
Lauryn Ryan MD  
  
Electronically signed by Lauryn Ryan MD at 2022 10:28 AM   
EST  
Formatting of this note might be   
different from the original.  
Rx provera given  
  
Lauryn Ryan MD  
  
Electronically signed by aLuryn Ryan MD at 2022 10:28 AM   
EST  
documented in this encounter            [facility]  
   
                                                    2022 Miscellaneous   
Notes                                   Formatting of this note might be   
different from the original.  
Left message for patient to call   
office or check [email or web address] message.  
Shiloh Alvarado RN  
  
Electronically signed by Shiloh Alvarado RN at 2022 11:20 AM   
EST  
Formatting of this note might be   
different from the original.  
Images from the original note were   
not included.  
Left message for patient to call   
office.  
MD Shiloh Baum RN, RN  
Please call patient & make sure   
she is taking a folic acid   
supplement as desires pregnancy.   
Thanks!  
  
[facility]  
Electronically signed by Shiloh Alvarado RN at 2022 3:30 PM   
EST  
documented in this encounter            [facility]  
   
                                                    2022 History of   
Present illness Narrative               Formatting of this note is   
different from the original.  
Don Mittal is a 22 year old   
female who presents for problem   
visit (virtually).  
  
HPI: Patient has questions about   
her positive pregnancy test and   
missed menses. Also she would like   
to get pregnant and has questions   
about that as well.  
  
OB History  
 T0  L0  
SAB0 IAB0 Ectopic0 Multiple0 Live   
Births0  
  
Comment: 3 stepchildren  
  
Gyn History  
  
LMP: 2022, Having periods  
Age at Menarche:  
Age at First Pregnancy:  
Age at Menopause:  
Gyn History Comments:  
Sexual Activity: Yes; Male;   
sexually active with   
Contraception: Pill  
  
  
  
PAST MEDICAL HISTORY  
Diagnosis Date  
Exertional asthma 2012  
Medial meniscus tear 2012  
Unspecified otitis media  
Recurrent otitis  
Vocal cord dysfunction 5/15/2012  
  
PAST SURGICAL HISTORY  
Procedure Laterality Date  
ANKLE ARTHROSCOPY Right 16  
Dr. Yeung  
  
FAMILY HISTORY  
Problem Relation Age of Onset  
other (Other [Other]) Paternal   
Grandfather  
leukemia  
Cancer Maternal Grandmother  
breast  
Asthma Sister  
  
Social History  
  
Tobacco Use  
Smoking status: Never  
Smokeless tobacco: Never  
Vaping Use  
Vaping Use: Never used  
Substance Use Topics  
Alcohol use: Yes  
Drug use: No  
  
Current Outpatient Medications  
Medication Sig  
Norethindrone Acet-Ethinyl Est   
(JUNEL 1/20, ,) 1-20 mg-mcg per   
tablet TAKE 1 TABLET BY MOUTH   
DAILY  
albuterol HFA (PROVENTIL HFA,   
VENTOLIN HFA) 90 mcg/actuation   
inhaler Inhale 2 Puffs as   
instructed every 4 hours as   
needed.  
  
No current facility-administered   
medications for this visit.  
  
Allergies As of Date: 2022  
Allergen Noted Reaction  
GRASS POLLEN 2013 Unknown  
SEASONAL ALLERGIES 2013   
Unknown  
TREES 2013 Unknown  
  
Fully Assessed 08/10/2022  
  
Allergies and current medication   
updated:Yes  
  
EXAM: LMP 2022  
  
  
GENERAL: pleasant,    
female in no apparent distress  
  
ASSESSMENT AND PLAN:  
23yo female with PCOS & missed   
menses  
Discussed likely false positive   
home pregnancy test vs blighted   
ovum. Serum hcg confirms patient   
is no pregnant.  
Missed menses - discussed R/B/A   
and will proceed with provera to   
induce menses if no menses by   
.  
PCOS - patient reports that home   
OPK don't show ovulation. Will   
check day 21 progesterone.  
All questions answered & patient   
agrees with plan.  
  
I spent a total of 25 minutes on   
the date of the service which   
included preparing to see the   
patient, face-to-face patient   
care, completing clinical   
documentation, and counseling and   
educating the   
patient/family/caregiver.  
  
Lauryn Ryan MD  
  
  
Electronically signed by Lauryn Ryan MD at 2022 2:56 PM   
EST  
documented in this encounter            [facility]  
   
                                                    2022 Miscellaneous   
Notes                                   Formatting of this note might be   
different from the original.  
Patient notified and voiced   
understanding of below. Virtual   
visit appointment scheduled per   
request.  
  
Chitra Tijerina RN  
  
Electronically signed by Chitra Tijerina RN at 2022 2:31 PM   
EST  
Formatting of this note might be   
different from the original.  
Based on the dates of her tests I   
suspect that she had a false home   
pregnancy test but can't exclude a   
blighted ovum. If she misses   
another menses I could give her   
provera to induce menses. If she'd   
like to discuss things more please   
offer her a virtual or in person   
visit.  
Has she stopped taking the ocps?  
Please remind her to take a folic   
acid supplement daily when trying   
to conceive.  
  
Lauryn Ryan MD  
  
Electronically signed by Lauryn Ryan MD at 2022 2:21 PM   
EST  
Formatting of this note might be   
different from the original.  
LMP 10/26/22  
She had positive UPT  at home   
and then negative hcg quant on   
. Concerned because she still   
hasn't started menses. Not having   
any symptoms like she is going to   
start soon. She has been having   
regular menses in July, August and   
September. She was previously   
diagnosed with PCOS earlier this   
year and is aware that can cause   
irregular menses. She has been   
trying to conceive x2 months.   
Asking what else she can do to   
help regulate menses to continue   
to try to conceive. Aware [facility] back   
in office tomorrow. Please advise.  
Shiloh Alvarado RN  
  
Electronically signed by Shiloh Alvarado RN at 2022 11:11 AM   
EST  
documented in this encounter            [facility]  
   
                                                    2022 Miscellaneous   
Notes                                   Formatting of this note might be   
different from the original.  
Patient notified and appt made for   
tomorrow at her request. Leave   
phone note open for quant result  
Electronically signed by Fariba Mojica RN at 2022 1:10 PM EST  
Formatting of this note might be   
different from the original.  
Hcg quant ordered  
  
Lauryn Ryan MD  
  
Electronically signed by Lauryn Ryan MD at 2022 12:12 PM   
EST  
Formatting of this note might be   
different from the original.  
Pt calling and stated that she got   
a positive pregnancy test on   
10/19/22. Pt has her PNOB and NOB   
appointment scheduled. She took   
another test this morning and and   
received a negative test. Pt   
stating she also go a negative   
results yesterday as well. Pt   
wanting to know if she can have a   
serum quant to verify. LMP   
10/26/22. Please advise. Jenna Stephens LPN  
  
Electronically signed by Jenna Stephens LPN at 2022 11:28 AM   
EST  
documented in this encounter            [facility]  
   
                                                    2022 Miscellaneous   
Notes                                     
  
  
Formatting of this note might be   
different from the original.  
See phone note.  
Shiloh Alvarado RN  
  
  
  
Electronically signed by Shiloh Alvarado RN at 2022 8:50 AM   
EDT  
  
  
Formatting of this note might be   
different from the original.  
This is [facility] patient- I read   
ultrasound. Please forward to her.  
  
  
Electronically signed by Awilda Leonard MD at 2022   
8:45 AM EDTdocumented in this   
encounter                               [facility]  
   
                                                    2022 History of   
Present illness Narrative                 
  
  
Formatting of this note is   
different from the original.  
Don is a 22 year old who   
presents for an annual gynecologic   
exam.  
  
Menses are typically regular. She   
had some breakthrough bleeding so   
she stopped the ocps last month.   
The episode of irregular bleeding   
was heavy. She then restarted the   
ocps. Patient has changed jobs   
(last year) & got  in   
March. She takes her ocps   
regularly. Patient states home hcg   
tests are negative.  
  
Menses:regular other than above  
Contraception: combined hormonal   
contraceptives  
HPV vaccine: Yes  
Last Pap: 2019 normal  
HPV: N/A  
History of abnormal pap: No  
Last mammogram: never  
  
OB History  
No obstetric history on file.  
  
Gyn History  
LMP: 2022, Having periods  
Age at Menarche:  
Age at First Pregnancy:  
Age at Menopause:  
Gyn History Comments:  
Sexual Activity: Yes; Male;   
sexually active with   
Contraception: Pill  
  
  
PAST MEDICAL HISTORY  
Diagnosis Date  
Exertional asthma 2012  
Medial meniscus tear 2012  
Unspecified otitis media  
Recurrent otitis  
Vocal cord dysfunction 5/15/2012  
  
PAST SURGICAL HISTORY  
Procedure Laterality Date  
ANKLE ARTHROSCOPY Right 16  
Dr. Yeung  
  
FAMILY HISTORY  
Problem Relation Age of Onset  
other (Other [Other]) Paternal   
Grandfather  
leukemia  
Cancer Maternal Grandmother  
breast  
Asthma Sister  
SOCIAL HISTORY  
Social History  
  
Tobacco Use  
Smoking status: Never Smoker  
Smokeless tobacco: Never Used  
Vaping Use  
Vaping Use: Never used  
Substance Use Topics  
Alcohol use: Yes  
Drug use: No  
  
REVIEW OF SYSTEMS  
Abdomen: No abdominal pain,   
nausea, vomiting, diarrhea, or   
constipation. No bloating, early   
satiety, indigestion, or increased   
flatulence.  
Bladder: No dysuria, gross   
hematuria, urinary frequency,   
urinary urgency, or incontinence.  
Breast: No breast lumps, nipple   
d/c, overlying skin changes,   
redness or skin retraction.  
Allergies and current medication   
updated:Yes  
  
EXAM: /64   Ht 5' 4.173    
(1.63m)   Wt 149 lb (67.6kg)   LMP   
2022   BMI 25.44 kg/(m^2).  
  
  
GENERAL: pleasant,    
female in no apparent distress  
BREAST: soft, non-tender,   
symmetric, no dominant mass,   
normal nipple-areolar complex, no   
lymphadenopathy and no nipple   
discharge  
CHEST: Normal inspiratory effort  
ABDOMEN: soft, non-tender and no   
masses  
PELVIC: external genitalia normal,   
normal Bartholin's glands,   
urethra, Jenison's glands, no vulvar   
lesions, no cervical lesions, good   
vaginal support, physiologic   
discharge present, normal   
appearing perineal body and   
perianal region  
BIMANUAL: uterus normal size,   
shape and consistency, no adnexal   
masses and non-tender  
RECTOVAGINAL: deferred.  
NEURO: alert and oriented x3,exam   
grossly non-focal  
EXTREMITIES: normal  
  
ASSESSMENT/PLAN:  
1) Health maintenance:  
Pap done with HPV.  
Nutrition, exercise and routine   
health maintenance exams reviewed.  
2) Contraception: combined   
hormonal contraceptives.   
Contraceptive options reviewed and   
information provided.  
3) STD screening: Declined STD   
check.  
4) Follow up one year or sooner as   
needed  
5) Breakthrough bleeding - likely   
from stress & then stopping ocps.   
Check labs & pelvic US.  
  
Lauryn Ryan MD  
  
  
Electronically signed by Lauryn Ryan MD at 2022 8:54 AM   
EDTdocumented in this encounter         [facility]  
  
  
  
                                                    2013 History of Past i  
llness Narrative   
  
  
  
                                Problem         Noted Date      Resolved Date  
   
                                Posterior tibial tendonitis 2013  
   
                                Achilles tendinitis 2013  
  
documented as of this encounter (statuses as of 2022)  
[facility]- History of Past illness Narrative*   
  
                                Problem         Noted Date      Resolved Date  
   
                                Posterior tibial tendonitis 2013  
   
                                Achilles tendinitis 2013  
  
documented as of this encounter (statuses as of 2022)  
[facility] History of Past illness Narrative*   
  
                                Problem         Noted Date      Resolved Date  
   
                                Posterior tibial tendonitis 2013  
   
                                Achilles tendinitis 2013  
  
documented as of this encounter (statuses as of 2022)  
[facility] History of Past illness Narrative*   
  
                                Problem         Noted Date      Resolved Date  
   
                                Posterior tibial tendonitis 2013  
   
                                Achilles tendinitis 2013  
  
documented as of this encounter (statuses as of 2022)  
[facility] History of Past illness Narrative*   
  
                                Problem         Noted Date      Resolved Date  
   
                                Posterior tibial tendonitis 2013  
   
                                Achilles tendinitis 2013  
  
documented as of this encounter (statuses as of 2022)  
[facility] History of Past illness Narrative*   
  
                                Problem         Noted Date      Resolved Date  
   
                                Posterior tibial tendonitis 2013  
   
                                Achilles tendinitis 2013  
  
documented as of this encounter (statuses as of 2022)  
[facility] History of Past illness Narrative*   
  
                                Problem         Noted Date      Resolved Date  
   
                                Posterior tibial tendonitis 2013  
   
                                Achilles tendinitis 2013  
  
documented as of this encounter (statuses as of 2022)  
[facility] History of Past illness Narrative*   
  
                                Problem         Noted Date      Resolved Date  
   
                                Posterior tibial tendonitis 2013  
   
                                Achilles tendinitis 2013  
  
documented as of this encounter (statuses as of 2023)  
[facility] History of Past illness Narrative*   
  
                                Problem         Noted Date      Resolved Date  
   
                                Posterior tibial tendonitis 2013  
   
                                Achilles tendinitis 2013  
  
documented as of this encounter (statuses as of 2023)  
[facility] History of Past illness Narrative*   
  
                                Problem         Noted Date      Resolved Date  
   
                                Posterior tibial tendonitis 2013  
   
                                Achilles tendinitis 2013  
  
documented as of this encounter (statuses as of 2023)  
[facility] History of Past illness Narrative*   
  
                                Problem         Noted Date      Resolved Date  
   
                                Posterior tibial tendonitis 2013  
   
                                Achilles tendinitis 2013  
  
documented as of this encounter (statuses as of 2023)  
[facility] History of Past illness Narrative*   
  
                                Problem         Noted Date      Resolved Date  
   
                                Posterior tibial tendonitis 2013  
   
                                Achilles tendinitis 2013  
  
documented as of this encounter (statuses as of 2023)  
[facility] History of Past illness Narrative*   
  
                          Problem      Noted Date   Diagnosed Date Resolved Date  
   
                          Posterior tibial tendonitis 2013  
   
                          Achilles tendinitis 2013  
15  
  
documented as of this encounter (statuses as of 2023)  
[facility] History of Past illness Narrative*   
  
                          Problem      Noted Date   Diagnosed Date Resolved Date  
   
                          Posterior tibial tendonitis 2013  
   
                          Achilles tendinitis 2013  
15  
  
documented as of this encounter (statuses as of 2024)  
[facility] History of Past illness Narrative*   
  
                          Problem      Noted Date   Diagnosed Date Resolved Date  
   
                          Posterior tibial tendonitis 2013  
   
                          Achilles tendinitis 2013  
15  
  
documented as of this encounter (statuses as of 2024)  
[facility]   
  
                                                    Diagnosis  
   
                                                      
  
  
Encounter for gynecological examination without abnormal finding- Primary  
  
  
Routine gynecological examination  
   
                                                      
  
  
Encounter for screening for malignant neoplasm of cervix  
  
  
Screening for malignant neoplasm of the cervix  
   
                                                      
  
  
Irregular intermenstrual bleeding  
  
  
Metrorrhagia  
  
documented in this encounter  
[facility]   
  
                                                    Diagnosis  
   
                                                      
  
  
Abnormal uterine bleeding (AUB)- Primary  
  
documented in this encounter  
[facility]   
  
                                                    Diagnosis  
   
                                                      
  
  
Missed menses- Primary  
  
  
Absence of menstruation  
  
documented in this encounter  
[facility]   
  
                                                    Diagnosis  
   
                                                      
  
  
PCOS (polycystic ovarian syndrome)- Primary  
  
  
Polycystic ovaries  
   
                                                      
  
  
Missed menses  
  
  
Absence of menstruation  
  
documented in this encounter  
[facility]   
  
                                                    Diagnosis  
   
                                                      
  
  
Anovulation- Primary  
  
  
Female infertility associated with anovulation  
  
documented in this encounter  
[facility]   
  
                                                    Diagnosis  
   
                                                      
  
  
Encounter for gynecological examination (general) (routine) without abnormal   
findings- Primary  
   
                                                      
  
  
PCOS (polycystic ovarian syndrome)  
  
  
Polycystic ovaries  
  
documented in this encounter  
[facility]   
  
                                                    Diagnosis  
   
                                                      
  
  
Annual physical exam- Primary  
  
  
Routine general medical examination at a health care facility  
   
                                                      
  
  
Screening for deficiency anemia  
  
  
Screening for other and unspecified deficiency anemia  
   
                                                      
  
  
Screening for cholesterol level  
   
                                                      
  
  
Anxiety and depression  
  
documented in this encounter  
[facility]   
  
                                                    Diagnosis  
   
                                                      
  
  
Anxiety and depression- Primary  
   
                                                      
  
  
Weight gain  
  
  
Other symptoms concerning nutrition, metabolism, and development  
  
documented in this encounter  
[facility] note*   
  
                                                    Diagnosis  
   
                                                      
  
  
Anxiety and depression- Primary  
   
                                                      
  
  
Weight gain  
  
  
Other symptoms concerning nutrition, metabolism, and development  
  
documented in this encounter  
[facility] note*   
  
                                                    Diagnosis  
   
                                                      
  
  
Anxiety and depression- Primary  
   
                                                      
  
  
PCOS (polycystic ovarian syndrome)  
  
  
Polycystic ovaries  
  
documented in this encounter  
[facility] note*   
  
                                                    Diagnosis  
   
                                                      
  
  
Anxiety and depression- Primary  
   
                                                      
  
  
PCOS (polycystic ovarian syndrome)  
  
  
Polycystic ovaries  
  
documented in this encounter  
[facility] note*   
  
                                                    Diagnosis  
   
                                                      
  
  
PCOS (polycystic ovarian syndrome)- Primary  
  
  
Polycystic ovaries  
   
                                                      
  
  
Elevated LDL cholesterol level  
  
  
Pure hypercholesterolemia  
   
                                                      
  
  
IFG (impaired fasting glucose)  
  
  
Impaired fasting glucose  
   
                                                      
  
  
Irregular menses  
  
  
Irregular menstrual cycle  
   
                                                      
  
  
Anxiety and depression  
  
  
Dysthymic disorder  
   
                                                      
  
  
History of bulimia  
  
  
Personal history of other mental disorder  
   
                                                      
  
  
History of anorexia nervosa  
  
  
Personal history of other mental disorder  
   
                                                      
  
  
Overweight with body mass index (BMI) of 29 to 29.9 in adult  
  
documented in this encounter  
[facility] note*   
  
                                                    Diagnosis  
   
                                                      
  
  
PCOS (polycystic ovarian syndrome)- Primary  
  
  
Polycystic ovaries  
   
                                                      
  
  
Irregular menses  
  
  
Irregular menstrual cycle  
   
                                                      
  
  
Anxiety and depression  
  
  
Dysthymic disorder  
   
                                                      
  
  
History of bulimia  
  
  
Personal history of other mental disorder  
   
                                                      
  
  
History of anorexia nervosa  
  
  
Personal history of other mental disorder  
   
                                                      
  
  
Provided repeat prescription for oral contraceptive  
   
                                                      
  
  
Overweight with body mass index (BMI) of 29 to 29.9 in adult  
  
documented in this encounter  
[facility] note*   
  
                                                    Diagnosis  
   
                                                      
  
  
Anxiety and depression- Primary  
   
                                                      
  
  
Skin sensation disturbance  
  
  
Disturbance of skin sensation  
  
documented in this encounter  
[facility] for referral (narrative)* Diagnostic Procedure Only (Routine)
   - Authorized  
  
                          Specialty    Diagnoses / Procedures Referred By Contac  
t Referred To Contact  
   
                                                    [facility]                                 
  
  
Diagnoses  
  
  
Irregular intermenstrual   
bleeding  
  
  
  
Procedures  
  
  
PELVIC US WHI  
  
  
US PELVIC NONOBSTETRIC   
REAL-TIME IMAGE COMPLETE                  
  
  
Lauryn Ryan MD  
  
  
[facility] [place] [address] [place] [address]  
  
  
Phone: [phone]  
  
  
Fax: [phone]                         
  
  
[facility]  
  
  
[address] [place] [address]  
  
  
  
                          Referral ID  Status       Reason       Start   
Date                                    Expiration   
Date                                    Visits   
Requested                               Visits   
Authorized  
   
                                [id number]        Authorized        
  
  
[employer]  
ed Referral     2022       1               1  
  
  
  
  
Electronically signed by Lauryn Ryan MD at 2022 7:46 AM EDT  
  
  
[facility]  
  
Summary Purpose  
  
  
                                                      
  
  
  
Family History  
No Family History Records FoundNo Family History Records Found  
  
Advance Directives  
No Advanced Directives Records FoundNo Advanced Directives Records Found  
  
Additional Source Comments  
  
  
  
                                                    Source Comments (unrecognize  
d section and content)  
   
                                                    In the event this informatio  
n is protected by the Federal Confidentiality of   
Alcohol   
and Drug Abuse Patient Records regulations: This information has been disclosed 
to   
you from records protected by Federal confidentiality rules (42 CFR Part 2). The
   
Federal rules prohibit you from making any further disclosure of this 
information   
unless further disclosure is expressly permitted by the written consent of the 
person   
to whom it pertains or as otherwise permitted by 42 CFR Part 2. A general   
authorization for the release of medical or other information is NOT sufficient 
for   
this purpose. The Federal rules restrict any use of the information to 
criminally   
investigate or prosecute any alcohol or drug abuse patient.[facility] 
the   
event this information is protected by the Federal Confidentiality of Alcohol 
and   
Drug Abuse Patient Records regulations: This information has been disclosed to 
you   
from records protected by Federal confidentiality rules (42 CFR Part 2). The 
Federal   
rules prohibit you from making any further disclosure of this information unless
   
further disclosure is expressly permitted by the written consent of the person 
to   
whom it pertains or as otherwise permitted by 42 CFR Part 2. A general 
authorization   
for the release of medical or other information is NOT sufficient for this 
purpose.   
The Federal rules restrict any use of the information to criminally investigate 
or   
prosecute any alcohol or drug abuse patient.[facility] the event this   
information is protected by the Federal Confidentiality of Alcohol and Drug 
Abuse   
Patient Records regulations: This information has been disclosed to you from 
records   
protected by Federal confidentiality rules (42 CFR Part 2). The Federal rules   
prohibit you from making any further disclosure of this information unless 
further   
disclosure is expressly permitted by the written consent of the person to whom 
it   
pertains or as otherwise permitted by 42 CFR Part 2. A general authorization for
 the   
release of medical or other information is NOT sufficient for this purpose. The   
Federal rules restrict any use of the information to criminally investigate or   
prosecute any alcohol or drug abuse patient.[facility] the event this   
information is protected by the Federal Confidentiality of Alcohol and Drug 
Abuse   
Patient Records regulations: This information has been disclosed to you from 
records   
protected by Federal confidentiality rules (42 CFR Part 2). The Federal rules   
prohibit you from making any further disclosure of this information unless 
further   
disclosure is expressly permitted by the written consent of the person to whom 
it   
pertains or as otherwise permitted by 42 CFR Part 2. A general authorization for
 the   
release of medical or other information is NOT sufficient for this purpose. The   
Federal rules restrict any use of the information to criminally investigate or   
prosecute any alcohol or drug abuse patient.[facility] the event this   
information is protected by the Federal Confidentiality of Alcohol and Drug 
Abuse   
Patient Records regulations: This information has been disclosed to you from 
records   
protected by Federal confidentiality rules (42 CFR Part 2). The Federal rules   
prohibit you from making any further disclosure of this information unless 
further   
disclosure is expressly permitted by the written consent of the person to whom 
it   
pertains or as otherwise permitted by 42 CFR Part 2. A general authorization for
 the   
release of medical or other information is NOT sufficient for this purpose. The   
Federal rules restrict any use of the information to criminally investigate or   
prosecute any alcohol or drug abuse patient.[facility] the event this   
information is protected by the Federal Confidentiality of Alcohol and Drug 
Abuse   
Patient Records regulations: This information has been disclosed to you from 
records   
protected by Federal confidentiality rules (42 CFR Part 2). The Federal rules   
prohibit you from making any further disclosure of this information unless 
further   
disclosure is expressly permitted by the written consent of the person to whom 
it   
pertains or as otherwise permitted by 42 CFR Part 2. A general authorization for
 the   
release of medical or other information is NOT sufficient for this purpose. The   
Federal rules restrict any use of the information to criminally investigate or   
prosecute any alcohol or drug abuse patient.[facility] the event this   
information is protected by the Federal Confidentiality of Alcohol and Drug 
Abuse   
Patient Records regulations: This information has been disclosed to you from 
records   
protected by Federal confidentiality rules (42 CFR Part 2). The Federal rules   
prohibit you from making any further disclosure of this information unless 
further   
disclosure is expressly permitted by the written consent of the person to whom 
it   
pertains or as otherwise permitted by 42 CFR Part 2. A general authorization for
 the   
release of medical or other information is NOT sufficient for this purpose. The   
Federal rules restrict any use of the information to criminally investigate or   
prosecute any alcohol or drug abuse patient.[facility] HamiltonIn the event this   
information is protected by the Federal Confidentiality of Alcohol and Drug 
Abuse   
Patient Records regulations: This information has been disclosed to you from 
records   
protected by Federal confidentiality rules (42 CFR Part 2). The Federal rules   
prohibit you from making any further disclosure of this information unless 
further   
disclosure is expressly permitted by the written consent of the person to whom 
it   
pertains or as otherwise permitted by 42 CFR Part 2. A general authorization for
 the   
release of medical or other information is NOT sufficient for this purpose. The   
Federal rules restrict any use of the information to criminally investigate or   
prosecute any alcohol or drug abuse patient.[facility] HamiltonIn the event this   
information is protected by the Federal Confidentiality of Alcohol and Drug 
Abuse   
Patient Records regulations: This information has been disclosed to you from 
records   
protected by Federal confidentiality rules (42 CFR Part 2). The Federal rules   
prohibit you from making any further disclosure of this information unless 
further   
disclosure is expressly permitted by the written consent of the person to whom 
it   
pertains or as otherwise permitted by 42 CFR Part 2. A general authorization for
 the   
release of medical or other information is NOT sufficient for this purpose. The   
Federal rules restrict any use of the information to criminally investigate or   
prosecute any alcohol or drug abuse patient.[facility] HamiltonIn the event this   
information is protected by the Federal Confidentiality of Alcohol and Drug 
Abuse   
Patient Records regulations: This information has been disclosed to you from 
records   
protected by Federal confidentiality rules (42 CFR Part 2). The Federal rules   
prohibit you from making any further disclosure of this information unless 
further   
disclosure is expressly permitted by the written consent of the person to whom 
it   
pertains or as otherwise permitted by 42 CFR Part 2. A general authorization for
 the   
release of medical or other information is NOT sufficient for this purpose. The   
Federal rules restrict any use of the information to criminally investigate or   
prosecute any alcohol or drug abuse patient.[facility] HamiltonIn the event this   
information is protected by the Federal Confidentiality of Alcohol and Drug 
Abuse   
Patient Records regulations: This information has been disclosed to you from 
records   
protected by Federal confidentiality rules (42 CFR Part 2). The Federal rules   
prohibit you from making any further disclosure of this information unless 
further   
disclosure is expressly permitted by the written consent of the person to whom 
it   
pertains or as otherwise permitted by 42 CFR Part 2. A general authorization for
 the   
release of medical or other information is NOT sufficient for this purpose. The   
Federal rules restrict any use of the information to criminally investigate or   
prosecute any alcohol or drug abuse patient.[facility] HamiltonIn the event this   
information is protected by the Federal Confidentiality of Alcohol and Drug 
Abuse   
Patient Records regulations: This information has been disclosed to you from 
records   
protected by Federal confidentiality rules (42 CFR Part 2). The Federal rules   
prohibit you from making any further disclosure of this information unless 
further   
disclosure is expressly permitted by the written consent of the person to whom 
it   
pertains or as otherwise permitted by 42 CFR Part 2. A general authorization for
 the   
release of medical or other information is NOT sufficient for this purpose. The   
Federal rules restrict any use of the information to criminally investigate or   
prosecute any alcohol or drug abuse patient.[facility] HamiltonIn the event this   
information is protected by the Federal Confidentiality of Alcohol and Drug 
Abuse   
Patient Records regulations: This information has been disclosed to you from 
records   
protected by Federal confidentiality rules (42 CFR Part 2). The Federal rules   
prohibit you from making any further disclosure of this information unless 
further   
disclosure is expressly permitted by the written consent of the person to whom 
it   
pertains or as otherwise permitted by 42 CFR Part 2. A general authorization for
 the   
release of medical or other information is NOT sufficient for this purpose. The   
Federal rules restrict any use of the information to criminally investigate or   
prosecute any alcohol or drug abuse patient.[facility] HamiltonIn the event this   
information is protected by the Federal Confidentiality of Alcohol and Drug 
Abuse   
Patient Records regulations: This information has been disclosed to you from 
records   
protected by Federal confidentiality rules (42 CFR Part 2). The Federal rules   
prohibit you from making any further disclosure of this information unless 
further   
disclosure is expressly permitted by the written consent of the person to whom 
it   
pertains or as otherwise permitted by 42 CFR Part 2. A general authorization for
 the   
release of medical or other information is NOT sufficient for this purpose. The   
Federal rules restrict any use of the information to criminally investigate or   
prosecute any alcohol or drug abuse patient.[facility] HamiltonIn the event this   
information is protected by the Federal Confidentiality of Alcohol and Drug 
Abuse   
Patient Records regulations: This information has been disclosed to you from 
records   
protected by Federal confidentiality rules (42 CFR Part 2). The Federal rules   
prohibit you from making any further disclosure of this information unless 
further   
disclosure is expressly permitted by the written consent of the person to whom 
it   
pertains or as otherwise permitted by 42 CFR Part 2. A general authorization for
 the   
release of medical or other information is NOT sufficient for this purpose. The   
Federal rules restrict any use of the information to criminally investigate or   
prosecute any alcohol or drug abuse patient.[facility]  
  
  
  
  
                                                    Reason for Visit (unrecogniz  
ed section and content)  
   
                                          
  
  
  
                                Reason          Onset Date      Comments  
   
                                Yearly Exam     2022        
  
  
  
                                        Reason              Comments  
   
                                        GYN Ultrasound        
  
  
  
                                        Reason              Comments  
   
                                        Patient Question      
  
  
  
                                        Reason              Comments  
   
                                        Irregular Menstrual Cycle   
  
  
  
                                        Reason              Comments  
   
                                        Follow Up             
  
  
  
                                        Reason              Comments  
   
                                        Orders                
  
  
  
                                Reason          Onset Date      Comments  
   
                                Refill Request                    
   
                                Refill Request  2023        
  
  
  
                                        Reason              Comments  
   
                                        Yearly Exam           
  
  
  
                                        Reason              Comments  
   
                                        Follow-up             
   
                                        Health Maintenance  Lipid-pendedHIV/Hep   
P-pbrapssbLdzvg-fzllelnzGIJ-completedPap-CCF   
(will scan in)Tdap-declinedInfluenza-declined  
  
  
  
                                        Reason              Comments  
   
                                        Medication Check      
   
                                        Weight Gain           
  
  
  
                                        Reason              Comments  
   
                                        Medication Check      
  
  
  
                                        Reason              Comments  
   
                                        Follow Up           Labs and medication  
  
  
  
                                        Reason              Comments  
   
                                        Weight Management     
  
  
  
                                        Reason              Comments  
   
                                        Medication Check      
   
                                        Foot Problem          
  
  
  
  
  
                                                    Care Teams (unrecognized sec  
tion and content)  
   
                                          
  
  
  
                      Team Member Relationship Specialty  Start Date End Date  
   
                                                      
  
  
MachelleVirginie ramos III, MD  
  
  
NO FORWARDING ADDRESS PCP - General                   10/24/02          
  
  
  
                      Team Member Relationship Specialty  Start Date End Date  
   
                                                      
  
  
MachelleVirginie ramos III, MD  
  
  
NO FORWARDING ADDRESS PCP - General                   10/24/02          
  
  
  
                      Team Member Relationship Specialty  Start Date End Date  
   
                                                      
  
  
MachelleVirginie ramos III, MD  
  
  
NO FORWARDING ADDRESS PCP - General                   10/24/02          
  
  
  
                      Team Member Relationship Specialty  Start Date End Date  
   
                                                      
  
  
Virginie Renee III, MD  
  
  
NO FORWARDING ADDRESS PCP - General                   10/24/02          
  
  
  
                      Team Member Relationship Specialty  Start Date End Date  
   
                                                      
  
  
MachelleVirginie ramos III, MD  
  
  
NO FORWARDING ADDRESS PCP - General                   10/24/02          
  
  
  
                      Team Member Relationship Specialty  Start Date End Date  
   
                                                      
  
  
MachelleVirginie ramos III, MD  
  
  
NO FORWARDING ADDRESS PCP - General                   10/24/02          
  
  
  
                      Team Member Relationship Specialty  Start Date End Date  
   
                                                      
  
  
Virginie Renee III, MD  
  
  
NO FORWARDING ADDRESS PCP - General                   10/24/02          
  
  
  
                      Team Member Relationship Specialty  Start Date End Date  
   
                                                      
  
  
Virginie Renee III, MD  
  
  
NO FORWARDING ADDRESS PCP - General                   10/24/02          
  
  
  
                      Team Member Relationship Specialty  Start Date End Date  
   
                                                      
  
  
Virginie Renee III, MD  
  
  
NO FORWARDING ADDRESS PCP - General                   10/24/02          
  
  
  
                      Team Member Relationship Specialty  Start Date End Date  
   
                                                      
  
  
MachelleVirginie ramos III, MD  
  
  
NPI: [id number]  
  
  
NO FORWARDING ADDRESS PCP - General                   10/24/02          
  
  
  
                      Team Member Relationship Specialty  Start Date End Date  
   
                                                      
  
  
Jose Sanderson MD  
  
  
NPI: [id number]  
  
  
[address] [place] [address] B  
  
  
[place] [address]  
  
  
[phone] (Work)  
  
  
[phone] (Fax) PCP - General   Family [facility] 23          
  
  
  
                      Team Member Relationship Specialty  Start Date End Date  
   
                                                      
  
  
Jose Sanderson MD  
  
  
NPI: [id number]  
  
  
[address] [place], [place] [address], [place] [address]  
  
  
[phone] (Work)  
  
  
[phone] (Fax) PCP - General   Family Medicine 23          
  
  
  
                      Team Member Relationship Specialty  Start Date End Date  
   
                                                      
  
  
Jose Sanderson MD  
  
  
NPI: [id number]  
  
  
[address] [facility]  
  
  
[place] [address]  
  
  
[phone] (Work)  
  
  
[phone] (Fax) PCP - [facility] 23          
  
  
  
                      Team Member Relationship Specialty  Start Date End Date  
   
                                                      
  
  
Jose Sanderson MD  
  
  
NPI: [id number]  
  
  
[facility] [place] [place] [address]  
  
  
[phone] (Work)  
  
  
[phone] (Fax) PCP - [facility]   Family Medicine 23          
  
  
  
                      Team Member Relationship Specialty  Start Date End Date  
   
                                                      
  
  
Monica Sanchez CNP  
  
  
NPI: [id number]  
  
  
[address] [facility] [place] [id number] [phone] (Work)  
  
  
[phone] (Fax) PCP - General   Family Medicine 23          
  
  
  
                      Team Member Relationship Specialty  Start Date End Date  
   
                                                      
  
  
Monica Sanchez CNP  
  
  
NPI: [id number]  
  
  
[address] [place], [place] [id number] [phone] (Work)  
  
  
[phone] (Fax) PCP - General   Family Medicine 23          
  
  
  
                      Team Member Relationship Specialty  Start Date End Date  
   
                                                      
  
  
Jose Sanderson MD  
  
  
NPI: [id number]  
  
  
[address] [place] [address]  
  
  
[phone] (Work)  
  
  
[phone] (Fax) PCP - General   Family Medicine 23          
  
  
  
  
  
                                                    INFORMATION SOURCE (unrecogn  
ized section and content)  
   
                                          
  
  
  
                                        DATE CREATED        AUTHOR  
   
                                2024                      [facility]  
  
  
  
                                DATE CREATED    AUTHOR          AUTHOR'S ISIAH SAMPSON  
   
                                2024                      [facility]  
  
  
FOR RECORDS PERTAINING TO PATIENTS WHO ARE OR HAVE BEEN ENROLLED IN A CHEMICAL 
DEPENDENCY/SUBSTANCEABUSE PROGRAM, SOME INFORMATION MAY BE OMITTED. This 
clinical summary was aggregated from multiple sources. Caution should be 
exercised in using it in the provision of clinical care. This summary normalizes
 information from multiple sources, and as a consequence, information in this 
document may materially change the coding, format and clinical context of 
patient data. In addition, data may be omitted in some cases. CLINICAL DECISIONS
 SHOULD BE BASED ON THE PRIMARY CLINICAL RECORDS. [employer] [facility]. provides
 no warranty or guarantee of the accuracy or completeness of information in this
 document.

## 2024-12-02 ENCOUNTER — OFFICE VISIT (OUTPATIENT)
Dept: URGENT CARE | Age: 24
End: 2024-12-02
Payer: COMMERCIAL

## 2024-12-02 VITALS
BODY MASS INDEX: 27.83 KG/M2 | HEART RATE: 90 BPM | HEIGHT: 64 IN | OXYGEN SATURATION: 98 % | DIASTOLIC BLOOD PRESSURE: 74 MMHG | WEIGHT: 163 LBS | RESPIRATION RATE: 20 BRPM | TEMPERATURE: 98.9 F | SYSTOLIC BLOOD PRESSURE: 125 MMHG

## 2024-12-02 DIAGNOSIS — L25.9 CONTACT DERMATITIS, UNSPECIFIED CONTACT DERMATITIS TYPE, UNSPECIFIED TRIGGER: Primary | ICD-10-CM

## 2024-12-02 RX ORDER — PREDNISONE 10 MG/1
TABLET ORAL
Qty: 30 TABLET | Refills: 0 | Status: SHIPPED | OUTPATIENT
Start: 2024-12-02 | End: 2024-12-13

## 2024-12-02 RX ORDER — TRIAMCINOLONE ACETONIDE 1 MG/G
CREAM TOPICAL
Qty: 15 G | Refills: 0 | Status: SHIPPED | OUTPATIENT
Start: 2024-12-02

## 2024-12-02 ASSESSMENT — ENCOUNTER SYMPTOMS
HEMATOLOGIC/LYMPHATIC NEGATIVE: 1
MUSCULOSKELETAL NEGATIVE: 1
EYES NEGATIVE: 1
GASTROINTESTINAL NEGATIVE: 1
CONSTITUTIONAL NEGATIVE: 1
PSYCHIATRIC NEGATIVE: 1
NEUROLOGICAL NEGATIVE: 1
ALLERGIC/IMMUNOLOGIC NEGATIVE: 1
ENDOCRINE NEGATIVE: 1
CARDIOVASCULAR NEGATIVE: 1

## 2024-12-02 ASSESSMENT — PAIN SCALES - GENERAL: PAINLEVEL_OUTOF10: 2

## 2024-12-02 NOTE — PROGRESS NOTES
"Subjective   Patient ID: Ina Velazco is a 24 y.o. female. They present today with a chief complaint of Rash.    History of Present Illness  Patient is a 23 y/o female c/o red, raised, rash to right ABD, neck, chest x5 days. Denies ingestion of new foods/drinks, use of new detergents/soap/conditioner/shampoos. Patient reports symptoms began shortly after utilizing Witch Hazel. Patient denies symptoms of fever, chills, bodyaches, lethargy, weakness, chest pain/tightness/pressure, SOB, wheezing, N/V/D, ABD pain. No OTC medication reported to be taken.       Rash        Past Medical History  Allergies as of 12/02/2024 - Reviewed 12/02/2024   Allergen Reaction Noted    Bee pollen Other 05/01/2013       (Not in a hospital admission)       No past medical history on file.    No past surgical history on file.     reports that she has never smoked. She has never used smokeless tobacco. She reports that she does not currently use alcohol.    Review of Systems  Review of Systems   Constitutional: Negative.    HENT: Negative.     Eyes: Negative.    Cardiovascular: Negative.    Gastrointestinal: Negative.    Endocrine: Negative.    Genitourinary: Negative.    Musculoskeletal: Negative.    Skin:  Positive for rash.   Allergic/Immunologic: Negative.    Neurological: Negative.    Hematological: Negative.    Psychiatric/Behavioral: Negative.                                    Objective    Vitals:    12/02/24 1445   BP: 125/74   Pulse: 90   Resp: 20   Temp: 37.2 °C (98.9 °F)   TempSrc: Oral   SpO2: 98%   Weight: 73.9 kg (163 lb)   Height: 1.613 m (5' 3.5\")     No LMP recorded.    Physical Exam  Constitutional:       Comments: Patient A/O x4, LOC 5, calm and cooperative. Patient self-ambulatory to treatment area and is in no acute distress.    HENT:      Head: Normocephalic and atraumatic.      Right Ear: Tympanic membrane normal.      Left Ear: Tympanic membrane normal.      Nose: Nose normal.      Mouth/Throat:      Mouth: Mucous " membranes are dry.      Pharynx: Oropharynx is clear.   Eyes:      Extraocular Movements: Extraocular movements intact.      Pupils: Pupils are equal, round, and reactive to light.   Cardiovascular:      Rate and Rhythm: Normal rate and regular rhythm.      Pulses: Normal pulses.      Heart sounds: Normal heart sounds.   Pulmonary:      Effort: Pulmonary effort is normal.      Breath sounds: Normal breath sounds.   Abdominal:      General: Abdomen is flat.      Palpations: Abdomen is soft.   Musculoskeletal:         General: Normal range of motion.      Cervical back: Neck supple.   Skin:     Capillary Refill: Capillary refill takes less than 2 seconds.      Comments: Multiple erythematous, raised, wheals present to right hip, ABD, neck. Blanchable and pruritic to touch. No open tissue or active exudates. All other visible skin intact   Neurological:      General: No focal deficit present.      Mental Status: She is oriented to person, place, and time.   Psychiatric:         Mood and Affect: Mood normal.         Behavior: Behavior normal.         Procedures    Point of Care Test & Imaging Results from this visit  No results found for this visit on 12/02/24.   No results found.    Diagnostic study results (if any) were reviewed by MARCUS Kurtz.    Assessment/Plan   Allergies, medications, history, and pertinent labs/EKGs/Imaging reviewed by MARCUS Kurtz.     Medical Decision Making  Patient to follow up with dermatology if symptoms persist. Will treat with prednisone taper and triamcinolone cream.     At time of discharge, patient was clinically well-appearing and appropriate for outpatient management. The patient/parent/guardian was educated regarding diagnosis, supportive care, OTC and Rx medications. The patient/parent/guardian was given the opportunity to ask questions prior to discharge. They verbalized understanding of discussion of treatment plan, expected course of illness and/or injury,  indications on when to return to , when to seek further evaluation in ED/call 911, and the need to follow up with PCP and/or specialist as referred. Patient/parent/guardian was provided with work/school documentation if requested. Patient stable upon discharge.     Orders and Diagnoses  Diagnoses and all orders for this visit:  Contact dermatitis, unspecified contact dermatitis type, unspecified trigger  -     predniSONE (Deltasone) 10 mg tablet; Take 4 tablets (40 mg) by mouth once daily for 3 days, THEN 3 tablets (30 mg) once daily for 3 days, THEN 2 tablets (20 mg) once daily for 3 days, THEN 1 tablet (10 mg) once daily for 3 days. Take in the mornings with food.  -     triamcinolone (Kenalog) 0.1 % cream; Apply a thin film topically to affected areas 2-3 times daily as needed for rash/itching      Medical Admin Record      Patient disposition: Home    Electronically signed by MARCUS Kurtz  3:07 PM

## 2024-12-03 LAB
PROGESTERONE: 0.2 NG/ML
PROLACTIN SERPL-MCNC: 15.5 NG/ML (ref 4.8–33.4)

## 2025-01-14 ENCOUNTER — HOSPITAL ENCOUNTER (OUTPATIENT)
Age: 25
Discharge: HOME | End: 2025-01-14
Payer: COMMERCIAL

## 2025-01-14 DIAGNOSIS — N97.9: Primary | ICD-10-CM

## 2025-01-14 PROCEDURE — 36415 COLL VENOUS BLD VENIPUNCTURE: CPT

## 2025-01-14 PROCEDURE — 84144 ASSAY OF PROGESTERONE: CPT

## 2025-01-14 NOTE — XMS RPT_ITS
Comprehensive CCD (C-CDA v2.1)  
  
                          Created on: 2025  
  
  
KyrieDon  
External Reference #: CDR,PersonID:[id number]  
: 2000  
Sex: Female  
  
Demographics  
  
  
                                        Address             [address] [place]  [address]  
   
                                        Home Phone          [phone]  
   
                                        Mobile Phone        [phone]  
   
                                        Preferred Language  en  
   
                                        Marital Status        
   
                                        [employer] Affiliation Unknown  
   
                                        Race                Unknown  
   
                                        Ethnic Group        Not  or Lati  
no  
  
  
Author  
  
  
                                        Organization        [facility]  
  
  
Care Team Providers  
  
  
                                Care Team Member Name Role            Phone  
   
                                Thelma CANO MD, Virginie ROBBINS Primary Care Provider Keira  
vailabertin Sanderson MD, Jose Goldman Primary Care Provider [phone]  
[phone]  
   
                                Bridenthal JAMES, Monica Primary Care Provider [phone]  
   
                                ERNESTINE SHER    Attending       Unavailable  
   
                                BRIDENTHAL, MONICA Primary Care    Unavailable  
   
                                AWILDA BELL Attending       Unavail  
able  
   
                                VIRGINIE RENEE III Primary Care    Unavailable  
   
                                BRIDENTHAL, MONICA Primary Care    Unavailable  
   
                                SHER, ERNESTINE    Attending       Unavailable  
   
                                BRIDENTHAL, MONICA Primary Care    Unavailable  
   
                                SHER ERNESTINE    Attending       Unavailable  
   
                                SHER ERNESTINE    Referring       Unavailable  
   
                                BRIDENTHAL, MONICA Primary Care    Unavailable  
   
                                BRIDENTHAL, MONICA Attending       Unavailable  
   
                                ANTONIO, JOSE Primary Care    Unavailable  
   
                                BRIDENTHAL, MONICA Attending       Unavailable  
   
                                ANTONIO, JOSE Primary Care    Unavailable  
   
                                BRIDENTHAL, MONICA Attending       Unavailable  
   
                                ANTONIO, JOSE Primary Care    Unavailable  
   
                                BRIDENTHAL, MONICA Attending       Unavailable  
   
                                ANTONIO, JOSE Primary Care    Unavailable  
   
                                BRIDENTHAL, MONICA Attending       Unavailable  
   
                                ANTONIO, JOSE Primary Care    Unavailable  
   
                                Unavailable     Primary Care Provider Unavailabl  
e  
  
  
  
Allergies  
  
  
                                                    Allergy   
Classification                          Reported   
Allergen(s)               Allergy Type              Date of   
Onset                     Reaction(s)               Facility  
   
                                                      
(16 sources)                            Grass pollen;   
Translations:   
[GRASS POLLEN]            Drug Allergy                
3                                       Unknown, Other:   
See Comments                            [facility]  
   
                                                      
(16 sources)                            Seasonal   
allergy;   
Translations:   
[SEASONAL   
ALLERGIES]                              Allergy to   
substance                                 
3                                       Unknown, Other:   
See Comments                            [facility]  
   
                                                      
(16 sources)                            Tree;   
Translations:   
[TREES]                                 Allergy to   
substance                                 
3                                       Unknown, Other:   
See Comments                            [facility]  
   
                                                      
(6 sources)         Grass pollen        Drug Allergy          
3                         Unknown                   [facility]  
   
                                                      
(6 sources)               Other                     Allergy to   
substance                                 
3                         Unknown                   [facility]  
   
                                                      
(1 source)                Pollen                    Allergy to   
substance                                 
3                         Other                     [facility]  
   
                                                      
(1 source)          Bee pollen          Drug Allergy          
3                         Other                     [facility]  
  
  
  
Medications  
Current Medications  
  
  
  
                      Medication Drug Class(es) Dates      Sig (Normalized) Sig   
(Original)  
   
                                                    24 hr buPROPion   
hydrochloride 300 mg   
extended release oral   
tablet  
(7 sources)               Aminoketone               Start:   
2024                              take 1 tablet by   
mouth once daily in   
the morning                             buPROPion XL   
(Wellbutrin XL)   
300 MG 24 hr   
tablet   
Indications:   
Anxiety and   
depression take 1   
tablet by mouth   
every morning DO   
NOT CRUSH, CHEW,   
AND/OR DIVIDE 30   
tablet 2   
2024 Active  
  
  
  
                                                    Start: 2024  
End: 2024                         take 1 tablet by mouth once   
daily in the morning                    buPROPion XL (WELLBUTRIN XL) 150 mg   
24 hr tablet Take 150 mg by mouth   
every morning. 0 2024 Active  
  
  
  
                                        Comment on above:   Take 150 mg by mouth  
 every morning.   
   
                                                    Desogestrel / Ethinyl   
Estradiol  
(2 sources)                             Progestin,   
Estrogen                                Start:   
  
4  
End:   
  
5                                       take 1 tablet   
by mouth once   
daily, then   
take 0.15   
tablet by mouth   
once                                    Desogestrel-Ethinyl   
Estradiol (APRI)   
0.15-0.03 mg per   
tablet Indications:   
PCOS (polycystic   
ovarian syndrome) ,   
Provided repeat   
prescription for oral   
contraceptive Take 1   
tablet by mouth once   
daily. 84 tablet 3   
2024   
Active  
  
  
  
                                                take 1 tablet by mouth once ashley  
samantha Juleber 0.15-30 MG-MCG tablet Take 1 tablet   
by   
mouth daily. 0 Active  
  
  
  
                                        Comment on above:   Take 1 tablet by grover  
 once daily.   
   
                                                    metFORMIN   
hydrochloride 500 mg   
oral tablet  
(6 sources)               Biguanide                 Start:   
  
4  
End:   
  
4                                       take 2 tablets by   
mouth twice daily   
at mealtime                             metFORMIN   
(GLUCOPHAGE) 500 mg   
tablet Indications:   
PCOS (polycystic   
ovarian syndrome) ,   
IFG (impaired   
fasting glucose) ,   
Irregular menses ,   
Overweight with   
body mass index   
(BMI) of 29 to 29.9   
in adult Take 2   
tablets by mouth   
two times a day   
with meals. 360   
tablet 0 2024 Active  
  
  
  
                                                    Start: 2024  
End: 2024                         take 1 tablet by mouth in the   
morning                                 metFORMIN (Glucophage) 500 MG tablet   
Take 1 tablet by mouth in the   
morning and 1 tablet in the evening.   
Take with meals. 0 2024 Active  
  
  
  
                                        Comment on above:   Take 2 tablets by mo  
uth two times a day with meals.   
   
                                                            Take 1 tablet by grover  
th two times a day with meals.   
   
                                                    naltrexone   
hydrochloride 50 mg   
oral tablet  
(2 sources)               Opioid Antagonist         Start:   
  
End:   
                                       take 0.5 tablet   
by mouth twice   
daily                                   naltrexone 50 mg   
tablet Indications:   
PCOS (polycystic   
ovarian syndrome) ,   
Overweight with   
body mass index   
(BMI) of 29 to 29.9   
in adult Take 0.5   
tablets by mouth   
two times a day. 90   
tablet 0 2024 Active  
  
  
  
                                                                naltrexone (Depa  
de) 50 MG tablet Take 25 mg by mouth in the morning and 25 mg   
in   
the evening. 0 Active  
  
  
  
                                        Comment on above:   Take 0.5 tablets by   
mouth two times a day.   
   
                                                    predniSONE 10 mg oral   
tablet  
(1 source)                                          Start:   
  
End:   
                                     take 4 tablets   
by mouth once   
daily, then   
take 3 tablets   
by mouth once   
daily, then   
take 2 tablets   
by mouth once   
daily, then   
take 1 tablet   
by mouth once   
daily at   
mealtime                                predniSONE   
(Deltasone) 10 mg   
tablet Indications:   
Contact dermatitis,   
unspecified contact   
dermatitis type,   
unspecified trigger   
Take 4 tablets (40   
mg) by mouth once   
daily for 3 days,   
THEN 3 tablets (30   
mg) once daily for 3   
days, THEN 2 tablets   
(20 mg) once daily   
for 3 days, THEN 1   
tablet (10 mg) once   
daily for 3 days.   
Take in the mornings   
with food. 30 tablet   
2024 Active  
   
                                                    Prenatal   
Multivit-Min-Fe-FA   
(PRENATAL/IRON PO)  
(3 sources)                                                 take 1 tablet   
by mouth once   
in the morning                          Prenatal   
Multivit-Min-Fe-FA   
(PRENATAL/IRON PO)   
Take 1 tablet by   
mouth in the   
morning. 0 Active  
   
                                                    triamcinolone   
acetonide 1 mg/ml   
topical cream  
(1 source)                Corticosteroid            Start:   
                                                 triamcinolone   
(Kenalog) 0.1 %   
cream Indications:   
Contact dermatitis,   
unspecified contact   
dermatitis type,   
unspecified trigger   
Apply a thin film   
topically to   
affected areas 2-3   
times daily as   
needed for   
rash/itching 15 g   
2024 Active  
  
  
  
Completed/Discontinued Medications  
  
  
  
                      Medication Drug Class(es) Dates      Sig (Normalized) Sig   
(Original)  
   
                                                    [id number]   
actuat albuterol   
0.09 mg/actuat   
metered dose   
inhaler  
(15 sources)                            beta2-Adrenergic   
Agonist                                 Start:   
2019                              take 2 puff(s) by   
inhalation every   
four hours as   
needed                                  albuterol HFA   
(PROVENTIL HFA,   
VENTOLIN HFA) 90   
mcg/actuation   
inhaler   
Indications:   
Exertional asthma   
Inhale 2 Puffs as   
instructed every 4   
hours as needed. 1   
Inhaler 0   
2019 Active  
   
                                        Comment on above:   Inhale 2 Puffs as in  
structed every 4 hours as needed.   
   
                                                    drospirenone /   
Ethinyl Estradiol  
(1 source)                Progestin, Estrogen       Start:   
2023                              take 1 tablet by   
mouth once daily,   
then take 1 tablet   
by mouth once                           Drospirenone-Ethin  
yl Estradiol   
(SINGH, 28,)   
3-0.03 mg per   
tablet   
Indications: PCOS   
(polycystic   
ovarian syndrome)   
Take 1 tablet by   
mouth once daily.   
Take one active   
pill continuously   
x 3 months-   
discard placebo   
pills 112 tablet 3   
2023 Active  
   
                                        Comment on above:   Take 1 tablet by grover  
th once daily. Take one active pill   
continuously x 3 months- discard placebo pills   
   
                                                    escitalopram 20 mg   
oral tablet  
(3 sources)                             Serotonin Reuptake   
Inhibitor                               Start:   
12-  
End:   
2024                              take 1 tablet by   
mouth once daily                        escitalopram   
(Lexapro) 20 MG   
tablet   
Indications:   
Anxiety and   
depression Take 1   
tablet (20 mg) by   
mouth daily. 30   
tablet 1   
12/15/2023   
2024   
Discontinued   
(Ineffective)  
  
  
  
                                                    Start: 2023  
End: 2024                         take 1 tablet by mouth once   
daily                                   escitalopram (Lexapro) 10 MG tablet   
Indications: Anxiety and depression   
Take 1 tablet (10 mg) by mouth   
daily. 30 tablet 1 2023 Active  
  
  
  
                                                    Ethinyl Estradiol /   
Norethindrone  
(12 sources)              Estrogen                  Start: 2023  
End: 2023                         take 1 tablet   
by mouth once   
daily, then   
take 0.05   
tablet by mouth   
once                                    Norethindrone   
Acet-Ethinyl Est   
(JUNEL ,)   
1-20 mg-mcg per   
tablet TAKE 1 TABLET   
BY MOUTH DAILY 28   
tablet 2 2023   
Discontinued  
  
  
  
                                        Start: 2023   take 1 tablet by grover  
th once   
daily, then take 0.05 tablet   
by mouth once                           Norethindrone Acet-Ethinyl Est (JUNEL   
1/20, ,) 1-20 mg-mcg per tablet   
TAKE 1 TABLET BY MOUTH DAILY 28   
tablet 2 2023 Active  
   
                                                    Start: 2023  
End: 2023                         take 1 tablet by mouth once   
daily, then take 0.05 tablet   
by mouth once                           Norethindrone Acet-Ethinyl Est (JUNEL   
1/20, ,) 1-20 mg-mcg per tablet   
TAKE 1 TABLET BY MOUTH DAILY 28   
tablet 3 2023   
Discontinued  
   
                                        Start: 2023   take 1 tablet by grover  
th once   
daily, then take 0.05 tablet   
by mouth once                           Norethindrone Acet-Ethinyl Est (JUNEL   
1/20, ,) 1-20 mg-mcg per tablet   
TAKE 1 TABLET BY MOUTH DAILY 28   
tablet 3 2023 Active  
   
                                                    Start: 2023  
End: 2023                         take 1 tablet by mouth once   
daily, then take 0.05 tablet   
by mouth once                           Norethindrone Acet-Ethinyl Est (JUNEL   
1/20, ,) 1-20 mg-mcg per tablet   
TAKE 1 TABLET BY MOUTH DAILY 28   
tablet 3 2023   
Discontinued  
   
                                                    Start: 2022  
End: 2022                         take 1 tablet by mouth once   
daily, then take 0.05 tablet   
by mouth once                           Norethindrone Acet-Ethinyl Est (JUNEL   
1/20, ,) 1-20 mg-mcg per tablet   
TAKE 1 TABLET BY MOUTH DAILY 28   
tablet 14 2022   
Discontinued  
   
                                        Start: 2022   take 1 tablet by grover  
th once   
daily, then take 0.05 tablet   
by mouth once                           Norethindrone Acet-Ethinyl Est (JUNEL   
1/20, ,) 1-20 mg-mcg per tablet   
TAKE 1 TABLET BY MOUTH DAILY 28   
tablet 14 2022 Active  
   
                                                    Start: 2021  
End: 2022                         take 1 tablet by mouth once   
daily, then take 0.05 tablet   
by mouth once                           Norethindrone Acet-Ethinyl Est (JUNEL   
1/20, 21,) 1-20 mg-mcg per tablet   
TAKE 1 TABLET BY MOUTH DAILY TAKES   
ACTIVE TABLET CONTINUOUSLY 84 tablet   
3 2021 Discontinued  
  
  
  
                                        Comment on above:   TAKE 1 TABLET BY GROVER  
TH DAILY TAKES ACTIVE TABLET   
CONTINUOUSLY   
   
                                                            TAKE 1 TABLET BY GROVER  
TH DAILY   
   
                                                    medroxyPROGESTERone   
acetate 10 mg oral tablet  
(4 sources)               Progestin                 Start:   
2022  
End:   
2023                                    take 1   
tablet by   
mouth once   
daily                                   medroxyPROGESTERone   
(PROVERA) 10 mg tablet   
Take 1 tablet by mouth   
once daily for 10 days.   
Or until menses starts.   
10 tablet 0 2022 Discontinued  
   
                                        Comment on above:   Take 1 tablet by grover  
th once daily for 10 days. Or until   
menses starts.   
   
                                                    PNV/iron/folic acid   
(PRENATAL VITAMIN-IRON-FA   
ORAL)  
(2 sources)                                                 take 1   
tablet by   
mouth once   
daily                                   PNV/iron/folic acid   
(PRENATAL   
VITAMIN-IRON-FA ORAL)   
Take 1 tablet by mouth   
once daily. 0 Active  
   
                                        Comment on above:   Take 1 tablet by grover  
th once daily.   
   
                                                    Prenatal MV-Min-Fe   
Fum-FA-DHA (PRENATAL 1   
PO)  
(6 sources)                                           
End:   
2024                                                Prenatal MV-Min-Fe   
Fum-FA-DHA (PRENATAL 1   
PO) Take by mouth. 0   
2024 Discontinued   
(Duplicate order)  
  
  
  
                                                                Prenatal MV-Min-  
Fe Fum-FA-DHA (PRENATAL 1 PO) Take by mouth. 0 Active  
  
  
  
Problems  
Active Problems  
  
  
                                                    Problem   
Classification  Problem         Date            Documented Date Episodic/Chronic  
   
                                                    Allergic reactions  
(1 source)                              Contact dermatitis;   
Translations:   
[Unspecified contact   
dermatitis,   
unspecified cause]                      2024          Episodic  
   
                                                    Anxiety disorders  
(18 sources)                            Mixed anxiety and   
depressive disorder;   
Translations:   
[Anxiety disorder,   
unspecified]                            Onset:   
2023                Chronic  
   
                                                    Asthma  
(8 sources)                             Asthma; Translations:   
[Unspecified asthma,   
uncomplicated]                          Onset:   
2011                Chronic  
   
                                                    Contraceptive and   
procreative   
management  
(2 sources)                             Oral contraceptive   
repeat; Translations:   
[Encounter for   
surveillance of   
contraceptive pills]                    Onset:   
2024                Episodic  
   
                                                    Diabetes mellitus   
without complication  
(1 source)                              Impaired fasting   
glycemia;   
Translations:   
[Impaired fasting   
glucose]                                2024          Episodic  
   
                                                    Disorders of lipid   
metabolism  
(3 sources)                             Raised low density   
lipoprotein   
cholesterol;   
Translations: [Pure   
hypercholesterolemia,   
unspecified]                            Onset:   
2024                Chronic  
   
                                                    Female infertility  
(1 source)                              Anovulation;   
Translations: [Female   
infertility   
associated with   
anovulation]                                                Chronic  
   
                                                    Menstrual disorders  
(6 sources)                             Intermenstrual   
bleeding - irregular;   
Translations:   
[Excessive and   
frequent menstruation   
with irregular cycle]                   Onset:   
2024                                          Chronic  
   
                                                    Mood disorders  
(9 sources)                             Mood disorders;   
Translations:   
[Anxiety and   
depression]                             Onset:   
2023                  
   
                                                    Other endocrine   
disorders  
(14 sources)                            Polycystic ovary   
syndrome;   
Translations:   
[Polycystic ovarian   
syndrome]                               Onset:   
2022                                          Chronic  
   
                                                    Other endocrine   
disorders  
(1 source)                              Polycystic ovarian   
syndrome;   
Translations: [PCOS   
(polycystic ovarian   
syndrome)]                              Onset:   
2024                                          Chronic  
   
                                                    Other female genital   
disorders  
(1 source)                              Abnormal uterine   
bleeding;   
Translations:   
[Abnormal uterine and   
vaginal bleeding,   
unspecified]                                                Chronic  
   
                                                    Other nervous system   
disorders  
(2 sources)                             Skin sensation   
disturbance;   
Translations:   
[Unspecified   
disturbances of skin   
sensation]                              Onset:   
2024                Episodic  
   
                                                    Other nutritional;   
endocrine; and   
metabolic disorders  
(3 sources)                             Overweight in   
adulthood with body   
mass index of 25 or   
more but less than   
30; Translations:   
[Overweight]                            Onset:   
2024                Episodic  
   
                                                    Other nutritional;   
endocrine; and   
metabolic disorders  
(1 source)                              Overweight;   
Translations:   
[Overweight with body   
mass index (BMI) of   
29 to 29.9 in adult]                    Onset:   
2024                                          Episodic  
   
                                                    Other nutritional;   
endocrine; and   
metabolic disorders  
(1 source)                              Body mass index (BMI)   
29.0-29.9, adult;   
Translations:   
[Overweight with body   
mass index (BMI) of   
29 to 29.9 in adult]                    Onset:   
2024                                          Episodic  
   
                                                    Screening and history   
of mental health and   
substance abuse codes  
(8 sources)                             History of bulimia   
nervosa;   
Translations:   
[Personal history of   
other mental and   
behavioral disorders]                   Onset:   
2024                Episodic  
   
                                                    Unclassified  
(2 sources)                             Medication Check;   
Translations:   
[Medication Check]                      Onset:   
2024                                            
  
  
Past or Other Problems  
  
  
                      Problem Classification Problem    Date       Documented Da  
te Episodic/Chronic  
   
                                                    Other nutritional;   
endocrine; and   
metabolic disorders  
(7 sources)                             Weight gain;   
Translations:   
[Abnormal weight   
gain]                                   Onset:   
2024                Episodic  
   
                                                    Other screening for   
suspected conditions   
(not mental disorders   
or infectious disease)  
(20 sources)                            Patient encounter   
status;   
Translations:   
[Encounter for   
screening for   
malignant   
neoplasm of   
cervix]                                 Onset:   
08-                                          Episodic  
   
                                                    Other skin disorders  
(18 sources)                            Hirsutism;   
Translations:   
[Hirsutism]                             Onset:   
03-                08-                Episodic  
   
                                                    Other upper respiratory   
disease  
(20 sources)                            Vocal cord   
dysfunction;   
Translations:   
[Other diseases   
of vocal cords]                         Onset:   
05-                05-                Episodic  
   
                                                    Other upper respiratory   
disease  
(2 sources)                             Other diseases of   
vocal cords;   
Translations:   
[Other diseases   
of vocal cords]                         Onset:   
2023                                          Episodic  
  
  
  
Results  
  
  
                      Test Name  Value      Interpretation Reference Range Facil  
ity  
   
                                                    36on 2024   
   
                      36         Not due for refill. Normal                [facility]  
   
                      36         Refill not yet due. Normal                [facility]  
   
                                                    Office Visiton 2024   
   
                                        Follow-up visit     [id number] Lux Mittal   
2000 F  
Date Provider   
Department Center  
2024   
[id number] REYES [facility]  
Family History  
Problem Relation Age   
of Onset  
Thyroid disease Mother  
Perkins syndrome Father  
Asthma Sister  
Breast cancer Maternal   
Grandmother  
Cancer Maternal   
Grandfather  
Cancer Paternal   
Grandfather  
Family Status -   
Relation Status Age at   
Death  
Mother Alive  
Father Alive  
Sister  
Sister   
Maternal Grandmother   
  
Maternal Grandfather   
  
Paternal Grandmother   
Alive  
Paternal Grandfather   
  
Level of Service:02019   
[facility]   
ESTABLISHED LOW MDM 20   
MIN  
Reason for Visit and   
Comments:  
Medication Check   
[[id number]]  
Foot Problem [229]  Normal                                  [facility]  
   
                                                    PATINSon 2024   
   
                                        PATINS              Ice and elevate foot  
   
couple times a day, be   
careful not to wear   
shoes that  
pinch/pressure to much   
on top of foot.     Normal                                  [facility]  
   
                                                    Progress Noteon 2024   
   
                                        Progress Note       Stable. Continue   
Wellbutrin 300 mg   
daily               Normal                                  [facility]  
   
                                        Progress Note       Left foot exam   
unremarkable and   
currently   
asymptomatic. Likely   
superficial  
nerve inflammation.   
Recommend avoiding   
shoes that are tight   
across the top of  
the foot, ice and   
elevate 2-3 times   
daily and follow-up if   
fails to resolve.  
Unknown etiology at   
this time otherwise Normal                                  [facility]  
   
                                        Progress Note       2024  
Don Mittal (:   
2000) is a 24   
y.o. female ,   
Established patient,   
here  
for evaluation of the   
following chief   
complaint(s):  
Medication Check and   
Foot Problem  
ASSESSMENT/PLAN:  
1. Anxiety and   
depression  
Assessment & Plan:  
Stable. Continue   
Wellbutrin 300 mg   
daily  
2. Skin sensation   
disturbance  
Assessment & Plan:  
Left foot exam   
unremarkable and   
currently   
asymptomatic. Likely   
superficial  
nerve inflammation.   
Recommend avoiding   
shoes that are tight   
across the top of  
the foot, ice and   
elevate 2-3 times   
daily and follow-up if   
fails to resolve.  
Unknown etiology at   
this time otherwise  
Follow up in about 6   
months (around   
2024) for Yearly   
Wellness Visit.  
SUBJECTIVE/OBJECTIVE:  
HPI -  
Don Mittal (:   
2000) is a 24   
y.o. female ,   
Established patient,   
here  
for the evaluation of   
the following chief   
complaint(s):  
Medication Check and   
Foot Problem  
Anxiety/depression-   
Doing pretty well,   
wellbutrin  mg   
daily. Denies any SI  
or HI. Not doing   
counseling. Reports   
being able to handle   
stress better and not  
feeling as   
overwhelmed.  
Left foot problem:  
Is having a hot   
sensation/tingling to   
the top of the left   
foot and numbness. No  
known trigger. No   
known injury.   
Happening for about   
1.5 weeks, will happen  
intermittently through   
the day (?50 times or   
so)- lasts only for a   
few seconds  
or so.  
No swelling or redness   
noted.  
Currently asymptomatic  
Prior to Admission   
medications  
Medication Sig Start   
Date End Date Taking?   
Authorizing Provider  
buPROPion XL   
(Wellbutrin XL) 300 MG   
24 hr tablet take 1   
tablet by mouth every  
morning DO NOT CRUSH,   
CHEW, AND/OR DIVIDE   
24 Yes SHANEKA Phan - CNP  
Juleber 0.15-30 MG-MCG   
tablet Take 1 tablet   
by mouth daily. Yes   
Historical  
Provider, MD  
metFORMIN (Glucophage)   
500 MG tablet Take 1   
tablet by mouth in the   
morning and 1  
tablet in the evening.   
Take with meals.   
24 Yes   
Historical Provider,  
MD  
naltrexone (Depade) 50   
MG tablet Take 25 mg   
by mouth in the   
morning and 25 mg in  
the evening. Yes   
Historical Provider,   
MD  
Prenatal   
Multivit-Min-Fe-FA   
(PRENATAL/IRON PO)   
Take 1 tablet by mouth   
in the  
morning. Yes   
Historical Provider,   
MD  
Prenatal MV-Min-Fe   
Fum-FA-DHA (PRENATAL 1   
PO) Take by mouth.   
Historical  
Provider, MD  
Review of Systems  
Constitutional:   
Negative for activity   
change, chills,   
fatigue and fever.  
Respiratory: Negative.  
Cardiovascular:   
Negative.  
Gastrointestinal:   
Negative.  
Genitourinary:   
Negative for   
difficulty urinating.  
Musculoskeletal:  
Left foot.  
Psychiatric/Behavioral  
: Positive for   
decreased   
concentration.   
Negative for  
agitation, dysphoric   
mood and sleep   
disturbance. The   
patient is not  
nervous/anxious.  
Vitals:  
24 1307  
BP: 129/85  
Pulse: 102  
Resp: 18  
Temp: 36.9 ?C (98.4   
?F)  
TempSrc: Infrared  
SpO2: 97%  
Weight: 161 lb 9.6 oz   
(73.3 kg)  
Physical Exam  
Constitutional:  
General: She is not in   
acute distress.  
Appearance: Normal   
appearance. She is not   
ill-appearing.  
HENT:  
Head: Normocephalic   
and atraumatic.  
Cardiovascular:  
Rate and Rhythm:   
Normal rate and   
regular rhythm.  
Pulses: Normal pulses.  
Heart sounds: Normal   
heart sounds.  
Pulmonary:  
Effort: Pulmonary   
effort is normal.  
Breath sounds: Normal   
breath sounds.  
Musculoskeletal:  
Right foot: Normal.  
Left foot: Normal.  
Comments: Left foot   
exam normal  
Skin:  
General: Skin is warm   
and dry.  
Neurological:  
Mental Status: She is   
alert and oriented to   
person, place, and   
time.  
Psychiatric:  
Mood and Affect: Mood   
normal.  
Behavior: Behavior   
normal.  
Thought Content:   
Thought content   
normal.  
Judgment: Judgment   
normal.  
An electronic   
signature was used to   
authenticate this   
note.  
Monica Sanchez, APRN - CNP  
2024  
4:40 PM             [facility]  
   
                                        Progress Note       Patient was identifi  
ed   
by name and Date of   
Birth.              [facility]  
   
                                                    36on 2024   
   
                                        36                  Rx sent. Follow up a  
s   
scheduled.          [facility]  
   
                                        36                  Prescription Request  
:  
  
Last medication check:   
24  
Last physical exam:   
23  
Next scheduled   
appointment: 24  
  
Last date of refill on   
this medication   
3/21/24             [facility]  
   
                                                    CNOVon 2024   
   
                                        CNOV                Office Visit (OBGYWM  
)  
----------------------  
--------------  
DON MITTAL   
([id number]) 2000   
F  
Date Time Provider   
Department  
3/21/24 7:30 AM   
ERNESTINE SHER OBPANKAJ  
During your visit   
today, we recorded the   
following information   
about you:  
Pulse Blood pressure   
Weight  
80/minute 126/82 75.8   
kg  
Ernestine Sher APRN.CNP   
3/21/2024 8:47 AM   
Signed  
Some documentation   
from previous visit of   
2024 was copied   
and pasted,  
documentation has been   
reviewed and edited as   
necessary for today's   
visit.  
Patient Summary:   
Don is a 24 year   
old female who   
presents for follow-up  
evaluation of her   
obesity/weight   
management to treat   
PCOS, IFG, elevated   
LDL,  
anxiety and depression   
and prevent   
relatedco-morbidities.   
In our previous  
visits we have   
discussed lifestyle   
intervention including   
a nutrition  
recommendations and   
physical activity   
optimization. Her last   
office visit was 1  
month ago.  
Irregular menses -   
Menses 2023 LMP   
1/2-3 spotting  
No contraception.  
20 lb weight gain with   
Lexapro for anxiety.   
Changed to bupropion   
by PCP plans  
to discuss increasing   
dose with PCP  
Assessment/plan from   
last visit:  
Metformin 1 gm twice a   
day with meals -   
Adjusted to 500 mg at   
breakfast and 1  
gm at dinner but has   
had frequent diarrhea   
since increasing to 1   
gm twice a day  
3 weeks ago.  
History of anorexia   
and bulimia in HS - no   
treatment, In   
remission since age  
15. Dad helped her and   
she started adding   
protein shakes.  
Interval History -  
Awake - 0700  
B - 0745 Premier   
Protein shake  
S - none  
L - 1/2 cup cottage   
cheese with fruit  
S - none  
D - 1830 protein,   
pasta/potato/rice/swee  
t potato and veg -   
asparagus, cucumber  
salad, winter and   
summer squash/salad  
S - none  
Fluids - water,   
unsweetened tea  
Bedtime -   
She feels the   
medication is helping   
to lessen hunger and   
craving to candy  
controlled  
Exercise: stable   
sedentary job at bank.   
1 mile daily walk with   
dog  
Stress: increased -   
 wearing heart   
monitor  
Sleep: 7 hours, up to   
go to bathroom a few   
times LASHAWN -no  
Weight gain since last   
vist: 2 lbs since last   
visit  
3/21/2024 167 lb BMI:   
28.67  
2024 169 lb  
2024 166 lb   
metformin  
CrCl cannot be   
calculated (Patient's   
most recent lab result   
is older than the  
maximum 180 days   
allowed.).  
PAST MEDICAL HISTORY  
Diagnosis Date  
Exertional asthma   
2012  
Generalized anxiety   
disorder  
IFG (impaired fasting   
glucose) 2024  
Medial meniscus tear   
2012  
PCOS (polycystic   
ovarian syndrome)   
Unspecified otitis   
media  
Recurrent otitis  
Vocal cord dysfunction   
05/15/2012  
Current Outpatient   
Medications  
Medication Sig   
Dispense Refill  
metFORMIN (GLUCOPHAGE)   
500 mg tablet Take 2   
tablets by mouth two   
times a day  
with meals. 360 tablet   
0  
buPROPion XL   
(WELLBUTRIN XL) 150 mg   
24 hr tablet Take 150   
mg by mouth every  
morning.  
PNV/iron/folic acid   
(PRENATAL   
VITAMIN-IRON-FA ORAL)   
Take 1 tablet by mouth  
once daily.  
albuterol HFA   
(PROVENTIL HFA,   
VENTOLIN HFA) 90   
mcg/actuation inhaler   
Inhale 2  
Puffs as instructed   
every 4 hours as   
needed. 1 Inhaler 0  
No current   
facility-administered   
medications for this   
visit.  
Occupation: bank  
Contraception: none  
/82   Pulse 80     
Wt 167 lb (75.8 kg)     
LMP 2024   SpO2   
98%    
BMI 28.67 kg/m?  
Assessment/Plan:  
Don Mittal is a 24   
year old yo female   
with overweight   
(pre-obesity) who  
presented today for   
follow up for   
supervised weight loss   
to treat PCOS, IFG,  
elevated LDL, anxiety   
and depression and   
prevent related   
co-morbidities.  
ASSESSMENT/PLAN:  
1. PCOS (polycystic   
ovarian syndrome) -   
ICD9: 256.4, ICD10:   
E28.2 (primary  
diagnosis)  
- Whole food balanced   
protein low-carb   
nutrition  
- METFORMIN 500 MG   
TABLET  
- DESOGESTREL 0.15   
MG-ETHINYL ESTRADIOL   
0.03 MG TABLET  
- NALTREXONE 50 MG   
TABLET  
2. Irregular menses -   
ICD9: 626.4, ICD10:   
N92.6  
- DESOGESTREL 0.15   
MG-ETHINYL ESTRADIOL   
0.03 MG TABLET  
3. Anxiety and   
depression - ICD9:   
300.00, 311, ICD10:   
F41.9, F32.A  
- Continue bupropion   
prescribed by PCP- she   
plans to discuss   
increasing dose  
with PCP  
4. History of bulimia   
- ICD9: V11.8, ICD10:   
Z86.59  
- in remission since   
age 15  
5. History of anorexia   
nervosa - ICD9: V11.8,   
ICD10: Z86.59  
- in remission since   
age 15  
6. Provided repeat   
prescription for oral   
contraceptive - ICD9:   
V25.41, ICD10:  
Z30.41  
- RX for Apri given   
today.  
- discussed with   
patient on how to take   
OCP's.Given written   
information  
- counseled on   
benefits, risks and   
possible severe side   
effects of OCP's.  
- discussed need to   
use Condoms to help to   
prevent STD's   
including HIV etc.  
- DESOGESTREL 0.15   
MG-ETHINYL ESTRADIOL   
0.03 MG TABLET  
7. Overweight with   
body mass index (BMI)   
of 29 to 29.9 in adult   
- ICD9: 278.02,  
V85.25, ICD10: E66.3,   
Z68.29  
- METFORMIN 500 MG   
TABLET - decrease to   
500 mg with breakfast   
and 1 gm with  
dinner due to diarrhea   
with 1 gm twice daily  
- DESOGESTREL 0.15   
MG-ETHINYL ESTRADIOL   
0.03 MG TABLET  
- NALTREXONE 50 (more   
content not   
included)...        Normal                                  [facility]  
   
                                                    CNOVon 2024   
   
                                        CNOV                Office Visit (OBTIFFANYWVENICE  
)  
----------------------  
--------------  
DON MITTAL   
([id number]) 2000   
F  
Date Time Provider   
Department  
24 7:00 AM   
ERNESTINE SHER  
During your visit   
today, we recorded the   
following information   
about you:  
Blood pressure Weight   
Last Period  
116/84 76.7 kg   
24  
Ernestine Sher APRN.CNP   
2024 7:59 PM   
Signed  
Some documentation   
from previous visit of   
2023 was copied   
and pasted,  
documentation has been   
reviewed and edited as   
necessary for today's   
visit.  
Patient Summary:   
Don is a 23 year   
old female who   
presents for follow-up  
evaluation of her   
obesity/weight   
management to treat   
PCOS, IFG, elevated   
LDL,  
anxiety and depression   
and prevent   
relatedco-morbidities.   
In our previous  
visits we have   
discussed lifestyle   
intervention including   
a nutrition  
recommendations and   
physical activity   
optimization. Her last   
office visit was 1  
month ago.  
Irregular menses -   
Menses 2023 LMP   
-3 spotting  
20 lb weight gain with   
Lexapro for anxiety.   
Changed to bupropion   
by PCP.  
Assessment/plan from   
last visit:  
Metformin 500 mg twice   
a day with meals -   
tried 1 gm at dinner   
but had nausea  
and mild diarrhea   
(actually took at   
bedtime)  
Bupropion 150 mg 24/hr   
tab for anxiety and   
depression  
Was in [place] with   
 who races -   
very hectic travel.   
Tried to eat  
healthier.  
Quit second job so   
life is becoming more   
calm  
History of anorexia   
and bulimia in HS - no   
treatment, Dad helped   
her and she  
started adding protein   
shakes.  
Interval History -   
changes made in past   
month  
Awake - 0700 but is   
changing to 0520 to   
exercise before work  
B - 0745 Premier   
Protein shake  
S - none  
L - SKIP  
S - none  
D - 1830 protein,   
pasta/potato/rice/swee  
t potato and veg -   
asparagus, cucumber  
salad, winter and   
summer squash  
S - none or Skinny   
popcorn  
Fluids - water, zero   
sugar electrolyte   
drink mixes,   
unsweetened tea  
Bedtime -   
She feels the   
medication is helping   
to lessen hunger and   
craving to candy  
Exercise: stable   
sedentary job at bank.   
Just got gym   
membership - walking  
and free weights  
Stress: decreased -   
 races and   
season has not started   
yet and decreased  
stress after quitting   
second job  
Sleep: 7 hours, well   
rested LASHAWN -no  
Weight gain since last   
vist: 3 lbs since last   
visit  
2024 169 lb  
2024 166 lb   
metformin  
CrCl cannot be   
calculated (Patient's   
most recent lab result   
is older than the  
maximum 180 days   
allowed.).  
PAST MEDICAL HISTORY  
Diagnosis Date  
Exertional asthma   
2012  
Generalized anxiety   
disorder  
IFG (impaired fasting   
glucose) 2024  
Medial meniscus tear   
2012  
PCOS (polycystic   
ovarian syndrome)   
Unspecified otitis   
media  
Recurrent otitis  
Vocal cord dysfunction   
05/15/2012  
Current Outpatient   
Medications  
Medication Sig   
Dispense Refill  
buPROPion XL   
(WELLBUTRIN XL) 150 mg   
24 hr tablet Take 150   
mg by mouth every  
morning.  
PNV/iron/folic acid   
(PRENATAL   
VITAMIN-IRON-FA ORAL)   
Take 1 tablet by mouth  
once daily.  
metFORMIN (GLUCOPHAGE)   
500 mg tablet Take 1   
tablet by mouth two   
times a day  
with meals. 180 tablet   
0  
albuterol HFA   
(PROVENTIL HFA,   
VENTOLIN HFA) 90   
mcg/actuation inhaler   
Inhale 2  
Puffs as instructed   
every 4 hours as   
needed. 1 Inhaler 0  
No current   
facility-administered   
medications for this   
visit.  
/84   Wt 169 lb   
(76.7 kg)   LMP   
2024   BMI 29.01   
kg/m?  
Assessment/Plan:  
Don Mittal is a 23   
year old yo female   
with overweight   
(pre-obesity) who  
presented today for   
follow up for   
supervised weight loss   
to treat PCOS, IFG,  
elevated LDL, anxiety   
and depression and   
prevent related   
co-morbidities.  
ASSESSMENT/PLAN:  
1. PCOS (polycystic   
ovarian syndrome) -   
ICD9: 256.4, ICD10:   
E28.2 (primary  
diagnosis)  
- Whole food balanced   
protein low-carb   
nutrition  
- METFORMIN 500 MG   
TABLET  
2. Elevated LDL   
cholesterol level -   
ICD9: 272.0, ICD10:   
E78.00  
- benefits of weight   
loss discussed  
3. IFG (impaired   
fasting glucose) -   
ICD9: 790.21, ICD10:   
R73.01  
- METFORMIN 500 MG   
TABLET  
4. Irregular menses -   
ICD9: 626.4, ICD10:   
N92.6  
- METFORMIN 500 MG   
TABLET  
5. Anxiety and   
depression - ICD9:   
300.00, 311, ICD10:   
F41.9, F32.A  
- well-controlled with   
bupropion  
6. History of bulimia   
- ICD9: V11.8, ICD10:   
Z86.59  
- in remission  
7. History of anorexia   
nervosa - ICD9: V11.8,   
ICD10: Z86.59  
- in remission  
8. Overweight with   
body mass index (BMI)   
of 29 to 29.9 in adult   
- ICD9: 278.02,  
V85.25, ICD10: E66.3,   
Z68.29  
- METFORMIN 500 MG   
TABLET - continue to   
increase dose as   
tolerated  
- Recommended whole   
food low-carb diet   
with 30 g of protein 3   
times a day and  
30 g of carbs at lunch   
and dinner only. Given   
tracking log.  
- Given 15 gram carb   
whole food and protein   
suggestion list.  
- Given protein snack   
ideas  
- continue exercise  
Lengthy discussion   
regarding history of   
anorexia and bulimia   
which is in  
remission. Discussed   
appropriate amount of   
exercise. We agreed   
that in-office  
visits (more content   
not included)...    Normal                                  [facility]  
   
                                                    Office Visiton 2024   
   
                                        Follow-up visit     [id number] Lux Mittal   
2000 F  
Date Provider   
Department Center  
2024   
[id number] SANCHEZ [facility] IRAABDULAZIZ   
[facility]  
Family History  
Problem Relation Age   
of Onset  
Thyroid disease Mother  
Perkins syndrome Father  
Asthma Sister  
Breast cancer Maternal   
Grandmother  
Cancer Maternal   
Grandfather  
Cancer Paternal   
Grandfather  
Family Status -   
Relation Status Age at   
Death  
Mother Alive  
Father Alive  
Sister  
Sister   
Maternal Grandmother   
  
Maternal Grandfather   
  
Paternal Grandmother   
Alive  
Paternal Grandfather   
  
Level of Service:22788   
[facility]   
ESTABLISHED MOD MDM 30   
MIN  
Reason for Visit and   
Comments:  
Medication Check   
[[id number]]        [facility]  
   
                                                    PATINSon 2024   
   
                                        PATINS              Asked about extended  
   
release metformin.  
Give update in about a   
month- if you want to   
increase dose of   
Wellbutrin or not   [facility]  
   
                                                    Progress Noteon 2024   
   
                                        Progress Note       Recommend following   
up   
with OB/GYN regarding   
side effects of   
metformin and  
requesting if she can   
take extended release   
metformin to see if   
that is better  
tolerated.          Normal                                  [facility]  
   
                                        Progress Note       Denies any suicidal   
or   
homicidal ideation.   
Anxiety and depression   
have improved  
we will continue   
Wellbutrin 150 mg   
daily, patient to give   
us an update in about  
1 month via [employer] if   
she would like to   
increase the dose to   
300 mg. We will  
schedule her for   
follow-up in 3 months [facility]  
   
                                        Progress Note       Patient was identifi  
ed   
by name and Date of   
Birth.              Normal                                  [facility]  
   
                                        Progress Note       2024  
Don Mittal (:   
2000) is a 23   
y.o. female ,   
Established patient,   
here  
for evaluation of the   
following chief   
complaint(s):  
Medication Check  
ASSESSMENT/PLAN:  
1. Anxiety and   
depression  
Assessment & Plan:  
Denies any suicidal or   
homicidal ideation.   
Anxiety and depression   
have improved  
we will continue   
Wellbutrin 150 mg   
daily, patient to give   
us an update in about  
1 month via [employer] if   
she would like to   
increase the dose to   
300 mg. We will  
schedule her for   
follow-up in 3 months  
2. PCOS (polycystic   
ovarian syndrome)  
Assessment & Plan:  
Recommend following up   
with OB/GYN regarding   
side effects of   
metformin and  
requesting if she can   
take extended release   
metformin to see if   
that is better  
tolerated.  
Follow up for 3 month   
[facility].  
SUBJECTIVE/OBJECTIVE:  
HPI -  
Don Mittal (:   
2000) is a 23   
y.o. female ,   
Established patient,   
here  
for the evaluation of   
the following chief   
complaint(s):  
Medication Check  
RACING WITH  IN   
[place] WENT WELL, WAS   
SUPER BUSY. Did not   
get to do any  
sightseeing as they   
were busy at the track   
every day. Got back   
from [place] on  
2024.   
Patient reports she  
went to ob/gyn for   
pcos- was started on   
metformin and reports   
that she has been  
having nausea and mild   
diarrhea with it. She   
has a follow-up with   
them  
tomorrow.  
Reports that the   
wellbutrin has helped   
anxiety and mood. Is   
doing better  
handling things. Is   
not. Feeling as   
overwhelmed, reports   
sleeping is getting  
back on schedule.  
Still doing shreya and   
yoga. Most days.  
Would like to keep the   
Wellbutrin at the   
current dose for now  
Prior to Admission   
medications  
Medication Sig Start   
Date End Date Taking?   
Authorizing Provider  
buPROPion XL   
(Wellbutrin XL) 150 MG   
24 hr tablet Take 1   
tablet (150 mg) by   
mouth  
every morning. Do not   
crush, chew, or split.   
1/22/24 3/22/24 Yes   
Monica Sanchez, APRN - CNP  
metFORMIN (Glucophage)   
500 MG tablet Take 1   
tablet by mouth in the   
morning and 1  
tablet in the evening.   
Take with meals.   
24 Yes   
Historical Provider,  
MD  
Prenatal   
Multivit-Min-Fe-FA   
(PRENATAL/IRON PO)   
Take 1 tablet by mouth   
in the  
morning. Yes   
Historical Provider,   
MD  
Prenatal MV-Min-Fe   
Fum-FA-DHA (PRENATAL 1   
PO) Take by mouth. Yes   
Historical  
Provider, MD  
Review of Systems  
Constitutional:   
Negative for activity   
change, chills,   
fatigue and fever.  
HENT: Negative.   
Negative for   
congestion.  
Respiratory: Negative.  
Cardiovascular:   
Negative.  
Gastrointestinal:   
Positive for diarrhea   
and nausea. Negative   
for abdominal pain  
and vomiting.  
Genitourinary:   
Positive for menstrual   
problem (Irregular   
menses due to PCOS).  
Neurological:   
Negative.  
Vitals:  
24 0735  
BP: 112/69  
Pulse: 94  
Resp: 18  
Temp: 37 ?C (98.6 ?F)  
TempSrc: Infrared  
SpO2: 98%  
Weight: 170 lb 3.2 oz   
(77.2 kg)  
Physical Exam  
Constitutional:  
Appearance: Normal   
appearance.  
HENT:  
Head: Normocephalic   
and atraumatic.  
Mouth/Throat:  
Mouth: Mucous   
membranes are moist.  
Pharynx: Oropharynx is   
clear. No posterior   
oropharyngeal   
erythema.  
Cardiovascular:  
Rate and Rhythm:   
Normal rate and   
regular rhythm.  
Pulses: Normal pulses.  
Heart sounds: Normal   
heart sounds.  
Pulmonary:  
Effort: Pulmonary   
effort is normal.  
Breath sounds: Normal   
breath sounds.  
Skin:  
General: Skin is warm   
and dry.  
Neurological:  
Mental Status: She is   
alert and oriented to   
person, place, and   
time.  
Psychiatric:  
Mood and Affect: Mood   
normal.  
Behavior: Behavior   
normal.  
Thought Content:   
Thought content   
normal.  
Judgment: Judgment   
normal.  
An electronic   
signature was used to   
authenticate this   
note.  
SHANEKA Chacon CNP  
2024  
9:35 AM             [facility] 2024   
   
                                        [facility]                Office Visit (OBGYWM  
)  
----------------------  
--------------  
DON MITTAL   
([id number]) 2000   
F  
Date Time Provider   
Department  
24 7:00 AM   
ERNESTINE SHER  
During your visit   
today, we recorded the   
following information   
about you:  
Blood pressure Weight   
Last Period  
100/72 75.3 kg   
24  
Ernestine Sher APRN.CNP   
2024 1:09 PM   
Signed  
Don GRIMES Kyrie is a 23   
year old female who   
presents for problem   
visit missed  
menses x 2 months and   
multiple negative home   
pregnancy tests.  
HPI: Stopped OCP to   
attempt pregnancy in   
2023. Last   
menses in  
November. Multiple   
home pregnancy tests   
have all been   
negative.  
Has PCOS - recently   
noticing more facial   
hair although acne   
improving. 20 lb  
weight gain with   
Lexapro for anxiety.   
Changed to bupropion a   
few days ago by  
PCP.  
No previous   
pregnancies. Partner   
has 3 children.  
Shreya and yoga daily   
for exercise. Trying   
to eat healthy.  
OB History  
 T0  L0  
SAB0 IAB0 Ectopic0   
Multiple0 Live Births0  
Comment: 3   
stepchildren  
Gyn History  
LMP: 2024,   
Having periods  
Age at Menarche:  
Age at First   
Pregnancy:  
Age at Menopause:  
Gyn History Comments:  
Sexual Activity: Yes;   
Male; sexually active   
with   
Contraception: Pill  
PAST MEDICAL HISTORY  
Diagnosis Date  
Exertional asthma   
2012  
Generalized anxiety   
disorder  
Medial meniscus tear   
2012  
PCOS (polycystic   
ovarian syndrome)   
Unspecified otitis   
media  
Recurrent otitis  
Vocal cord dysfunction   
05/15/2012  
PAST SURGICAL HISTORY  
Procedure Laterality   
Date  
ANKLE ARTHROSCOPY   
Right 16  
Dr. Yeung  
FAMILY HISTORY  
Problem Relation Age   
of Onset  
other (Other [Other])   
Paternal Grandfather  
leukemia  
Cancer Maternal   
Grandmother  
breast  
Asthma Sister  
Social History  
Tobacco Use  
Smoking status: Never  
Smokeless tobacco:   
Never  
Vaping Use  
Vaping Use: Never used  
Substance Use Topics  
Alcohol use: Yes  
Drug use: No  
Current Outpatient   
Medications  
Medication Sig  
buPROPion XL   
(WELLBUTRIN XL) 150 mg   
24 hr tablet Take 150   
mg by mouth every  
morning.  
PNV/iron/folic acid   
(PRENATAL   
VITAMIN-IRON-FA ORAL)   
Take 1 tablet by mouth  
once daily.  
albuterol HFA   
(PROVENTIL HFA,   
VENTOLIN HFA) 90   
mcg/actuation inhaler   
Inhale 2  
Puffs as instructed   
every 4 hours as   
needed.  
Drospirenone-Ethinyl   
Estradiol (SINGH,   
28,) 3-0.03 mg per   
tablet Take 1  
tablet by mouth once   
daily. Take one active   
pill continuously x 3   
months-  
discard placebo pills   
(Patient not taking:   
Reported on 2024)  
No current   
facility-administered   
medications for this   
visit.  
Allergies As of Date:   
2024  
Allergen Noted   
Reaction  
GRASS POLLEN   
2013 Unknown  
SEASONAL ALLERGIES   
2013 Unknown  
TREES 2013   
Unknown  
Fully Assessed   
2024  
REVIEW OF SYSTEMS  
Abdomen: No bloating,   
early satiety,   
indigestion, or   
increased flatulence.   
No  
abdominal pain,   
nausea, vomiting,   
diarrhea, or   
constipation.  
Bladder: No dysuria,   
gross hematuria,   
urinary frequency,   
urinary urgency, or  
incontinence.  
Allergies and current   
medication updated:Yes  
EXAM: /72   Wt   
166 lb (75.3kg)   LMP   
2024  
GENERAL: pleasant,   
 female in no   
apparent distress  
CHEST: Normal   
inspiratory effort  
NEURO: alert and   
oriented x3,exam   
grossly non-focal  
ASSESSMENT/PLAN:  
1. Irregular menses -   
ICD9: 626.4, ICD10:   
N92.6 (primary   
diagnosis)  
- HCG QUAL UR B/O -   
negative  
- METFORMIN 500 MG   
TABLET  
- HGB A1C  
- INSULIN ASSAY BLOOD  
- GLUCOSE FASTING BLD  
2. PCOS (polycystic   
ovarian syndrome) -   
ICD9: 256.4, ICD10:   
E28.2  
- METFORMIN 500 MG   
TABLET  
Agreeable to begin   
Metformin 500 mg with   
dinner daily x 1 week.   
If tolerating  
will increase to 2   
tablets with dinner   
daily.  
We discussed common   
side effects of this   
medication including   
nausea, changes  
in bowel habits,   
abdominal discomfort,   
and flatulence.   
Discussed taking it   
with  
food and complication   
of lactic acidosis and   
signs/symptoms and   
medication  
handout given. Further   
instructed that if she   
experiences malaise,   
muscle  
aches, difficulty   
breathing, or severe   
abdominal pain to seek   
immediate medical  
attention.  
- HGB A1C  
- INSULIN ASSAY BLOOD  
- GLUCOSE FASTING BLD  
- - discussed with pt   
- review of PCOS with   
signs and symptoms.   
Increased risk  
of DMT2, CAD,   
infertility. Treatment   
discussed: weight loss   
to restore  
ovulatory cycles and   
reduce androgen   
concentrations,   
exercise, cyclic  
medroxyprogesterone to   
induce menses.   
Metformin discussed -   
explained that it  
is not considered a   
first-line treatment   
as it does not change   
pregnancy  
outcomes but that it   
will decrease HgbA1c   
although weight loss   
will do the  
same. Discussed that   
she may need   
medication to induce   
ovulation if she does  
not become pregnant   
with lifestyle changes   
and weight loss.  
- Eat primarily whole   
foods. Limit carbs,   
especially processed   
carbs.  
- Do not drink your   
calories  
- 30 grams of protein   
for your first meal of   
the day decreases your   
hu (more content not   
included)...        Normal                                  [facility]  
   
                                                    Glucose p fast SerPl-mCncon   
2024   
   
                                                    Glucose post fast   
[Mass/Vol]      101 mg/dL       High            74-99           [facility]  
   
                                        Comment on above:   Order Comment: Speci  
men Type: BLOOD SPECIMEN  
Ordering Facility: [facility]  
Address: [address] [place] [address]   
   
                                                            Result Comment: Amer  
ican Diabetes Association guidelines state that   
a diabetes mellitus diagnosis is preliminarily made when the   
fasting plasma glucose meets or exceeds 126 mg/dL. In the absence   
of unequivocal hyperglycemia, results should be confirmed with   
repeat testing. Patients are at increased risk for diabetes   
mellitus (prediabetes) when the fasting glucose is 100 to 125   
mg/dL.   
   
                                                            Performed By: #### 1  
[id number] ####  
[facility]  
CLIA [id number]  
[address] [place] [address] [place]   
   
                                                    HbA1c (Bld)on 2024   
   
                                                    Average glucose   
Estimated from   
glycated   
hemoglobin (Bld)   
[Mass/Vol]      111 mg/dL       Normal                          [facility]  
   
                                        Comment on above:   Order Comment: Speci  
men Type: BLOOD SPECIMEN  
Ordering Facility: [facility]  
Address: [address] [place] [address]   
   
                                                            Result Comment: eAG:  
 (Estimated average glucose) is a calculated   
value from HgbA1c and is representative of the average blood   
glucose level in the last 2-3 month period.   
   
                                                            Performed By: #### 5  
[id number] ####  
[facility]  
CLIA [id number]  
[address] [place] [address] [place]   
   
                                                    HbA1c (Bld) [Mass   
fraction]       5.5 %           Normal          4.3-5.6         [facility]  
   
                                        Comment on above:   Order Comment: Speci  
men Type: BLOOD SPECIMEN  
Ordering Facility: [facility]  
Address: [address] [place] [address]   
   
                                                            Result Comment: Amer  
ican Diabetes Association guidelines indicate   
that patients with HgbA1c in the range 5.7-6.4% are at increased   
risk for development of diabetes, and intervention by lifestyle   
modification may be beneficial. HgbA1c greater or equal to 6.5% is   
considered diagnostic of diabetes.   
   
                                                            Performed By: #### 5  
[id number] ####  
[facility]  
CLIA [id number]  
[address] [place] [address] [place]   
   
                                                    Insulin SerPl-aCncon   
024   
   
                      Insulin Qn 13.0 u[IU]/mL Normal     3.0-25.0   [facility]  
   
                                        Comment on above:   Order Comment: Speci  
men Type: BLOOD SPECIMEN  
Ordering Facility: [facility]  
Address: [address] [place] [address]   
   
                                                            Performed By: #### 2  
[id number] ####  
[facility]  
CLIA [id number]  
[address] [place] [address]  
[address]   
   
                                                    Office Visiton 2024   
   
                                        Follow-up visit     [id number] Lux Mittal   
2000 F  
Date Provider   
Department Center  
2024   
MONICA MORRIS [facility] SIMON   
[facility]  
Family History  
Problem Relation Age   
of Onset  
Thyroid disease Mother  
Perkins syndrome Father  
Asthma Sister  
Breast cancer Maternal   
Grandmother  
Cancer Maternal   
Grandfather  
Cancer Paternal   
Grandfather  
Family Status -   
Relation Status Age at   
Death  
Mother Alive  
Father Alive  
Sister  
Sister   
Maternal Grandmother   
  
Maternal Grandfather   
  
Paternal Grandmother   
Alive  
Paternal Grandfather   
  
Level of Service:40566   
[facility]   
ESTABLISHED MOD MDM 30   
MIN  
Reason for Visit and   
Comments:  
Medication Check   
[[id number]]  
Weight Gain [180]   Normal                                  [facility]  
   
                                                    PATINSon 2024   
   
                                        PATINS              Start wellbutrin and  
   
decrease lexapro to 10   
mg daily x 7 days,   
then stop the  
lexapro.  
Check [employer] james   
for meditation      Normal                                  [facility]  
   
                                                    Progress Noteon 2024   
   
                                        Progress Note       Will have her follow  
   
up with her ob/gyn,   
suspect gain posssilby   
from pcos.  
Continue exercise,   
healthy eating and   
portion control. Will   
stop lexapro  
(possible weight gain)   
and switch to   
wellbutrin.         Normal                                  [facility]  
   
                                        Progress Note       Denies si/hi. Anxiet  
y   
and depression   
symptoms better but   
still is having   
trouble  
focusing. Will taper   
discontinue lexapro   
and start wellbutrin.   
Follow up in  
about 1 month       [facility]  
   
                                        Progress Note       Patient was identifi  
ed   
by name and Date of   
Birth.              Normal                                  [facility]  
   
                                        Progress Note       2024  
Don Mittal (:   
2000) is a 23   
y.o. female ,   
Established patient,   
here  
for evaluation of the   
following chief   
complaint(s):  
Medication Check and   
Weight Gain  
ASSESSMENT/PLAN:  
1. Anxiety and   
depression  
Assessment & Plan:  
Denies si/hi. Anxiety   
and depression   
symptoms better but   
still is having   
trouble  
focusing. Will taper   
discontinue lexapro   
and start wellbutrin.   
Follow up in  
about 1 month  
Orders:  
- buPROPion XL   
(Wellbutrin XL) 150 MG   
24 hr tablet; Take 1   
tablet (150 mg)  
by mouth every   
morning. Do not crush,   
chew, or split.,   
Starting Mon   
2024,  
Until Fri 3/22/2024,   
Normal  
2. Weight gain  
Assessment & Plan:  
Will have her follow   
up with her ob/gyn,   
suspect gain posssilby   
from pcos.  
Continue exercise,   
healthy eating and   
portion control. Will   
stop lexapro  
(possible weight gain)   
and switch to   
wellbutrin.  
Follow up in about 1   
month (around   
2024).  
SUBJECTIVE/OBJECTIVE:  
[facility] -  
Don Mittal (:   
2000) is a 23   
y.o. female ,   
Established patient,   
here  
for the evaluation of   
the following chief   
complaint(s):  
Medication Check and   
Weight Gain  
Stopped nicotine and   
stopped etoh since we   
last met. . Has gained   
some weight.  
She thinks it may be   
from going off of her   
birth control   
medication, Is working  
out with online [employer]   
classes daily,   
watching what she is   
eating. Is doing well  
as far as eating and   
exercising. She also   
is doing yoga.  
Stopped birth control.   
Has not had period   
since sept and   
November and has been  
pregnancy testing. Is   
trying to get   
pregnant. Will be   
following up with her  
ob/gyn.  
Reports anxiety is   
better, but is not   
able to focus well   
still. Denies si/hi.  
Her and her    
are getting ready to   
go to [place] for car   
racing for 11  
days. Her    
races cars. States she   
is looking forward to   
going.  
Prior to Admission   
medications  
Medication Sig Start   
Date End Date Taking?   
Authorizing Provider  
escitalopram (Lexapro)   
20 MG tablet Take 1   
tablet (20 mg) by   
mouth daily.  
12/15/23 3/14/24 Yes   
SHANEKA Chacon - CNP  
Prenatal MV-Min-Fe   
Fum-FA-DHA (PRENATAL 1   
PO) Take by mouth. Yes   
Historical  
Provider, MD  
Review of Systems  
Constitutional:   
Negative.  
HENT: Negative.  
Respiratory: Negative.  
Cardiovascular:   
Negative.  
Gastrointestinal:   
Negative.  
Genitourinary:   
Negative.  
Musculoskeletal:   
Negative.  
Neurological:   
Negative.  
Psychiatric/Behavioral  
: Positive for   
decreased   
concentration.   
Negative for  
self-injury, sleep   
disturbance and   
suicidal ideas. The   
patient is  
nervous/anxious.  
Vitals:  
24 1346  
BP: 133/89  
Pulse: 98  
Resp: 18  
Temp: 36.7 ?C (98.1   
?F)  
TempSrc: Infrared  
SpO2: 97%  
Weight: 164 lb (74.4   
kg)  
Physical Exam  
Constitutional:  
General: She is not in   
acute distress.  
Appearance: Normal   
appearance. She is not   
ill-appearing.  
HENT:  
Head: Normocephalic   
and atraumatic.  
Right Ear: Tympanic   
membrane normal.  
Left Ear: Tympanic   
membrane normal.  
Nose: No congestion or   
rhinorrhea.  
Mouth/Throat:  
Mouth: Mucous   
membranes are moist.  
Pharynx: Oropharynx is   
clear. No posterior   
oropharyngeal   
erythema.  
Eyes:  
Conjunctiva/sclera:   
Conjunctivae normal.  
Cardiovascular:  
Rate and Rhythm:   
Normal rate and   
regular rhythm.  
Pulmonary:  
Effort: Pulmonary   
effort is normal.  
Breath sounds: Normal   
breath sounds.  
Lymphadenopathy:  
Cervical: No cervical   
adenopathy.  
Skin:  
General: Skin is warm   
and dry.  
Neurological:  
Mental Status: She is   
alert and oriented to   
person, place, and   
time.  
Psychiatric:  
Mood and Affect: Mood   
normal.  
Behavior: Behavior   
normal.  
Thought Content:   
Thought content   
normal.  
Judgment: Judgment   
normal.  
An electronic   
signature was used to   
authenticate this   
note.  
SHANEKA Chacon - JAMES  
2024  
4:24 PM             Normal                                  [facility]  
   
                                                    Office Visiton 2023   
   
                                        Follow-up visit     [id number] Lux Mittal   
2000 F  
Date Provider   
Department Center  
2023   
[id number]  
Family History  
Problem Relation Age   
of Onset  
Thyroid disease Mother  
Perkins syndrome Father  
Asthma Sister  
Breast cancer Maternal   
Grandmother  
Cancer Maternal   
Grandfather  
Cancer Paternal   
Grandfather  
Family Status -   
Relation Status Age at   
Death  
Mother Alive  
Father Alive  
Sister  
Sister   
Maternal Grandmother   
  
Maternal Grandfather   
  
Paternal Grandmother   
Alive  
Paternal Grandfather   
  
Level of Service:72240   
[facility] PERIODIC PREVENT[facility]   
MED EST PATIENT 18-39   
YRS  
Reason for Visit and   
Comments:  
Follow-up [249854]  
Health Maintenance   
[872] - Lipid-pended  
HIV/Hep  
C-declined  
Covid-declined  
PHQ-completed  
Pap-[facility] (will scan  
in)  
Tdap-declined  
Influenza-declined  
?                   Normal                                  [facility]  
   
                                                    PATINSon 2023   
   
                                        PATINS              Melatonin 1- 5 mg   
nightly to help with   
sleep.  
  
Send update via   
[employer] to provider in   
1 month on how you are   
doing on the  
lexapro 10 mg daily. Normal                                  [facility]  
   
                                                    Progress Noteon 2023   
   
                                        Progress Note       Denies any suicidal   
or   
homicidal ideation.   
Reports improved   
symptoms. We will  
continue on Lexapro 10   
mg daily she is to   
provide an update   
through [facility] in  
approximately 4 weeks.   
Consider up titration   
if needed at that   
point.              Normal                                  [facility]  
   
                                        Progress Note       Patient was identifi  
ed   
by name and Date of   
Birth.  
  
Health Main:  
  
Lipid-pended  
HIV/Hep C-declined  
Covid-declined  
PHQ-completed  
Pap-[facility] (will scan in)  
Tdap-declined  
Influenza-declined  Normal                                  [facility]  
   
                                        Progress Note       2023  
Don Mittal (:   
2000) is a 23   
y.o. female ,   
Established patient,   
here  
for evaluation of the   
following chief   
complaint(s):  
Follow-up and Health   
Maintenance   
(Lipid-pended/HIV/Hep  
C-declined/Covid-decli  
edin/PHQ-completed/Pap-  
[facility] (will scan  
in)/Tdap-declined/Infl  
uenza-declined/ )  
ASSESSMENT/PLAN:  
1. Annual physical   
exam  
- Comprehensive   
metabolic panel  
- CBC  
- Lipid panel  
2. Screening for   
deficiency anemia  
- CBC  
3. Screening for   
cholesterol level  
- Lipid panel  
4. Anxiety and   
depression  
Assessment & Plan:  
Denies any suicidal or   
homicidal ideation.   
Reports improved   
symptoms. We will  
continue on Lexapro 10   
mg daily she is to   
provide an update   
through [facility] in  
approximately 4 weeks.   
Consider up titration   
if needed at that   
point.  
Follow up in about 3   
months (around   
2024).  
SUBJECTIVE/OBJECTIVE:  
HPI -  
Don Mittal (:   
2000) is a 23   
y.o. female ,   
Established patient,   
here  
for the evaluation of   
the following chief   
complaint(s):  
Follow-up and Health   
Maintenance   
(Lipid-pended/HIV/Hep  
C-declined/Covid-decli  
edin/PHQ-completed/Pap-  
[facility] (will scan  
in)/Tdap-declined/Infl  
uenza-declined/ )  
Recently newly   
established patient to   
us about 2 weeks ago   
who had presented for  
an acute complaint of   
anxiety and   
depression. She was   
started on Lexapro 10   
mg  
at that time and   
recommended to get set   
up with a counselor.   
No other acute  
complaints today.  
Anxiety/dep- feels   
like medication is   
helping some. Is able   
to think clearer. Is  
processing thoughts   
better. States that   
she has not broke down   
and cried like  
she was before. Denies   
any suicidal or   
homicidal ideation.  
She has yet to set up   
with a counselor.  
Has OB/GYN which she   
follows for women's   
health.  
Prior to Admission   
medications  
Medication Sig Start   
Date End Date Taking?   
Authorizing Provider  
escitalopram (Lexapro)   
10 MG tablet Take 1   
tablet (10 mg) by   
mouth daily.  
23 Yes   
SHANEKA Chacon - CNP  
Prenatal MV-Min-Fe   
Fum-FA-DHA (PRENATAL 1   
PO) Take by mouth. Yes   
Historical  
Provider, MD  
Review of Systems  
Constitutional:   
Negative.  
HENT: Negative.  
Respiratory: Negative.  
Cardiovascular:   
Negative.  
Gastrointestinal:   
Negative.  
Genitourinary:   
Negative.  
Musculoskeletal:   
Negative.  
Neurological:   
Negative.  
Psychiatric/Behavioral  
: Positive for   
decreased   
concentration and   
sleep  
disturbance (still not   
sleeping well.).   
Negative for   
self-injury and   
suicidal  
ideas. The patient is   
nervous/anxious.  
Vitals:  
23 0929  
BP: 116/77  
Pulse: 98  
Resp: 18  
Temp: 36.4 ?C (97.6   
?F)  
TempSrc: Infrared  
SpO2: 99%  
Weight: 151 lb (68.5   
kg)  
Physical Exam  
Constitutional:  
General: She is not in   
acute distress.  
Appearance: Normal   
appearance. She is not   
ill-appearing.  
HENT:  
Head: Normocephalic   
and atraumatic.  
Right Ear: Tympanic   
membrane normal.  
Left Ear: Tympanic   
membrane normal.  
Mouth/Throat:  
Mouth: Mucous   
membranes are moist.  
Pharynx: Oropharynx is   
clear. No posterior   
oropharyngeal   
erythema.  
Eyes:  
Conjunctiva/sclera:   
Conjunctivae normal.  
Cardiovascular:  
Rate and Rhythm:   
Normal rate and   
regular rhythm.  
Pulses: Normal pulses.  
Heart sounds: Normal   
heart sounds.  
Musculoskeletal:  
Right lower leg: No   
edema.  
Left lower leg: No   
edema.  
Skin:  
General: Skin is warm   
and dry.  
Neurological:  
Mental Status: She is   
alert and oriented to   
person, place, and   
time.  
Psychiatric:  
Mood and Affect: Mood   
normal.  
Behavior: Behavior   
normal.  
Thought Content:   
Thought content   
normal.  
Judgment: Judgment   
normal.  
An electronic   
signature was used to   
authenticate this   
note.  
SHANEKA Chacon CNP  
2023  
1:22 PM             [facility]  
   
                                                    Office Visiton 2023   
   
                                        Follow-up visit     [id number] Lux Mittal   
2000 F  
Date Provider   
Department Center  
2023   
[id number] SANCHEZ [facility]  
Family History  
Problem Relation Age   
of Onset  
Thyroid disease Mother  
Perkins syndrome Father  
Asthma Sister  
Breast cancer Maternal   
Grandmother  
Cancer Maternal   
Grandfather  
Cancer Paternal   
Grandfather  
Family Status -   
Relation Status Age at   
Death  
Mother Alive  
Father Alive  
Sister  
Sister   
Maternal Grandmother   
  
Maternal Grandfather   
  
Paternal Grandmother   
Alive  
Paternal Grandfather   
  
Level of Service:02932   
VA OFFICE/OUTPATIENT   
NEW MODERATE MDM 45-59   
MINUTES  
Reason for Visit and   
Comments:  
New Patient [542]   Normal                                  [facility]  
   
                                                    PATINSon 2023   
   
                                        PATINS              Start with 1/2 tab   
daily x 1 week, then   
increase to 1 whole   
tablet  
If you or someone you   
know needs support   
now, call or text 688   
or chat  
[employer]  
----------------------  
--------------  
----------------------  
----------------------  
---  
To find providers in   
your area for mental   
health services  
https://findtreatment.  
gov/  
To find additional   
support and   
information regarding   
mental health services   
and  
substance abuse  
https://www.samhsa.gov  
/  
Mental Health and   
Psychiatry Resources  
Counseling  
[place] [address] [address]  
[phone]  
[address]  
[phone]  
[facility]   
[facility]   
Office  
[address],   
[place]  
[phone]  
[facility]   
[facility]   
Services (LGBTQIA+   
inclusive)  
[phone]  
[place] Office  
[address]   
[address]  
[place]   
[address]  
[place] [facility]  
[facility] [place] [address]  
[email or web address]  
[facility] [place]   
[facility]  
[address] [place], [place]  
[phone]  
Psychiatry  
[facility]  
[facility]  
[phone]  
[place]  
Ladonna Izquierdo, APRN-CNP  
(commercial insurance   
only)  
Behavioral Health   
Services  
[facility]  
[phone]  
Counseling and   
Psychiatry  
[facility]  
[address] [place] [address] Drive   
[address], [place]  
[phone]  
[facility]  
[place] [facility]   
Clinic: [phone]  
[place] [facility]   
Clinic: [phone]  
[facility]   
Outpatient Clinic:   
[phone]  
Psychiatric Emergency   
Services, [address]   
[place] (OPEN   
): [phone]  
[facility] Behavioral   
Health, Psychiatry and   
Traumatic Stress   
Center  
[facility] [address]   
[address]  
[phone]  
[facility]  
Multiple locations in   
[place]  
Go to [email or web address] Normal                                  [facility]  
   
                                                    Progress Noteon 2023   
   
                                        Progress Note       Denies any active   
suicidal or homicidal   
ideation. Symptoms are   
poorly  
controlled. Will start   
Lexapro 10 mg daily,   
recommend that she   
start cognitive  
behavioral   
therapy-resources   
provided. Close   
follow-up in 2 weeks   
sooner for  
any worsening symptoms Normal                                  [facility]  
   
                                        Progress Note       Follow-up with OB/GY  
N   
as directed         Normal                                  [facility]  
   
                                        Progress Note       Symptoms are   
controlled.         Normal                                  [facility]  
   
                                        Progress Note       Controlled. Has not   
needed her rescue   
inhaler over a year,   
continue albuterol  
as needed as needed Normal                                  [facility]  
   
                                        Progress Note       2023  
Don Mittal (:   
2000) is a 23   
y.o. female , New   
patient, here for  
evaluation of the   
following chief   
complaint(s):  
New Patient  
ASSESSMENT/PLAN:  
1. Anxiety and   
depression  
Assessment & Plan:  
Denies any active   
suicidal or homicidal   
ideation. Symptoms are   
poorly  
controlled. Will start   
Lexapro 10 mg daily,   
recommend that she   
start cognitive  
behavioral   
therapy-resources   
provided. Close   
follow-up in 2 weeks   
sooner for  
any worsening symptoms  
Orders:  
- escitalopram   
(Lexapro) 10 MG   
tablet; Take 1 tablet   
(10 mg) by mouth  
daily., Starting 2023, Until Mon   
2024, Normal  
2. Mild intermittent   
asthma, unspecified   
whether complicated  
Assessment & Plan:  
Controlled. Has not   
needed her rescue   
inhaler over a year,   
continue albuterol  
as needed as needed  
3. Vocal cord   
dysfunction  
Assessment & Plan:  
Symptoms are   
controlled.  
4. PCOS (polycystic   
ovarian syndrome)  
Assessment & Plan:  
Follow-up with OB/GYN   
as directed  
Follow up in about 2   
weeks (around   
2023) for   
Recheck.  
SUBJECTIVE/OBJECTIVE:  
HPI -  
Don Mittal presents   
as new patient,   
previous primary care   
provider Virginie Mann III, last seen 10   
years by previous   
provider.  
Specialists/other   
providers? Yes,   
describe: ob/gyn.  
Chief complaint(s):   
New Patient  
Patient presents today   
to establish and for   
an acute complaint of   
anxiety and  
depression symptoms.   
Reports these have   
been worsening over   
the past few months  
and is not sure if it   
is related to the   
amount of stressors   
that she is  
currently   
experiencing. States   
she has a history of   
some trauma in her  
childhood. Her father   
is currently   
estranged, has a good   
relationship now with  
her mother.  
Is working full-time   
at [employer]   
and then part-time at   
[employer].  
She also helps with   
administrative and   
business aspects with   
her  and  
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   
business. Was    
2 years ago to her   
 and is  
now his stepmom to 3   
young children, they   
have them usually   
every other  
weekend. She reports   
this is stressful for   
her and she tries the   
best she can  
but struggles with   
figuring out how to be   
a good mom. There are   
some stressors  
with their biological   
mother whom may or may   
not have bipolar   
disorder or some  
type of mood disorder   
as she is not always   
consistent with   
behaviors/attitude  
toward her.  
States that she has   
struggled with anxiety   
and some depression   
symptoms since  
childhood but has   
never been on any   
medications and only   
briefly saw a   
counselor  
1 time about 6 years   
ago. Reports daily   
symptoms of feeling   
overwhelmed,  
difficulty   
concentrating, and   
difficulty in daily   
activities. Reports   
fleeting  
thoughts of suicide   
when feeling   
overwhelmed with no   
plan. States she would  
never act on any of   
those thoughts due to   
her stepchildren and   
has been.  
Vocal cord   
dysfunction-reports   
diagnosed as a child   
and has gone through   
speech  
therapy for this which   
has helped. States she   
has not had any   
flareups. Also  
thinks that it helped   
after she quit vaping.  
Asthma,-reports   
symptoms controlled.   
Has not needed her   
rescue inhaler for a  
very long time, no   
hospitalizations  
PCOS-was briefly on   
birth control through   
her OB/GYN however   
decided not to take  
it any longer . Is   
currently taking   
prenatal vitamins   
states she would be   
okay  
if she got pregnant  
Past Medical History:  
Diagnosis Date  
Asthma   
Past Surgical History:  
Procedure Laterality   
Date  
ANKLE SURGERY Right  
x 3. Dr. Reza Verdin- [facility] /   
previously [facility]   
[facility]- last  
sx 2019  
WISDOM TOOTH   
EXTRACTION Bilateral  
  
Family History  
Problem Relation Name   
Age of Onset  
Thyroid disease Mother  
Perkins syndrome Father  
Asthma Sister Karo Kincaid  
Breast cancer Maternal   
Grandmother  
Cancer Maternal   
Grandfather  
Cancer Paternal   
Grandfather  
Social History  
Socioeconomic History  
Marital status:   
  
Spouse name: Not on   
file  
Number of children:   
Not on file  
Years of education:   
Not on file  
Highest education   
level: Not on file  
Occupational History  
Not on file  
Tobacco Use  
Smoking status: Never  
Smokeless tobacco:   
Current  
Types: Chew  
Tobacco comments:  
Nicotine pouch,  
Vaping Use  
Vaping Use: Former  
Substance and Sexual   
Activity  
Alcohol use: Yes  
Alcohol/week: 5.0   
standard drinks of   
alcohol  
Types: 5 Shots of   
liquor per week  
Comment: occ.  
Drug use: Never  
Sexual activity: Yes  
Partners: Male  
Birth   
control/protection:   
None  
Other Topics Concern  
Not on file  
Social History   
Narrative  
Lives with -   
padmini, 2 dogs- nancy and   
lev, dalmation and   
rott- both 2 yo.  has 3   
kids- live wth them   
occasionally- every   
other weekend (10-  
Cristian boy,8- Jenny-   
girl, 2- Akash- boy)  
Works at [employer]   
bank full time and   
part time [employer]   
[employer]  
 races cars.   
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   
and then lives in   
motor home when he  
is racing.  
Social Determinants of   
Health  
Financial Resource   
Strain: Not on file  
Food Insecurity: Not   
on file  
Transp (more content   
not included)...    [facility]  
   
                                                    CNOVon 2023   
   
                                        CNOV                Office Visit (OBGYWM  
)  
----------------------  
--------------  
DON MITTAL   
([id number]) 2000   
F  
Date Time Provider   
Department  
23 2:30 PM AWILDA BELL   
OBGYWM  
During your visit   
today, we recorded the   
following information   
about you:  
Blood pressure Weight   
Height Last Period  
118/68 67.6 kg 1.626 m   
23  
Awilda Bell MD 2023   
3:09 PM Signed  
Chaperone offered:   
Patient declinesJoe Gloria is a 23 year   
old  who   
presents for an annual   
gynecologic exam  
without complaints.   
Does have irregular   
bleeding with pills.   
 does  
sprint car racing. Has   
three step children.   
Thinking about kids.  
Menses: cycles every   
28 days and 5-8 days   
but does have a lot of   
irregular  
bleeding for 3-4 days   
per month- not as   
heavy. Does not miss   
pills or take them  
late.  
Contraception:   
combined hormonal   
contraceptives  
HPV vaccine: Yes  
Last Pap: 2022   
normal  
HPV: N/A  
History of abnormal   
pap: No  
Last mammogram: never  
Sexually active: Yes  
History of STDS: None  
Patient concerns for   
STD exposure: No.  
Pain with intercourse:   
No  
Postcoital bleeding:   
No  
Exercise:active   
walking  
Diet: balanced  
OB History  
 T0  L0  
SAB0 IAB0 Ectopic0   
Multiple0 Live Births0  
Comment: 3   
stepchildren  
Gyn History  
LMP: 2022,   
Having periods  
Age at Menarche:  
Age at First   
Pregnancy:  
Age at Menopause:  
Gyn History Comments:  
Sexual Activity: Yes;   
Male; sexually active   
with   
Contraception: Pill  
PAST MEDICAL HISTORY  
Diagnosis Date  
Exertional asthma   
2012  
Medial meniscus tear   
2012  
Unspecified otitis   
media  
Recurrent otitis  
Vocal cord dysfunction   
5/15/2012  
PAST SURGICAL HISTORY  
Procedure Laterality   
Date  
ANKLE ARTHROSCOPY   
Right 14  
Dr. Yeung  
FAMILY HISTORY  
Problem Relation Age   
of Onset  
other (Other [Other])   
Paternal Grandfather  
leukemia  
Cancer Maternal   
Grandmother  
breast  
Asthma Sister  
SOCIAL HISTORY  
Social History  
Tobacco Use  
Smoking status: Never  
Smokeless tobacco:   
Never  
Vaping Use  
Vaping Use: Never used  
Substance Use Topics  
Alcohol use: Yes  
Drug use: No  
REVIEW OF SYSTEMS  
Abdomen: No abdominal   
pain, nausea,   
vomiting, diarrhea, or   
constipation. No  
bloating, early   
satiety, indigestion,   
or increased   
flatulence.  
Bladder: No dysuria,   
gross hematuria,   
urinary frequency,   
urinary urgency, or  
incontinence.  
Breast: No breast   
lumps, nipple d/c,   
overlying skin   
changes, redness or   
skin  
retraction.  
Allergies and current   
medication updated:Yes  
EXAM: /68   Ht   
5' 4  (1.63m)   Wt 149   
lb (67.6kg)   LMP   
2023   BMI  
25.56 kg/(m2).  
GENERAL: pleasant,   
 female in no   
apparent distress  
HEENT: Normocephalic,   
atraumatic, mucus   
membranes moist, and   
no lesions  
NECK: Supple, full   
range of motion, no   
adenopathy, and   
thyroid normal  
DERMATOLOGY: Normal,   
without lesions,   
non-icteric, and   
non-hirsute  
BREAST: soft,   
non-tender, symmetric,   
no dominant mass,   
normal nipple-areolar  
complex, no   
lymphadenopathy, and   
no nipple discharge  
ABDOMEN: soft,   
non-tender, and no   
masses  
PELVIC: external   
genitalia normal,   
normal Bartholin's   
glands, urethra,   
Nenana's  
glands, no vulvar   
lesions, no cervical   
lesions, good vaginal   
support,  
physiologic discharge   
present, normal   
appearing perineal   
body and perianal  
region  
BIMANUAL: uterus   
normal size, shape and   
consistency, no   
adnexal masses, and  
non-tender  
RECTOVAGINAL:   
deferred.  
NEURO: alert and   
oriented x3,exam   
grossly non-focal  
EXTREMITIES: normal  
ASSESSMENT/PLAN:  
1) Health maintenance:  
Pap/HPV up to date.  
Mammogram starting age   
38.  
Nutrition, exercise   
and routine health   
maintenance exams   
reviewed.  
Calcium/Vitamin D   
supplementation   
information provided.  
2) Contraception:   
combined hormonal   
contraceptives.   
Contraceptive options  
reviewed and   
information provided.   
Changed to singh  
3) STD screening:   
Declined STD check.  
4) Follow up one year   
or sooner as needed  
Awilda Schulz MD  
Allergies As of Date:   
2023 Noted   
Allergy Reaction  
GRASS POLLEN   
2013 16 -   
Unknown  
SEASONAL ALLERGIES   
2013 16 -   
Unknown  
Comments: [place]  
TREES 2013 16 -   
Unknown  
Date Reviewed:   
2023  
Reviewed by: Terrell   
Ma, Queta - Fully   
Assessed  
Reason for Visit:  
Yearly Exam [187]  
Primary Visit   
Diagnosis:Encounter   
for gynecological   
examination (general)  
(routine) without   
abnormal findings   
[Z01.419]  
Other Visit   
Diagnosis:PCOS   
(polycystic ovarian   
syndrome) [E28.2]  
Order(s):Drospirenone-  
Ethinyl Estradiol   
(SINGH, 28,) 3-0.03   
mg per tabletTake  
1 tablet by mouth once   
daily. Take one active   
pill continuously x 3  
months- discard   
placebo pillsDisp: 112   
tabletRfl: 3  
Prescriptions as of   
2023  
- Drospirenone-Ethinyl   
Estradiol (SINGH,   
28,) 3-0.03 mg per   
tablet  
Take 1 tablet by mouth   
once daily. Take one   
active pill   
continuously x 3  
months- discard   
placebo pills  
- albuterol HFA   
(PROVENTIL HFA,   
VENTOLIN HFA) 90   
mcg/actuatio (more   
content not   
included)...        Normal                                  [facility]  
   
                                                    CBC W Auto Differential pane  
l (Bld)on 2022   
   
                      Abs Immature Gran <0.03                 <0.10 k/uL [facility]  
   
                                                    Basophils (Bld)   
[#/Vol]         0.03 10*3/uL                    <0.11 k/uL      [facility]  
   
                                                    Basophils/100 WBC   
(Bld)           0.4 %                                           [facility]  
   
                                                    Differential cell   
count method Nom   
(Bld)           Auto                                            [facility]  
   
                                                    Eosinophils (Bld)   
[#/Vol]         0.18 10*3/uL                    <0.46 k/uL      [facility]  
   
                                                    Eosinophils/100   
WBC (Bld)       2.5 %                                           [facility]  
   
                                                    Erythrocyte   
distribution width   
(RBC) [Ratio]   13.0 %                          11.5 - 15.0 %   [facility]  
   
                                                    Hematocrit (Bld)   
[Volume fraction] 37.3 %                          36.0 - 46.0 %   [facility]  
   
                                                    Hemoglobin (Bld)   
[Mass/Vol]          12.3 g/dL                               11.5 - 15.5   
g/dL                                    [facility]  
   
                      Immature Gran % 0.1 %                            [facility]  
   
                                                    Lymphocytes (Bld)   
[#/Vol]             2.81 10*3/uL                            1.00 - 4.00   
k/uL                                    [facility]  
   
                                                    Lymphocytes/100   
WBC (Bld)       39.7 %                                          [facility]  
   
                                                    MCH (RBC) [Entitic   
mass]           28.6 pg                         26.0 - 34.0 pg  [facility]  
   
                                                    MCHC (RBC)   
[Mass/Vol]          33.0 g/dL                               30.5 - 36.0   
g/dL                                    [facility]  
   
                                                    MCV (RBC) [Entitic   
vol]            86.7 fL                         80.0 - 100.0 fL [facility]  
   
                                                    Monocytes (Bld)   
[#/Vol]         0.46 10*3/uL                    <0.87 k/uL      [facility]  
   
                                                    Monocytes/100 WBC   
(Bld)           6.5 %                                           [facility]  
   
                                                    Neutrophils (Bld)   
[#/Vol]             3.58 10*3/uL                            1.45 - 7.50   
k/uL                                    [facility]  
   
                                                    Neutrophils/100   
WBC (Bld)       50.8 %                                          [facility]  
   
                                                    Nucleated RBC   
(Bld) [#/Vol]   10*3/uL                         <0.01 k/uL      [facility]  
   
                                                    Nucleated RBC/100   
WBC (Bld) [Ratio] 0.0 /100 WBC                                    [facility]  
   
                                                    Platelet mean   
volume (Bld)   
[Entitic vol]   11.4 fL                         9.0 - 12.7 fL   [facility]  
   
                                                    Platelets (Bld)   
[#/Vol]         276 10*3/uL                     150 - 400 k/uL  [facility]  
   
                          RBC (Bld) [#/Vol] 4.30 10*6/uL              3.90 - 5.2  
0   
m/uL                                    [facility]  
   
                          WBC (Bld) [#/Vol] 7.07 10*3/uL              3.70 - 11.  
00   
k/uL                                    [facility]  
   
                                                    HCG QUAL UR B/Oon 2022  
   
   
                      Pregnancy status Negative              neg - pos  [facility]   
[facility]  
   
                      Quality Check Yes                              [facility]  
  
  
  
Vital Signs  
  
  
                          Date Time    Vital Sign   Value        Performing   
Clinician                               Facility  
   
                                                    2024   
14:          Body height         161.3 cm            Desiree MUNROE-CNP  
Work Phone:   
[phone]                          [facility]  
   
                                                    2024   
14:                              Body mass index   
(BMI) [Ratio]             28.42 kg/m2               Desiree Cool APRN-CNP  
Work Phone:   
[phone]                          [facility]  
   
                                                    2024   
14:          Body temperature    98.91 [degF]        Desiree Cool APRN-CNP  
Work Phone:   
[phone]                          [facility]  
   
                                                    2024   
14:          Body weight         73.94 kg            Tia Mandela   
APRN-CNP  
Work Phone:   
[phone]                          [facility]  
   
                                                    2024   
14:                              Diastolic blood   
pressure                  74 mm[Hg]                 Tia Mandela   
APRN-CNP  
Work Phone:   
[phone]                          [facility]  
   
                                                    2024   
14:          Heart rate          90 /min             Tia Mandela   
APRN-CNP  
Work Phone:   
[phone]                          [facility]  
   
                                                    2024   
14:          Respiratory rate    20 /min             Tia Mandela   
APRN-CNP  
Work Phone:   
[phone]                          [facility]  
   
                                                    2024   
14:                              SaO2% (BldA) [Mass   
fraction]                 98 %                      Tia Mandela   
APRN-CNP  
Work Phone:   
[phone]                          [facility]  
   
                                                    2024   
14:                              Systolic blood   
pressure                  125 mm[Hg]                Tia Mandela   
APRN-CNP  
Work Phone:   
[phone]                          [facility]  
   
                                                    2024   
13:                              Body mass index   
(BMI) [Ratio]             27.74 kg/m2               Monica Bridenthal   
APRN - CNP  
Work Phone:   
[phone]                          [facility] [employer]  
   
                                                    2024   
13:          Body temperature    98.4 [degF]         Monica Bridenthal   
APRN - CNP  
Work Phone:   
[phone]                          [facility]  
   
                                                    2024   
13:          Body weight         73.3 kg             Monica Bridenthal   
APRN - CNP  
Work Phone:   
[phone]                          [facility]  
   
                                                    2024   
13:                              Diastolic blood   
pressure                  85 mm[Hg]                 Monica Bridenthal   
APRN - CNP  
Work Phone:   
[phone]                          [facility]  
   
                                                    2024   
13:          Heart rate          102 /min            Monica Bridenthal   
APRN - CNP  
Work Phone:   
[phone]                          [facility]  
   
                                                    2024   
13:          Respiratory rate    18 /min             Monica Bridenthal   
APRN - CNP  
Work Phone:   
[phone]                          [facility]  
   
                                                    2024   
13:                              SaO2% (BldA) [Mass   
fraction]                 97 %                      Monicafaith Sanchez   
APRN - CNP  
Work Phone:   
[phone]                          [facility]  
   
                                                    2024   
13:                              Systolic blood   
pressure                  129 mm[Hg]                Monica Luis Antonioenthal   
APRN - CNP  
Work Phone:   
[phone]                          [facility]  
   
                                                    2024   
07:          Body weight         75.75 kg            Ernestine MUNROE.CNP  
Work Phone:   
[phone]                          [facility]  
   
                                                    2024   
07:                              Diastolic blood   
pressure                  82 mm[Hg]                 Ernestine MUNROE.CNP  
Work Phone:   
[phone]                          [facility]  
   
                                                    2024   
07:          Heart rate          80 /min             Ernestine MUNROE.CNP  
Work Phone:   
[phone]                          [facility]  
   
                                                    2024   
07:                              SaO2% (BldA) [Mass   
fraction]                 98 %                      Ernestine MUNROE.CNP  
Work Phone:   
[phone]                          [facility]  
   
                                                    2024   
07:                              Systolic blood   
pressure                  126 mm[Hg]                Ernestine MUNROE.CNP  
Work Phone:   
[phone]                          [facility]  
   
                                                    2024   
06:          Body weight         76.66 kg            Ernestine MUNROE.CNP  
Work Phone:   
[phone]                          [facility]  
   
                                                    2024   
06:                              Diastolic blood   
pressure                  84 mm[Hg]                 Ernestine MUNROE.CNP  
Work Phone:   
[phone]                          [facility]  
   
                                                    2024   
06:                              Systolic blood   
pressure                  116 mm[Hg]                Ernestine MUNROE.CNP  
Work Phone:   
[phone]                          [facility]  
   
                                                    2024   
07:                              Body mass index   
(BMI) [Ratio]             29.21 kg/m2               Monicafaith Nogueiraal   
APRN - CNP  
Work Phone:   
[phone]                          [facility]  
   
                                                    2024   
07:          Body temperature    98.6 [degF]         Monica Bridenthal   
APRN - CNP  
Work Phone:   
[phone]                          [facility]  
   
                                                    2024   
07:          Body weight         77.2 kg             Monica Bridenthal   
APRN - CNP  
Work Phone:   
[phone]                          [facility]  
   
                                                    2024   
07:                              Diastolic blood   
pressure                  69 mm[Hg]                 Monica Bridenthal   
APRN - CNP  
Work Phone:   
[phone]                          [facility]  
   
                                                    2024   
07:          Heart rate          94 /min             Monica Bridenthal   
APRN - CNP  
Work Phone:   
[phone]                          [facility]  
   
                                                    2024   
07:          Respiratory rate    18 /min             Monica Bridenthal   
APRN - CNP  
Work Phone:   
[phone]                          [facility]  
   
                                                    2024   
07:                              SaO2% (BldA) [Mass   
fraction]                 98 %                      Monica Bridenthal   
APRN - CNP  
Work Phone:   
[phone]                          [facility]  
   
                                                    2024   
07:                              Systolic blood   
pressure                  112 mm[Hg]                Monica Bridenthal   
APRN - CNP  
Work Phone:   
[phone]                          [facility]  
   
                                                    2024   
13:                              Body mass index   
(BMI) [Ratio]             28.15 kg/m2               Monica Bridenthal   
APRN - CNP  
Work Phone:   
[phone]                          [facility]  
   
                                                    2024   
13:          Body temperature    98.1 [degF]         Monica Bridenthal   
APRN - CNP  
Work Phone:   
[phone]                          [facility]  
   
                                                    2024   
13:          Body weight         74.39 kg            Monica Bridenthal   
APRN - CNP  
Work Phone:   
[phone]                          [facility]  
   
                                                    2024   
13:                              Diastolic blood   
pressure                  89 mm[Hg]                 Monica Bridenthal   
APRN - CNP  
Work Phone:   
[phone]                          [facility]  
   
                                                    2024   
13:          Heart rate          98 /min             Monica Bridenthal   
APRN - CNP  
Work Phone:   
[phone]                          [facility]  
   
                                                    2024   
13:          Respiratory rate    18 /min             Monica Bridenthal   
APRN - CNP  
Work Phone:   
[phone]                          [facility]  
   
                                                    2024   
13:                              SaO2% (BldA) [Mass   
fraction]                 97 %                      Monica Bridenthal   
APRN - CNP  
Work Phone:   
[phone]                          [facility]  
   
                                                    2024   
13:                              Systolic blood   
pressure                  133 mm[Hg]                Monica Bridenthal   
APRN - CNP  
Work Phone:   
[phone]                          [facility]  
   
                                                    2023   
09:                              Body mass index   
(BMI) [Ratio]             25.92 kg/m2               Monica Bridenthal   
APRN - CNP  
Work Phone:   
[phone]                          [facility]  
   
                                                    2023   
09:          Body temperature    97.59 [degF]        Monica Bridenthal   
APRN - CNP  
Work Phone:   
[phone]                          [facility]  
   
                                                    2023   
09:          Body weight         68.49 kg            Monica Bridenthal   
APRN - CNP  
Work Phone:   
[phone]                          [facility]  
   
                                                    2023   
09:                              Diastolic blood   
pressure                  77 mm[Hg]                 Monica Bridenthal   
APRN - CNP  
Work Phone:   
[phone]                          [facility]  
   
                                                    2023   
09:          Heart rate          98 /min             Monica Bridenthal   
APRN - CNP  
Work Phone:   
[phone]                          [facility]  
   
                                                    2023   
09:          Respiratory rate    18 /min             Monica Bridenthal   
APRN - CNP  
Work Phone:   
[phone]                          [facility]  
   
                                                    2023   
09:                              SaO2% (BldA) [Mass   
fraction]                 99 %                      Monica Bridenthal   
APRN - CNP  
Work Phone:   
[phone]                          [facility]  
   
                                                    2023   
09:                              Systolic blood   
pressure                  116 mm[Hg]                Monica Mirlandeal   
APRN - CNP  
Work Phone:   
[phone]                          [facility]  
   
                                                    2023   
14:          Body height         162.6 cm            Awilda Leonard MD  
Work Phone:   
[phone]                          [facility]  
   
                                                    2023   
14:          Body weight         67.59 kg            Awilda Leonard MD  
Work Phone:   
[phone]                          [facility]  
   
                                                    2023   
14:                              Diastolic blood   
pressure                  68 mm[Hg]                 Awilda Leonard MD  
Work Phone:   
[phone]                          [facility]  
   
                                                    2023   
14:                              Systolic blood   
pressure                  118 mm[Hg]                Awilda Leonard MD  
Work Phone:   
[phone]                          [facility]  
   
                                                    2022   
07:          Body height         163 cm              Lauryn Ryan MD  
Work Phone:   
[phone]                          [facility]  
   
                                                    2022   
07:          Body weight         67.59 kg            Lauryn Ryan MD  
Work Phone:   
[phone]                          [facility]  
   
                                                    2022   
07:                              Diastolic blood   
pressure                  64 mm[Hg]                 Lauryn Ryan MD  
Work Phone:   
[phone]                          [facility]  
   
                                                    2022   
07:                              Systolic blood   
pressure                  104 mm[Hg]                Lauryn Ryan MD  
Work Phone:   
[phone]                          [facility]  
  
  
  
Encounters  
  
  
                          Encounter Date Encounter Type Care Provider Facility  
   
                                                    Start: 2024  
End: 2024                         Office outpatient new 45   
minutes                                 Desiree Cool APRN-CNP  
Work Phone:   
[phone]                           [facility]   
[place]  
   
                                        Comment on above:   Contact dermatitis,   
unspecified contact dermatitis type,   
unspecified trigger (Primary Dx)   
   
                                                    Start: 2024  
End: 2024                         Office outpatient visit 15   
minutes                                 Monica Mirlandeal   
APRN - CNP  
Work Phone:   
[phone]                          [facility]   
[facility]   
Medicine  
   
                                        Comment on above:   Anxiety and depressi  
on (Primary Dx);  
Skin sensation disturbance   
   
                                                    Start: 2024  
End: 2024     ambulatory          MONICA BRIDENTHAL  [facility]  
   
                                                    Start: 2024  
End: 2024     ambulatory          MONICA BRIDENTHAL  Facility:[facility]  
   
                                                    Start: 2024  
End: 2024                         Patient encounter   
procedure                               Ernestine Sher APRN.CNP  
Work Phone:   
[phone]                          OB/Gynecology  
   
                                        Comment on above:   PCOS (polycystic ova  
cheo syndrome) (Primary Dx);  
Irregular menses;  
Anxiety and depression;  
History of bulimia;  
History of anorexia nervosa;  
Provided repeat prescription for oral contraceptive;  
Overweight with body mass index (BMI) of 29 to 29.9 in adult   
   
                                                    Start: 2024  
End: 2024     [facility]          [facility]  [facility]  
   
                                                    Start: 2024  
End: 2024                         Patient encounter   
procedure                               Ernestine ALANISCNP  
Work Phone:   
[phone]                          OB/Gynecology  
   
                                        Comment on above:   PCOS (polycystic ova  
cheo syndrome) (Primary Dx);  
Elevated LDL cholesterol level;  
IFG (impaired fasting glucose);  
Irregular menses;  
Anxiety and depression;  
History of bulimia;  
History of anorexia nervosa;  
Overweight with body mass index (BMI) of 29 to 29.9 in adult   
   
                                                    Start: 2024  
End: 2024                         Office outpatient visit 15   
minutes                                 Monica Bridenthal   
APRN - CNP  
Work Phone:   
[phone]                          [facility]   
Medicine  
   
                                        Comment on above:   Anxiety and depressi  
on (Primary Dx);  
PCOS (polycystic ovarian syndrome)   
   
                                                    Start: 2024  
End: 2024                         Office outpatient visit 25   
minutes                                 Monica Bridenthal   
APRN - CNP  
Work Phone:   
[phone]                          [facility]   
Medicine  
   
                                        Comment on above:   Anxiety and depressi  
on (Primary Dx);  
PCOS (polycystic ovarian syndrome)   
   
                                                    Start: 2024  
End: 2024     ambulatory          MONICA BRIDENTHAL  [facility]  
   
                                                    Start: 2024  
End: 2024     ambulatory          ERNESTINE SHER         Facility:[facility]  
   
                                                    Start: 2024  
End: 2024                         Office outpatient visit 15   
minutes                                 Monica Bridenthal   
APRN - CNP  
Work Phone:   
[phone]                          [facility]   
Medicine  
   
                                        Comment on above:   Anxiety and depressi  
on (Primary Dx);  
Weight gain   
   
                                                    Start: 2024  
End: 2024                         Office outpatient visit 25   
minutes                                 Mnoica Mirlandeal   
APRN - CNP  
Work Phone:   
[phone]                          [facility]   
Medicine  
   
                                        Comment on above:   Anxiety and depressi  
on (Primary Dx);  
Weight gain   
   
                                                    Start: 2024  
End: 2024     [facility]  
   
                                                    Start: 2023  
End: 2023                         Patient encounter   
procedure                               Monicafaith Nogueiraal   
APRN - CNP  
Work Phone:   
[phone]                          [facility]  
Work Phone:   
[phone]  
   
                                                    Start: 2023  
End: 2023                         Periodic preventive med   
est patient 18-39 yrs                   Monica Luis Antonioenthal   
APRN - CNP  
Work Phone:   
[phone]                          [facility]   
[facility]  
   
                                        Comment on above:   Annual physical exam  
 (Primary Dx);  
Screening for deficiency anemia;  
Screening for cholesterol level;  
Anxiety and depression   
   
                                                    Start: 2023  
End: 2023     ambulatory          [facility]  
   
                                                    Start: 2023  
End: 2023                         Encounter for general   
adult medical examination   
without abnormal findings [facility]  
   
                                                    Start: 2023  
End: 2023     ambulatory          [facility]  
   
                                                    Start: 2023  
End: 2023           ambulatory                AWILDA LEONARD                                Facility:[facility]  
   
                                                    Start: 2023  
End: 2023                         Patient encounter   
procedure                               Awilda Leonard MD  
Work Phone:   
[phone]                          OB/Gynecology  
   
                                        Comment on above:   Encounter for gyneco  
logical examination (general) (routine)   
without   
abnormal findings (Primary Dx);  
PCOS (polycystic ovarian syndrome)   
   
                                                    Start: 2023  
End: 2023           Patient encounter status  Awilda Leonard MD  
Work Phone:   
[phone]                          [facility]  
   
                                Start: 2023 Refill          Rachel small   
APRN.CNM  
Work Phone:   
[phone]                          [facility]cology  
   
                                        Comment on above:   Refill Request; Refi  
ll Request   
   
                                Start: 2023 ambulatory      Lauryn CHAPA  
Work Phone:   
[phone]                          OB/Gy[facility]cology  
   
                                        Comment on above:   Birth Control   
   
                                Start: 2023 Telephone encounter Lauryn grady MD  
Work Phone:   
[phone]                          [facility]/Gy[facility]cology  
   
                                        Comment on above:   Orders   
   
                                Start: 2023 ambulatory      Lauryn CHAPA  
Work Phone:   
[phone]                          [facility]cology  
   
                                        Comment on above:   Period Update   
   
                                Start: 2022 ambulatory      Lauryn CHAPA  
Work Phone:   
[phone]                          OB/Gynecology  
   
                                        Comment on above:   Period/Pregnancy Birdie  
t Update   
   
                                Start: 2022 Telephone encounter Lauryn grady MD  
Work Phone:   
[phone]                          [facility]/Gy[facility]cology  
   
                                        Comment on above:   Follow Up   
   
                                                    Start: 2022  
End: 2022           ambulatory                Lauryn Ryan MD  
Work Phone:   
[phone]                          [facility]cology  
   
                                        Comment on above:   PCOS (polycystic ova  
cheo syndrome) (Primary Dx);  
Missed menses   
   
                                                    Start: 2022  
End: 2022                         Telemedicine consultation   
with patient                            Lauryn Ryan MD  
Work Phone:   
[phone]                          [facility]  
   
                                Start: 2022 Telephone encounter Lauryn grady MD  
Work Phone:   
[phone]                          OB/Gynecology  
   
                                        Comment on above:   Patient Question   
   
                                Start: 2022 Telephone encounter Lauryn grady MD  
Work Phone:   
[phone]                          OB/Gynecology  
   
                                        Comment on above:   Patient Question   
   
                                Start: 2022 ambulatory      Awilda Leonard MD  
Work Phone:   
[phone]                          [facility] [place]  
   
                                Start: 2022 Manual pelvic examination Shaggy Leonard MD  
Work Phone:   
[phone]                          OB/Gynecology  
   
                                        Comment on above:   Question regarding P  
[facility]   
   
                                                    Start: 2022  
End: 2022           ambulatory                Awilda Leonard MD  
Work Phone:   
[phone]                          [facility]cology  
   
                                        Comment on above:   GYN Ultrasound   
   
                                                    Start: 2022  
End: 2022                         Patient encounter   
procedure                               Awilda Leonard MD  
Work Phone:   
[phone]                          [facility]  
   
                                                    Start: 2022  
End: 2022                         Patient encounter   
procedure                               Lauryn Ryan MD  
Work Phone:   
[phone]                          [facility]/Gy[facility]cology  
   
                                        Comment on above:   Encounter for gyneco  
logical examination without abnormal   
finding   
(Primary Dx);  
Encounter for screening for malignant neoplasm of cervix;  
Irregular intermenstrual bleeding   
   
                                                    Start: 2022  
End: 2022           Patient encounter status  Lauryn Ryan MD  
Work Phone:   
[phone]                          OB/Gynecology  
  
  
  
Procedures  
  
  
                          Date         Procedure    Procedure Detail Performing   
Clinician  
   
                                        Start: 2023   Lipid 1996 panel - S  
eun   
or Plasma                                           Monica Sanchez APRN   
- CNP  
Work Phone:   
[phone]  
   
                                        Start: 2022   Urine pregnancy test  
   
visual color cmprsn meths                           Lauryn Ryan MD  
Work Phone:   
[phone]  
   
                                        Start: 2022   Microscopic observat  
ion   
[Identifier] in Cervix by   
Cyto stain                                          Monica Sanchez APRN   
- CNP  
Work Phone:   
[phone]  
   
                                        Start: 2017   Adult depression scr  
eening   
assessment                                          Lauryn Ryan MD  
Work Phone:   
[phone]  
  
  
  
Plan of Treatment  
  
  
                          Date         Care Activity Detail       Author  
   
                                        Start: 2058   RSV Immunization age  
d   
60 or older (1 - 1-dose   
60+ series)                             RSV Immunization aged 60   
or older (1 - 1-dose 60+   
series)                                 [facility]  
   
                                        Start: 2048   Zoster Vaccines (1 o  
f   
2)                        Zoster Vaccines (1 of 2)  [facility]  
   
                          Start: 2028 Lipid panel  Lipid Panel  [facility]  
th  
   
                          Start: 2025 PAP TESTING  PAP TESTING  [facility]  
   
                                        Start: 2025   Screening for malign  
ant   
neoplasm of cervix                                  [facility]  
   
                          Start: 2024 COVID-19 Vaccine (#1) COVID-19 Vacci  
ne (#1) [facility]  
   
                                        Comment on above:   Postponed from  (Patient Refused)   
   
                                        Start: 2024   COVID-19 Vaccine ( season)                         COVID-19 Vaccine ( season)                         [facility]  
   
                                        Comment on above:   Postponed from  (Patient Refused)   
   
                                        Start: 2024   DTaP/Tdap/Td Vaccine  
s   
(7 - Td or Tdap)                        DTaP/Tdap/Td Vaccines (7   
- Td or Tdap)                           [facility]  
   
                                        Comment on above:   Postponed from  (Patient Refused)   
   
                          Start: 2024 Hepatitis C screening Hepatitis C Sc  
reening [facility]  
   
                                        Comment on above:   Postponed from  (Patient Refused)   
   
                          Start: 2024 HIV screening HIV Screening [facility]  
   
                                        Comment on above:   Postponed from  (Patient Refused)   
   
                                                    Start: 2024  
End: 2024                         Patient encounter   
procedure                               2024 7:40 AM EST   
Office Visit [facility] [address]   
[place]  [address] [place] [address] [phone]   
Luis AntonioenthMonica ozuna,   
APRN - CNP [address] [facility] [address] [place], [place]   
[id number] [phone]   
(Work) [phone]   
(Fax)                                   [facility]  
   
                                Start: 2024 Influenza vaccination Influenz  
a Vaccine   
(Season Ended)                          [facility]  
   
                          Start: 2024 Influenza vaccination Influenza Vacc  
ine (#1) [facility]  
   
                                        Comment on above:   Postponed from  (Patient Refused)   
   
                          Start: 2024 Depression Monitoring Depression Mon  
itoring [facility]  
   
                          Start: 2024 Depresssion Monitoring Depresssion M  
onitoring [facility]  
   
                                                    Start: 2024  
End: 2024                         Patient encounter   
procedure                               2024 1:00 PM EDT   
Office Visit [facility] [address]   
[place]  [address] [place] [address] [phone]   
BridenthMonica ozuna,   
APRN - CNP [address] [place], [place]   
[id number] [phone]   
(Work) [phone]   
(Fax)                                   [facility]   
Medicine  
   
                                                    Start: 2024  
End: 2024                         Patient encounter   
procedure                               2024 2:00 PM EST   
Office Visit [facility] [address]   
[place] [address] [place] [address] [phone]   
Bridenthal, Monica,   
APRN - CNP [address] [place]   
[address] [phone]   
(Work) [phone]   
(Fax)                                   [facility]  
   
                                                    Start: 2024  
End: 2024                         Patient encounter   
procedure                               2024 7:40 AM EST   
Office Visit [facility] [address]   
[place] [address] [place] [address] [phone]   
Bridenthal, Monica,   
APRN - CNP [address] [place]   
[address] [phone]   
(Work) [phone]   
(Fax)                                   [facility]  
   
                          Start: 2024 Depression Assessment Depression Ass  
[facility]  
   
                                                    Start: 2023  
End: 2024                         CBC panel - Blood by   
Automated count                         CBC Lab Routine   
Screening for deficiency   
anemia Annual physical   
exam Expected:   
2023   
(Approximate), Expires:   
2024                              [facility]  
   
                                        Comment on above:   Expected: 2023  
 (Approximate), Expires: 2024   
   
                                                    Start: 2023  
End: 2024                         Comprehensive metabolic   
1998 panel - Serum or   
Plasma                                  Comprehensive metabolic   
panel Lab Routine Annual   
physical exam Expected:   
2023   
(Approximate), Expires:   
2024                              [facility]  
Work Phone:   
[phone]  
   
                                        Comment on above:   Expected: 2023  
 (Approximate), Expires: 2024   
   
                                                    Start: 2023  
End: 2024                         Lipid 1996 panel -   
Serum or Plasma                         Lipid panel Lab Routine   
Screening for   
cholesterol level Annual   
physical exam Expected:   
2023   
(Approximate), Expires:   
[address] [place] [address]  
   
                                        Comment on above:   Expected: 2023  
 (Approximate), Expires: 2024   
   
                                        Start: 2023   Covid-19 Vaccine (2   
-   
2023-24 season)                         Covid-19 Vaccine (2 -   
2023-24 season)                         [facility]  
   
                          Start: 2023 Influenza vaccination              C  
[facility]  
   
                                                    Start: 2023  
End: 2023                         Progesterone   
[Mass/volume] in Serum   
or Plasma                               PROGESTERONE BLD Lab   
Routine Anovulation   
Expected: 2023,   
Expires: 2023                     [facility]  
Work Phone:   
[phone]  
   
                                        Comment on above:   Expected: 2023  
, Expires: 2023   
   
                          Start: 2023 DEPRESSION ASSESSMENT DEPRESSION ASS  
[facility]  
   
                          Start: 2022 PAP TESTING  PAP TESTING  [facility]  
   
                                                    Start: 2022  
End: 2023                         Choriogonadotropin.beta   
subunit [Units/volume]   
in Serum or Plasma                      [facility]AT[facility] Lab   
Routine Missed menses   
Expected: 2022,   
Expires: 2023                     [facility]  
Work Phone:   
[phone]  
   
                                        Comment on above:   Expected: 2022  
, Expires: 2023   
   
                          Start: 2022 Influenza vaccination              [facility]  
   
                                                    Start: 2022  
End: 2022                         Thyrotropin   
[Units/volume] in Serum   
or Plasma                                           [facility]  
Work Phone:   
[phone]  
   
                                        Comment on above:   Expected: 2022  
, Expires: 2022   
   
                          Start: 2022 [facility]PRESSION ASSESSMENT [facility]  
[facility]  
   
                                        Start: 2021   COVID-19 VACCINE (2   
-   
Booster for Gilbert   
series)                                 COVID-19 VACCINE (2 -   
Booster for Gilbert   
series)                                 [facility]  
   
                                        Start: 2020   Urine microalbumin   
profile                                             [facility]  
   
                                        Start: 2018   Adult depression   
screening assessment      DEPRESSION SCREENING      [facility]  
   
                          Start: 2016 HEPATITIS C SCREENING HEPATITIS C [facility]  
   
                          Start: 2016 Hepatitis C screening Hepatitis C [facility]  
   
                          Start: 2016 HIV SCREENING HIV SCREENING [facility]  
   
                          Start: 2016 HIV screening HIV Screening [facility]  
   
                                        Start: 2012   PEDS TO ADULT   
TRANSITION ANNUAL   
ASSESSMENT                              PEDS TO ADULT TRANSITION   
ANNUAL ASSESSMENT                       [facility]  
   
                                        Start: 2010   PEDS TO ADULT   
TRANSITION INITIAL   
DISCUSSION                              PEDS TO ADULT TRANSITION   
INITIAL DISCUSSION                      [facility]  
   
                                        Start: 2008   MENINGOCOCCAL B:   
Consider based on risk   
(1 of 2 - Risk Bexsero   
2-dose series)                          MENINGOCOCCAL B:   
Consider based on risk   
(1 of 2 - Risk Bexsero   
2-dose series)                          [facility]  
   
                                        Start: 2004   Pneumococcal Vaccine  
:   
Pediatrics (0 to 5   
Years) and At-Risk   
Patients (6 to 64   
Years) (1 of 2 - PCV)                   Pneumococcal Vaccine:   
Pediatrics (0 to 5   
Years) and At-Risk   
Patients (6 to 64 Years)   
(1 of 2 - PCV)                          [facility]  
   
                          Start: 1998 Lipid panel  Lipid Panel  [facility]  
th  
   
                                                            PAP FLUID CERVICAL   
SCREENING                               PAP FLUID CERVICAL   
SCREENING Lab Routine   
Encounter for screening   
for malignant neoplasm   
of cervix 2022   
8:07 AM EDT                             [facility]  
Work Phone:   
[phone]  
   
                                                PELVIC US WHI   PELVIC US [facility] An  
c   
Imaging Routine   
Irregular intermenstrual   
bleeding Ordered:   
2022                              [facility]  
Work Phone:   
[phone]  
   
                                        Comment on above:   Ordered: 2022   
   
                                                                 [facility]  
c  
   
                                                                 [facility]  
c  
   
                                                                 [facility]  
c  
   
                                                                 [facility]  
c  
   
                                                                 [facility]  
c  
   
                                                                 [facility]  
c  
   
                                                                 [facility]  
c  
   
                                                                 [facility]  
c  
   
                                                                 [facility]  
c  
  
  
  
Immunizations  
  
  
                      Immunization Date Immunization Notes      Care Provider Erwin alexander  
   
                                        2021          [facility] SARS-CoV-2   
Vaccination                                         Monica Sanchez   
APRN - CNP  
Work Phone:   
[phone]                          [facility]  
   
                                        2017          influenza, injectabl  
e,   
quadrivalent, contains   
preservative                                        Lauryn Ryan MD  
Work Phone:   
[phone]                          [facility]  
   
                                        2017          influenza virus vacc  
ine,   
unspecified formulation                             Monica Sanchez   
APRN - CNP  
Work Phone:   
[phone]                          [facility]  
   
                                        2014          meningococcal   
polysaccharide (groups   
A, C, Y and W-135)   
diphtheria toxoid   
conjugate vaccine   
(MCV4P)                                             Lauryn Ryan MD  
Work Phone:   
[phone]                          [facility]  
   
                                        2013          human papilloma viru  
s   
vaccine, quadrivalent                               Lauryn Ryan MD  
Work Phone:   
[phone]                          [facility]  
   
                                        2013          human papilloma viru  
s   
vaccine, quadrivalent                               Lauryn Ryan MD  
Work Phone:   
[phone]                          [facility]  
   
                                        2013          human papilloma viru  
s   
vaccine, quadrivalent                               Lauryn Ryan MD  
Work Phone:   
[phone]                          [facility]  
   
                                        2010          Meningococcal, MCV4,  
   
unspecified conjugate   
formulation(groups A, C,   
Y and W-135)                                        Lauryn Ryan MD  
Work Phone:   
[phone]                          [facility]  
   
                                        2010          tetanus toxoid, redu  
pastor   
diphtheria toxoid, and   
acellular pertussis   
vaccine, adsorbed                                   Lauryn Ryan MD  
Work Phone:   
[phone]                          [facility]  
   
                          2009   varicella virus vaccine              Marianne Ryan MD  
Work Phone:   
[phone]                          [facility]  
   
                                        2005          poliovirus vaccine,   
inactivated                                         Lauryn Ryan MD  
Work Phone:   
[phone]                          [facility]  
Work Phone:   
[phone]  
   
                                        2004          measles, mumps and   
rubella virus vaccine                               Lauryn Ryan MD  
Work Phone:   
[phone]                          [facility]  
Work Phone:   
[phone]  
   
                                        2004          diphtheria, tetanus   
toxoids and acellular   
pertussis vaccine                                   Lauryn Ryan MD  
Work Phone:   
[phone]                          [facility]  
Work Phone:   
[phone]  
   
                                        1999          DTaP-Haemophilus   
influenzae type b   
conjugate vaccine                                   Lauryn Ryan MD  
Work Phone:   
[phone]                          [facility]  
   
                                        1999          poliovirus vaccine,   
inactivated                                         Lauryn Ryan MD  
Work Phone:   
[phone]                          [facility]  
   
                          06-   varicella virus vaccine              Marianne Ryan MD  
Work Phone:   
[phone]                          [facility]  
   
                                        1999          measles, mumps and   
rubella virus vaccine                               Lauryn Ryan MD  
Work Phone:   
[phone]                          [facility]  
   
                                        1998          hepatitis B vaccine,  
   
pediatric or   
pediatric/adolescent   
dosage                                              Lauryn Ryan MD  
Work Phone:   
[phone]                          [facility]  
   
                                        1998          DTaP-Haemophilus   
influenzae type b   
conjugate vaccine                                   Lauryn Ryan MD  
Work Phone:   
[phone]                          [facility]  
   
                                        1998          poliovirus vaccine,   
inactivated                                         Lauryn Ryan MD  
Work Phone:   
[phone]                          [facility]  
   
                                        1998          DTaP-Haemophilus   
influenzae type b   
conjugate vaccine                                   Lauryn Ryan MD  
Work Phone:   
[phone]                          [facility]  
   
                                        1998          poliovirus vaccine,   
inactivated                                         Lauryn Ryan MD  
Work Phone:   
[phone]                          [facility]  
   
                                        1998          DTaP-Haemophilus   
influenzae type b   
conjugate vaccine                                   Lauryn Ryan MD  
Work Phone:   
[phone]                          [facility]  
   
                                        1998          hepatitis B vaccine,  
   
pediatric or   
pediatric/adolescent   
dosage                                              Lauryn Ryan MD  
Work Phone:   
[phone]                          [facility]  
   
                                        1998          poliovirus vaccine,   
inactivated                                         Lauryn Ryan MD  
Work Phone:   
[phone]                          [facility]  
   
                                        1998          hepatitis B vaccine,  
   
pediatric or   
pediatric/adolescent   
dosage                                              Lauryn Ryan MD  
Work Phone:   
[phone]                          [facility]  
  
  
  
Payers  
  
  
                          Date         Payer Category Payer        Policy ID  
   
                                        2023          [facility] Member   
Subscriber Plan / Payer   
(Effective   
2023-Present) Name:   
Don Mittal Member ID:   
[id number] Relation to   
Subscriber: Spouse Name:   
KYRIEPADMINI Subscriber   
ID: [id number] YOB: 1992 Address:   
[address] [place] [address] Payer ID:   
[id number] (NAIC) Group ID:   
[id number] Type: Not on   
file Address: [address]   
[address] [place]   
[address]                              [id number]  
[id number].  
[id number]  
   
                          2023   Unknown                   [id number]  
   
                                2022      Unknown         [employer] M[employer] SUPER[employer]  
 PLUS   
[id number] 3/12/2022-Present   
[phone] [address]   
[place] [address]   
[facility]                                     [id number]   
[id number]  
[id number]  
   
                          2022   Unknown                   [id number]  
[id number]  
[id number]  
  
  
  
Social History  
  
  
                          Date         Type         Detail       Facility  
   
                                                    Start:   
08-  
End: 2024                         Tobacco smoking status   
NHIS                      Never smoked tobacco      [facility]  
Work Phone:   
[phone]  
   
                                                    Start:   
2022  
End: 2024           Alcohol intake            Current drinker of   
alcohol (finding)                       [facility]  
   
                                                    Start:   
05-                              History SDOH Alcohol   
Frequency                 3                         [facility]  
   
                                                    Start:   
05-                              History SDOH Alcohol Std   
Drinks                    1                         [facility]  
   
                                                    Start:   
05-                              History SDOH Alcohol   
Binge                     2                         [facility]  
   
                                                    Start:   
05-                              History SDOH Social   
Connections Phone         5                         [facility]  
   
                                                    Start:   
05-                              History SDOH Social   
Connections Living        7                         [facility]  
   
                                                    Start:   
05-                              History SDOH Physical   
Activity DPW              4                         [facility]  
   
                                                    Start:   
05-                              History SDOH Physical   
Activity MPS              6                         [facility]  
   
                                                    Start:   
05-          Education           14                  [facility]  
   
                                                    Start:   
1998          Sex Assigned At Birth Female              [facility]  
   
                                                    Start:   
2022  
End: 2022                         Exposure to SARS-CoV-2   
(event)                   Not sure                  [facility]  
   
                                                    Start:   
08-  
End: 2024           Tobacco use and exposure  Smokeless tobacco   
non-user                                [facility]  
   
                                                    Start:   
05-  
End: 2023                         History of Social   
function                                            [facility]  
   
                                                    Start:   
05-  
End: 2023                         Social connection and   
isolation panel                                     [facility]  
   
                                                            Do you belong to any  
   
clubs or organizations   
such as Sikh groups,   
unions, fraternal or   
athletic groups, or   
school groups?            Yes                       [facility]  
   
                                                            Are you now ,  
   
, ,   
, never    
or living with a partner? Never              [facility]  
   
                                                            How often to you hav  
e a   
drink containing alcohol? 2-4 times a month         [facility]  
   
                                                            How many standard dr  
inks   
containing alcohol do you   
have on a typical day?    1 or 2                    [facility]  
   
                                                            How often do you hav  
e 6   
or more drinks on 1   
occasion?                 Less than monthly         [facility] [facility]  
   
                                                            How hard is it for y  
ou to   
pay for the very basics   
like food, housing,   
medical care, and heating Not very hard             [facility]  
   
                                                            Do you feel stress -  
   
tense, restless, nervous,   
or anxious, or unable to   
sleep at night because   
your mind is troubled all   
the time - these days   
[OSQ]                     Rather much               [facility]  
   
                                                            (I/We) worried whereji  
er   
(my/our) food would run   
out before (I/we) got   
money to buy more.        Never true                [facility]  
   
                                                            In the past 12 month  
s,   
has lack of   
transportation kept you   
from medical appointments   
or from getting   
medications?              No                        [facility]  
   
                                                            In the past 12 month  
s,   
was there a time when you   
were not able to pay the   
mortgage or rent on time? No                        [facility]  
   
                                                    Start:   
2022                Gender identity           Identifies as female   
gender (finding)                        [facility]  
   
                                                    Start:   
2022          Sexual orientation  Heterosexual (finding) [facility]  
   
                                                    Start:   
2023                Tobacco use and exposure  User of smokeless   
tobacco                                 [facility]  
   
                                       History of tobacco use Chews Tobacco [facility]  
   
                                                    Start:   
2023          Tobacco Comment     Nicotine pouch,     [facility]  
   
                                                    Start:   
2023          Alcohol Comment     occ.                [facility]  
   
                                                    Start:   
2024          Alcoholic beverage intake Ex-drinker (finding) [facility]  
Work Phone:   
[phone]  
   
                                                    Start:   
2000          Sex assigned at birth Not on file         [facility]  
Work Phone:   
[phone]  
  
  
  
Clinical Notes 2013 to 2024  
 DORIS LindseyCNP - 2024 2:40 PM ESTAssessment & Plan Note - SHANEKA Chacon CNP - 2024 4:40 PM EDTMandi D'Amico - 2024 1:00 
PM EDTPatient Instructions  
  
                                Note Date & Type Note            Facility  
   
                                                    2024 History of   
Present illness Narrative               Formatting of this note is   
different from the original.  
Subjective  
Patient ID: Don Mittal is a 24   
y.o. female. They present today   
with a chief complaint of Rash.  
  
History of Present Illness  
Patient is a 25 y/o female c/o   
red, raised, rash to right ABD,   
neck, chest x5 days. Denies   
ingestion of new foods/drinks,   
use of new   
detergents/soap/conditioner/shamp  
oos. Patient reports symptoms   
began shortly after utilizing   
W[employer]. Patient denies   
symptoms of fever, chills,   
bodyaches, lethargy, weakness,   
chest pain/tightness/pressure,   
SOB, wheezing, N/V/D, ABD pain.   
No OTC medication reported to be   
taken.  
  
Rash  
  
Past Medical History  
Allergies as of 2024 -   
Reviewed 2024  
Allergen Reaction Noted  
Bee pollen Other 2013  
  
(Not in a hospital admission)  
  
  
No past medical history on file.  
  
No past surgical history on file.  
  
reports that she has never   
smoked. She has never used   
smokeless tobacco. She reports   
that she does not currently use   
alcohol.  
  
Review of Systems  
Review of Systems  
Constitutional: Negative.  
HENT: Negative.  
Eyes: Negative.  
Cardiovascular: Negative.  
Gastrointestinal: Negative.  
Endocrine: Negative.  
Genitourinary: Negative.  
Musculoskeletal: Negative.  
Skin: Positive for rash.  
Allergic/Immunologic: Negative.  
Neurological: Negative.  
Hematological: Negative.  
Psychiatric/Behavioral: Negative.  
  
  
  
  
  
  
  
  
  
  
  
Objective  
Vitals:  
24 1445  
BP: 125/74  
Pulse: 90  
Resp: 20  
Temp: 37.2 C (98.9 F)  
TempSrc: Oral  
SpO2: 98%  
Weight: 73.9 kg (163 lb)  
Height: 1.613 m (5' 3.5 )  
  
No LMP recorded.  
  
Physical Exam  
Constitutional:  
Comments: Patient A/O x4, LOC 5,   
calm and cooperative. Patient   
self-ambulatory to treatment area   
and is in no acute distress.  
HENT:  
Head: Normocephalic and   
atraumatic.  
Right Ear: Tympanic membrane   
normal.  
Left Ear: Tympanic membrane   
normal.  
Nose: Nose normal.  
Mouth/Throat:  
Mouth: Mucous membranes are dry.  
Pharynx: Oropharynx is clear.  
Eyes:  
Extraocular Movements:   
Extraocular movements intact.  
Pupils: Pupils are equal, round,   
and reactive to light.  
Cardiovascular:  
Rate and Rhythm: Normal rate and   
regular rhythm.  
Pulses: Normal pulses.  
Heart sounds: Normal heart   
sounds.  
Pulmonary:  
Effort: Pulmonary effort is   
normal.  
Breath sounds: Normal breath   
sounds.  
Abdominal:  
General: Abdomen is flat.  
Palpations: Abdomen is soft.  
Musculoskeletal:  
General: Normal range of motion.  
Cervical back: Neck supple.  
Skin:  
Capillary Refill: Capillary   
refill takes less than 2 seconds.  
Comments: Multiple erythematous,   
raised, wheals present to right   
hip, ABD, neck. Blanchable and   
pruritic to touch. No open tissue   
or active exudates. All other   
visible skin intact  
Neurological:  
General: No focal deficit   
present.  
Mental Status: She is oriented to   
person, place, and time.  
Psychiatric:  
Mood and Affect: Mood normal.  
Behavior: Behavior normal.  
  
Procedures  
  
Point of Care Test & Imaging   
Results from this visit  
No results found for this visit   
on 24.  
No results found.  
  
Diagnostic study results (if any)   
were reviewed by CHRISTIE Lindsey.  
  
Assessment/Plan  
Allergies, medications, history,   
and pertinent labs/EKGs/Imaging   
reviewed by CHRISTIE Lindsey.  
  
Medical Decision Making  
Patient to follow up with   
dermatology if symptoms persist.   
Will treat with prednisone taper   
and triamcinolone cream.  
  
At time of discharge, patient was   
clinically well-appearing and   
appropriate for outpatient   
management. The   
patient/parent/guardian was   
educated regarding diagnosis,   
supportive care, OTC and Rx   
medications. The   
patient/parent/guardian was given   
the opportunity to ask questions   
prior to discharge. They   
verbalized understanding of   
discussion of treatment plan,   
expected course of illness and/or   
injury, indications on when to   
return to , when to seek   
further evaluation in ED/call   
911, and the need to follow up   
with PCP and/or specialist as   
referred. Patient/parent/guardian   
was provided with work/school   
documentation if requested.   
Patient stable upon discharge.  
  
Orders and Diagnoses  
Diagnoses and all orders for this   
visit:  
Contact dermatitis, unspecified   
contact dermatitis type,   
unspecified trigger  
- predniSONE (Deltasone) 10 mg   
tablet; Take 4 tablets (40 mg) by   
mouth once daily for 3 days, THEN   
3 tablets (30 mg) once daily for   
3 days, THEN 2 tablets (20 mg)   
once daily for 3 days, THEN 1   
tablet (10 mg) once daily for 3   
days. Take in the mornings with   
food.  
- triamcinolone (Kenalog) 0.1 %   
cream; Apply a thin film   
topically to affected areas 2-3   
times daily as needed for   
rash/itching  
  
Medical Admin Record  
  
Patient disposition: Home  
  
Electronically signed by CHRISTIE Lindsey  
3:07 PM  
  
  
Electronically signed by CHRISTIE Lindsey at 2024   
3:07 PM EST  
documented in this encounter            [facility]  
Work Phone:   
[phone]  
   
                                                    2024 Evaluation + Plan  
   
note                                    Associated Problem(s): Anxiety   
and depression  
Formatting of this note might be   
different from the original.  
Stable. Continue Wellbutrin 300   
mg daily  
Electronically signed by SHANEKA Chacon CNP at   
2024 4:40 PM EDT  
                                        [facility]  
   
                                                    2024 Evaluation + Plan  
   
note                                    Associated Problem(s): Skin   
sensation disturbance  
Formatting of this note might be   
different from the original.  
Left foot exam unremarkable and   
currently asymptomatic. Likely   
superficial nerve inflammation.   
Recommend avoiding shoes that are   
tight across the top of the foot,   
ice and elevate 2-3 times daily   
and follow-up if fails to   
resolve. Unknown etiology at this   
time otherwise  
Electronically signed by SHANEKA Chacon CNP at   
2024 4:40 PM EDT  
                                        [facility]  
   
                                                    2024 Miscellaneous   
Notes                                   Associated Problem(s): Anxiety   
and depression  
Formatting of this note might be   
different from the original.  
Stable. Continue Wellbutrin 300   
mg daily  
Electronically signed by SHANEKA Chacon CNP at   
2024 4:40 PM EDT  
Associated Problem(s): Skin   
sensation disturbance  
Formatting of this note might be   
different from the original.  
Left foot exam unremarkable and   
currently asymptomatic. Likely   
superficial nerve inflammation.   
Recommend avoiding shoes that are   
tight across the top of the foot,   
ice and elevate 2-3 times daily   
and follow-up if fails to   
resolve. Unknown etiology at this   
time otherwise  
Electronically signed by SHANEKA Chacon CNP at   
2024 4:40 PM EDT  
documented in this encounter            [facility]  
   
                                                    2024 History of   
Present illness Narrative               Formatting of this note might be   
different from the original.  
Patient was identified by name   
and Date of Birth.  
  
Electronically signed by Mandi D'Amico at 2024 4:41 PM EDT  
Formatting of this note is   
different from the original.  
Images from the original note   
were not included.  
  
  
2024  
  
Don Mittal (: 2000) is   
a 24 y.o. female , Established   
patient, here for evaluation of   
the following chief complaint(s):  
Medication Check and Foot Problem  
  
ASSESSMENT/PLAN:  
  
1. Anxiety and depression  
Assessment & Plan:  
Stable. Continue Wellbutrin 300   
mg daily  
2. Skin sensation disturbance  
Assessment & Plan:  
Left foot exam unremarkable and   
currently asymptomatic. Likely   
superficial nerve inflammation.   
Recommend avoiding shoes that are   
tight across the top of the foot,   
ice and elevate 2-3 times daily   
and follow-up if fails to   
resolve. Unknown etiology at this   
time otherwise  
  
Follow up in about 6 months   
(around 2024) for Yearly   
Wellness Visit.  
  
SUBJECTIVE/OBJECTIVE:  
  
HPI -  
Don Mittal (: 2000) is   
a 24 y.o. female , Established   
patient, here for the evaluation   
of the following chief   
complaint(s):  
Medication Check and Foot Problem  
  
Anxiety/depression- Doing pretty   
well, wellbutrin  mg daily.   
Denies any SI or HI. Not doing   
counseling. Reports being able to   
handle stress better and not   
feeling as overwhelmed.  
  
Left foot problem:  
Is having a hot   
sensation/tingling to the top of   
the left foot and numbness. No   
known trigger. No known injury.   
Happening for about 1.5 weeks,   
will happen intermittently   
through the day (?50 times or   
so)- lasts only for a few seconds   
or so.  
No swelling or redness noted.  
Currently asymptomatic  
  
Prior to Admission medications  
Medication Sig Start Date End   
Date Taking? Authorizing Provider  
buPROPion XL (Wellbutrin XL) 300   
MG 24 hr tablet take 1 tablet by   
mouth every morning DO NOT CRUSH,   
CHEW, AND/OR DIVIDE 24 Yes   
SHANEKA Phan - CNP  
Juleber 0.15-30 MG-MCG tablet   
Take 1 tablet by mouth daily. Yes   
Historical Provider, MD  
metFORMIN (Glucophage) 500 MG   
tablet Take 1 tablet by mouth in   
the morning and 1 tablet in the   
evening. Take with meals. 24 Yes Historical Provider,   
MD  
naltrexone (Depade) 50 MG tablet   
Take 25 mg by mouth in the   
morning and 25 mg in the evening.   
Yes Historical Provider, MD  
Prenatal Multivit-Min-Fe-FA   
(PRENATAL/IRON PO) Take 1 tablet   
by mouth in the morning. Yes   
Historical Provider, MD  
Prenatal MV-Min-Fe Fum-FA-DHA   
(PRENATAL 1 PO) Take by mouth.   
Historical Provider, MD  
  
  
  
Review of Systems  
Constitutional: Negative for   
activity change, chills, fatigue   
and fever.  
Respiratory: Negative.  
Cardiovascular: Negative.  
Gastrointestinal: Negative.  
Genitourinary: Negative for   
difficulty urinating.  
Musculoskeletal:  
Left foot.  
Psychiatric/Behavioral: Positive   
for decreased concentration.   
Negative for agitation, dysphoric   
mood and sleep disturbance. The   
patient is not nervous/anxious.  
  
Vitals:  
24 1307  
BP: 129/85  
Pulse: 102  
Resp: 18  
Temp: 36.9 C (98.4 F)  
TempSrc: Infrared  
SpO2: 97%  
Weight: 161 lb 9.6 oz (73.3 kg)  
  
Physical Exam  
Constitutional:  
General: She is not in acute   
distress.  
Appearance: Normal appearance.   
She is not ill-appearing.  
HENT:  
Head: Normocephalic and   
atraumatic.  
Cardiovascular:  
Rate and Rhythm: Normal rate and   
regular rhythm.  
Pulses: Normal pulses.  
Heart sounds: Normal heart   
sounds.  
Pulmonary:  
Effort: Pulmonary effort is   
normal.  
Breath sounds: Normal breath   
sounds.  
Musculoskeletal:  
Right foot: Normal.  
Left foot: Normal.  
Comments: Left foot exam normal  
Skin:  
General: Skin is warm and dry.  
Neurological:  
Mental Status: She is alert and   
oriented to person, place, and   
time.  
Psychiatric:  
Mood and Affect: Mood normal.  
Behavior: Behavior normal.  
Thought Content: Thought content   
normal.  
Judgment: Judgment normal.  
  
An electronic signature was used   
to authenticate this note.  
  
SHANEKA Rao CNP  
2024  
4:40 PM  
Electronically signed by SHANEKA Chacon CNP at   
2024 4:41 PM EDT  
documented in this encounter            [facility]  
   
                                        2024 Instructions   
  
  
SHANEKA Chacon CNP -   
2024 1:00 PM EDTFormatting   
of this note might be different   
from the original.  
Ice and elevate foot couple times   
a day, be careful not to wear   
shoes that pinch/pressure to much   
on top of foot.  
Electronically signed by SHANEKA Chacon CNP at   
2024 1:25 PM EDT  
  
documented in this encounter            [facility]  
   
                                        2024 Note     HNO ID: [id number]  
Author: ERNESTINE SHER APRN.CNP  
Service: ?  
Author Type: Nurse Practitioner  
Type: Progress Notes  
Filed: 2024 08:47  
Note Text:  
Some documentation from previous   
visit of 2024 was copied and   
pasted,  
documentation has been reviewed   
and edited as necessary for   
today's visit.  
Patient Summary: Don is a 24   
year old female who presents for   
follow-up  
evaluation of her obesity/weight   
management to treat PCOS, IFG,   
elevated LDL,  
anxiety and depression and   
prevent relatedco-morbidities. In   
our previous  
visits we have discussed   
lifestyle intervention including   
a nutrition  
recommendations and physical   
activity optimization. Her last   
office visit was 1  
month ago.  
Irregular menses - Menses   
2023 LMP /-3 spotting  
No contraception.  
20 lb weight gain with Lexapro   
for anxiety. Changed to bupropion   
by PCP plans  
to discuss increasing dose with   
PCP  
Assessment/plan from last visit:  
Metformin 1 gm twice a day with   
meals - Adjusted to 500 mg at   
breakfast and 1 gm  
at dinner but has had frequent   
diarrhea since increasing to 1 gm   
twice a day 3  
weeks ago.  
History of anorexia and bulimia   
in HS - no treatment, In   
remission since age 15.  
Dad helped her and she started   
adding protein shakes.  
Interval History -  
Awake - 0700  
B - 0745 Premier Protein shake  
S - none  
L - 1/2 cup cottage cheese with   
fruit  
S - none  
D - 1830 protein,   
pasta/potato/rice/sweet potato   
and veg - asparagus, cucumber  
salad, winter and summer   
squash/salad  
S - none  
Fluids - water, unsweetened tea  
Bedtime -   
She feels the medication is   
helping to lessen hunger and   
craving to candy  
controlled  
Exercise: stable sedentary job at   
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  
Stress: increased -    
wearing heart monitor  
Sleep: 7 hours, up to go to   
bathroom a few times LASHAWN -no  
Weight gain since last vist: 2   
lbs since last visit  
3/21/2024 167 lb BMI: 28.67  
2024 169 lb  
2024 166 lb metformin  
CrCl cannot be calculated   
(Patient's most recent lab result   
is older than the  
maximum 180 days allowed.).  
PAST MEDICAL HISTORY  
Diagnosis Date  
Exertional asthma 2012  
Generalized anxiety disorder  
IFG (impaired fasting glucose)   
2024  
Medial meniscus tear 2012  
PCOS (polycystic ovarian   
syndrome)   
Unspecified otitis media  
Recurrent otitis  
Vocal cord dysfunction 05/15/2012  
Current Outpatient Medications  
Medication Sig Dispense Refill  
metFORMIN (GLUCOPHAGE) 500 mg   
tablet Take 2 tablets by mouth   
two times a day  
with meals. 360 tablet 0  
buPROPion XL (WELLBUTRIN XL) 150   
mg 24 hr tablet Take 150 mg by   
mouth every  
morning.  
PNV/iron/folic acid (PRENATAL   
VITAMIN-IRON-FA ORAL) Take 1   
tablet by mouth once  
daily.  
albuterol HFA (PROVENTIL HFA,   
VENTOLIN HFA) 90 mcg/actuation   
inhaler Inhale 2  
Puffs as instructed every 4 hours   
as needed. 1 Inhaler 0  
No current facility-administered   
medications for this visit.  
Occupation: bank  
Contraception: none  
/82   Pulse 80   Wt 167 lb   
(75.8 kg)   LMP 2024   SpO2   
98%    
BMI 28.67 kg/m?  
Assessment/Plan:  
Don Mittal is a 24 year old   
yo female with overweight   
(pre-obesity) who  
presented today for follow up for   
supervised weight loss to treat   
PCOS, IFG,  
elevated LDL, anxiety and   
depression and prevent related   
co-morbidities.  
ASSESSMENT/PLAN:  
1. PCOS (polycystic ovarian   
syndrome) - ICD9: 256.4, ICD10:   
E28.2 (primary  
diagnosis)  
- Whole food balanced protein   
low-carb nutrition  
- METFORMIN 500 MG TABLET  
- DESOGESTREL 0.15 MG-ETHINYL   
ESTRADIOL 0.03 MG TABLET  
- NALTREXONE 50 MG TABLET  
2. Irregular menses - ICD9:   
626.4, ICD10: N92.6  
- DESOGESTREL 0.15 MG-ETHINYL   
ESTRADIOL 0.03 MG TABLET  
3. Anxiety and depression - ICD9:   
300.00, 311, ICD10: F41.9, F32.A  
- Continue bupropion prescribed   
by PCP- she plans to discuss   
increasing dose  
with PCP  
4. History of bulimia - ICD9:   
V11.8, ICD10: Z86.59  
- in remission since age 15  
5. History of anorexia nervosa -   
ICD9: V11.8, ICD10: Z86.59  
- in remission since age 15  
6. Provided repeat prescription   
for oral contraceptive - ICD9:   
V25.41, ICD10:  
Z30.41  
- RX for Apri given today.  
- discussed with patient on how   
to take OCP's.Given written   
information  
- counseled on benefits, risks   
and possible severe side effects   
of OCP's.  
- discussed need to use Condoms   
to help to prevent STD's   
including HIV etc.  
- DESOGESTREL 0.15 MG-ETHINYL   
ESTRADIOL 0.03 MG TABLET  
7. Overweight with body mass   
index (BMI) of 29 to 29.9 in   
adult - ICD9: 278.02,  
V85.25, ICD10: E66.3, Z68.29  
- METFORMIN 500 MG TABLET -   
decrease to 500 mg with breakfast   
and 1 gm with  
dinner due to diarrhea with 1 gm   
twice daily  
- DESOGESTREL 0.15 MG-ETHINYL   
ESTRADIOL 0.03 MG TABLET  
- NALTREXONE 50 MG TABLET  
Agreeable to adding naltrexone to   
Wellbutrin to mimic the effect of   
Contrave.  
Confirmed no   
allergies/contraindications to   
naltrexone. Reviewed potential   
side  
effects. Patient will notify me   
if there are any adverse effec   
(more content not included)...          [facility]  
   
                                        2024 Instructions   
  
  
Ernestine Sher APRN.CNP -   
2024 8:28 AM EDTFormatting   
of this note is different from   
the original.  
  
The addition of naltrexone to   
your current buprioprion   
prescription will mimic the brand   
name weight loss drug called   
Contrave.  
  
Here is a link to the brand name   
medication:  
https://contrave.com/about/  
  
AM PM (early afternoon)  
Week 1 Naltrexone 12.5 mg (1/4   
tablet) None  
Week 2 Naltrexone 12.5 mg (1/4   
tablet) Naltrexone 12.5 mg (1/4   
tablet)  
Week 3 Naltrexone 25 mg (1/2   
tablet) Naltrexone 25 mg (1/2   
tablet)  
Week 4 Naltrexone 25 mg (1/2   
tablet) Naltrexone 25 mg (1/2   
tablet)  
  
- Please take your bupropion as   
usual, these medications need to   
work together to formulate the   
desired effect.  
- If at anytime you feel a   
positive effect, you can stay at   
that dose and do not need to   
increase.  
- Please stop the medication if   
you develop symptoms that are   
concerning.  
  
Take low dose naltrexone to:  
  
Regulate appetite: Naltrexone   
helps normalize your metabolism,   
matching your appetite to resting   
energy expenditures.  
  
Reduce insulin resistance:   
Naltrexone modulates cellular   
resistance to insulin, which may   
lead to weight loss.  
  
Improve sleep: Lack of sleep has   
negative effects on your body s   
hormonal system, which can lead   
to weight gain. Naltrexone   
combats this unhealthy cycle.  
  
Improve mood: Combination LDN   
weight loss medications trigger   
an increase in serotonin and   
dopamine production, which   
decreases anxiety and stress and   
reduces emotional eating.  
  
https://www.[employer] [facility] [place].[employer]Stak/n  
altrexone#:~:text=Regulate%20appe  
tite%3A%20Naltrexone%20helps%20no  
rmalize,may%20lead%20to%20weight%  
20loss.  
  
Does Wellbutrin cause weight   
loss?  
It can. Bupropion (the generic   
form of Wellbutrin) was initially   
prescribed as an antidepressant.   
It is the only antidepressant   
associated with weight loss   
(Carolyne, 2019).   
Healthcare providers noticed that   
mostly pleasant side effect, and   
today bupropion is sometimes   
prescribed as part of a   
medication for weight loss   
(naltrexone/bupropion, brand name   
Contrave), as well as a   
stop-smoking aid (brand name   
Zyban).  
  
As far as the evidence that   
bupropion by itself causes weight   
loss:  
  
A 2016 study that analyzed the   
long-term weight loss effect of   
various antidepressant   
medications found that   
non-smokers who took bupropion   
lost 7.1 pounds over two years.   
(This effect was not seen in   
smokers). Users of the other   
antidepressants in the study   
gained weight (Arterburn, 2016).  
Bupropion seems to be effective   
for weight-loss maintenance as   
well. A 2012 study found that   
obese adults who took bupropion   
SR (standard release) in 300mg or   
400mg doses lost 7.2% and 10% of   
their body weight, respectively,   
over 24 weeks and maintained that   
weight loss at 48 weeks   
(Zurdo, 2012).  
And a 2019 review of 27 studies   
on antidepressants and weight   
gain found that antidepressant   
use increases body weight by an   
average of 5%--except bupropion,   
which is associated with weight   
loss (Raudel-Pedrero, 2019).  
  
https://ro.co/health-guide/wellbu  
ramez-for-weight-loss/  
  
Bupropion and naltrexone: Patient   
drug information  
Access [employer] for   
additional drug information,   
tools, and databases.  
Copyright 2319-1712 [employer]   
[employer]. All rights reserved.  
(For additional information see   
 Bupropion and naltrexone: Drug   
information )  
  
Contrave (naltrexone/bupropion)   
Patient Information  
  
Who Is Contrave For?  
Contrave is a medication for   
chronic weight management. It is   
for people with overweight and   
weight-related complications or   
obesity. It is meant to be used   
together with a lifestyle therapy   
regimen involving a reduced   
calorie diet and increased   
physical activity.  
  
How Does Contrave Work?  
Contrave works in the brain as an   
appetite suppressant.  
  
Who Should Not Take Contrave?   
Women who are pregnant, nursing,   
or planning to become pregnant  
People who have uncontrolled high   
blood pressure  
People who have or have had   
seizures  
People with a history of eating   
disorders such as anorexia   
nervosa or bulimia  
People who have glaucoma or are   
at risk for glaucoma  
People who used to drink a lot of   
alcohol and abruptly stop   
drinking  
People who use other medications   
containing bupropion, such as   
Wellbutrin or  
Aplenzin  
People who are taking opioid pain   
relievers or are in opioid   
withdrawal, or use  
medicines to help stop taking   
opioids such as methadone or   
buprenorphine  
People who are taking a monoamine   
oxidase inhibitor (MAOI) now or   
have taken one  
within the past 14 days  
  
Do not take Contrave with a high   
fat meal.  
Swallow Contrave tablets whole.   
Do not cut, chew, or crush   
Contrave tablets.  
If you miss a dose of Contrave,   
wait until your next regular time   
to take it. Do not take more than   
1 dose of Contrave at a time.  
  
Is Contrave a Controlled   
Substance?  
No, Contrave is not a controlled   
substance.  
Which Medications Might Not Be   
Safe to Use with Contrave?  
Contrave can affect how other   
medicines work in your body, and   
other medicines can affect how   
Contrave works. Tell your doctor   
about all the medicines and   
supplements you take, especially:  
Carbamazepine, phenobarbital, or   
phenytoin--usually given to treat   
seizures  
Efavirenz, lopinavir, or   
ritonavir--used to treat HIV   
infection Antidepressants  
Pain medications  
What Are the Most Common Side   
Effects of Contrave?  
Nausea or vomiting  
Dizziness  
Changes in the way foods taste or   
loss of taste  
Trouble sleeping  
Constipation or diarrhea  
Headache  
Dry mouth  
  
What Are the Possible Serious   
Side Effects of Contrave?  
Suicidal Thoughts or Actions  
One of the ingredients in   
Contrave is bupropion, which has   
caused some people to have   
suicidal thoughts or actions or   
unusual changes in behavior,   
whether or not they are taking   
medicines used to treat   
depression.  
If you already have depression or   
another mental illness, taking   
bupropion may cause your   
condition to get worse,   
especially within the first few   
months of treatment. Let your   
doctor know if you experience an   
increase in symptoms of   
depression, anxiety,   
irritability, suicidal thoughts,   
agitation, anger, or other   
unusual changes in behavior or   
mood.  
Seizures  
There is a risk of having a   
seizure when you take Contrave.   
The risk of seizure is higher in   
people who take higher doses of   
Contrave, have certain medical   
conditions, or take Contrave with   
certain other medicines. If you   
have a seizure while taking   
Contrave, stop taking Contrave   
and call your doctor right away.   
Do not take Contrave again if you   
have a seizure.  
2  
  
Contrave (naltrexone/bupropion)   
Patient Information  
  
Risk of Opioid Overdose  
One of the ingredients in   
Contrave (naltrexone) can   
increase your chance of having an   
opioid overdose if you take   
opioid medicines while taking   
Contrave. Opioids are common pain   
medications. Do not use any   
opioid medications while taking   
Contrave. Using opioids in the 7   
to 10 days before you start   
taking Contrave may cause you to   
suddenly have symptoms of opioid   
withdrawal when you take it. Tell   
your doctor if you have used   
these medications in the past 10   
days. Inform your doctor that you   
are taking Contrave before any   
medical procedure or surgery.  
  
Severe Allergic Reactions  
Some people have had a severe   
allergic reaction to bupropion,   
one of the ingredients in   
Contrave. Stop taking Contrave   
and call your doctor or go to the   
nearest hospital emergency room   
right away if you have signs and   
symptoms of an allergic reaction   
such as rash, itching, swelling   
of the lips or tongue, fever,   
chest pain, or trouble breathing.  
Increases in Blood Pressure or   
Heart Rate  
Some people may get high blood   
pressure or have a higher heart   
rate when taking Contrave. Your   
doctor should check your blood   
pressure and heart rate before   
you start taking Contrave and   
while you take it.  
  
Liver Damage or Hepatitis  
One of the ingredients in   
Contrave--naltrexone--can cause   
liver damage or hepatitis. Stop   
taking Contrave and tell your   
doctor if you have any signs or   
symptoms of liver problems such   
as stomach pain, dark urine,   
yellow eyes (jaundice), or   
extreme fatigue.  
  
Manic Episodes  
One of the ingredients in   
Contrave--bupropion--can cause   
some people who were manic or   
depressed in the past, or who   
have bipolar disease, to become   
manic or depressed again.  
Vision Problems (Angle-Closure   
Glaucoma)  
One of the ingredients in   
Contrave--bupropion--can cause   
some people to have problems with   
their vision (angle-closure   
glaucoma). Signs and symptoms of   
angle-closure glaucoma may   
include eye pain, vision changes,   
or swelling or redness in or   
around the eye. Ask your eye   
doctor if you are at risk for   
angle-closure glaucoma and do not   
use Contrave if you are at risk.  
  
Low Blood Sugar (Hypoglycemia)  
Weight loss can cause low blood   
sugar in people with type 2   
diabetes who also take medicines   
used to treat type 2 diabetes   
(such as insulin or   
sulfonylureas). You should check   
your blood sugar before you start   
taking Contrave and while you   
take Contrave.  
  
This is not intended to be a   
complete list. For additional   
information please see the   
 s website:   
https://www.contrave.[employer]St[employer].  
  
Oral Contraceptives: The Pill  
  
Beginning the Pill  
  
Pills come in either a 21 day   
pack or a 28 day pack. With the   
21 day pack you will take one   
pill for 21 days then no pill for   
7 days, during which time you   
will have what is known as   
withdrawal bleeding. The 28 day   
pack allows you to take a pill   
every day of the cycle with no   
interruptions. The first 21 pills   
are the pills with the active   
ingredients and the last 7 are   
the nonmedical pills (placebo) or   
they may contain iron. There will   
be bleeding during the week you   
are taking the nonmedical pills.   
The advantage to the 28 day pack   
is that you don t have to keep   
track of when you stopped the   
pill. There are a group of 28 day   
pills that contain 24 active   
pills and only 4 placebo pills.   
These are formulated to give you   
a lighter period.  
  
Unless otherwise instructed, you   
should start your pills the   
 following your first day   
of bleeding with your next period   
(if your period starts on a   
, you should start pills   
the same day)  
Read your information packet that   
comes with the pills.  
  
Pill Benefits  
  
The pill is the most popular   
method of reversible birth   
control being used today.   
Millions of women rely on oral   
contraceptives as their birth   
control method. It is important   
to have an examination by your   
physician to determine if the   
pill is safe for you. There are   
several advantages associated   
with the pill: it is 97-98%   
effective when used correctly;   
may improve acne; periods are   
more regular and less painful;   
there is less iron deficiency   
anemia in pill users. Long term   
use is associated with a   
decreased incidence of ovarian   
and uterine cancer. There is also   
no evidence that the pill   
increases the incidence of any   
cancer.  
  
How Oral Contraceptives Work  
  
Oral contraceptives come in two   
varieties. One is the combination   
pill which contains both estrogen   
and progesterone. Combination   
pills are considered 98-99%   
effective in preventing   
pregnancy. This pill comes in   
either monophasic, which delivers   
the same amount of estrogen and   
progesterone throughout the   
cycle; and triphasic, which try   
tries to mimic the normal hormone   
cycle by changing the levels of   
the hormones in the pills during   
the month. There is no real   
advantage to taking the one over   
the other. The other type of pill   
only contains progesterone. It is   
best used for women who can t   
take estrogen. This type of pill   
is slightly less effective than   
the combination pill in   
preventing pregnancy. It is VERY   
important to take the   
progesterone only pill at the   
same time every day. Oral   
contraceptives prevent ovulation   
(release of an egg from the   
ovary) by suppressing the   
pituitary gland s action. The   
pill does NOT prevent sexually   
transmitted disease.  
  
Obtaining a Prescription  
It is important to see your   
doctor before starting oral   
contraceptives so that you can   
have a full medical history taken   
and a physical examination given.   
Certain medical conditions may   
make the pill inappropriate for   
you, therefore it is very   
important to be honest and as   
complete as possible with the   
information you share with your   
doctor.  
  
The types of predisposing factors   
which would make the pill a poor   
choice of birth control would   
include:  
History of blood clots  
Stroke  
Serious liver disease or impaired   
liver function  
Unexplained vaginal bleeding or   
pregnancy  
Cancer of the reproductive system  
Active gall bladder disease  
Hypertension  
  
Possible Side Effects  
  
It can take up to three months   
for your body to become adjusted   
to the pill. The more common side   
effects experienced at this time   
are: breakthrough spotting or   
bleeding, which is bleeding at   
any other time other than when   
you should be having a period;   
nausea or vomiting; breast   
tenderness; and mild fluid   
retention. There is no long term   
weight gain with the use of the   
pill. Breakthrough bleeding is   
the most common complaint of new   
pill users. There is no way to   
predict who will have it and   
there is no way of preventing it.   
Breakthrough bleeding usually   
subsides on its own with no   
further treatment after the first   
three months of taking the pill.   
If these symptoms continue to   
occur after the first three   
months you should check with your   
physician to see if there is any   
physical cause and possibly   
change to another birth control   
pill.  
  
Problems:  
  
Missed 1 pill: Take 2 pills the   
next day.  
Missed 2 pills: Take 2 pills the   
next day and 2 pills the   
following day. Also use another   
form of birth control (condoms)   
along with the pill for the rest   
of the month.  
Missed 3 or more pills: You have   
two choices. You can take two   
pills each day until you are on   
schedule, plus use an additional   
form of birth control along with   
the pill for the rest of the   
month. Or you can stop the pill   
and start a completely new pack   
of pills the next . You   
must use another form of birth   
control with the pill for at   
least the first two weeks of the   
new pack.  
You re ill and you have been   
vomiting or have diarrhea: You   
must use another form of birth   
control with the pill since the   
pill may not be fully absorbed   
during your illness. Continue to   
use the added birth control until   
the end of the cycle.  
Desire to become pregnant: Stop   
using the pill for one month   
before trying to become pregnant.  
Taking other medications: The   
birth control pill is less   
effective when you take the   
antibiotic Rifampin, epilepsy   
(seizure) drugs such as   
phenytoin, carbamazepine,   
phenobarbital, topiramate and   
some medications for HIV. Let   
your doctor know if you start   
taking any of these medications   
while on the pill.  
  
Symptoms to Notify Your Doctor   
with Immediately:  
  
Pain in your chest or legs  
Continuous blurred vision  
Severe headaches  
Slurred speech  
Tingling or weakness on one side   
of your body  
Shortness of breath  
Swelling of one leg  
  
Refills of Birth Control Pills  
  
You need to see a doctor every   
year for a refill of your   
prescription. This is necessary   
in order that your health can be   
monitored closely while you are   
taking birth control pills. If   
your prescription should    
before your next scheduled   
appointment you can usually get a   
one month extension from your   
doctors office if you call during   
regular business hours about one   
week before you need to start the   
new package of pills. This allows   
the physician to refer to your   
chart for necessary health   
information.  
  
Bupropion: Patient drug   
information  
  
[employer] for   
additional drug information,   
tools, and databases.  
Copyright 3917-2048 [employer]   
[employer]. All rights reserved.  
Contributor Disclosures  
(For additional information see   
 Bupropion: Drug information  and   
see  Bupropion: Pediatric drug   
information )  
  
You must carefully read the   
 Consumer Information Use and   
Disclaimer  below in order to   
understand and correctly use this   
information.  
  
Brand Names: [place]  
Aplenzin;  
Forfivo XL;  
Wellbutrin SR;  
Wellbutrin XL;  
Zyban [DSC]  
  
Brand Names: Yenny  
MYLAN-BuPROPion XL;  
ODAN Bupropion SR;  
PMS-BuPROPion SR;  
RATIO-BuPROPion SR [DSC];  
TARO-Bupropion XL;  
TEVA-Bupropion XL;  
Wellbutrin SR;  
Wellbutrin XL;  
Zyban  
  
Warning  
Drugs like this one have raised   
the chance of suicidal thoughts   
or actions in children and young   
adults. The risk may be greater   
in people who have had these   
thoughts or actions in the past.   
All people who take this drug   
need to be watched closely. Call   
the doctor right away if signs   
like low mood (depression),   
nervousness, restlessness,   
grouchiness, panic attacks, or   
changes in mood or actions are   
new or worse. Call the doctor   
right away if any thoughts or   
actions of suicide occur.  
  
What is this drug used for?  
It is used to treat low mood   
(depression).  
It is used to prevent seasonal   
affective disorder (SAD).  
It is used to help you stop   
smoking.  
It may be given to you for other   
reasons. Talk with the doctor.  
  
What do I need to tell my doctor   
BEFORE I take this drug?  
If you are allergic to this drug;   
any part of this drug; or any   
other drugs, foods, or   
substances. Tell your doctor   
about the allergy and what signs   
you had.  
If you have ever had seizures.  
If you drink a lot of alcohol and   
you stop drinking all of a   
sudden.  
If you use certain other drugs   
like drugs for seizures or   
anxiety and you stop using them   
all of a sudden.  
If you have ever had an eating   
problem like anorexia or bulimia.  
If you have any of these health   
problems: Kidney disease or liver   
disease.  
If you have taken certain drugs   
for depression or Parkinson's   
disease in the last 14 days. This   
includes isocarboxazid,   
phenelzine, tranylcypromine,   
selegiline, or rasagiline. Very   
high blood pressure may happen.  
If you are taking any of these   
drugs: Linezolid or methylene   
blue.  
If you are taking another drug   
that has the same drug in it.  
This is not a list of all drugs   
or health problems that interact   
with this drug.  
Tell your doctor and pharmacist   
about all of your drugs   
(prescription or OTC, natural   
products, vitamins) and health   
problems. You must check to make   
sure that it is safe for you to   
take this drug with all of your   
drugs and health problems. Do not   
start, stop, or change the dose   
of any drug without checking with   
your doctor.  
  
What are some things I need to   
know or do while I take this   
drug?  
For all patients taking this   
drug:  
Tell all of your health care   
providers that you take this   
drug. This includes your doctors,   
nurses, pharmacists, and   
dentists.  
Avoid driving and doing other   
tasks or actions that call for   
you to be alert or have clear   
eyesight until you see how this   
drug affects you.  
This drug may affect certain lab   
tests. Tell all of your health   
care providers and lab workers   
that you take this drug.  
Do not stop taking this drug all   
of a sudden without calling your   
doctor. You may have a greater   
risk of side effects. If you need   
to stop this drug, you will want   
to slowly stop it as ordered by   
your doctor.  
High blood pressure has happened   
with this drug. Have your blood   
pressure checked as you have been   
told by your doctor.  
This drug may raise the chance of   
seizures. The risk may be higher   
in people who take higher doses,   
have certain health problems, or   
take certain other drugs. People   
who suddenly stop drinking a lot   
of alcohol or suddenly stop   
taking certain drugs (like drugs   
used for anxiety, sleep, or   
seizures) may also have a higher   
risk. Talk to your doctor to see   
if you have a greater chance of   
seizures.  
Avoid drinking alcohol while   
taking this drug.  
Talk with your doctor before you   
use marijuana, other forms of   
cannabis, or prescription or OTC   
drugs that may slow your actions.  
It may take several weeks to see   
the full effects.  
This drug is not approved for use   
in children. Talk with the   
doctor.  
If you are 65 or older, use this   
drug with care. You could have   
more side effects.  
Tell your doctor if you are   
pregnant, plan on getting   
pregnant, or are breast-feeding.   
You will need to talk about the   
benefits and risks to you and the   
baby.  
If you smoke:  
Not all products are approved for   
use to help stop smoking. Talk   
with the doctor to make sure that   
you have the right product.  
New or worse mental, mood, or   
behavior problems have happened   
when bupropion has been used to   
stop smoking. These problems   
include thoughts of suicide or   
murder, depression, forceful   
actions, fury, anxiety, and   
anger. These problems have   
happened in people with and   
without a history of mental or   
mood problems. Talk with the   
doctor.  
  
What are some side effects that I   
need to call my doctor about   
right away?  
WARNING/CAUTION: Even though it   
may be rare, some people may have   
very bad and sometimes deadly   
side effects when taking a drug.   
Tell your doctor or get medical   
help right away if you have any   
of the following signs or   
symptoms that may be related to a   
very bad side effect:  
Signs of an allergic reaction,   
like rash; hives; itching; red,   
swollen, blistered, or peeling   
skin with or without fever;   
wheezing; tightness in the chest   
or throat; trouble breathing,   
swallowing, or talking; unusual   
hoarseness; or swelling of the   
mouth, face, lips, tongue, or   
throat.  
Signs of high blood pressure like   
very bad headache or dizziness,   
passing out, or change in   
eyesight.  
Feeling confused, not able to   
focus, or change in behavior.  
Hallucinations (seeing or hearing   
things that are not there).  
If seizures are new or worse   
after starting this drug.  
Chest pain or pressure, a fast   
heartbeat, or an abnormal   
heartbeat.  
Swelling.  
Shortness of breath.  
Change in hearing.  
Ringing in ears.  
Passing urine more often.  
Swollen gland.  
Trouble moving around.  
Some people may have a higher   
chance of eye problems with this   
drug. Your doctor may want you to   
have an eye exam to see if you   
have a higher chance of these eye   
problems. Call your doctor right   
away if you have eye pain, change   
in eyesight, or swelling or   
redness in or around the eye.  
A severe skin reaction   
(Amador-Jl syndrome/toxic   
epidermal necrolysis) may happen.   
It can cause severe health   
problems that may not go away,   
and sometimes death. Get medical   
help right away if you have signs   
like red, swollen, blistered, or   
peeling skin (with or without   
fever); red or irritated eyes; or   
sores in your mouth, throat,   
nose, or eyes.  
  
What are some other side effects   
of this drug?  
All drugs may cause side effects.   
However, many people have no side   
effects or only have minor side   
effects. Call your doctor or get   
medical help if any of these side   
effects or any other side effects   
bother you or do not go away:  
All products:  
Dizziness or headache.  
Constipation, diarrhea, stomach   
pain, upset stomach, throwing up,   
or feeling less hungry.  
Shakiness.  
Feeling nervous and excitable.  
Strange or odd dreams.  
Gas.  
Dry mouth.  
Trouble sleeping.  
Muscle or joint pain.  
Nose or throat irritation.  
Sweating a lot.  
A change in weight without   
trying.  
Extended-release tablets:  
For some brands, you may see the   
tablet shell in your stool. For   
these brands, this is normal and   
not a cause for concern. If you   
have questions, talk with your   
doctor.  
These are not all of the side   
effects that may occur. If you   
have questions about side   
effects, call your doctor. Call   
your doctor for medical advice   
about side effects.  
You may report side effects to   
your national health agency.  
  
How is this drug best taken?  
Use this drug as ordered by your   
doctor. Read all information   
given to you. Follow all   
instructions closely.  
For all uses of this drug:  
Do not take this drug more often   
than you are told. This may raise   
the risk of seizures. Be sure you   
know how far apart to take your   
doses.  
Take in the morning if taking   
once a day.  
Take with or without food.  
If you are not able to sleep, do   
not take this drug too close to   
bedtime. Talk with your doctor.  
Swallow whole. Do not chew,   
break, or crush.  
Keep taking this drug as you have   
been told by your doctor or other   
health care provider, even if you   
feel well.  
If you have trouble swallowing,   
talk with your doctor.  
For stopping smoking:  
You may take this drug for 1 week   
before you stop smoking.  
Nicotine products and counseling   
may be used at the same time for   
best results.  
If you have not been able to quit   
smoking after taking this drug   
for 12 weeks, talk with your   
doctor.  
You may have signs of nicotine   
withdrawal when you try to quit   
smoking even when using drugs   
like this one to help you quit   
smoking. There are many signs of   
nicotine withdrawal. Rarely   
depression and suicidal thoughts   
have happened in people trying to   
quit smoking. Talk with your   
doctor.  
  
What do I do if I miss a dose?  
Skip the missed dose and go back   
to your normal time.  
Do not take 2 doses at the same   
time or extra doses.  
  
How do I store and/or throw out   
this drug?  
Store at room temperature   
protected from light. Store in a   
dry place. Do not store in a   
bathroom.  
Keep all drugs in a safe place.   
Keep all drugs out of the reach   
of children and pets.  
Throw away unused or    
drugs. Do not flush down a toilet   
or pour down a drain unless you   
are told to do so. Check with   
your pharmacist if you have   
questions about the best way to   
throw out drugs. There may be   
drug take-back programs in your   
area.  
  
General drug facts  
If your symptoms or health   
problems do not get better or if   
they become worse, call your   
doctor.  
Do not share your drugs with   
others and do not take anyone   
else's drugs.  
Some drugs may have another   
patient information leaflet. If   
you have any questions about this   
drug, please talk with your   
doctor, nurse, pharmacist, or   
other health care provider.  
If you think there has been an   
overdose, call your poison   
control center or get medical   
care right away. Be ready to tell   
or show what was taken, how much,   
and when it happened.  
  
Last Reviewed [id number]  
Consumer Information Use and   
Disclaimer  
This generalized information is a   
limited summary of diagnosis,   
treatment, and/or medication   
information. It is not meant to   
be comprehensive and should be   
used as a tool to help the user   
understand and/or assess   
potential diagnostic and   
treatment options. It does NOT   
include all information about   
conditions, treatments,   
medications, side effects, or   
risks that may apply to a   
specific patient. It is not   
intended to be medical advice or   
a substitute for the medical   
advice, diagnosis, or treatment   
of a health care provider based   
on the health care provider's   
examination and assessment of a   
patient's specific and unique   
circumstances. Patients must   
speak with a health care provider   
for complete information about   
their health, medical questions,   
and treatment options, including   
any risks or benefits regarding   
use of medications. This   
information does not endorse any   
treatments or medications as   
safe, effective, or approved for   
treating a specific patient.   
[employer] and its affiliates   
disclaim any warranty or   
liability relating to this   
information or the use thereof.   
The use of this information is   
governed by the Terms of Use,   
available at   
https://www.[employer]ters[employer]uwer.com/en/  
know/clinical-effectiveness-terms  
.  
  
 [employer]. and its   
affiliates and/or licensors. All   
rights reserved.  
  
  
Electronically signed by Ernestine Sher APRN.CNP at 2024 8:29   
AM EDT  
  
documented in this encounter            [facility]  
   
                                                    2024 History of   
Present illness Narrative               Formatting of this note is   
different from the original.  
Some documentation from previous   
visit of 2024 was copied and   
pasted, documentation has been   
reviewed and edited as necessary   
for today's visit.  
  
Patient Summary: Don is a 24   
year old female who presents for   
follow-up evaluation of her   
obesity/weight management to   
treat PCOS, IFG, elevated LDL,   
anxiety and depression and   
prevent relatedco-morbidities. In   
our previous visits we have   
discussed lifestyle intervention   
including a nutrition   
recommendations and physical   
activity optimization. Her last   
office visit was 1 month ago.  
  
Irregular menses - Menses   
2023 LMP 1/2-3 spotting  
No contraception.  
20 lb weight gain with Lexapro   
for anxiety. Changed to bupropion   
by PCP plans to discuss   
increasing dose with PCP  
  
Assessment/plan from last visit:  
Metformin 1 gm twice a day with   
meals - Adjusted to 500 mg at   
breakfast and 1 gm at dinner but   
has had frequent diarrhea since   
increasing to 1 gm twice a day 3   
weeks ago.  
History of anorexia and bulimia   
in HS - no treatment, In   
remission since age 15. Dad   
helped her and she started adding   
protein shakes.  
  
Interval History -  
Awake - 0700  
B - 0745 Premier Protein shake  
S - none  
L - 1/2 cup cottage cheese with   
fruit  
S - none  
D -  protein,   
pasta/potato/rice/sweet potato   
and veg - asparagus, cucumber   
salad, winter and summer   
squash/salad  
S - none  
Fluids - water, unsweetened tea  
Bedtime -   
  
She feels the medication is   
helping to lessen hunger and   
craving to candy controlled  
  
Exercise: stable sedentary job at   
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  
  
Stress: increased -    
wearing heart monitor  
  
Sleep: 7 hours, up to go to   
bathroom a few times LASHAWN -no  
  
Weight gain since last vist: 2   
lbs since last visit  
  
3/21/2024 167 lb BMI: 28.67  
2024 169 lb  
2024 166 lb metformin  
  
CrCl cannot be calculated   
(Patient's most recent lab result   
is older than the maximum 180   
days allowed.).  
  
PAST MEDICAL HISTORY  
Diagnosis Date  
Exertional asthma 2012  
Generalized anxiety disorder  
IFG (impaired fasting glucose)   
2024  
Medial meniscus tear 2012  
PCOS (polycystic ovarian   
syndrome)   
Unspecified otitis media  
Recurrent otitis  
Vocal cord dysfunction 05/15/2012  
  
Current Outpatient Medications  
Medication Sig Dispense Refill  
metFORMIN (GLUCOPHAGE) 500 mg   
tablet Take 2 tablets by mouth   
two times a day with meals. 360   
tablet 0  
buPROPion XL (WELLBUTRIN XL) 150   
mg 24 hr tablet Take 150 mg by   
mouth every morning.  
PNV/iron/folic acid (PRENATAL   
VITAMIN-IRON-FA ORAL) Take 1   
tablet by mouth once daily.  
albuterol HFA (PROVENTIL HFA,   
VENTOLIN HFA) 90 mcg/actuation   
inhaler Inhale 2 Puffs as   
instructed every 4 hours as   
needed. 1 Inhaler 0  
  
No current facility-administered   
medications for this visit.  
  
Occupation: bank  
Contraception: none  
  
/82   Pulse 80   Wt 167 lb   
(75.8 kg)   LMP 2024   SpO2   
98%   BMI 28.67 kg/m  
  
Assessment/Plan:  
Don Mittal is a 24 year old   
yo female with overweight   
(pre-obesity) who presented today   
for follow up for supervised   
weight loss to treat PCOS, IFG,   
elevated LDL, anxiety and   
depression and prevent related   
co-morbidities.  
  
ASSESSMENT/PLAN:  
1. PCOS (polycystic ovarian   
syndrome) - ICD9: 256.4, ICD10:   
E28.2 (primary diagnosis)  
- Whole food balanced protein   
low-carb nutrition  
- METFORMIN 500 MG TABLET  
- DESOGESTREL 0.15 MG-ETHINYL   
ESTRADIOL 0.03 MG TABLET  
- NALTREXONE 50 MG TABLET  
  
2. Irregular menses - ICD9:   
626.4, ICD10: N92.6  
- DESOGESTREL 0.15 MG-ETHINYL   
ESTRADIOL 0.03 MG TABLET  
  
3. Anxiety and depression - ICD9:   
300.00, 311, ICD10: F41.9, F32.A  
- Continue bupropion prescribed   
by PCP- she plans to discuss   
increasing dose with PCP  
  
4. History of bulimia - ICD9:   
V11.8, ICD10: Z86.59  
- in remission since age 15  
  
5. History of anorexia nervosa -   
ICD9: V11.8, ICD10: Z86.59  
- in remission since age 15  
  
6. Provided repeat prescription   
for oral contraceptive - ICD9:   
V25.41, ICD10: Z30.41  
- RX for Apri given today.  
- discussed with patient on how   
to take OCP's.Given written   
information  
- counseled on benefits, risks   
and possible severe side effects   
of OCP's.  
- discussed need to use Condoms   
to help to prevent STD's   
including HIV etc.  
- DESOGESTREL 0.15 MG-ETHINYL   
ESTRADIOL 0.03 MG TABLET  
  
7. Overweight with body mass   
index (BMI) of 29 to 29.9 in   
adult - ICD9: 278.02, V85.25,   
ICD10: E66.3, Z68.29  
- METFORMIN 500 MG TABLET -   
decrease to 500 mg with breakfast   
and 1 gm with dinner due to   
diarrhea with 1 gm twice daily  
- DESOGESTREL 0.15 MG-ETHINYL   
ESTRADIOL 0.03 MG TABLET  
- NALTREXONE 50 MG TABLET  
Agreeable to adding naltrexone to   
Wellbutrin to mimic the effect of   
Contrave. Confirmed no   
allergies/contraindications to   
naltrexone. Reviewed potential   
side effects. Patient will notify   
me if there are any adverse   
effects with the medication.   
Prescription provided today and   
dosing schedule provided. Patient   
advised to increase dose only if   
tolerated. Will remain on lower   
dose if effective.  
Discussed increased risk for   
opioid overdose if opioid   
medicines are taken while taking   
naltrexone. Aware to not use any   
opioid medications while taking   
this medication. Has not taken   
any opioid medication in the last   
7-10 days. Will discontinue   
naltrexone before any medical   
procedure or surgery.  
- Continue bupropion prescribed   
by PCP- she plans to discuss   
increasing dose with PCP  
  
- Continue whole food low-carb   
diet with 30 g of protein 3 times   
a day and 30 g of carbs at lunch   
and dinner only.  
- continue exercise. Discussed   
benefits of walking 15 minutes   
immediately after meals  
  
Due to history of anorexia and   
bulimia, we agreed that in-office   
visits would be best for now.  
  
Follow up in 6 weeks  
  
Ernestine Sher APRN.CNP  
Advanced Education from the   
Obesity [employer] Association  
  
Medical Decision Making:  
  
Problems:  
Moderate: 1+ chronic illnesses   
with change  
Risk:  
Moderate: Drug management and   
Moderate risk from   
testing/treatment  
  
Medical Decision Making Level: 4   
- Moderate  
  
  
Electronically signed by Ernestine Sher APRN.CNP at 2024 8:47   
AM EDT  
documented in this encounter            [facility]  
   
                                        2024 Note     HNO ID: [id number]  
Author: ERNESTINE SHER APRN.CNP  
Service: ?  
Author Type: Nurse Practitioner  
Type: Progress Notes  
Filed: 2024 19:59  
Note Text:  
Some documentation from previous   
visit of 2023 was copied and   
pasted,  
documentation has been reviewed   
and edited as necessary for   
today's visit.  
Patient Summary: Don is a 23   
year old female who presents for   
follow-up  
evaluation of her obesity/weight   
management to treat PCOS, IFG,   
elevated LDL,  
anxiety and depression and   
prevent relatedco-morbidities. In   
our previous  
visits we have discussed   
lifestyle intervention including   
a nutrition  
recommendations and physical   
activity optimization. Her last   
office visit was 1  
month ago.  
Irregular menses - Menses   
2023 LMP 1/2-3 spotting  
20 lb weight gain with Lexapro   
for anxiety. Changed to bupropion   
by PCP.  
Assessment/plan from last visit:  
Metformin 500 mg twice a day with   
meals - tried 1 gm at dinner but   
had nausea  
and mild diarrhea (actually took   
at bedtime)  
Bupropion 150 mg 24/hr tab for   
anxiety and depression  
Was in [place] with  who   
races - very hectic travel. Tried   
to eat  
healthier.  
Quit second job so life is   
becoming more calm  
History of anorexia and bulimia   
in HS - no treatment, Dad helped   
her and she  
started adding protein shakes.  
Interval History - changes made   
in past month  
Awake - 0700 but is changing to   
0520 to exercise before work  
B - 07 Premier Protein shake  
S - none  
L - SKIP  
S - none  
D - 183 protein,   
pasta/potato/rice/sweet potato   
and veg - asparagus, cucumber  
salad, winter and summer squash  
S - none or Skinny popcorn  
Fluids - water, zero sugar   
electrolyte drink mixes,   
unsweetened tea  
Bedtime -   
She feels the medication is   
helping to lessen hunger and   
craving to candy  
Exercise: stable sedentary job at   
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   
walking and  
free weights  
Stress: decreased -  races   
and season has not started yet   
and decreased  
stress after quitting second job  
Sleep: 7 hours, well rested LASHAWN   
-no  
Weight gain since last vist: 3   
lbs since last visit  
2024 169 lb  
2024 166 lb metformin  
CrCl cannot be calculated   
(Patient's most recent lab result   
is older than the  
maximum 180 days allowed.).  
PAST MEDICAL HISTORY  
Diagnosis Date  
Exertional asthma 2012  
Generalized anxiety disorder  
IFG (impaired fasting glucose)   
2024  
Medial meniscus tear 2012  
PCOS (polycystic ovarian   
syndrome)   
Unspecified otitis media  
Recurrent otitis  
Vocal cord dysfunction 05/15/2012  
Current Outpatient Medications  
Medication Sig Dispense Refill  
buPROPion XL (WELLBUTRIN XL) 150   
mg 24 hr tablet Take 150 mg by   
mouth every  
morning.  
PNV/iron/folic acid (PRENATAL   
VITAMIN-IRON-FA ORAL) Take 1   
tablet by mouth once  
daily.  
metFORMIN (GLUCOPHAGE) 500 mg   
tablet Take 1 tablet by mouth two   
times a day  
with meals. 180 tablet 0  
albuterol HFA (PROVENTIL HFA,   
VENTOLIN HFA) 90 mcg/actuation   
inhaler Inhale 2  
Puffs as instructed every 4 hours   
as needed. 1 Inhaler 0  
No current facility-administered   
medications for this visit.  
/84   Wt 169 lb (76.7 kg)     
LMP 2024   BMI 29.01 kg/m?  
Assessment/Plan:  
Don Mittal is a 23 year old   
yo female with overweight   
(pre-obesity) who  
presented today for follow up for   
supervised weight loss to treat   
PCOS, IFG,  
elevated LDL, anxiety and   
depression and prevent related   
co-morbidities.  
ASSESSMENT/PLAN:  
1. PCOS (polycystic ovarian   
syndrome) - ICD9: 256.4, ICD10:   
E28.2 (primary  
diagnosis)  
- Whole food balanced protein   
low-carb nutrition  
- METFORMIN 500 MG TABLET  
2. Elevated LDL cholesterol level   
- ICD9: 272.0, ICD10: E78.00  
- benefits of weight loss   
discussed  
3. IFG (impaired fasting glucose)   
- ICD9: 790.21, ICD10: R73.01  
- METFORMIN 500 MG TABLET  
4. Irregular menses - ICD9:   
626.4, ICD10: N92.6  
- METFORMIN 500 MG TABLET  
5. Anxiety and depression - ICD9:   
300.00, 311, ICD10: F41.9, F32.A  
- well-controlled with bupropion  
6. History of bulimia - ICD9:   
V11.8, ICD10: Z86.59  
- in remission  
7. History of anorexia nervosa -   
ICD9: V11.8, ICD10: Z86.59  
- in remission  
8. Overweight with body mass   
index (BMI) of 29 to 29.9 in   
adult - ICD9: 278.02,  
V85.25, ICD10: E66.3, Z68.29  
- METFORMIN 500 MG TABLET -   
continue to increase dose as   
tolerated  
- Recommended whole food low-carb   
diet with 30 g of protein 3 times   
a day and  
30 g of carbs at lunch and dinner   
only. Given tracking log.  
- Given 15 gram carb whole food   
and protein suggestion list.  
- Given protein snack ideas  
- continue exercise  
Lengthy discussion regarding   
history of anorexia and bulimia   
which is in  
remission. Discussed appropriate   
amount of exercise. We agreed   
that in-office  
visits would be best for now.  
Follow up in 4 weeks  
SHANEKA Wan.CNP  
Advanced Education from the   
Obesity Medicine Association  
Medical Decision Making:  
Problems:  
Moderate: 1+ chronic illnesses   
with change  
Risk:  
Moderate: Drug man (more content   
not included)...                        [facility]  
   
                                        2024 Instructions   
  
  
Ernestine Sher APRN.CNP -   
2024 7:10 AM ESTFormatting   
of this note is different from   
the original.  
- Whole food low-carb diet with   
30 g of protein 3 times a day and   
up to 30 g of carbs at lunch and   
dinner only.  
Meals - protein is a goal and   
carbohydrates are a limit.  
Snacks - all protein or more   
protein than carbs  
Use tracking log as a worksheet   
and bring with you to your next   
appointment.  
  
Protein - no carbs  
Egg 1 large - 6g  
Egg white 1 large 3.6g  
  
3 oz is approximately the size of   
a deck of cards and equals 21 g   
protein  
Beef, Chicken, Turkey, Pork, Lamb   
1 oz 7g  
Fish, Tuna Fish 1 oz 7g (Starkist   
tuna packet 2.6 oz 17 gm protein)  
Seafood (Crabmeat, Shrimp,   
Lobster) 1 oz 6g  
  
Protein shakes (read labels)  
Premier Protein or generic   
WalMart Equate, Aldi Elevate,   
Meijer High Performance- 30g   
protein & 1g carb - meal   
replacement  
Premier Protein plant protein   
powder - 25 gm protein, 0 suger/2   
carb Vanilla and chocolate (not a   
meal replacement)  
Fairlife 30 gram protein - 30g   
protein & 3g carb  
BOOST Glucose Control Max 30g   
Protein Nutritional Drink - 30g   
protein & 1 carb - meal   
replacement  
Slimfast High Protein - 20g   
protein & 1g carb  
Ensure Max Protein Nutrition   
Shake 30g protein & 2 carb  
_________________________________  
________________  
Protein AND carbs  
Beef/Turkey Jerky 1 oz dried   
10-15g protein - check carb   
count, can be high if sugar added  
Slim Thompson - 6 gm protein and 4 net   
carb  
Great Value original turkey   
sausage sticks - 7 gm protein and   
2 gm carb  
Imitation Crab Meat 1 oz - 2g   
protein & 4g carb  
  
Milk, skim 2% or 1% 8 oz - 8g   
protein & 12g carb  
  
Greek yogurt  
Full Fat Greek Yogurt 1 cup -   
20.4g protein & 9.1g carb  
2% Greek Yogurt 1 cup - 22.7g   
protein & 9.1g carb  
0% (fat-free) Greek Yogurt - 1   
cup 24g protein & 9.3g carb  
:ratio, KETO Friendly Dairy Snack   
1 single svg - 15g protein & 2g   
carb  
:ratio Protein 1 single svg - 25g   
protein & 8g carb  
Dannon Light + Fit 1 single csvg   
- 12g protein & 9g carb  
Two Good Lowfat Greek Yogurt,   
Strawberry, Lower Sugar - 12g   
protein & 2g carb  
Oikos Triple Zero Greek Nonfat   
Yogurt 1 single svg - 15g protein   
& 7g carb  
Aldi Greek yogurt 10g protein & 4   
g carb  
  
Cheese each oz  
Brie 5.9g protein & 0.1g carb  
Cheddar Cheese 7g protein & 0.4g   
carb  
Mozzarella Cheese 6.3g protein &   
0.6g carb  
Bassem Cheese 6.7g protein & 0.7g   
carb  
Parmesan Cheese 10g protein &   
0.9g carb  
Cream Cheese 1.7g protein & 1.2g   
carb  
Feta 4g protein & 1.2g carb  
Swiss Cheese 7.6g protein & 1.5g   
carb  
  
Franklin s Low Fat Cottage Cheese   
1/2cup 12g protein & 4g carb  
  
Legumes  
Lentils cup 9g protein & 20g carb  
Lima beans cup 7g protein & 20g   
carb  
Kidney, Black, Navy, Cannellini   
beans cup 8g protein & 20g carb  
Soybeans 1/2 c 14g protein & 8.5g   
carb  
Peanut butter, natural 2 Tbsp   
7-8g protein & 4g net carbs, 190   
calories  
Cleves milk, unsweetened 8 oz 1g   
protein & 2g carb  
Soy milk 8 oz 3.5g protein & 1.6g   
carb  
Tofu 1/2 cup 10g protein & 2.3g   
carb  
  
Nuts and Seeds per oz  
Pumpkin Seeds - 6.9g protein & 5g   
carb  
Almonds - 5.9g protein & 6.1g   
carb  
Sunflower Seeds - 5.8g protein &   
5.6g carb  
Pistachios - 5.8g protein & 7.8g   
carb  
Cashews - 5.1g protein & 9.2g   
carb  
Walnuts - 4.3g protein & 3.8g   
carb  
Hazelnuts - 4.2g protein & 4.7g   
carb  
Brazil Nuts - 4.0g protein & 3.4g   
carb  
Pecans - 2.6g protein & 3.9g carb  
Peanuts - 7g protein & 4.6g carb  
  
<15 gram carb fruit options  
Berries have the lowest sugar   
content  
  
1/2 cup diced honeydew melon - 8   
carbs  
1/2 cup diced watermelon - 6   
carbs  
One half medium grapefruit - 10.5   
carbs  
1 medium orange -15.5 carbs  
1 medium peach -14.5 carbs  
1/2 cup fresh cranberries - 6.5   
carbs  
1 medium plum -7.5 carbs  
1/2 cup raspberries -7.5 carbs  
1 medium Mona -9 carbs  
1/2 cup fresh pineapple -11 carbs  
1 medium nectarine - 15 carbs  
1/2 cup blueberries - 11 carbs -   
may actually help you lose weight  
1 medium kiwi without skin - 11   
carbs  
1/2 cup fresh cherries -11 carbs  
1 medium tangerine -12 carbs  
1/2 cup sliced franky -14 carbs  
1/2 medium banana  
1/2 c grapes  
1/2 medium apple - 12.5 carbs  
1/2 c strawberries - 12.7 carbs  
  
5 (FIVE) gram carb vegetable   
options  
  
1 cup raw OR cup cooked:  
Asparagus  
Cabbage  
Spinach  
Peppers  
Green beans  
Carrots  
Tomato  
Cucumber  
Bean sprouts  
Cauliflower  
Lettuce  
Snap peas  
Broccoli  
Eggplant  
Zucchini  
Turnips  
Spaghetti squash  
Brussel sprouts  
  
15 gram carb vegetable options  
  
cup cooked green peas  
cup cooked corn or hominy  
corn on the cob, large (5 oz)  
cup cooked sweet potato, plain  
cup cooked potato, plain  
1 small potato or sweet potato  
1 cup winter squash (pumpkin,  
acorn, butternut)  
1 cup marinara or pasta sauce -   
check label  
cup tomato juice  
cup tomato puree  
Beans, Seeds, Nuts  
cup cooked beans (kidney, elizabeth,  
red, green, etc.)  
cup cooked lentils  
cup baked beans  
4 tablespoons nut butter  
  
Grains  
White long-grain rice 1/2 c 2g   
protein 22.5 carb  
Brown rice 1/2 c 5.5g protein 24   
carb  
Quinoa 1/2 c 4 gm complete   
protein 25 carbs  
Oatmeal  
  
30 High Protein Snack Ideas  
  
1. Jerky  
2. Trail mix without or minimal   
dried fruit  
3. Turkey roll-ups  
4. Greek yogurt  
5. Veggies and yogurt dip  
6. Tuna  
7. Hard-boiled eggs  
8. Peanut butter celery sticks  
9. No-bake energy bites  
10. Cheese slices/ Cheese Stick  
11. Handful of almonds  
12. Roasted chickpeas  
13. Hummus and veggies  
14. Cottage Cheese  
15. Celery/fruit with peanut   
butter  
16. Beef sticks (Grass-fed,   
natural ingredients)  
17. Protein bars  
18. Canned [place]  
19. Jose pudding  
20. Homemade granola - rolled   
oats, nuts, and a little   
sweetener - 1/4 cup serving  
21. Pumpkin seeds  
22. Nut butter  
23. Protein shakes  
24. Edamame  
25. Avocado and chicken salad  
26. Fruit and nut bars - natural   
ingredients without added sugar.  
27. Lentil salad  
28. Overnight oatmeal  
29. Egg muffins  
30. Leftover protein or lunch   
meat  
Electronically signed by Ernetsine Sher APRN.CNP at 2024 7:36   
AM EST  
  
documented in this encounter            [facility]  
   
                                                    2024 History of   
Present illness Narrative               Formatting of this note is   
different from the original.  
Some documentation from previous   
visit of 2023 was copied and   
pasted, documentation has been   
reviewed and edited as necessary   
for today's visit.  
  
Patient Summary: Don is a 23   
year old female who presents for   
follow-up evaluation of her   
obesity/weight management to   
treat PCOS, IFG, elevated LDL,   
anxiety and depression and   
prevent relatedco-morbidities. In   
our previous visits we have   
discussed lifestyle intervention   
including a nutrition   
recommendations and physical   
activity optimization. Her last   
office visit was 1 month ago.  
  
Irregular menses - Menses   
2023 LMP 1/2-3 spotting  
20 lb weight gain with Lexapro   
for anxiety. Changed to bupropion   
by PCP.  
  
Assessment/plan from last visit:  
Metformin 500 mg twice a day with   
meals - tried 1 gm at dinner but   
had nausea and mild diarrhea   
(actually took at bedtime)  
Bupropion 150 mg 24/hr tab for   
anxiety and depression  
Was in [place] with  who   
races - very hectic travel. Tried   
to eat healthier.  
Quit second job so life is   
becoming more calm  
History of anorexia and bulimia   
in HS - no treatment, Dad helped   
her and she started adding   
protein shakes.  
  
Interval History - changes made   
in past month  
Awake - 0700 but is changing to   
0520 to exercise before work  
B - 0745 Premier Protein shake  
S - none  
L - SKIP  
S - none  
D - 1830 protein,   
pasta/potato/rice/sweet potato   
and veg - asparagus, cucumber   
salad, winter and summer squash  
S - none or Skinny popcorn  
Fluids - water, zero sugar   
electrolyte drink mixes,   
unsweetened tea  
Bedtime -   
  
She feels the medication is   
helping to lessen hunger and   
craving to candy  
Exercise: stable sedentary job at   
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   
walking and free weights  
  
Stress: decreased -  races   
and season has not started yet   
and decreased stress after   
quitting second job  
  
Sleep: 7 hours, well rested LASHAWN   
-no  
  
Weight gain since last vist: 3   
lbs since last visit  
2024 169 lb  
2024 166 lb metformin  
  
CrCl cannot be calculated   
(Patient's most recent lab result   
is older than the maximum 180   
days allowed.).  
  
PAST MEDICAL HISTORY  
Diagnosis Date  
Exertional asthma 2012  
Generalized anxiety disorder  
IFG (impaired fasting glucose)   
2024  
Medial meniscus tear 2012  
PCOS (polycystic ovarian   
syndrome)   
Unspecified otitis media  
Recurrent otitis  
Vocal cord dysfunction 05/15/2012  
  
Current Outpatient Medications  
Medication Sig Dispense Refill  
buPROPion XL (WELLBUTRIN XL) 150   
mg 24 hr tablet Take 150 mg by   
mouth every morning.  
PNV/iron/folic acid (PRENATAL   
VITAMIN-IRON-FA ORAL) Take 1   
tablet by mouth once daily.  
metFORMIN (GLUCOPHAGE) 500 mg   
tablet Take 1 tablet by mouth two   
times a day with meals. 180   
tablet 0  
albuterol HFA (PROVENTIL HFA,   
VENTOLIN HFA) 90 mcg/actuation   
inhaler Inhale 2 Puffs as   
instructed every 4 hours as   
needed. 1 Inhaler 0  
  
No current facility-administered   
medications for this visit.  
  
/84   Wt 169 lb (76.7 kg)     
LMP 2024   BMI 29.01 kg/m  
  
Assessment/Plan:  
Don Mittal is a 23 year old   
yo female with overweight   
(pre-obesity) who presented today   
for follow up for supervised   
weight loss to treat PCOS, IFG,   
elevated LDL, anxiety and   
depression and prevent related   
co-morbidities.  
  
ASSESSMENT/PLAN:  
1. PCOS (polycystic ovarian   
syndrome) - ICD9: 256.4, ICD10:   
E28.2 (primary diagnosis)  
- Whole food balanced protein   
low-carb nutrition  
- METFORMIN 500 MG TABLET  
  
2. Elevated LDL cholesterol level   
- ICD9: 272.0, ICD10: E78.00  
- benefits of weight loss   
discussed  
  
3. IFG (impaired fasting glucose)   
- ICD9: 790.21, ICD10: R73.01  
- METFORMIN 500 MG TABLET  
  
4. Irregular menses - ICD9:   
626.4, ICD10: N92.6  
- METFORMIN 500 MG TABLET  
  
5. Anxiety and depression - ICD9:   
300.00, 311, ICD10: F41.9, F32.A  
- well-controlled with bupropion  
  
6. History of bulimia - ICD9:   
V11.8, ICD10: Z86.59  
- in remission  
  
7. History of anorexia nervosa -   
ICD9: V11.8, ICD10: Z86.59  
- in remission  
  
8. Overweight with body mass   
index (BMI) of 29 to 29.9 in   
adult - ICD9: 278.02, V85.25,   
ICD10: E66.3, Z68.29  
- METFORMIN 500 MG TABLET -   
continue to increase dose as   
tolerated  
- Recommended whole food low-carb   
diet with 30 g of protein 3 times   
a day and 30 g of carbs at lunch   
and dinner only. Given tracking   
log.  
- Given 15 gram carb whole food   
and protein suggestion list.  
- Given protein snack ideas  
- continue exercise  
  
Lengthy discussion regarding   
history of anorexia and bulimia   
which is in remission. Discussed   
appropriate amount of exercise.   
We agreed that in-office visits   
would be best for now.  
  
Follow up in 4 weeks  
  
Ernestine Sher APRN.CNP  
Advanced Education from the   
[employer]  
  
Medical Decision Making:  
  
Problems:  
Moderate: 1+ chronic illnesses   
with change  
Risk:  
Moderate: Drug management and   
Moderate risk from   
testing/treatment  
  
Medical Decision Making Level: 4   
- Moderate  
  
  
Electronically signed by Ernestine Sher APRN.CNP at 2024 7:59   
PM EST  
documented in this encounter            [facility]  
   
                                                    2024 Evaluation + Plan  
   
note                                    Associated Problem(s): PCOS   
(polycystic ovarian syndrome)  
Formatting of this note might be   
different from the original.  
Recommend following up with   
OB/GYN regarding side effects of   
metformin and requesting if she   
can take extended release   
metformin to see if that is   
better tolerated.  
Electronically signed by SHANEKA Chacon CNP at   
2024 9:35 AM EST  
                                        [facility]  
   
                                                    2024 Miscellaneous   
Notes                                   Associated Problem(s): PCOS   
(polycystic ovarian syndrome)  
Formatting of this note might be   
different from the original.  
Recommend following up with   
OB/GYN regarding side effects of   
metformin and requesting if she   
can take extended release   
metformin to see if that is   
better tolerated.  
Electronically signed by SHANEKA Chacon CNP at   
2024 9:35 AM EST  
Associated Problem(s): Anxiety   
and depression  
Formatting of this note might be   
different from the original.  
Denies any suicidal or homicidal   
ideation. Anxiety and depression   
have improved we will continue   
Wellbutrin 150 mg daily, patient   
to give us an update in about 1   
month via [facility] if she would   
like to increase the dose to 300   
mg. We will schedule her for   
follow-up in 3 months  
Electronically signed by SHANEKA Chacon CNP at   
2024 9:35 AM EST  
documented in this encounter            [facility]  
   
                                                    2024 Miscellaneous   
Notes                                   Associated Problem(s): PCOS   
(polycystic ovarian syndrome)  
Formatting of this note might be   
different from the original.  
Recommend following up with   
OB/GYN regarding side effects of   
metformin and requesting if she   
can take extended release   
metformin to see if that is   
better tolerated.  
Electronically signed by SHANEKA Chacon CNP at   
2024 9:35 AM EST  
Associated Problem(s): Anxiety   
and depression  
Formatting of this note might be   
different from the original.  
Denies any suicidal or homicidal   
ideation. Anxiety and depression   
have improved we will continue   
Wellbutrin 150 mg daily, patient   
to give us an update in about 1   
month via [facility] if she would   
like to increase the dose to 300   
mg. We will schedule her for   
follow-up in 3 months  
Electronically signed by SHANEKA Chacon CNP at   
2024 9:35 AM EST  
Addended by: MONICA SANCHEZ   
on: 2024 08:32 AM  
  
Modules accepted: Level of   
Service  
  
  
Electronically signed by SHANEKA Chacon CNP at   
2024 8:32 AM EST  
documented in this encounter            [facility]  
   
                                                    2024 Evaluation + Plan  
   
note                                    Associated Problem(s): Anxiety   
and depression  
Formatting of this note might be   
different from the original.  
Denies any suicidal or homicidal   
ideation. Anxiety and depression   
have improved we will continue   
Wellbutrin 150 mg daily, patient   
to give us an update in about 1   
month via [facility] if she would   
like to increase the dose to 300   
mg. We will schedule her for   
follow-up in 3 months  
Electronically signed by SHANEKA Chacon CNP at   
2024 9:35 AM EST  
                                        [facility]  
   
                                                    2024 History of   
Present illness Narrative               Formatting of this note might be   
different from the original.  
Patient was identified by name   
and Date of Birth.  
  
Electronically signed by Mandi D'Amico at 2024 9:37 AM EST  
Formatting of this note is   
different from the original.  
Images from the original note   
were not included.  
  
  
2024  
  
Don Mittal (: 2000) is   
a 23 y.o. female , Established   
patient, here for evaluation of   
the following chief complaint(s):  
Medication Check  
  
ASSESSMENT/PLAN:  
  
1. Anxiety and depression  
Assessment & Plan:  
Denies any suicidal or homicidal   
ideation. Anxiety and depression   
have improved we will continue   
Wellbutrin 150 mg daily, patient   
to give us an update in about 1   
month via [email or web address] if she would   
like to increase the dose to 300   
mg. We will schedule her for   
follow-up in 3 months  
2. PCOS (polycystic ovarian   
syndrome)  
Assessment & Plan:  
Recommend following up with   
OB/GYN regarding side effects of   
metformin and requesting if she   
can take extended release   
metformin to see if that is   
better tolerated.  
  
Follow up for 3 month [facility].  
  
SUBJECTIVE/OBJECTIVE:  
  
HPI -  
Don Mittal (: 2000) is   
a 23 y.o. female , Established   
patient, here for the evaluation   
of the following chief   
complaint(s):  
Medication Check  
  
RACING WITH  IN [place]   
WENT WELL, WAS SUPER BUSY. Did   
not get to do any sightseeing as   
they were busy at the track every   
day. Got back from [place] on   
2024. Patient   
reports she  
went to ob/gyn for pcos- was   
started on metformin and reports   
that she has been having nausea   
and mild diarrhea with it. She   
has a follow-up with them   
tomorrow.  
  
Reports that the wellbutrin has   
helped anxiety and mood. Is doing   
better handling things. Is not.   
Feeling as overwhelmed, reports   
sleeping is getting back on   
schedule.  
Still doing shreya and yoga. Most   
days.  
Would like to keep the Wellbutrin   
at the current dose for now  
Prior to Admission medications  
Medication Sig Start Date End   
Date Taking? Authorizing Provider  
buPROPion XL (Wellbutrin XL) 150   
MG 24 hr tablet Take 1 tablet   
(150 mg) by mouth every morning.   
Do not crush, chew, or split.   
1/22/24 3/22/24 Yes SHANEKA Chacon - CNP  
metFORMIN (Glucophage) 500 MG   
tablet Take 1 tablet by mouth in   
the morning and 1 tablet in the   
evening. Take with meals. 24 Yes Historical Provider,   
MD  
Prenatal Multivit-Min-Fe-FA   
(PRENATAL/IRON PO) Take 1 tablet   
by mouth in the morning. Yes   
Historical Provider, MD  
Prenatal MV-Min-Fe Fum-FA-DHA   
(PRENATAL 1 PO) Take by mouth.   
Yes Historical Provider, MD  
  
  
  
Review of Systems  
Constitutional: Negative for   
activity change, chills, fatigue   
and fever.  
HENT: Negative. Negative for   
congestion.  
Respiratory: Negative.  
Cardiovascular: Negative.  
Gastrointestinal: Positive for   
diarrhea and nausea. Negative for   
abdominal pain and vomiting.  
Genitourinary: Positive for   
menstrual problem (Irregular   
menses due to PCOS).  
Neurological: Negative.  
  
Vitals:  
24 0735  
BP: 112/69  
Pulse: 94  
Resp: 18  
Temp: 37 C (98.6 F)  
TempSrc: Infrared  
SpO2: 98%  
Weight: 170 lb 3.2 oz (77.2 kg)  
  
Physical Exam  
Constitutional:  
Appearance: Normal appearance.  
HENT:  
Head: Normocephalic and   
atraumatic.  
Mouth/Throat:  
Mouth: Mucous membranes are   
moist.  
Pharynx: Oropharynx is clear. No   
posterior oropharyngeal erythema.  
Cardiovascular:  
Rate and Rhythm: Normal rate and   
regular rhythm.  
Pulses: Normal pulses.  
Heart sounds: Normal heart   
sounds.  
Pulmonary:  
Effort: Pulmonary effort is   
normal.  
Breath sounds: Normal breath   
sounds.  
Skin:  
General: Skin is warm and dry.  
Neurological:  
Mental Status: She is alert and   
oriented to person, place, and   
time.  
Psychiatric:  
Mood and Affect: Mood normal.  
Behavior: Behavior normal.  
Thought Content: Thought content   
normal.  
Judgment: Judgment normal.  
  
An electronic signature was used   
to authenticate this note.  
  
SHANEKA Chacon CNP  
2024  
9:35 AM  
Electronically signed by SHANEKA Chacon CNP at   
2024 9:37 AM EST  
documented in this encounter            [facility]  
   
                                                    2024 History of   
Present illness Narrative               Formatting of this note might be   
different from the original.  
Patient was identified by name   
and Date of Birth.  
  
Electronically signed by Mandi D'Amico at 2024 9:37 AM EST  
Formatting of this note is   
different from the original.  
Images from the original note   
were not included.  
  
  
2024  
  
Don Mittal (: 2000) is   
a 23 y.o. female , Established   
patient, here for evaluation of   
the following chief complaint(s):  
Medication Check  
  
ASSESSMENT/PLAN:  
  
1. Anxiety and depression  
Assessment & Plan:  
Denies any suicidal or homicidal   
ideation. Anxiety and depression   
have improved we will continue   
Wellbutrin 150 mg daily, patient   
to give us an update in about 1   
month via [facility] if she would   
like to increase the dose to 300   
mg. We will schedule her for   
follow-up in 3 months  
2. PCOS (polycystic ovarian   
syndrome)  
Assessment & Plan:  
Recommend following up with   
OB/GYN regarding side effects of   
metformin and requesting if she   
can take extended release   
metformin to see if that is   
better tolerated.  
  
Follow up for 3 month [facility].  
  
SUBJECTIVE/OBJECTIVE:  
  
HPI -  
Don Mittal (: 2000) is   
a 23 y.o. female , Established   
patient, here for the evaluation   
of the following chief   
complaint(s):  
Medication Check  
  
RACING WITH  IN [place]   
WENT WELL, WAS SUPER BUSY. Did   
not get to do any sightseeing as   
they were busy at the track every   
day. Got back from [place] on   
2024. Patient   
reports she  
went to ob/gyn for pcos- was   
started on metformin and reports   
that she has been having nausea   
and mild diarrhea with it. She   
has a follow-up with them   
tomorrow.  
  
Reports that the wellbutrin has   
helped anxiety and mood. Is doing   
better handling things. Is not.   
Feeling as overwhelmed, reports   
sleeping is getting back on   
schedule.  
Still doing shreya and yoga. Most   
days.  
Would like to keep the Wellbutrin   
at the current dose for now  
Prior to Admission medications  
Medication Sig Start Date End   
Date Taking? Authorizing Provider  
buPROPion XL (Wellbutrin XL) 150   
MG 24 hr tablet Take 1 tablet   
(150 mg) by mouth every morning.   
Do not crush, chew, or split.   
1/22/24 3/22/24 Yes SHANEKA Chacon CNP  
metFORMIN (Glucophage) 500 MG   
tablet Take 1 tablet by mouth in   
the morning and 1 tablet in the   
evening. Take with meals. 24 Yes Historical Provider,   
MD  
Prenatal Multivit-Min-Fe-FA   
(PRENATAL/IRON PO) Take 1 tablet   
by mouth in the morning. Yes   
Historical Provider, MD  
Prenatal MV-Min-Fe Fum-FA-DHA   
(PRENATAL 1 PO) Take by mouth.   
Yes Historical Provider, MD  
  
  
  
Review of Systems  
Constitutional: Negative for   
activity change, chills, fatigue   
and fever.  
HENT: Negative. Negative for   
congestion.  
Respiratory: Negative.  
Cardiovascular: Negative.  
Gastrointestinal: Positive for   
diarrhea and nausea. Negative for   
abdominal pain and vomiting.  
Genitourinary: Positive for   
menstrual problem (Irregular   
menses due to PCOS).  
Neurological: Negative.  
  
Vitals:  
24 0735  
BP: 112/69  
Pulse: 94  
Resp: 18  
Temp: 37 C (98.6 F)  
TempSrc: Infrared  
SpO2: 98%  
Weight: 170 lb 3.2 oz (77.2 kg)  
  
Physical Exam  
Constitutional:  
Appearance: Normal appearance.  
HENT:  
Head: Normocephalic and   
atraumatic.  
Mouth/Throat:  
Mouth: Mucous membranes are   
moist.  
Pharynx: Oropharynx is clear. No   
posterior oropharyngeal erythema.  
Cardiovascular:  
Rate and Rhythm: Normal rate and   
regular rhythm.  
Pulses: Normal pulses.  
Heart sounds: Normal heart   
sounds.  
Pulmonary:  
Effort: Pulmonary effort is   
normal.  
Breath sounds: Normal breath   
sounds.  
Skin:  
General: Skin is warm and dry.  
Neurological:  
Mental Status: She is alert and   
oriented to person, place, and   
time.  
Psychiatric:  
Mood and Affect: Mood normal.  
Behavior: Behavior normal.  
Thought Content: Thought content   
normal.  
Judgment: Judgment normal.  
  
An electronic signature was used   
to authenticate this note.  
  
SHANEKA Chacon CNP  
2024  
9:35 AM  
Electronically signed by SHANEKA Chacon CNP at   
2024 9:37 AM EST  
documented in this encounter            [facility]  
   
                                        2024 Instructions   
  
  
SHANEKA Chacon CNP -   
2024 7:40 AM ESTFormatting   
of this note might be different   
from the original.  
Asked about extended release   
metformin.  
Give update in about a month- if   
you want to increase dose of   
Wellbutrin or not  
Electronically signed by SHANEKA Chacon CNP at   
2024 7:56 AM EST  
  
documented in this encounter            [facility]  
   
                                        2024 Instructions   
  
  
SHANEKA Chacon CNP -   
2024 7:40 AM ESTFormatting   
of this note might be different   
from the original.  
Asked about extended release   
metformin.  
Give update in about a month- if   
you want to increase dose of   
Wellbutrin or not  
Electronically signed by SHANEKA Chacon CNP at   
2024 7:56 AM EST  
  
documented in this encounter            [facility]  
   
                                        2024 Note     Addended by: MONICA MCADAMS   
on: 2024 08:32 AM  
  
Modules accepted: Level of   
Service  
  
  
Electronically signed by SHANEKA Chacon CNP at   
2024 8:32 AM EST  
                                        [facility]  
   
                                        2024 Note     Addended by: MONICA MCADAMS   
on: 2024 08:32 AM  
  
Modules accepted: Level of   
Service                                 [facility]  
   
                                        2024 Note     HNO ID: [id number]  
Author: ERNESTINE SHER APRN.CNP  
Service: ?  
Author Type: Nurse Practitioner  
Type: Progress Notes  
Filed: 2024 13:09  
Note Text:  
Don Mittal is a 23 year old   
female who presents for problem   
visit missed  
menses x 2 months and multiple   
negative home pregnancy tests.  
HPI: Stopped OCP to attempt   
pregnancy in 2023. Last   
menses in  
November. Multiple home pregnancy   
tests have all been negative.  
Has PCOS - recently noticing more   
facial hair although acne   
improving. 20 lb  
weight gain with Lexapro for   
anxiety. Changed to bupropion a   
few days ago by  
PCP.  
No previous pregnancies. Partner   
has 3 children.  
Shreya and yoga daily for   
exercise. Trying to eat healthy.  
OB History  
 T0  L0  
SAB0 IAB0 Ectopic0 Multiple0 Live   
Births0  
Comment: 3 stepchildren  
Gyn History  
LMP: 2024, Having periods  
Age at Menarche:  
Age at First Pregnancy:  
Age at Menopause:  
Gyn History Comments:  
Sexual Activity: Yes; Male;   
sexually active with   
Contraception: Pill  
PAST MEDICAL HISTORY  
Diagnosis Date  
Exertional asthma 2012  
Generalized anxiety disorder  
Medial meniscus tear 2012  
PCOS (polycystic ovarian   
syndrome)   
Unspecified otitis media  
Recurrent otitis  
Vocal cord dysfunction 05/15/2012  
PAST SURGICAL HISTORY  
Procedure Laterality Date  
ANKLE ARTHROSCOPY Right 16  
Dr. Yeung  
FAMILY HISTORY  
Problem Relation Age of Onset  
other (Other [Other]) Paternal   
Grandfather  
leukemia  
Cancer Maternal Grandmother  
breast  
Asthma Sister  
Social History  
Tobacco Use  
Smoking status: Never  
Smokeless tobacco: Never  
Vaping Use  
Vaping Use: Never used  
Substance Use Topics  
Alcohol use: Yes  
Drug use: No  
Current Outpatient Medications  
Medication Sig  
buPROPion XL (WELLBUTRIN XL) 150   
mg 24 hr tablet Take 150 mg by   
mouth every  
morning.  
PNV/iron/folic acid (PRENATAL   
VITAMIN-IRON-FA ORAL) Take 1   
tablet by mouth once  
daily.  
albuterol HFA (PROVENTIL HFA,   
VENTOLIN HFA) 90 mcg/actuation   
inhaler Inhale 2  
Puffs as instructed every 4 hours   
as needed.  
Drospirenone-Ethinyl Estradiol   
(SINGH, 28,) 3-0.03 mg per   
tablet Take 1 tablet  
by mouth once daily. Take one   
active pill continuously x 3   
months- discard  
placebo pills (Patient not   
taking: Reported on 2024)  
No current facility-administered   
medications for this visit.  
Allergies As of Date: 2024  
Allergen Noted Reaction  
GRASS POLLEN 2013 Unknown  
SEASONAL ALLERGIES 2013   
Unknown  
TREES 2013 Unknown  
Fully Assessed 2024  
REVIEW OF SYSTEMS  
Abdomen: No bloating, early   
satiety, indigestion, or   
increased flatulence. No  
abdominal pain, nausea, vomiting,   
diarrhea, or constipation.  
Bladder: No dysuria, gross   
hematuria, urinary frequency,   
urinary urgency, or  
incontinence.  
Allergies and current medication   
updated:Yes  
EXAM: /72   Wt 166 lb   
(75.3kg)   LMP 2024  
GENERAL: pleasant,    
female in no apparent distress  
CHEST: Normal inspiratory effort  
NEURO: alert and oriented x3,exam   
grossly non-focal  
ASSESSMENT/PLAN:  
1. Irregular menses - ICD9:   
626.4, ICD10: N92.6 (primary   
diagnosis)  
- HCG QUAL UR B/O - negative  
- METFORMIN 500 MG TABLET  
- HGB A1C  
- INSULIN ASSAY BLOOD  
- GLUCOSE FASTING BLD  
2. PCOS (polycystic ovarian   
syndrome) - ICD9: 256.4, ICD10:   
E28.2  
- METFORMIN 500 MG TABLET  
Agreeable to begin Metformin 500   
mg with dinner daily x 1 week. If   
tolerating  
will increase to 2 tablets with   
dinner daily.  
We discussed common side effects   
of this medication including   
nausea, changes in  
bowel habits, abdominal   
discomfort, and flatulence.   
Discussed taking it with  
food and complication of lactic   
acidosis and signs/symptoms and   
medication  
handout given. Further instructed   
that if she experiences malaise,   
muscle aches,  
difficulty breathing, or severe   
abdominal pain to seek immediate   
medical  
attention.  
- HGB A1C  
- INSULIN ASSAY BLOOD  
- GLUCOSE FASTING BLD  
- - discussed with pt - review of   
PCOS with signs and symptoms.   
Increased risk  
of DMT2, CAD, infertility.   
Treatment discussed: weight loss   
to restore ovulatory  
cycles and reduce androgen   
concentrations, exercise, cyclic   
medroxyprogesterone  
to induce menses. Metformin   
discussed - explained that it is   
not considered a  
first-line treatment as it does   
not change pregnancy outcomes but   
that it will  
decrease HgbA1c although weight   
loss will do the same. Discussed   
that she may  
need medication to induce   
ovulation if she does not become   
pregnant with  
lifestyle changes and weight   
loss.  
- Eat primarily whole foods.   
Limit carbs, especially processed   
carbs.  
- Do not drink your calories  
- 30 grams of protein for your   
first meal of the day decreases   
your hunger  
during the day by up to 40 %   
Premier Protein or generic 30 gm   
protein 1 gm  
sugar  
- Walk for 15 minutes immediately   
a meal.  
3. Elevated LDL cholesterol level   
- ICD9: 272.0, ICD10: E78.00  
- benefits of weight loss   
discussed  
Fol (more content not   
included)...                            [facility]  
   
                                                    2024 Evaluation + Plan  
   
note                                    Associated Problem(s): Weight   
gain  
Formatting of this note might be   
different from the original.  
Will have her follow up with her   
ob/gyn, suspect gain posssilby   
from pcos. Continue exercise,   
healthy eating and portion   
control. Will stop lexapro   
(possible weight gain) and switch   
to wellbutrin.  
Electronically signed by SHANEKA Chacon CNP at   
2024 4:24 PM EST  
                                        [facility]  
   
                                                    2024 Miscellaneous   
Notes                                   Associated Problem(s): Weight   
gain  
Formatting of this note might be   
different from the original.  
Will have her follow up with her   
ob/gyn, suspect gain posssilby   
from pcos. Continue exercise,   
healthy eating and portion   
control. Will stop lexapro   
(possible weight gain) and switch   
to wellbutrin.  
Electronically signed by SHANEKA Chacon CNP at   
2024 4:24 PM EST  
Associated Problem(s): Anxiety   
and depression  
Formatting of this note might be   
different from the original.  
Denies si/hi. Anxiety and   
depression symptoms better but   
still is having trouble focusing.   
Will taper discontinue lexapro   
and start wellbutrin. Follow up   
in about 1 month  
Electronically signed by SHANEKA Chacon CNP at   
2024 4:17 PM EST  
documented in this encounter            [facility]  
   
                                                    2024 Miscellaneous   
Notes                                   Associated Problem(s): Weight   
gain  
Formatting of this note might be   
different from the original.  
Will have her follow up with her   
ob/gyn, suspect gain posssilby   
from pcos. Continue exercise,   
healthy eating and portion   
control. Will stop lexapro   
(possible weight gain) and switch   
to wellbutrin.  
Electronically signed by SHANEKA Chacon CNP at   
2024 4:24 PM EST  
Associated Problem(s): Anxiety   
and depression  
Formatting of this note might be   
different from the original.  
Denies si/hi. Anxiety and   
depression symptoms better but   
still is having trouble focusing.   
Will taper discontinue lexapro   
and start wellbutrin. Follow up   
in about 1 month  
Electronically signed by SHANEKA Chacon CNP at   
2024 4:17 PM EST  
Addended by: MONICA SANCHEZ   
on: 2024 10:57 AM  
  
Modules accepted: Level of   
Service  
  
  
Electronically signed by SHANEKA Chacon CNP at   
2024 10:57 AM EST  
documented in this encounter            [facility]  
   
                                                    2024 Evaluation + Plan  
   
note                                    Associated Problem(s): Anxiety   
and depression  
Formatting of this note might be   
different from the original.  
Denies si/hi. Anxiety and   
depression symptoms better but   
still is having trouble focusing.   
Will taper discontinue lexapro   
and start wellbutrin. Follow up   
in about 1 month  
Electronically signed by SHANEKA Chacon CNP at   
2024 4:17 PM EST  
                                        [employer]  
   
                                                    2024 History of   
Present illness Narrative               Formatting of this note might be   
different from the original.  
Patient was identified by name   
and Date of Birth.  
  
Electronically signed by Mandi D'Amico at 2024 4:24 PM EST  
Formatting of this note is   
different from the original.  
Images from the original note   
were not included.  
  
  
2024  
  
Don Mittal (: 2000) is   
a 23 y.o. female , Established   
patient, here for evaluation of   
the following chief complaint(s):  
Medication Check and Weight Gain  
  
ASSESSMENT/PLAN:  
  
1. Anxiety and depression  
Assessment & Plan:  
Denies si/hi. Anxiety and   
depression symptoms better but   
still is having trouble focusing.   
Will taper discontinue lexapro   
and start wellbutrin. Follow up   
in about 1 month  
Orders:  
- buPROPion XL (Wellbutrin XL)   
150 MG 24 hr tablet; Take 1   
tablet (150 mg) by mouth every   
morning. Do not crush, chew, or   
split., Starting Mon 2024,   
Until Fri 3/22/2024, Normal  
2. Weight gain  
Assessment & Plan:  
Will have her follow up with her   
ob/gyn, suspect gain posssilby   
from pcos. Continue exercise,   
healthy eating and portion   
control. Will stop lexapro   
(possible weight gain) and switch   
to wellbutrin.  
  
Follow up in about 1 month   
(around 2024).  
  
SUBJECTIVE/OBJECTIVE:  
  
HPI -  
Don Mittal (: 2000) is   
a 23 y.o. female , Established   
patient, here for the evaluation   
of the following chief   
complaint(s):  
Medication Check and Weight Gain  
  
Stopped nicotine and stopped etoh   
since we last met. . Has gained   
some weight. She thinks it may be   
from going off of her birth   
control medication, Is working   
out with online [employer] classes   
daily, watching what she is   
eating. Is doing well as far as   
eating and exercising. She also   
is doing yoga.  
Stopped birth control. Has not   
had period since sept and   
November and has been pregnancy   
testing. Is trying to get   
pregnant. Will be following up   
with her ob/gyn.  
Reports anxiety is better, but is   
not able to focus well still.   
Denies si/hi.  
Her and her  are getting   
ready to go to [place] for car   
racing for 11 days. Her    
races cars. States she is looking   
forward to going.  
Prior to Admission medications  
Medication Sig Start Date End   
Date Taking? Authorizing Provider  
escitalopram (Lexapro) 20 MG   
tablet Take 1 tablet (20 mg) by   
mouth daily. 12/15/23 3/14/24 Yes   
SHANEKA Chacon CNP  
Prenatal MV-Min-Fe Fum-FA-DHA   
(PRENATAL 1 PO) Take by mouth.   
Yes Historical Provider, MD  
  
  
  
Review of Systems  
Constitutional: Negative.  
HENT: Negative.  
Respiratory: Negative.  
Cardiovascular: Negative.  
Gastrointestinal: Negative.  
Genitourinary: Negative.  
Musculoskeletal: Negative.  
Neurological: Negative.  
Psychiatric/Behavioral: Positive   
for decreased concentration.   
Negative for self-injury, sleep   
disturbance and suicidal ideas.   
The patient is nervous/anxious.  
  
Vitals:  
24 1346  
BP: 133/89  
Pulse: 98  
Resp: 18  
Temp: 36.7 C (98.1 F)  
TempSrc: Infrared  
SpO2: 97%  
Weight: 164 lb (74.4 kg)  
  
Physical Exam  
Constitutional:  
General: She is not in acute   
distress.  
Appearance: Normal appearance.   
She is not ill-appearing.  
HENT:  
Head: Normocephalic and   
atraumatic.  
Right Ear: Tympanic membrane   
normal.  
Left Ear: Tympanic membrane   
normal.  
Nose: No congestion or   
rhinorrhea.  
Mouth/Throat:  
Mouth: Mucous membranes are   
moist.  
Pharynx: Oropharynx is clear. No   
posterior oropharyngeal erythema.  
Eyes:  
Conjunctiva/sclera: Conjunctivae   
normal.  
Cardiovascular:  
Rate and Rhythm: Normal rate and   
regular rhythm.  
Pulmonary:  
Effort: Pulmonary effort is   
normal.  
Breath sounds: Normal breath   
sounds.  
Lymphadenopathy:  
Cervical: No cervical adenopathy.  
Skin:  
General: Skin is warm and dry.  
Neurological:  
Mental Status: She is alert and   
oriented to person, place, and   
time.  
Psychiatric:  
Mood and Affect: Mood normal.  
Behavior: Behavior normal.  
Thought Content: Thought content   
normal.  
Judgment: Judgment normal.  
  
An electronic signature was used   
to authenticate this note.  
  
SHANEKA Chacon CNP  
2024  
4:24 PM  
Electronically signed by SHANEKA Chacon CNP at   
2024 4:24 PM EST  
documented in this encounter            [facility]  
   
                                                    2024 History of   
Present illness Narrative               Formatting of this note might be   
different from the original.  
Patient was identified by name   
and Date of Birth.  
  
Electronically signed by Mandi D'Amico at 2024 4:24 PM EST  
Formatting of this note is   
different from the original.  
Images from the original note   
were not included.  
  
  
2024  
  
Don Mittal (: 2000) is   
a 23 y.o. female , Established   
patient, here for evaluation of   
the following chief complaint(s):  
Medication Check and Weight Gain  
  
ASSESSMENT/PLAN:  
  
1. Anxiety and depression  
Assessment & Plan:  
Denies si/hi. Anxiety and   
depression symptoms better but   
still is having trouble focusing.   
Will taper discontinue lexapro   
and start wellbutrin. Follow up   
in about 1 month  
Orders:  
- buPROPion XL (Wellbutrin XL)   
150 MG 24 hr tablet; Take 1   
tablet (150 mg) by mouth every   
morning. Do not crush, chew, or   
split., Starting Mon 2024,   
Until Fri 3/22/2024, Normal  
2. Weight gain  
Assessment & Plan:  
Will have her follow up with her   
ob/gyn, suspect gain posssilby   
from pcos. Continue exercise,   
healthy eating and portion   
control. Will stop lexapro   
(possible weight gain) and switch   
to wellbutrin.  
  
Follow up in about 1 month   
(around 2024).  
  
SUBJECTIVE/OBJECTIVE:  
  
HPI -  
Don Mittal (: 2000) is   
a 23 y.o. female , Established   
patient, here for the evaluation   
of the following chief   
complaint(s):  
Medication Check and Weight Gain  
  
Stopped nicotine and stopped etoh   
since we last met. . Has gained   
some weight. She thinks it may be   
from going off of her birth   
control medication, Is working   
out with online [employer] classes   
daily, watching what she is   
eating. Is doing well as far as   
eating and exercising. She also   
is doing yoga.  
Stopped birth control. Has not   
had period since sept and   
November and has been pregnancy   
testing. Is trying to get   
pregnant. Will be following up   
with her ob/gyn.  
Reports anxiety is better, but is   
not able to focus well still.   
Denies si/hi.  
Her and her  are getting   
ready to go to [place] for car   
racing for 11 days. Her    
races cars. States she is looking   
forward to going.  
Prior to Admission medications  
Medication Sig Start Date End   
Date Taking? Authorizing Provider  
escitalopram (Lexapro) 20 MG   
tablet Take 1 tablet (20 mg) by   
mouth daily. 12/15/23 3/14/24 Yes   
SHANEKA Chacon CNP  
Prenatal MV-Min-Fe Fum-FA-DHA   
(PRENATAL 1 PO) Take by mouth.   
Yes Historical Provider, MD  
  
  
  
Review of Systems  
Constitutional: Negative.  
HENT: Negative.  
Respiratory: Negative.  
Cardiovascular: Negative.  
Gastrointestinal: Negative.  
Genitourinary: Negative.  
Musculoskeletal: Negative.  
Neurological: Negative.  
Psychiatric/Behavioral: Positive   
for decreased concentration.   
Negative for self-injury, sleep   
disturbance and suicidal ideas.   
The patient is nervous/anxious.  
  
Vitals:  
24 1346  
BP: 133/89  
Pulse: 98  
Resp: 18  
Temp: 36.7 C (98.1 F)  
TempSrc: Infrared  
SpO2: 97%  
Weight: 164 lb (74.4 kg)  
  
Physical Exam  
Constitutional:  
General: She is not in acute   
distress.  
Appearance: Normal appearance.   
She is not ill-appearing.  
HENT:  
Head: Normocephalic and   
atraumatic.  
Right Ear: Tympanic membrane   
normal.  
Left Ear: Tympanic membrane   
normal.  
Nose: No congestion or   
rhinorrhea.  
Mouth/Throat:  
Mouth: Mucous membranes are   
moist.  
Pharynx: Oropharynx is clear. No   
posterior oropharyngeal erythema.  
Eyes:  
Conjunctiva/sclera: Conjunctivae   
normal.  
Cardiovascular:  
Rate and Rhythm: Normal rate and   
regular rhythm.  
Pulmonary:  
Effort: Pulmonary effort is   
normal.  
Breath sounds: Normal breath   
sounds.  
Lymphadenopathy:  
Cervical: No cervical adenopathy.  
Skin:  
General: Skin is warm and dry.  
Neurological:  
Mental Status: She is alert and   
oriented to person, place, and   
time.  
Psychiatric:  
Mood and Affect: Mood normal.  
Behavior: Behavior normal.  
Thought Content: Thought content   
normal.  
Judgment: Judgment normal.  
  
An electronic signature was used   
to authenticate this note.  
  
SHANEKA Chacon CNP  
2024  
4:24 PM  
Electronically signed by SHANEKA Chacon CNP at   
2024 4:24 PM EST  
documented in this encounter            [facility]  
   
                                        2024 Instructions   
  
  
SHANEKA Chacon CNP -   
2024 1:40 PM ESTFormatting   
of this note might be different   
from the original.  
Start wellbutrin and decrease   
lexapro to 10 mg daily x 7 days,   
then stop the lexapro.  
Check [facility] james for   
meditation  
Electronically signed by SHANEKA Chacon CNP at   
2024 2:05 PM EST  
  
documented in this encounter            [facility]  
   
                                        2024 Instructions   
  
  
SHANEKA Chacon CNP -   
2024 1:40 PM ESTFormatting   
of this note might be different   
from the original.  
Start wellbutrin and decrease   
lexapro to 10 mg daily x 7 days,   
then stop the lexapro.  
Check [employer] james for   
meditation  
Electronically signed by SHANEKA Chacon CNP at   
2024 2:05 PM EST  
  
documented in this encounter            [facility]  
   
                                        2024 Note     Addended by: MONICA MCADAMS   
on: 2024 10:57 AM  
  
Modules accepted: Level of   
Service  
  
  
Electronically signed by SHANEKA Chacon CNP at   
2024 10:57 AM EST  
                                        [facility]  
   
                                        2024 Note     Addended by: MONICA MCADAMS   
on: 2024 10:57 AM  
  
Modules accepted: Level of   
Service                                 [facility]  
   
                                                    2023 Evaluation + Plan  
   
note                                    Associated Problem(s): Anxiety   
and depression  
Formatting of this note might be   
different from the original.  
Denies any suicidal or homicidal   
ideation. Reports improved   
symptoms. We will continue on   
Lexapro 10 mg daily she is to   
provide an update through [facility]   
in approximately 4 weeks.   
Consider up titration if needed   
at that point.  
Electronically signed by SHANEKA Chacon CNP at   
2023 1:22 PM EST  
                                        [facility]  
   
                                                    2023 Miscellaneous   
Notes                                   Associated Problem(s): Anxiety   
and depression  
Formatting of this note might be   
different from the original.  
Denies any suicidal or homicidal   
ideation. Reports improved   
symptoms. We will continue on   
Lexapro 10 mg daily she is to   
provide an update through [facility]   
in approximately 4 weeks.   
Consider up titration if needed   
at that point.  
Electronically signed by SHANEKA Chacon CNP at   
2023 1:22 PM EST  
documented in this encounter            [facility]  
   
                                                    2023 History of   
Present illness Narrative               Formatting of this note might be   
different from the original.  
Patient was identified by name   
and Date of Birth.  
  
Health Main:  
  
Lipid-pended  
HIV/Hep C-declined  
Covid-declined  
PHQ-completed  
Pap-[facility] (will scan in)  
Tdap-declined  
Influenza-declined  
  
  
Electronically signed by Mandi D'Amico at 2023 1:22 PM EST  
Formatting of this note is   
different from the original.  
Images from the original note   
were not included.  
  
  
2023  
  
Don Mittal (: 2000) is   
a 23 y.o. female , Established   
patient, here for evaluation of   
the following chief complaint(s):  
Follow-up and Health Maintenance   
(Lipid-pended/HIV/Hep   
C-declined/Covid-declined/PHQ-com  
pleted/Pap-[facility] (will scan   
in)/Tdap-declined/Influenza-decli  
edin/ )  
  
ASSESSMENT/PLAN:  
  
1. Annual physical exam  
- Comprehensive metabolic panel  
- CBC  
- Lipid panel  
2. Screening for deficiency   
anemia  
- CBC  
3. Screening for cholesterol   
level  
- Lipid panel  
4. Anxiety and depression  
Assessment & Plan:  
Denies any suicidal or homicidal   
ideation. Reports improved   
symptoms. We will continue on   
Lexapro 10 mg daily she is to   
provide an update through [facility]   
in approximately 4 weeks.   
Consider up titration if needed   
at that point.  
  
Follow up in about 3 months   
(around 2024).  
  
SUBJECTIVE/OBJECTIVE:  
  
HPI -  
Don Mittal (: 2000) is   
a 23 y.o. female , Established   
patient, here for the evaluation   
of the following chief   
complaint(s):  
Follow-up and Health Maintenance   
(Lipid-pended/HIV/Hep   
C-declined/Covid-declined/PHQ-com  
pleted/Pap-[facility] (will scan   
in)/Tdap-declined/Influenza-decli  
edin/ )  
Recently newly established   
patient to us about 2 weeks ago   
who had presented for an acute   
complaint of anxiety and   
depression. She was started on   
Lexapro 10 mg at that time and   
recommended to get set up with a   
counselor. No other acute   
complaints today.  
Anxiety/dep- feels like   
medication is helping some. Is   
able to think clearer. Is   
processing thoughts better.   
States that she has not broke   
down and cried like she was   
before. Denies any suicidal or   
homicidal ideation.  
She has yet to set up with a   
counselor.  
Has OB/GYN which she follows for   
women's health.  
  
Prior to Admission medications  
Medication Sig Start Date End   
Date Taking? Authorizing Provider  
escitalopram (Lexapro) 10 MG   
tablet Take 1 tablet (10 mg) by   
mouth daily. 23 Yes   
Monica Sanchez, SHANEKA - CNP  
Prenatal MV-Min-Fe Fum-FA-DHA   
(PRENATAL 1 PO) Take by mouth.   
Yes Historical Provider, MD  
  
  
  
Review of Systems  
Constitutional: Negative.  
HENT: Negative.  
Respiratory: Negative.  
Cardiovascular: Negative.  
Gastrointestinal: Negative.  
Genitourinary: Negative.  
Musculoskeletal: Negative.  
Neurological: Negative.  
Psychiatric/Behavioral: Positive   
for decreased concentration and   
sleep disturbance (still not   
sleeping well.). Negative for   
self-injury and suicidal ideas.   
The patient is nervous/anxious.  
  
Vitals:  
23 0929  
BP: 116/77  
Pulse: 98  
Resp: 18  
Temp: 36.4 C (97.6 F)  
TempSrc: Infrared  
SpO2: 99%  
Weight: 151 lb (68.5 kg)  
  
Physical Exam  
Constitutional:  
General: She is not in acute   
distress.  
Appearance: Normal appearance.   
She is not ill-appearing.  
HENT:  
Head: Normocephalic and   
atraumatic.  
Right Ear: Tympanic membrane   
normal.  
Left Ear: Tympanic membrane   
normal.  
Mouth/Throat:  
Mouth: Mucous membranes are   
moist.  
Pharynx: Oropharynx is clear. No   
posterior oropharyngeal erythema.  
Eyes:  
Conjunctiva/sclera: Conjunctivae   
normal.  
Cardiovascular:  
Rate and Rhythm: Normal rate and   
regular rhythm.  
Pulses: Normal pulses.  
Heart sounds: Normal heart   
sounds.  
Musculoskeletal:  
Right lower leg: No edema.  
Left lower leg: No edema.  
Skin:  
General: Skin is warm and dry.  
Neurological:  
Mental Status: She is alert and   
oriented to person, place, and   
time.  
Psychiatric:  
Mood and Affect: Mood normal.  
Behavior: Behavior normal.  
Thought Content: Thought content   
normal.  
Judgment: Judgment normal.  
  
An electronic signature was used   
to authenticate this note.  
  
SHANEKA Chacon CNP  
2023  
1:22 PM  
Electronically signed by SHANEKA Chacon CNP at   
2023 1:22 PM EST  
documented in this encounter            [facility]  
   
                                        2023 Instructions   
  
  
SHANEKA Chacon CNP -   
2023 9:40 AM ESTFormatting   
of this note might be different   
from the original.  
Melatonin 1- 5 mg nightly to help   
with sleep.  
  
Send update via [employer] to   
provider in 1 month on how you   
are doing on the lexapro 10 mg   
daily.  
Electronically signed by SHANEKA Chacon CNP at   
2023 10:00 AM EST  
  
documented in this encounter            [facility]  
   
                                        2023 Note     HNO ID: [id number]  
Author: Awilda Bell MD  
Service: ?  
Author Type: Physician  
Type: Progress Notes  
Filed: 2023 3:09 PM  
Note Text:  
Chaperone offered: Patient   
declinesJoe Gloria is a 23 year old    
who presents for an annual   
gynecologic  
exam without complaints. Does   
have irregular bleeding with   
pills.   
does sprint car racing. Has three   
step children. Thinking about   
kids.  
Menses: cycles every 28 days and   
5-8 days but does have a lot of   
irregular  
bleeding for 3-4 days per month-   
not as heavy. Does not miss pills   
or take  
them late.  
Contraception: combined hormonal   
contraceptives  
HPV vaccine: Yes  
Last Pap: 2022 normal  
HPV: N/A  
History of abnormal pap: No  
Last mammogram: never  
Sexually active: Yes  
History of STDS: None  
Patient concerns for STD   
exposure: No.  
Pain with intercourse: No  
Postcoital bleeding: No  
Exercise:active walking  
Diet: balanced  
OB History  
 T0  L0  
SAB0 IAB0 Ectopic0 Multiple0 Live   
Births0  
Comment: 3 stepchildren  
Gyn History  
LMP: 2022, Having periods  
Age at Menarche:  
Age at First Pregnancy:  
Age at Menopause:  
Gyn History Comments:  
Sexual Activity: Yes; Male;   
sexually active with   
Contraception: Pill  
PAST MEDICAL HISTORY  
Diagnosis Date  
Exertional asthma 2012  
Medial meniscus tear 2012  
Unspecified otitis media  
Recurrent otitis  
Vocal cord dysfunction 5/15/2012  
PAST SURGICAL HISTORY  
Procedure Laterality Date  
ANKLE ARTHROSCOPY Right 7/5/16  
Dr. Yeung  
FAMILY HISTORY  
Problem Relation Age of Onset  
other (Other [Other]) Paternal   
Grandfather  
leukemia  
Cancer Maternal Grandmother  
breast  
Asthma Sister  
SOCIAL HISTORY  
Social History  
Tobacco Use  
Smoking status: Never  
Smokeless tobacco: Never  
Vaping Use  
Vaping Use: Never used  
Substance Use Topics  
Alcohol use: Yes  
Drug use: No  
REVIEW OF SYSTEMS  
Abdomen: No abdominal pain,   
nausea, vomiting, diarrhea, or   
constipation.  
No bloating, early satiety,   
indigestion, or increased   
flatulence.  
Bladder: No dysuria, gross   
hematuria, urinary frequency,   
urinary urgency,  
or incontinence.  
Breast: No breast lumps, nipple   
d/c, overlying skin changes,   
redness or  
skin retraction.  
Allergies and current medication   
updated:Yes  
EXAM: /68   Ht 5' 4    
(1.63m)   Wt 149 lb (67.6kg)     
LMP 2023    
BMI 25.56 kg/(m2).  
GENERAL: pleasant,    
female in no apparent distress  
HEENT: Normocephalic, atraumatic,   
mucus membranes moist, and no   
lesions  
NECK: Supple, full range of   
motion, no adenopathy, and   
thyroid normal  
DERMATOLOGY: Normal, without   
lesions, non-icteric, and   
non-hirsute  
BREAST: soft, non-tender,   
symmetric, no dominant mass,   
normal  
nipple-areolar complex, no   
lymphadenopathy, and no nipple   
discharge  
ABDOMEN: soft, non-tender, and no   
masses  
PELVIC: external genitalia   
normal, normal Bartholin's   
glands, urethra,  
Nenana's glands, no vulvar   
lesions, no cervical lesions,   
good vaginal  
support, physiologic discharge   
present, normal appearing   
perineal body and  
perianal region  
BIMANUAL: uterus normal size,   
shape and consistency, no adnexal   
masses,  
and non-tender  
RECTOVAGINAL: deferred.  
NEURO: alert and oriented x3,exam   
grossly non-focal  
EXTREMITIES: normal  
ASSESSMENT/PLAN:  
1) Health maintenance:  
Pap/HPV up to date.  
Mammogram starting age 40.  
Nutrition, exercise and routine   
health maintenance exams   
reviewed.  
Calcium/Vitamin D supplementation   
information provided.  
2) Contraception: combined   
hormonal contraceptives.   
Contraceptive  
options reviewed and information   
provided. Changed to singh  
3) STD screening: Declined STD   
check.  
4) Follow up one year or sooner   
as needed  
Awilda Schulz MD             [facility]  
   
                                                    2023 History of   
Present illness Narrative               Formatting of this note is   
different from the original.  
Chaperone offered: Patient   
declines.  
  
Don is a 23 year old    
who presents for an annual   
gynecologic exam without   
complaints. Does have irregular   
bleeding with pills.  does   
sprint car racing. Has three step   
children. Thinking about kids.  
  
Menses: cycles every 28 days and   
5-8 days but does have a lot of   
irregular bleeding for 3-4 days   
per month- not as heavy. Does not   
miss pills or take them late.  
Contraception: combined hormonal   
contraceptives  
HPV vaccine: Yes  
Last Pap: 2022 normal  
HPV: N/A  
History of abnormal pap: No  
Last mammogram: never  
Sexually active: Yes  
History of STDS: None  
Patient concerns for STD   
exposure: No.  
Pain with intercourse: No  
Postcoital bleeding: No  
Exercise:active walking  
Diet: balanced  
  
OB History  
 T0  L0  
SAB0 IAB0 Ectopic0 Multiple0 Live   
Births0  
  
Comment: 3 stepchildren  
  
Gyn History  
  
LMP: 2022, Having periods  
Age at Menarche:  
Age at First Pregnancy:  
Age at Menopause:  
Gyn History Comments:  
Sexual Activity: Yes; Male;   
sexually active with   
Contraception: Pill  
  
  
  
PAST MEDICAL HISTORY  
Diagnosis Date  
Exertional asthma 2012  
Medial meniscus tear 2012  
Unspecified otitis media  
Recurrent otitis  
Vocal cord dysfunction 5/15/2012  
  
PAST SURGICAL HISTORY  
Procedure Laterality Date  
ANKLE ARTHROSCOPY Right 16  
Dr. Yeung  
  
FAMILY HISTORY  
Problem Relation Age of Onset  
other (Other [Other]) Paternal   
Grandfather  
leukemia  
Cancer Maternal Grandmother  
breast  
Asthma Sister  
SOCIAL HISTORY  
Social History  
  
Tobacco Use  
Smoking status: Never  
Smokeless tobacco: Never  
Vaping Use  
Vaping Use: Never used  
Substance Use Topics  
Alcohol use: Yes  
Drug use: No  
  
REVIEW OF SYSTEMS  
Abdomen: No abdominal pain,   
nausea, vomiting, diarrhea, or   
constipation. No bloating, early   
satiety, indigestion, or   
increased flatulence.  
Bladder: No dysuria, gross   
hematuria, urinary frequency,   
urinary urgency, or incontinence.  
Breast: No breast lumps, nipple   
d/c, overlying skin changes,   
redness or skin retraction.  
Allergies and current medication   
updated:Yes  
  
EXAM: /68   Ht 5' 4    
(1.63m)   Wt 149 lb (67.6kg)     
LMP 2023   BMI 25.56   
kg/(m^2).  
  
  
GENERAL: pleasant,    
female in no apparent distress  
HEENT: Normocephalic, atraumatic,   
mucus membranes moist, and no   
lesions  
NECK: Supple, full range of   
motion, no adenopathy, and   
thyroid normal  
DERMATOLOGY: Normal, without   
lesions, non-icteric, and   
non-hirsute  
BREAST: soft, non-tender,   
symmetric, no dominant mass,   
normal nipple-areolar complex, no   
lymphadenopathy, and no nipple   
discharge  
ABDOMEN: soft, non-tender, and no   
masses  
PELVIC: external genitalia   
normal, normal Bartholin's   
glands, urethra, Nenana's glands,   
no vulvar lesions, no cervical   
lesions, good vaginal support,   
physiologic discharge present,   
normal appearing perineal body   
and perianal region  
BIMANUAL: uterus normal size,   
shape and consistency, no adnexal   
masses, and non-tender  
RECTOVAGINAL: deferred.  
NEURO: alert and oriented x3,exam   
grossly non-focal  
EXTREMITIES: normal  
  
ASSESSMENT/PLAN:  
1) Health maintenance:  
Pap/HPV up to date.  
Mammogram starting age 40.  
Nutrition, exercise and routine   
health maintenance exams   
reviewed.  
Calcium/Vitamin D supplementation   
information provided.  
2) Contraception: combined   
hormonal contraceptives.   
Contraceptive options reviewed   
and information provided. Changed   
to singh  
3) STD screening: Declined STD   
check.  
4) Follow up one year or sooner   
as needed  
  
Awilda Schulz MD  
  
Electronically signed by Awilda Bell MD at   
2023 3:09 PM EDT  
documented in this encounter            [facility]  
   
                                                    2023 Miscellaneous   
Notes                                   Formatting of this note is   
different from the original.  
Patient called in requesting   
refill today. Annual scheduled   
with DM 23. No need to call   
back.  
  
Requested Prescriptions  
  
Pending Prescriptions Disp   
Refills  
Norethindrone Acet-Ethinyl Est   
(JUNEL 1/20, ,) 1-20 mg-mcg per   
tablet 28 tablet 2  
Sig: TAKE 1 TABLET BY MOUTH DAILY  
  
Shiloh Alvarado RN  
  
  
Electronically signed by Shiloh Alvarado RN at 2023 10:30 AM   
EDT  
documented in this encounter            [facility]  
   
                                                    2023 Miscellaneous   
Notes                                   Addended by: RACHEL NARAYANAN on:   
3/9/2023 03:14 PM  
  
Modules accepted: Orders  
  
  
Electronically signed by Rachel Narayanan APRN.CNM at 2023   
3:14 PM EST  
Addended by: JENNA STEPHENS LPN on: 3/9/2023 02:59 PM  
  
Modules accepted: Orders  
  
  
Electronically signed by Jenna Stephens LPN at 2023 2:59 PM   
EST  
Formatting of this note might be   
different from the original.  
Please see pended order below in   
DM absence. Pt is needing this   
today and it went to the wrong   
pharmacy. Call only if problems.   
Jenna Stephens LPN  
  
Electronically signed by Jenna Stephens LPN at 2023 2:59 PM   
EST  
Formatting of this note might be   
different from the original.  
ordered  
Electronically signed by Awilda Leonard MD at   
2023 2:54 PM EST  
Formatting of this note might be   
different from the original.  
Patient needing Rx before [facility]   
returns on Friday.  
Chitra Tijerina RN  
  
Electronically signed by Chitra Tijerina RN at 2023 1:53 PM   
EST  
Formatting of this note might be   
different from the original.  
RX pending. Patient last seen for   
annual 22  
Electronically signed by America Beck RN at 2023 1:15 PM   
EST  
documented in this encounter            [facility]  
   
                                                    2023 Miscellaneous   
Notes                                   Formatting of this note might be   
different from the original.  
Left detailed message on   
patient's voicemail  
Electronically signed by Ronel Diaz LPN at 2023 2:33   
PM EST  
Formatting of this note might be   
different from the original.  
Please let her know that I   
apologize. I thought the order   
was placed. Sorry for her   
inconvenience. Tomorrow morning   
would be fine for her to get her   
blood drawn.  
  
Lauryn Ryan MD  
  
Electronically signed by Lauryn Ryan MD at 2023 2:21 PM   
EST  
Formatting of this note might be   
different from the original.  
Patient was to have her 21 day   
progesterone drawn today. Patient   
went to the lab at 7 AM and there   
was no order. Patient voiced her   
frustration and lack of   
communication. If patient   
chooses, can she have it drawn   
tomorrow? Otherwise she's aware   
that she will have to wait until   
her next cycle. Order pending.  
America Beck RN  
  
Electronically signed by America Beck RN at 2023 2:14 PM   
EST  
documented in this encounter            [facility]  
   
                                                    2023 Miscellaneous   
Notes                                   Formatting of this note might be   
different from the original.  
Please call & explain that day 21   
would be  and   
today is day 1.  
Thanks!  
  
Lauryn Ryan MD  
  
Electronically signed by Lauryn Ryan MD at 2023 10:50 AM   
EST  
documented in this encounter            [facility]  
   
                                                    2022 Miscellaneous   
Notes                                   Formatting of this note might be   
different from the original.  
Menses can take 7-10 days to   
start after finishing provera.  
  
Lauryn Ryan MD  
  
Electronically signed by Lauryn Ryan MD at 2022 10:28 AM   
EST  
Formatting of this note might be   
different from the original.  
Rx provera given  
  
Lauryn Ryan MD  
  
Electronically signed by Lauryn Ryan MD at 2022 10:28 AM   
EST  
documented in this encounter            [facility]  
   
                                                    2022 Miscellaneous   
Notes                                   Formatting of this note might be   
different from the original.  
Left message for patient to call   
office or check [employer] message.  
Shiloh Alvarado RN  
  
Electronically signed by Shiloh Alvarado RN at 2022 11:20 AM   
EST  
Formatting of this note might be   
different from the original.  
Images from the original note   
were not included.  
Left message for patient to call   
office.  
MD Shiloh Baum RN, RN  
Please call patient & make sure   
she is taking a folic acid   
supplement as desires pregnancy.   
Thanks!  
  
[facility]  
Electronically signed by Sihloh Alvarado RN at 2022 3:30 PM   
EST  
documented in this encounter            [facility]  
   
                                                    2022 History of   
Present illness Narrative               Formatting of this note is   
different from the original.  
Don Mittal is a 22 year old   
female who presents for problem   
visit (virtually).  
  
HPI: Patient has questions about   
her positive pregnancy test and   
missed menses. Also she would   
like to get pregnant and has   
questions about that as well.  
  
OB History  
 T0  L0  
SAB0 IAB0 Ectopic0 Multiple0 Live   
Births0  
  
Comment: 3 stepchildren  
  
Gyn History  
  
LMP: 2022, Having periods  
Age at Menarche:  
Age at First Pregnancy:  
Age at Menopause:  
Gyn History Comments:  
Sexual Activity: Yes; Male;   
sexually active with   
Contraception: Pill  
  
  
  
PAST MEDICAL HISTORY  
Diagnosis Date  
Exertional asthma 2012  
Medial meniscus tear 2012  
Unspecified otitis media  
Recurrent otitis  
Vocal cord dysfunction 5/15/2012  
  
PAST SURGICAL HISTORY  
Procedure Laterality Date  
ANKLE ARTHROSCOPY Right 16  
Dr. Yeung  
  
FAMILY HISTORY  
Problem Relation Age of Onset  
other (Other [Other]) Paternal   
Grandfather  
leukemia  
Cancer Maternal Grandmother  
breast  
Asthma Sister  
  
Social History  
  
Tobacco Use  
Smoking status: Never  
Smokeless tobacco: Never  
Vaping Use  
Vaping Use: Never used  
Substance Use Topics  
Alcohol use: Yes  
Drug use: No  
  
Current Outpatient Medications  
Medication Sig  
Norethindrone Acet-Ethinyl Est   
(JUNEL 1/20, ,) 1-20 mg-mcg per   
tablet TAKE 1 TABLET BY MOUTH   
DAILY  
albuterol HFA (PROVENTIL HFA,   
VENTOLIN HFA) 90 mcg/actuation   
inhaler Inhale 2 Puffs as   
instructed every 4 hours as   
needed.  
  
No current facility-administered   
medications for this visit.  
  
Allergies As of Date: 2022  
Allergen Noted Reaction  
GRASS POLLEN 2013 Unknown  
SEASONAL ALLERGIES 2013   
Unknown  
TREES 2013 Unknown  
  
Fully Assessed 08/10/2022  
  
Allergies and current medication   
updated:Yes  
  
EXAM: LMP 2022  
  
  
GENERAL: pleasant,    
female in no apparent distress  
  
ASSESSMENT AND PLAN:  
21yo female with PCOS & missed   
menses  
Discussed likely false positive   
home pregnancy test vs blighted   
ovum. Serum hcg confirms patient   
is no pregnant.  
Missed menses - discussed R/B/A   
and will proceed with provera to   
induce menses if no menses by   
.  
PCOS - patient reports that home   
OPK don't show ovulation. Will   
check day 21 progesterone.  
All questions answered & patient   
agrees with plan.  
  
I spent a total of 25 minutes on   
the date of the service which   
included preparing to see the   
patient, face-to-face patient   
care, completing clinical   
documentation, and counseling and   
educating the   
patient/family/caregiver.  
  
Lauryn Ryan MD  
  
  
Electronically signed by Lauryn Ryan MD at 2022 2:56 PM   
EST  
documented in this encounter            [facility]  
   
                                                    2022 Miscellaneous   
Notes                                   Formatting of this note might be   
different from the original.  
Patient notified and voiced   
understanding of below. Virtual   
visit appointment scheduled per   
request.   
  
Chitra Tijerina RN  
  
Electronically signed by Chitra Tijerina RN at 2022 2:31 PM   
EST  
Formatting of this note might be   
different from the original.  
Based on the dates of her tests I   
suspect that she had a false home   
pregnancy test but can't exclude   
a blighted ovum. If she misses   
another menses I could give her   
provera to induce menses. If   
she'd like to discuss things more   
please offer her a virtual or in   
person visit.  
Has she stopped taking the ocps?  
Please remind her to take a folic   
acid supplement daily when trying   
to conceive.  
  
Lauryn Ryan MD  
  
Electronically signed by Lauryn yRan MD at 2022 2:21 PM   
EST  
Formatting of this note might be   
different from the original.  
LMP 10/26/22  
She had positive UPT  at   
home and then negative hcg quant   
on . Concerned because she   
still hasn't started menses. Not   
having any symptoms like she is   
going to start soon. She has been   
having regular menses in July,   
August and September. She was   
previously diagnosed with PCOS   
earlier this year and is aware   
that can cause irregular menses.   
She has been trying to conceive   
x2 months. Asking what else she   
can do to help regulate menses to   
continue to try to conceive.   
Aware [facility] back in office tomorrow.   
Please advise.  
Shiloh Alvarado RN  
  
Electronically signed by Shiloh Alvarado RN at 2022 11:11 AM   
EST  
documented in this encounter            [facility]  
   
                                                    2022 Miscellaneous   
Notes                                   Formatting of this note might be   
different from the original.  
Patient notified and appt made   
for tomorrow at her request.   
Leave phone note open for quant   
result  
Electronically signed by Fariba Mojica RN at 2022 1:10 PM EST  
Formatting of this note might be   
different from the original.  
Hcg quant ordered  
  
Lauryn Ryan MD  
  
Electronically signed by Lauryn Ryan MD at 2022 12:12 PM   
EST  
Formatting of this note might be   
different from the original.  
Pt calling and stated that she   
got a positive pregnancy test on   
10/19/22. Pt has her PNOB and NOB   
appointment scheduled. She took   
another test this morning and and   
received a negative test. Pt   
stating she also go a negative   
results yesterday as well. Pt   
wanting to know if she can have a   
serum quant to verify. LMP   
10/26/22. Please advise. Jenna Stephens LPN  
  
Electronically signed by Jenna Stephens LPN at 2022 11:28   
AM EST  
documented in this encounter            [facility]  
   
                                                    2022 Miscellaneous   
Notes                                     
  
  
Formatting of this note might be   
different from the original.  
See phone note.  
Shiloh Alvarado RN  
  
  
  
Electronically signed by Shiloh Alvarado RN at 2022 8:50 AM   
EDT  
  
  
Formatting of this note might be   
different from the original.  
This is [facility] patient- I read   
ultrasound. Please forward to   
her.  
  
  
Electronically signed by Awilda Leonard MD at   
2022 8:45 AM EDTdocumented   
in this encounter                       [facility]  
   
                                                    2022 History of   
Present illness Narrative                 
  
  
Formatting of this note is   
different from the original.  
Don is a 22 year old who   
presents for an annual   
gynecologic exam.  
  
Menses are typically regular. She   
had some breakthrough bleeding so   
she stopped the ocps last month.   
The episode of irregular bleeding   
was heavy. She then restarted the   
ocps. Patient has changed jobs   
(last year) & got  in   
March. She takes her ocps   
regularly. Patient states home   
hcg tests are negative.  
  
Menses:regular other than above  
Contraception: combined hormonal   
contraceptives  
HPV vaccine: Yes  
Last Pap: 2019 normal  
HPV: N/A  
History of abnormal pap: No  
Last mammogram: never  
  
OB History  
No obstetric history on file.  
  
Gyn History  
LMP: 2022, Having periods  
Age at Menarche:  
Age at First Pregnancy:  
Age at Menopause:  
Gyn History Comments:  
Sexual Activity: Yes; Male;   
sexually active with   
Contraception: Pill  
  
  
PAST MEDICAL HISTORY  
Diagnosis Date  
Exertional asthma 2012  
Medial meniscus tear 2012  
Unspecified otitis media  
Recurrent otitis  
Vocal cord dysfunction 5/15/2012  
  
PAST SURGICAL HISTORY  
Procedure Laterality Date  
ANKLE ARTHROSCOPY Right 16  
Dr. Yeung  
  
FAMILY HISTORY  
Problem Relation Age of Onset  
other (Other [Other]) Paternal   
Grandfather  
leukemia  
Cancer Maternal Grandmother  
breast  
Asthma Sister  
SOCIAL HISTORY  
Social History  
  
Tobacco Use  
Smoking status: Never Smoker  
Smokeless tobacco: Never Used  
Vaping Use  
Vaping Use: Never used  
Substance Use Topics  
Alcohol use: Yes  
Drug use: No  
  
REVIEW OF SYSTEMS  
Abdomen: No abdominal pain,   
nausea, vomiting, diarrhea, or   
constipation. No bloating, early   
satiety, indigestion, or   
increased flatulence.  
Bladder: No dysuria, gross   
hematuria, urinary frequency,   
urinary urgency, or incontinence.  
Breast: No breast lumps, nipple   
d/c, overlying skin changes,   
redness or skin retraction.  
Allergies and current medication   
updated:Yes  
  
EXAM: /64   Ht 5' 4.173    
(1.63m)   Wt 149 lb (67.6kg)     
LMP 2022   BMI 25.44   
kg/(m^2).  
  
  
GENERAL: pleasant,    
female in no apparent distress  
BREAST: soft, non-tender,   
symmetric, no dominant mass,   
normal nipple-areolar complex, no   
lymphadenopathy and no nipple   
discharge  
CHEST: Normal inspiratory effort  
ABDOMEN: soft, non-tender and no   
masses  
PELVIC: external genitalia   
normal, normal Bartholin's   
glands, urethra, Nenana's glands,   
no vulvar lesions, no cervical   
lesions, good vaginal support,   
physiologic discharge present,   
normal appearing perineal body   
and perianal region  
BIMANUAL: uterus normal size,   
shape and consistency, no adnexal   
masses and non-tender  
RECTOVAGINAL: deferred.  
NEURO: alert and oriented x3,exam   
grossly non-focal  
EXTREMITIES: normal  
  
ASSESSMENT/PLAN:  
1) Health maintenance:  
Pap done with HPV.  
Nutrition, exercise and routine   
health maintenance exams   
reviewed.  
2) Contraception: combined   
hormonal contraceptives.   
Contraceptive options reviewed   
and information provided.  
3) STD screening: Declined STD   
check.  
4) Follow up one year or sooner   
as needed  
5) Breakthrough bleeding - likely   
from stress & then stopping ocps.   
Check labs & pelvic US.  
  
Lauryn Ryan MD  
  
  
Electronically signed by Lauryn Ryan MD at 2022 8:54 AM   
EDTdocumented in this encounter         [facility]  
  
  
  
                                                    2013 History of Past i  
llness Narrative   
  
  
  
                                Problem         Noted Date      Resolved Date  
   
                                Posterior tibial tendonitis 2013  
   
                                Achilles tendinitis 2013  
  
documented as of this encounter (statuses as of 2022)  
[facility]- History of Past illness Narrative*   
  
                                Problem         Noted Date      Resolved Date  
   
                                Posterior tibial tendonitis 2013  
   
                                Achilles tendinitis 2013  
  
documented as of this encounter (statuses as of 2022)  
[facility] History of Past illness Narrative*   
  
                                Problem         Noted Date      Resolved Date  
   
                                Posterior tibial tendonitis 2013  
   
                                Achilles tendinitis 2013  
  
documented as of this encounter (statuses as of 2022)  
[facility] History of Past illness Narrative*   
  
                                Problem         Noted Date      Resolved Date  
   
                                Posterior tibial tendonitis 2013  
   
                                Achilles tendinitis 2013  
  
documented as of this encounter (statuses as of 2022)  
[facility] History of Past illness Narrative*   
  
                                Problem         Noted Date      Resolved Date  
   
                                Posterior tibial tendonitis 2013  
   
                                Achilles tendinitis 2013  
  
documented as of this encounter (statuses as of 2022)  
[facility] History of Past illness Narrative*   
  
                                Problem         Noted Date      Resolved Date  
   
                                Posterior tibial tendonitis 2013  
   
                                Achilles tendinitis 2013  
  
documented as of this encounter (statuses as of 2022)  
[facility] History of Past illness Narrative*   
  
                                Problem         Noted Date      Resolved Date  
   
                                Posterior tibial tendonitis 2013  
   
                                Achilles tendinitis 2013  
  
documented as of this encounter (statuses as of 2022)  
[facility] History of Past illness Narrative*   
  
                                Problem         Noted Date      Resolved Date  
   
                                Posterior tibial tendonitis 2013  
   
                                Achilles tendinitis 2013  
  
documented as of this encounter (statuses as of 2023)  
[facility] History of Past illness Narrative*   
  
                                Problem         Noted Date      Resolved Date  
   
                                Posterior tibial tendonitis 2013  
   
                                Achilles tendinitis 2013  
  
documented as of this encounter (statuses as of 2023)  
[facility] History of Past illness Narrative*   
  
                                Problem         Noted Date      Resolved Date  
   
                                Posterior tibial tendonitis 2013  
   
                                Achilles tendinitis 2013  
  
documented as of this encounter (statuses as of 2023)  
[facility] History of Past illness Narrative*   
  
                                Problem         Noted Date      Resolved Date  
   
                                Posterior tibial tendonitis 2013  
   
                                Achilles tendinitis 2013  
  
documented as of this encounter (statuses as of 2023)  
[facility] History of Past illness Narrative*   
  
                                Problem         Noted Date      Resolved Date  
   
                                Posterior tibial tendonitis 2013  
   
                                Achilles tendinitis 2013  
  
documented as of this encounter (statuses as of 2023)  
[facility] History of Past illness Narrative*   
  
                          Problem      Noted Date   Diagnosed Date Resolved Date  
   
                          Posterior tibial tendonitis 2013  
   
                          Achilles tendinitis 2013  
15  
  
documented as of this encounter (statuses as of 2023)  
[facility] History of Past illness Narrative*   
  
                          Problem      Noted Date   Diagnosed Date Resolved Date  
   
                          Posterior tibial tendonitis 2013  
   
                          Achilles tendinitis 2013  
15  
  
documented as of this encounter (statuses as of 2024)  
[facility] History of Past illness Narrative*   
  
                          Problem      Noted Date   Diagnosed Date Resolved Date  
   
                          Posterior tibial tendonitis 2013  
   
                          Achilles tendinitis 2013  
15  
  
documented as of this encounter (statuses as of 2024)  
[facility]   
  
                                                    Diagnosis  
   
                                                      
  
  
Encounter for gynecological examination without abnormal finding- Primary  
  
  
Routine gynecological examination  
   
                                                      
  
  
Encounter for screening for malignant neoplasm of cervix  
  
  
Screening for malignant neoplasm of the cervix  
   
                                                      
  
  
Irregular intermenstrual bleeding  
  
  
Metrorrhagia  
  
documented in this encounter  
[facility]   
  
                                                    Diagnosis  
   
                                                      
  
  
Abnormal uterine bleeding (AUB)- Primary  
  
documented in this encounter  
[facility]   
  
                                                    Diagnosis  
   
                                                      
  
  
Missed menses- Primary  
  
  
Absence of menstruation  
  
documented in this encounter  
[facility]   
  
                                                    Diagnosis  
   
                                                      
  
  
PCOS (polycystic ovarian syndrome)- Primary  
  
  
Polycystic ovaries  
   
                                                      
  
  
Missed menses  
  
  
Absence of menstruation  
  
documented in this encounter  
[facility]   
  
                                                    Diagnosis  
   
                                                      
  
  
Anovulation- Primary  
  
  
Female infertility associated with anovulation  
  
documented in this encounter  
[facility] note*   
  
                                                    Diagnosis  
   
                                                      
  
  
Encounter for gynecological examination (general) (routine) without abnormal   
findings- Primary  
   
                                                      
  
  
PCOS (polycystic ovarian syndrome)  
  
  
Polycystic ovaries  
  
documented in this encounter  
[facility] note*   
  
                                                    Diagnosis  
   
                                                      
  
  
Annual physical exam- Primary  
  
  
Routine general medical examination at a health care facility  
   
                                                      
  
  
Screening for deficiency anemia  
  
  
Screening for other and unspecified deficiency anemia  
   
                                                      
  
  
Screening for cholesterol level  
   
                                                      
  
  
Anxiety and depression  
  
documented in this encounter  
[facility] note*   
  
                                                    Diagnosis  
   
                                                      
  
  
Anxiety and depression- Primary  
   
                                                      
  
  
Weight gain  
  
  
Other symptoms concerning nutrition, metabolism, and development  
  
documented in this encounter  
[facility] note*   
  
                                                    Diagnosis  
   
                                                      
  
  
Anxiety and depression- Primary  
   
                                                      
  
  
Weight gain  
  
  
Other symptoms concerning nutrition, metabolism, and development  
  
documented in this encounter  
[facility] note*   
  
                                                    Diagnosis  
   
                                                      
  
  
Anxiety and depression- Primary  
   
                                                      
  
  
PCOS (polycystic ovarian syndrome)  
  
  
Polycystic ovaries  
  
documented in this encounter  
[facility] note*   
  
                                                    Diagnosis  
   
                                                      
  
  
Anxiety and depression- Primary  
   
                                                      
  
  
PCOS (polycystic ovarian syndrome)  
  
  
Polycystic ovaries  
  
documented in this encounter  
[facility] note*   
  
                                                    Diagnosis  
   
                                                      
  
  
PCOS (polycystic ovarian syndrome)- Primary  
  
  
Polycystic ovaries  
   
                                                      
  
  
Elevated LDL cholesterol level  
  
  
Pure hypercholesterolemia  
   
                                                      
  
  
IFG (impaired fasting glucose)  
  
  
Impaired fasting glucose  
   
                                                      
  
  
Irregular menses  
  
  
Irregular menstrual cycle  
   
                                                      
  
  
Anxiety and depression  
  
  
Dysthymic disorder  
   
                                                      
  
  
History of bulimia  
  
  
Personal history of other mental disorder  
   
                                                      
  
  
History of anorexia nervosa  
  
  
Personal history of other mental disorder  
   
                                                      
  
  
Overweight with body mass index (BMI) of 29 to 29.9 in adult  
  
documented in this encounter  
[facility] note*   
  
                                                    Diagnosis  
   
                                                      
  
  
PCOS (polycystic ovarian syndrome)- Primary  
  
  
Polycystic ovaries  
   
                                                      
  
  
Irregular menses  
  
  
Irregular menstrual cycle  
   
                                                      
  
  
Anxiety and depression  
  
  
Dysthymic disorder  
   
                                                      
  
  
History of bulimia  
  
  
Personal history of other mental disorder  
   
                                                      
  
  
History of anorexia nervosa  
  
  
Personal history of other mental disorder  
   
                                                      
  
  
Provided repeat prescription for oral contraceptive  
   
                                                      
  
  
Overweight with body mass index (BMI) of 29 to 29.9 in adult  
  
documented in this encounter  
[facility] note*   
  
                                                    Diagnosis  
   
                                                      
  
  
Anxiety and depression- Primary  
   
                                                      
  
  
Skin sensation disturbance  
  
  
Disturbance of skin sensation  
  
documented in this encounter  
[facility] note*   
  
                                                    Diagnosis  
   
                                                      
  
  
Contact dermatitis, unspecified contact dermatitis type, unspecified trigger- 
Primary  
  
documented in this encounter  
[facility]  
Work Phone: [phone] for referral (narrative)* Diagnostic Procedure 
  Only (Routine) - Authorized  
  
                          Specialty    Diagnoses / Procedures Referred By Contac  
t Referred To Contact  
   
                                                    [facility]   
INSTITUTE                                 
  
  
Diagnoses  
  
  
Irregular intermenstrual   
bleeding  
  
  
  
Procedures  
  
  
PELVIC US WHI  
  
  
US PELVIC NONOBSTETRIC   
REAL-TIME IMAGE COMPLETE                  
  
  
Lauryn Ryan MD  
  
  
[address]  
  
  
[place] [address]  
  
  
Phone: [phone]  
  
  
Fax: [phone]                         
  
  
[facility]  
  
  
[address]  
  
  
[place] [address]  
  
  
  
                          Referral ID  Status       Reason       Start   
Date                                    Expiration   
Date                                    Visits   
Requested                               Visits   
Authorized  
   
                                [id number]        Authorized        
  
  
[employer]  
ed Referral     2022       1               1  
  
  
  
  
Electronically signed by Lauryn Ryan MD at 2022 7:46 AM EDT  
  
  
[facility]  
  
Summary Purpose  
  
  
                                                      
  
  
  
Family History  
No Family History Records FoundNo Family History Records Found  
  
Advance Directives  
No Advanced Directives Records FoundNo Advanced Directives Records Found  
  
Additional Source Comments  
  
  
  
                                                    Source Comments (unrecognize  
d section and content)  
   
                                                    In the event this informatio  
n is protected by the Federal Confidentiality of   
Alcohol   
and Drug Abuse Patient Records regulations: This information has been disclosed 
to   
you from records protected by Federal confidentiality rules (42 CFR Part 2). The
   
Federal rules prohibit you from making any further disclosure of this 
information   
unless further disclosure is expressly permitted by the written consent of the 
person   
to whom it pertains or as otherwise permitted by 42 CFR Part 2. A general   
authorization for the release of medical or other information is NOT sufficient 
for   
this purpose. The Federal rules restrict any use of the information to 
criminally   
investigate or prosecute any alcohol or drug abuse patient.[facility] HospitalIn 
the   
event this information is protected by the Federal Confidentiality of Alcohol 
and   
Drug Abuse Patient Records regulations: This information has been disclosed to 
you   
from records protected by Federal confidentiality rules (42 CFR Part 2). The 
Federal   
rules prohibit you from making any further disclosure of this information unless
   
further disclosure is expressly permitted by the written consent of the person 
to   
whom it pertains or as otherwise permitted by 42 CFR Part 2. A general 
authorization   
for the release of medical or other information is NOT sufficient for this 
purpose.   
The Federal rules restrict any use of the information to criminally investigate 
or   
prosecute any alcohol or drug abuse patient.[facility] HospitalIn the event this   
information is protected by the Federal Confidentiality of Alcohol and Drug 
Abuse   
Patient Records regulations: This information has been disclosed to you from 
records   
protected by Federal confidentiality rules (42 CFR Part 2). The Federal rules   
prohibit you from making any further disclosure of this information unless 
further   
disclosure is expressly permitted by the written consent of the person to whom 
it   
pertains or as otherwise permitted by 42 CFR Part 2. A general authorization for
 the   
release of medical or other information is NOT sufficient for this purpose. The   
Federal rules restrict any use of the information to criminally investigate or   
prosecute any alcohol or drug abuse patient.[facility] HospitalIn the event this   
information is protected by the Federal Confidentiality of Alcohol and Drug 
Abuse   
Patient Records regulations: This information has been disclosed to you from 
records   
protected by Federal confidentiality rules (42 CFR Part 2). The Federal rules   
prohibit you from making any further disclosure of this information unless 
further   
disclosure is expressly permitted by the written consent of the person to whom 
it   
pertains or as otherwise permitted by 42 CFR Part 2. A general authorization for
 the   
release of medical or other information is NOT sufficient for this purpose. The   
Federal rules restrict any use of the information to criminally investigate or   
prosecute any alcohol or drug abuse patient.[facility] HospitalIn the event this   
information is protected by the Federal Confidentiality of Alcohol and Drug 
Abuse   
Patient Records regulations: This information has been disclosed to you from 
records   
protected by Federal confidentiality rules (42 CFR Part 2). The Federal rules   
prohibit you from making any further disclosure of this information unless 
further   
disclosure is expressly permitted by the written consent of the person to whom 
it   
pertains or as otherwise permitted by 42 CFR Part 2. A general authorization for
 the   
release of medical or other information is NOT sufficient for this purpose. The   
Federal rules restrict any use of the information to criminally investigate or   
prosecute any alcohol or drug abuse patient.[facility] HospitalIn the event this   
information is protected by the Federal Confidentiality of Alcohol and Drug 
Abuse   
Patient Records regulations: This information has been disclosed to you from 
records   
protected by Federal confidentiality rules (42 CFR Part 2). The Federal rules   
prohibit you from making any further disclosure of this information unless 
further   
disclosure is expressly permitted by the written consent of the person to whom 
it   
pertains or as otherwise permitted by 42 CFR Part 2. A general authorization for
 the   
release of medical or other information is NOT sufficient for this purpose. The   
Federal rules restrict any use of the information to criminally investigate or   
prosecute any alcohol or drug abuse patient.[facility] HospitalIn the event this   
information is protected by the Federal Confidentiality of Alcohol and Drug 
Abuse   
Patient Records regulations: This information has been disclosed to you from 
records   
protected by Federal confidentiality rules (42 CFR Part 2). The Federal rules   
prohibit you from making any further disclosure of this information unless 
further   
disclosure is expressly permitted by the written consent of the person to whom 
it   
pertains or as otherwise permitted by 42 CFR Part 2. A general authorization for
 the   
release of medical or other information is NOT sufficient for this purpose. The   
Federal rules restrict any use of the information to criminally investigate or   
prosecute any alcohol or drug abuse patient.[facility] HospitalIn the event this   
information is protected by the Federal Confidentiality of Alcohol and Drug 
Abuse   
Patient Records regulations: This information has been disclosed to you from 
records   
protected by Federal confidentiality rules (42 CFR Part 2). The Federal rules   
prohibit you from making any further disclosure of this information unless 
further   
disclosure is expressly permitted by the written consent of the person to whom 
it   
pertains or as otherwise permitted by 42 CFR Part 2. A general authorization for
 the   
release of medical or other information is NOT sufficient for this purpose. The   
Federal rules restrict any use of the information to criminally investigate or   
prosecute any alcohol or drug abuse patient.[facility] HospitalIn the event this   
information is protected by the Federal Confidentiality of Alcohol and Drug 
Abuse   
Patient Records regulations: This information has been disclosed to you from 
records   
protected by Federal confidentiality rules (42 CFR Part 2). The Federal rules   
prohibit you from making any further disclosure of this information unless 
further   
disclosure is expressly permitted by the written consent of the person to whom 
it   
pertains or as otherwise permitted by 42 CFR Part 2. A general authorization for
 the   
release of medical or other information is NOT sufficient for this purpose. The   
Federal rules restrict any use of the information to criminally investigate or   
prosecute any alcohol or drug abuse patient.[facility] HospitalIn the event this   
information is protected by the Federal Confidentiality of Alcohol and Drug 
Abuse   
Patient Records regulations: This information has been disclosed to you from 
records   
protected by Federal confidentiality rules (42 CFR Part 2). The Federal rules   
prohibit you from making any further disclosure of this information unless 
further   
disclosure is expressly permitted by the written consent of the person to whom 
it   
pertains or as otherwise permitted by 42 CFR Part 2. A general authorization for
 the   
release of medical or other information is NOT sufficient for this purpose. The   
Federal rules restrict any use of the information to criminally investigate or   
prosecute any alcohol or drug abuse patient.[facility] HospitalIn the event this   
information is protected by the Federal Confidentiality of Alcohol and Drug 
Abuse   
Patient Records regulations: This information has been disclosed to you from 
records   
protected by Federal confidentiality rules (42 CFR Part 2). The Federal rules   
prohibit you from making any further disclosure of this information unless 
further   
disclosure is expressly permitted by the written consent of the person to whom 
it   
pertains or as otherwise permitted by 42 CFR Part 2. A general authorization for
 the   
release of medical or other information is NOT sufficient for this purpose. The   
Federal rules restrict any use of the information to criminally investigate or   
prosecute any alcohol or drug abuse patient.[facility] HospitalIn the event this   
information is protected by the Federal Confidentiality of Alcohol and Drug 
Abuse   
Patient Records regulations: This information has been disclosed to you from 
records   
protected by Federal confidentiality rules (42 CFR Part 2). The Federal rules   
prohibit you from making any further disclosure of this information unless 
further   
disclosure is expressly permitted by the written consent of the person to whom 
it   
pertains or as otherwise permitted by 42 CFR Part 2. A general authorization for
 the   
release of medical or other information is NOT sufficient for this purpose. The   
Federal rules restrict any use of the information to criminally investigate or   
prosecute any alcohol or drug abuse patient.[facility] HospitalIn the event this   
information is protected by the Federal Confidentiality of Alcohol and Drug 
Abuse   
Patient Records regulations: This information has been disclosed to you from 
records   
protected by Federal confidentiality rules (42 CFR Part 2). The Federal rules   
prohibit you from making any further disclosure of this information unless 
further   
disclosure is expressly permitted by the written consent of the person to whom 
it   
pertains or as otherwise permitted by 42 CFR Part 2. A general authorization for
 the   
release of medical or other information is NOT sufficient for this purpose. The   
Federal rules restrict any use of the information to criminally investigate or   
prosecute any alcohol or drug abuse patient.[facility] HospitalIn the event this   
information is protected by the Federal Confidentiality of Alcohol and Drug 
Abuse   
Patient Records regulations: This information has been disclosed to you from 
records   
protected by Federal confidentiality rules (42 CFR Part 2). The Federal rules   
prohibit you from making any further disclosure of this information unless 
further   
disclosure is expressly permitted by the written consent of the person to whom 
it   
pertains or as otherwise permitted by 42 CFR Part 2. A general authorization for
 the   
release of medical or other information is NOT sufficient for this purpose. The   
Federal rules restrict any use of the information to criminally investigate or   
prosecute any alcohol or drug abuse patient.[facility] HospitalIn the event this   
information is protected by the Federal Confidentiality of Alcohol and Drug 
Abuse   
Patient Records regulations: This information has been disclosed to you from 
records   
protected by Federal confidentiality rules (42 CFR Part 2). The Federal rules   
prohibit you from making any further disclosure of this information unless 
further   
disclosure is expressly permitted by the written consent of the person to whom 
it   
pertains or as otherwise permitted by 42 CFR Part 2. A general authorization for
 the   
release of medical or other information is NOT sufficient for this purpose. The   
Federal rules restrict any use of the information to criminally investigate or   
prosecute any alcohol or drug abuse patient.[facility]  
  
  
  
  
                                                    Reason for Visit (unrecogniz  
ed section and content)  
   
                                          
  
  
  
                                Reason          Onset Date      Comments  
   
                                Yearly Exam     2022        
  
  
  
                                        Reason              Comments  
   
                                        GYN Ultrasound        
  
  
  
                                        Reason              Comments  
   
                                        Patient Question      
  
  
  
                                        Reason              Comments  
   
                                        Irregular Menstrual Cycle   
  
  
  
                                        Reason              Comments  
   
                                        Follow Up             
  
  
  
                                        Reason              Comments  
   
                                        Orders                
  
  
  
                                Reason          Onset Date      Comments  
   
                                Refill Request                    
   
                                Refill Request  2023        
  
  
  
                                        Reason              Comments  
   
                                        Yearly Exam           
  
  
  
                                        Reason              Comments  
   
                                        Follow-up             
   
                                        Health Maintenance  Lipid-pendedHIV/Hep   
I-jaabaxpyNsmug-cxeakajcUIQ-completedPap-CCF   
(will scan in)Tdap-declinedInfluenza-declined  
  
  
  
                                        Reason              Comments  
   
                                        Medication Check      
   
                                        Weight Gain           
  
  
  
                                        Reason              Comments  
   
                                        Medication Check      
  
  
  
                                        Reason              Comments  
   
                                        Follow Up           Labs and medication  
  
  
  
                                        Reason              Comments  
   
                                        Weight Management     
  
  
  
                                        Reason              Comments  
   
                                        Medication Check      
   
                                        Foot Problem          
  
  
  
                                        Reason              Comments  
   
                                        Rash                  
  
  
  
  
  
                                                    Care Teams (unrecognized sec  
tion and content)  
   
                                          
  
  
  
                      Team Member Relationship Specialty  Start Date End Date  
   
                                                      
  
  
Virginie Renee III, MD  
  
  
NO FORWARDING ADDRESS PCP - General                   10/24/02          
  
  
  
                      Team Member Relationship Specialty  Start Date End Date  
   
                                                      
  
  
MachelleVirginei ramos III, MD  
  
  
NO FORWARDING ADDRESS PCP - General                   10/24/02          
  
  
  
                      Team Member Relationship Specialty  Start Date End Date  
   
                                                      
  
  
Virginie Renee III, MD  
  
  
NO FORWARDING ADDRESS PCP - General                   10/24/02          
  
  
  
                      Team Member Relationship Specialty  Start Date End Date  
   
                                                      
  
  
Virginie Renee III, MD  
  
  
NO FORWARDING ADDRESS PCP - General                   10/24/02          
  
  
  
                      Team Member Relationship Specialty  Start Date End Date  
   
                                                      
  
  
Virginie Renee III, MD  
  
  
NO FORWARDING ADDRESS PCP - General                   10/24/02          
  
  
  
                      Team Member Relationship Specialty  Start Date End Date  
   
                                                      
  
  
Virginie Renee III, MD  
  
  
NO FORWARDING ADDRESS PCP - General                   10/24/02          
  
  
  
                      Team Member Relationship Specialty  Start Date End Date  
   
                                                      
  
  
Virginie Renee III, MD  
  
  
NO FORWARDING ADDRESS PCP - General                   10/24/02          
  
  
  
                      Team Member Relationship Specialty  Start Date End Date  
   
                                                      
  
  
Virginie Renee III, MD  
  
  
NO FORWARDING ADDRESS PCP - General                   10/24/02          
  
  
  
                      Team Member Relationship Specialty  Start Date End Date  
   
                                                      
  
  
Virginie Renee III, MD  
  
  
NO FORWARDING ADDRESS PCP - General                   10/24/02          
  
  
  
                      Team Member Relationship Specialty  Start Date End Date  
   
                                                      
  
  
Virginie Renee III, MD  
  
  
NPI: [id number]  
  
  
NO FORWARDING ADDRESS PCP - General                   10/24/02          
  
  
  
                      Team Member Relationship Specialty  Start Date End Date  
   
                                                      
  
  
Jose Sanderson MD  
  
  
NPI: [id number]  
  
  
[address] [place] [address]  
  
  
[phone] (Work)  
  
  
[phone] (Fax) PCP - General   Family Medicine 23          
  
  
  
                      Team Member Relationship Specialty  Start Date End Date  
   
                                                      
  
  
Jose Sanderson MD  
  
  
NPI: [id number]  
  
  
[address] [place] [address]  
  
  
[phone] (Work)  
  
  
[phone] (Fax) PCP - General   Family Medicine 23          
  
  
  
                      Team Member Relationship Specialty  Start Date End Date  
   
                                                      
  
  
Jose Sanderson MD  
  
  
NPI: [id number]  
  
  
[address] [place] [address]  
  
  
[phone] (Work)  
  
  
[phone] (Fax) PCP - General   Family Medicine 23          
  
  
  
                      Team Member Relationship Specialty  Start Date End Date  
   
                                                      
  
  
Jose Sanderson MD  
  
  
NPI: [id number]  
  
  
[address] [place] [address]  
  
  
[phone] (Work)  
  
  
[phone] (Fax) PCP - General   Family Medicine 23          
  
  
  
                      Team Member Relationship Specialty  Start Date End Date  
   
                                                      
  
  
Monica Sanchez CNP  
  
  
NPI: [id number]  
  
  
[address] [place] [address]  
  
  
[phone] (Work)  
  
  
[phone] (Fax) PCP - General   Family Medicine 23          
  
  
  
                      Team Member Relationship Specialty  Start Date End Date  
   
                                                      
  
  
Monica Sanchez CNP  
  
  
NPI: [id number]  
  
  
[address] [place] [address]  
  
  
[phone] (Work)  
  
  
[phone] (Fax) PCP - General   Family Medicine 23          
  
  
  
                      Team Member Relationship Specialty  Start Date End Date  
   
                                                      
  
  
Jose Sanderson MD  
  
  
NPI: [id number]  
  
  
[address] [facility]  
  
  
[place] [address]  
  
  
[phone] (Work)  
  
  
[phone] (Fax) PCP - General   Family Medicine 23          
  
  
  
  
  
                                                    INFORMATION SOURCE (unrecogn  
ized section and content)  
   
                                          
  
  
  
                                        DATE CREATED        AUTHOR  
   
                                2024                      [facility]  
  
  
  
                                DATE CREATED    AUTHOR          AUTHOR'S ISIAH SAMPSON  
   
                                2024                      [facility]  
  
  
FOR RECORDS PERTAINING TO PATIENTS WHO ARE OR HAVE BEEN ENROLLED IN A CHEMICAL 
DEPENDENCY/SUBSTANCEABUSE PROGRAM, SOME INFORMATION MAY BE OMITTED. This 
clinical summary was aggregated from multiple sources. Caution should be 
exercised in using it in the provision of clinical care. This summary normalizes
 information from multiple sources, and as a consequence, information in this 
document may materially change the coding, format and clinical context of 
patient data. In addition, data may be omitted in some cases. CLINICAL DECISIONS
 SHOULD BE BASED ON THE PRIMARY CLINICAL RECORDS. [employer] [facility] provides
 no warranty or guarantee of the accuracy or completeness of information in this
 document.

## 2025-01-15 LAB — PROGESTERONE: 2.8 NG/ML

## 2025-01-23 ENCOUNTER — HOSPITAL ENCOUNTER (OUTPATIENT)
Dept: HOSPITAL 100 - LAB | Age: 25
Discharge: HOME | End: 2025-01-23
Payer: COMMERCIAL

## 2025-01-23 DIAGNOSIS — N91.2: Primary | ICD-10-CM

## 2025-01-23 LAB — HCG SERPL QL: 8 MIU/ML (ref 1–3)

## 2025-01-23 PROCEDURE — 36415 COLL VENOUS BLD VENIPUNCTURE: CPT

## 2025-01-23 PROCEDURE — 84702 CHORIONIC GONADOTROPIN TEST: CPT

## 2025-01-25 ENCOUNTER — HOSPITAL ENCOUNTER (OUTPATIENT)
Dept: HOSPITAL 100 - LAB | Age: 25
Discharge: HOME | End: 2025-01-25
Payer: COMMERCIAL

## 2025-01-25 DIAGNOSIS — Z34.90: Primary | ICD-10-CM

## 2025-01-25 LAB — HCG SERPL QL: 28 MIU/ML (ref 1–3)

## 2025-01-25 PROCEDURE — 36415 COLL VENOUS BLD VENIPUNCTURE: CPT

## 2025-01-25 PROCEDURE — 84702 CHORIONIC GONADOTROPIN TEST: CPT

## 2025-01-25 NOTE — XMS RPT_ITS
Comprehensive CCD (C-CDA v2.1)  
  
                          Created on: 2025  
  
  
KyrieDon  
External Reference #: CDR,PersonID:[id number]  
: 2000  
Sex: Female  
  
Demographics  
  
  
                                        Address             [address] [place]  [address]  
   
                                        Home Phone          [phone]  
   
                                        Mobile Phone        [phone]  
   
                                        Preferred Language  en  
   
                                        Marital Status        
   
                                        Am[employer] Affiliation Unknown  
   
                                        Race                Unknown  
   
                                        Ethnic Group        Not  or Lati  
no  
  
  
Author  
  
  
                                        Organization        [facility]  
  
  
Care Team Providers  
  
  
                                Care Team Member Name Role            Phone  
   
                                Thelma CANO MD, Virginie ROBBINS Primary Care Provider Keira  
vailabertin Sanderson MD, Jose Goldman Primary Care Provider [phone]  
[phone]  
   
                                Bridenthal JAMES, Monica Primary Care Provider [phone]  
   
                                ERNESTINE SHER    Attending       Unavailable  
   
                                BRIDENTHAL, MONICA Primary Care    Unavailable  
   
                                AWILDA BELL Attending       Unavail  
able  
   
                                VIRGINIE RENEE III Primary Care    Unavailable  
   
                                BRIDENTHAL, MONICA Primary Care    Unavailable  
   
                                SHER, ERNESTINE    Attending       Unavailable  
   
                                BRIDENTHAL, MONICA Primary Care    Unavailable  
   
                                SHER ERNESTINE    Attending       Unavailable  
   
                                SHER ERNESTINE    Referring       Unavailable  
   
                                BRIDENTHAL, MONICA Primary Care    Unavailable  
   
                                BRIDENTHAL, MONICA Attending       Unavailable  
   
                                ANTONIO, JOSE Primary Care    Unavailable  
   
                                BRIDENTHAL, MONICA Attending       Unavailable  
   
                                ANTONIO, JOSE Primary Care    Unavailable  
   
                                BRIDENTHAL, MONICA Attending       Unavailable  
   
                                ANTONIO, JOSE Primary Care    Unavailable  
   
                                BRIDENTHAL, MONICA Attending       Unavailable  
   
                                ANTONIO, JOSE Primary Care    Unavailable  
   
                                BRIDENTHAL, MONICA Attending       Unavailable  
   
                                ANTONIO, JOSE Primary Care    Unavailable  
   
                                Unavailable     Primary Care Provider Unavailabl  
e  
  
  
  
Allergies  
  
  
                                                    Allergy   
Classification                          Reported   
Allergen(s)               Allergy Type              Date of   
Onset                     Reaction(s)               Facility  
   
                                                      
(16 sources)                            Grass pollen;   
Translations:   
[GRASS POLLEN]            Drug Allergy                
3                                       Unknown, Other:   
See Comments                            [facility]  
   
                                                      
(16 sources)                            Seasonal   
allergy;   
Translations:   
[SEASONAL   
ALLERGIES]                              Allergy to   
substance                                 
3                                       Unknown, Other:   
See Comments                            [facility]  
   
                                                      
(16 sources)                            Tree;   
Translations:   
[TREES]                                 Allergy to   
substance                                 
3                                       Unknown, Other:   
See Comments                            [facility]  
   
                                                      
(6 sources)         Grass pollen        Drug Allergy          
3                         Unknown                   [facility]  
   
                                                      
(6 sources)               Other                     Allergy to   
substance                                 
3                         Unknown                   [facility]  
   
                                                      
(1 source)                Pollen                    Allergy to   
substance                                 
3                         Other                     [facility]  
   
                                                      
(1 source)          Bee pollen          Drug Allergy          
3                         Other                     [facility]  
  
  
  
Medications  
Current Medications  
  
  
  
                      Medication Drug Class(es) Dates      Sig (Normalized) Sig   
(Original)  
   
                                                    24 hr buPROPion   
hydrochloride 300 mg   
extended release oral   
tablet  
(7 sources)               Aminoketone               Start:   
2024                              take 1 tablet by   
mouth once daily in   
the morning                             buPROPion XL   
(Wellbutrin XL)   
300 MG 24 hr   
tablet   
Indications:   
Anxiety and   
depression take 1   
tablet by mouth   
every morning DO   
NOT CRUSH, CHEW,   
AND/OR DIVIDE 30   
tablet 2   
2024 Active  
  
  
  
                                                    Start: 2024  
End: 2024                         take 1 tablet by mouth once   
daily in the morning                    buPROPion XL (WELLBUTRIN XL) 150 mg   
24 hr tablet Take 150 mg by mouth   
every morning. 0 2024 Active  
  
  
  
                                        Comment on above:   Take 150 mg by mouth  
 every morning.   
   
                                                    Desogestrel / Ethinyl   
Estradiol  
(2 sources)                             Progestin,   
Estrogen                                Start:   
  
4  
End:   
  
5                                       take 1 tablet   
by mouth once   
daily, then   
take 0.15   
tablet by mouth   
once                                    Desogestrel-Ethinyl   
Estradiol (APRI)   
0.15-0.03 mg per   
tablet Indications:   
PCOS (polycystic   
ovarian syndrome) ,   
Provided repeat   
prescription for oral   
contraceptive Take 1   
tablet by mouth once   
daily. 84 tablet 3   
2024   
Active  
  
  
  
                                                take 1 tablet by mouth once ashley  
asmantha Juleber 0.15-30 MG-MCG tablet Take 1 tablet   
by   
mouth daily. 0 Active  
  
  
  
                                        Comment on above:   Take 1 tablet by grover  
 once daily.   
   
                                                    metFORMIN   
hydrochloride 500 mg   
oral tablet  
(6 sources)               Biguanide                 Start:   
  
4  
End:   
  
4                                       take 2 tablets by   
mouth twice daily   
at mealtime                             metFORMIN   
(GLUCOPHAGE) 500 mg   
tablet Indications:   
PCOS (polycystic   
ovarian syndrome) ,   
IFG (impaired   
fasting glucose) ,   
Irregular menses ,   
Overweight with   
body mass index   
(BMI) of 29 to 29.9   
in adult Take 2   
tablets by mouth   
two times a day   
with meals. 360   
tablet 0 2024 Active  
  
  
  
                                                    Start: 2024  
End: 2024                         take 1 tablet by mouth in the   
morning                                 metFORMIN (Glucophage) 500 MG tablet   
Take 1 tablet by mouth in the   
morning and 1 tablet in the evening.   
Take with meals. 0 2024 Active  
  
  
  
                                        Comment on above:   Take 2 tablets by mo  
uth two times a day with meals.   
   
                                                            Take 1 tablet by grover  
th two times a day with meals.   
   
                                                    naltrexone   
hydrochloride 50 mg   
oral tablet  
(2 sources)               Opioid Antagonist         Start:   
  
End:   
                                       take 0.5 tablet   
by mouth twice   
daily                                   naltrexone 50 mg   
tablet Indications:   
PCOS (polycystic   
ovarian syndrome) ,   
Overweight with   
body mass index   
(BMI) of 29 to 29.9   
in adult Take 0.5   
tablets by mouth   
two times a day. 90   
tablet 0 2024 Active  
  
  
  
                                                                naltrexone (Depa  
de) 50 MG tablet Take 25 mg by mouth in the morning and 25 mg   
in   
the evening. 0 Active  
  
  
  
                                        Comment on above:   Take 0.5 tablets by   
mouth two times a day.   
   
                                                    predniSONE 10 mg oral   
tablet  
(1 source)                                          Start:   
  
End:   
                                     take 4 tablets   
by mouth once   
daily, then   
take 3 tablets   
by mouth once   
daily, then   
take 2 tablets   
by mouth once   
daily, then   
take 1 tablet   
by mouth once   
daily at   
mealtime                                predniSONE   
(Deltasone) 10 mg   
tablet Indications:   
Contact dermatitis,   
unspecified contact   
dermatitis type,   
unspecified trigger   
Take 4 tablets (40   
mg) by mouth once   
daily for 3 days,   
THEN 3 tablets (30   
mg) once daily for 3   
days, THEN 2 tablets   
(20 mg) once daily   
for 3 days, THEN 1   
tablet (10 mg) once   
daily for 3 days.   
Take in the mornings   
with food. 30 tablet   
2024 Active  
   
                                                    Prenatal   
Multivit-Min-Fe-FA   
(PRENATAL/IRON PO)  
(3 sources)                                                 take 1 tablet   
by mouth once   
in the morning                          Prenatal   
Multivit-Min-Fe-FA   
(PRENATAL/IRON PO)   
Take 1 tablet by   
mouth in the   
morning. 0 Active  
   
                                                    triamcinolone   
acetonide 1 mg/ml   
topical cream  
(1 source)                Corticosteroid            Start:   
                                                 triamcinolone   
(Kenalog) 0.1 %   
cream Indications:   
Contact dermatitis,   
unspecified contact   
dermatitis type,   
unspecified trigger   
Apply a thin film   
topically to   
affected areas 2-3   
times daily as   
needed for   
rash/itching 15 g   
2024 Active  
  
  
  
Completed/Discontinued Medications  
  
  
  
                      Medication Drug Class(es) Dates      Sig (Normalized) Sig   
(Original)  
   
                                                    [id number]   
actuat albuterol   
0.09 mg/actuat   
metered dose   
inhaler  
(15 sources)                            beta2-Adrenergic   
Agonist                                 Start:   
2019                              take 2 puff(s) by   
inhalation every   
four hours as   
needed                                  albuterol HFA   
(PROVENTIL HFA,   
VENTOLIN HFA) 90   
mcg/actuation   
inhaler   
Indications:   
Exertional asthma   
Inhale 2 Puffs as   
instructed every 4   
hours as needed. 1   
Inhaler 0   
2019 Active  
   
                                        Comment on above:   Inhale 2 Puffs as in  
structed every 4 hours as needed.   
   
                                                    drospirenone /   
Ethinyl Estradiol  
(1 source)                Progestin, Estrogen       Start:   
2023                              take 1 tablet by   
mouth once daily,   
then take 1 tablet   
by mouth once                           Drospirenone-Ethin  
yl Estradiol   
(SINGH, 28,)   
3-0.03 mg per   
tablet   
Indications: PCOS   
(polycystic   
ovarian syndrome)   
Take 1 tablet by   
mouth once daily.   
Take one active   
pill continuously   
x 3 months-   
discard placebo   
pills 112 tablet 3   
2023 Active  
   
                                        Comment on above:   Take 1 tablet by grover  
th once daily. Take one active pill   
continuously x 3 months- discard placebo pills   
   
                                                    escitalopram 20 mg   
oral tablet  
(3 sources)                             Serotonin Reuptake   
Inhibitor                               Start:   
12-  
End:   
2024                              take 1 tablet by   
mouth once daily                        escitalopram   
(Lexapro) 20 MG   
tablet   
Indications:   
Anxiety and   
depression Take 1   
tablet (20 mg) by   
mouth daily. 30   
tablet 1   
12/15/2023   
2024   
Discontinued   
(Ineffective)  
  
  
  
                                                    Start: 2023  
End: 2024                         take 1 tablet by mouth once   
daily                                   escitalopram (Lexapro) 10 MG tablet   
Indications: Anxiety and depression   
Take 1 tablet (10 mg) by mouth   
daily. 30 tablet 1 2023 Active  
  
  
  
                                                    Ethinyl Estradiol /   
Norethindrone  
(12 sources)              Estrogen                  Start: 2023  
End: 2023                         take 1 tablet   
by mouth once   
daily, then   
take 0.05   
tablet by mouth   
once                                    Norethindrone   
Acet-Ethinyl Est   
(JUNEL ,)   
1-20 mg-mcg per   
tablet TAKE 1 TABLET   
BY MOUTH DAILY 28   
tablet 2 2023   
Discontinued  
  
  
  
                                        Start: 2023   take 1 tablet by grover  
th once   
daily, then take 0.05 tablet   
by mouth once                           Norethindrone Acet-Ethinyl Est (JUNEL   
1/20, ,) 1-20 mg-mcg per tablet   
TAKE 1 TABLET BY MOUTH DAILY 28   
tablet 2 2023 Active  
   
                                                    Start: 2023  
End: 2023                         take 1 tablet by mouth once   
daily, then take 0.05 tablet   
by mouth once                           Norethindrone Acet-Ethinyl Est (JUNEL   
1/20, ,) 1-20 mg-mcg per tablet   
TAKE 1 TABLET BY MOUTH DAILY 28   
tablet 3 2023   
Discontinued  
   
                                        Start: 2023   take 1 tablet by grover  
th once   
daily, then take 0.05 tablet   
by mouth once                           Norethindrone Acet-Ethinyl Est (JUNEL   
1/20, ,) 1-20 mg-mcg per tablet   
TAKE 1 TABLET BY MOUTH DAILY 28   
tablet 3 2023 Active  
   
                                                    Start: 2023  
End: 2023                         take 1 tablet by mouth once   
daily, then take 0.05 tablet   
by mouth once                           Norethindrone Acet-Ethinyl Est (JUNEL   
1/20, ,) 1-20 mg-mcg per tablet   
TAKE 1 TABLET BY MOUTH DAILY 28   
tablet 3 2023   
Discontinued  
   
                                                    Start: 2022  
End: 2022                         take 1 tablet by mouth once   
daily, then take 0.05 tablet   
by mouth once                           Norethindrone Acet-Ethinyl Est (JUNEL   
1/20, ,) 1-20 mg-mcg per tablet   
TAKE 1 TABLET BY MOUTH DAILY 28   
tablet 14 2022   
Discontinued  
   
                                        Start: 2022   take 1 tablet by grover  
th once   
daily, then take 0.05 tablet   
by mouth once                           Norethindrone Acet-Ethinyl Est (JUNEL   
1/20, ,) 1-20 mg-mcg per tablet   
TAKE 1 TABLET BY MOUTH DAILY 28   
tablet 14 2022 Active  
   
                                                    Start: 2021  
End: 2022                         take 1 tablet by mouth once   
daily, then take 0.05 tablet   
by mouth once                           Norethindrone Acet-Ethinyl Est (JUNEL   
1/20, 21,) 1-20 mg-mcg per tablet   
TAKE 1 TABLET BY MOUTH DAILY TAKES   
ACTIVE TABLET CONTINUOUSLY 84 tablet   
3 2021 Discontinued  
  
  
  
                                        Comment on above:   TAKE 1 TABLET BY GROVER  
TH DAILY TAKES ACTIVE TABLET   
CONTINUOUSLY   
   
                                                            TAKE 1 TABLET BY GROVER  
TH DAILY   
   
                                                    medroxyPROGESTERone   
acetate 10 mg oral tablet  
(4 sources)               Progestin                 Start:   
2022  
End:   
2023                                    take 1   
tablet by   
mouth once   
daily                                   medroxyPROGESTERone   
(PROVERA) 10 mg tablet   
Take 1 tablet by mouth   
once daily for 10 days.   
Or until menses starts.   
10 tablet 0 2022 Discontinued  
   
                                        Comment on above:   Take 1 tablet by grover  
th once daily for 10 days. Or until   
menses starts.   
   
                                                    PNV/iron/folic acid   
(PRENATAL VITAMIN-IRON-FA   
ORAL)  
(2 sources)                                                 take 1   
tablet by   
mouth once   
daily                                   PNV/iron/folic acid   
(PRENATAL   
VITAMIN-IRON-FA ORAL)   
Take 1 tablet by mouth   
once daily. 0 Active  
   
                                        Comment on above:   Take 1 tablet by grover  
th once daily.   
   
                                                    Prenatal MV-Min-Fe   
Fum-FA-DHA (PRENATAL 1   
PO)  
(6 sources)                                           
End:   
2024                                                Prenatal MV-Min-Fe   
Fum-FA-DHA (PRENATAL 1   
PO) Take by mouth. 0   
2024 Discontinued   
(Duplicate order)  
  
  
  
                                                                Prenatal MV-Min-  
Fe Fum-FA-DHA (PRENATAL 1 PO) Take by mouth. 0 Active  
  
  
  
Problems  
Active Problems  
  
  
                                                    Problem   
Classification  Problem         Date            Documented Date Episodic/Chronic  
   
                                                    Allergic reactions  
(1 source)                              Contact dermatitis;   
Translations:   
[Unspecified contact   
dermatitis,   
unspecified cause]                      2024          Episodic  
   
                                                    Anxiety disorders  
(18 sources)                            Mixed anxiety and   
depressive disorder;   
Translations:   
[Anxiety disorder,   
unspecified]                            Onset:   
2023                Chronic  
   
                                                    Asthma  
(8 sources)                             Asthma; Translations:   
[Unspecified asthma,   
uncomplicated]                          Onset:   
2011                Chronic  
   
                                                    Contraceptive and   
procreative   
management  
(2 sources)                             Oral contraceptive   
repeat; Translations:   
[Encounter for   
surveillance of   
contraceptive pills]                    Onset:   
2024                Episodic  
   
                                                    Diabetes mellitus   
without complication  
(1 source)                              Impaired fasting   
glycemia;   
Translations:   
[Impaired fasting   
glucose]                                2024          Episodic  
   
                                                    Disorders of lipid   
metabolism  
(3 sources)                             Raised low density   
lipoprotein   
cholesterol;   
Translations: [Pure   
hypercholesterolemia,   
unspecified]                            Onset:   
2024                Chronic  
   
                                                    Female infertility  
(1 source)                              Anovulation;   
Translations: [Female   
infertility   
associated with   
anovulation]                                                Chronic  
   
                                                    Menstrual disorders  
(6 sources)                             Intermenstrual   
bleeding - irregular;   
Translations:   
[Excessive and   
frequent menstruation   
with irregular cycle]                   Onset:   
2024                                          Chronic  
   
                                                    Mood disorders  
(9 sources)                             Mood disorders;   
Translations:   
[Anxiety and   
depression]                             Onset:   
2023                  
   
                                                    Other endocrine   
disorders  
(14 sources)                            Polycystic ovary   
syndrome;   
Translations:   
[Polycystic ovarian   
syndrome]                               Onset:   
2022                                          Chronic  
   
                                                    Other endocrine   
disorders  
(1 source)                              Polycystic ovarian   
syndrome;   
Translations: [PCOS   
(polycystic ovarian   
syndrome)]                              Onset:   
2024                                          Chronic  
   
                                                    Other female genital   
disorders  
(1 source)                              Abnormal uterine   
bleeding;   
Translations:   
[Abnormal uterine and   
vaginal bleeding,   
unspecified]                                                Chronic  
   
                                                    Other nervous system   
disorders  
(2 sources)                             Skin sensation   
disturbance;   
Translations:   
[Unspecified   
disturbances of skin   
sensation]                              Onset:   
2024                Episodic  
   
                                                    Other nutritional;   
endocrine; and   
metabolic disorders  
(3 sources)                             Overweight in   
adulthood with body   
mass index of 25 or   
more but less than   
30; Translations:   
[Overweight]                            Onset:   
2024                Episodic  
   
                                                    Other nutritional;   
endocrine; and   
metabolic disorders  
(1 source)                              Overweight;   
Translations:   
[Overweight with body   
mass index (BMI) of   
29 to 29.9 in adult]                    Onset:   
2024                                          Episodic  
   
                                                    Other nutritional;   
endocrine; and   
metabolic disorders  
(1 source)                              Body mass index (BMI)   
29.0-29.9, adult;   
Translations:   
[Overweight with body   
mass index (BMI) of   
29 to 29.9 in adult]                    Onset:   
2024                                          Episodic  
   
                                                    Screening and history   
of mental health and   
substance abuse codes  
(8 sources)                             History of bulimia   
nervosa;   
Translations:   
[Personal history of   
other mental and   
behavioral disorders]                   Onset:   
2024                Episodic  
   
                                                    Unclassified  
(2 sources)                             Medication Check;   
Translations:   
[Medication Check]                      Onset:   
2024                                            
  
  
Past or Other Problems  
  
  
                      Problem Classification Problem    Date       Documented Da  
te Episodic/Chronic  
   
                                                    Other nutritional;   
endocrine; and   
metabolic disorders  
(7 sources)                             Weight gain;   
Translations:   
[Abnormal weight   
gain]                                   Onset:   
2024                Episodic  
   
                                                    Other screening for   
suspected conditions   
(not mental disorders   
or infectious disease)  
(20 sources)                            Patient encounter   
status;   
Translations:   
[Encounter for   
screening for   
malignant   
neoplasm of   
cervix]                                 Onset:   
08-                                          Episodic  
   
                                                    Other skin disorders  
(18 sources)                            Hirsutism;   
Translations:   
[Hirsutism]                             Onset:   
03-                08-                Episodic  
   
                                                    Other upper respiratory   
disease  
(20 sources)                            Vocal cord   
dysfunction;   
Translations:   
[Other diseases   
of vocal cords]                         Onset:   
05-                05-                Episodic  
   
                                                    Other upper respiratory   
disease  
(2 sources)                             Other diseases of   
vocal cords;   
Translations:   
[Other diseases   
of vocal cords]                         Onset:   
2023                                          Episodic  
  
  
  
Results  
  
  
                      Test Name  Value      Interpretation Reference Range Facil  
ity  
   
                                                    36on 2024   
   
                      36         Not due for refill. Normal                [facility]  
   
                      36         Refill not yet due. Normal                [facility]  
   
                                                    Office Visiton 2024   
   
                                        Follow-up visit     [id number] Lux Mittal   
2000 F  
Date Provider   
Department Center  
2024   
[id number] REYES [facility]  
Family History  
Problem Relation Age   
of Onset  
Thyroid disease Mother  
Perkins syndrome Father  
Asthma Sister  
Breast cancer Maternal   
Grandmother  
Cancer Maternal   
Grandfather  
Cancer Paternal   
Grandfather  
Family Status -   
Relation Status Age at   
Death  
Mother Alive  
Father Alive  
Sister  
Sister   
Maternal Grandmother   
  
Maternal Grandfather   
  
Paternal Grandmother   
Alive  
Paternal Grandfather   
  
Level of Service:42902   
ND OFFICE/OUTPATIENT   
ESTABLISHED LOW MDM 20   
MIN  
Reason for Visit and   
Comments:  
Medication Check   
[[id number]]  
Foot Problem [229]  Normal                                  [facility]  
   
                                                    PATINSon 2024   
   
                                        PATINS              Ice and elevate foot  
   
couple times a day, be   
careful not to wear   
shoes that  
pinch/pressure to much   
on top of foot.     Normal                                  [facility]  
   
                                                    Progress Noteon 2024   
   
                                        Progress Note       Stable. Continue   
Wellbutrin 300 mg   
daily               Normal                                  [facility]  
   
                                        Progress Note       Left foot exam   
unremarkable and   
currently   
asymptomatic. Likely   
superficial  
nerve inflammation.   
Recommend avoiding   
shoes that are tight   
across the top of  
the foot, ice and   
elevate 2-3 times   
daily and follow-up if   
fails to resolve.  
Unknown etiology at   
this time otherwise Normal                                  [facility]  
   
                                        Progress Note       2024  
Don Mittal (:   
2000) is a 24   
y.o. female ,   
Established patient,   
here  
for evaluation of the   
following chief   
complaint(s):  
Medication Check and   
Foot Problem  
ASSESSMENT/PLAN:  
1. Anxiety and   
depression  
Assessment & Plan:  
Stable. Continue   
Wellbutrin 300 mg   
daily  
2. Skin sensation   
disturbance  
Assessment & Plan:  
Left foot exam   
unremarkable and   
currently   
asymptomatic. Likely   
superficial  
nerve inflammation.   
Recommend avoiding   
shoes that are tight   
across the top of  
the foot, ice and   
elevate 2-3 times   
daily and follow-up if   
fails to resolve.  
Unknown etiology at   
this time otherwise  
Follow up in about 6   
months (around   
2024) for Yearly   
Wellness Visit.  
SUBJECTIVE/OBJECTIVE:  
HPI -  
Don Mittal (:   
2000) is a 24   
y.o. female ,   
Established patient,   
here  
for the evaluation of   
the following chief   
complaint(s):  
Medication Check and   
Foot Problem  
Anxiety/depression-   
Doing pretty well,   
wellbutrin  mg   
daily. Denies any SI  
or HI. Not doing   
counseling. Reports   
being able to handle   
stress better and not  
feeling as   
overwhelmed.  
Left foot problem:  
Is having a hot   
sensation/tingling to   
the top of the left   
foot and numbness. No  
known trigger. No   
known injury.   
Happening for about   
1.5 weeks, will happen  
intermittently through   
the day (?50 times or   
so)- lasts only for a   
few seconds  
or so.  
No swelling or redness   
noted.  
Currently asymptomatic  
Prior to Admission   
medications  
Medication Sig Start   
Date End Date Taking?   
Authorizing Provider  
buPROPion XL   
(Wellbutrin XL) 300 MG   
24 hr tablet take 1   
tablet by mouth every  
morning DO NOT CRUSH,   
CHEW, AND/OR DIVIDE   
24 Yes SHANEKA Phan - CNP  
Juleber 0.15-30 MG-MCG   
tablet Take 1 tablet   
by mouth daily. Yes   
Historical  
Provider, MD  
metFORMIN (Glucophage)   
500 MG tablet Take 1   
tablet by mouth in the   
morning and 1  
tablet in the evening.   
Take with meals.   
24 Yes   
Historical Provider,  
MD  
naltrexone (Depade) 50   
MG tablet Take 25 mg   
by mouth in the   
morning and 25 mg in  
the evening. Yes   
Historical Provider,   
MD  
Prenatal   
Multivit-Min-Fe-FA   
(PRENATAL/IRON PO)   
Take 1 tablet by mouth   
in the  
morning. Yes   
Historical Provider,   
MD  
Prenatal MV-Min-Fe   
Fum-FA-DHA (PRENATAL 1   
PO) Take by mouth.   
Historical  
Provider, MD  
Review of Systems  
Constitutional:   
Negative for activity   
change, chills,   
fatigue and fever.  
Respiratory: Negative.  
Cardiovascular:   
Negative.  
Gastrointestinal:   
Negative.  
Genitourinary:   
Negative for   
difficulty urinating.  
Musculoskeletal:  
Left foot.  
Psychiatric/Behavioral  
: Positive for   
decreased   
concentration.   
Negative for  
agitation, dysphoric   
mood and sleep   
disturbance. The   
patient is not  
nervous/anxious.  
Vitals:  
24 1307  
BP: 129/85  
Pulse: 102  
Resp: 18  
Temp: 36.9 ?C (98.4   
?F)  
TempSrc: Infrared  
SpO2: 97%  
Weight: 161 lb 9.6 oz   
(73.3 kg)  
Physical Exam  
Constitutional:  
General: She is not in   
acute distress.  
Appearance: Normal   
appearance. She is not   
ill-appearing.  
HENT:  
Head: Normocephalic   
and atraumatic.  
Cardiovascular:  
Rate and Rhythm:   
Normal rate and   
regular rhythm.  
Pulses: Normal pulses.  
Heart sounds: Normal   
heart sounds.  
Pulmonary:  
Effort: Pulmonary   
effort is normal.  
Breath sounds: Normal   
breath sounds.  
Musculoskeletal:  
Right foot: Normal.  
Left foot: Normal.  
Comments: Left foot   
exam normal  
Skin:  
General: Skin is warm   
and dry.  
Neurological:  
Mental Status: She is   
alert and oriented to   
person, place, and   
time.  
Psychiatric:  
Mood and Affect: Mood   
normal.  
Behavior: Behavior   
normal.  
Thought Content:   
Thought content   
normal.  
Judgment: Judgment   
normal.  
An electronic   
signature was used to   
authenticate this   
note.  
Monica Sanchez, APRN - CNP  
2024  
4:40 PM             [facility]  
   
                                        Progress Note       Patient was identifi  
ed   
by name and Date of   
Birth.              [facility]  
   
                                                    36on 2024   
   
                                        36                  Rx sent. Follow up a  
s   
scheduled.          [facility]  
   
                                        36                  Prescription Request  
:  
  
Last medication check:   
24  
Last physical exam:   
23  
Next scheduled   
appointment: 24  
  
Last date of refill on   
this medication   
3/21/24             [facility]  
   
                                                    CNOVon 2024   
   
                                        CNOV                Office Visit (OBGYWM  
)  
----------------------  
--------------  
DON MITTAL   
([id number]) 2000   
F  
Date Time Provider   
Department  
3/21/24 7:30 AM   
ERNESTINE SHER OBPANKAJ  
During your visit   
today, we recorded the   
following information   
about you:  
Pulse Blood pressure   
Weight  
80/minute 126/82 75.8   
kg  
Ernestine Sher APRN.CNP   
3/21/2024 8:47 AM   
Signed  
Some documentation   
from previous visit of   
2024 was copied   
and pasted,  
documentation has been   
reviewed and edited as   
necessary for today's   
visit.  
Patient Summary:   
Don is a 24 year   
old female who   
presents for follow-up  
evaluation of her   
obesity/weight   
management to treat   
PCOS, IFG, elevated   
LDL,  
anxiety and depression   
and prevent   
relatedco-morbidities.   
In our previous  
visits we have   
discussed lifestyle   
intervention including   
a nutrition  
recommendations and   
physical activity   
optimization. Her last   
office visit was 1  
month ago.  
Irregular menses -   
Menses 2023 LMP   
1/2-3 spotting  
No contraception.  
20 lb weight gain with   
Lexapro for anxiety.   
Changed to bupropion   
by PCP plans  
to discuss increasing   
dose with PCP  
Assessment/plan from   
last visit:  
Metformin 1 gm twice a   
day with meals -   
Adjusted to 500 mg at   
breakfast and 1  
gm at dinner but has   
had frequent diarrhea   
since increasing to 1   
gm twice a day  
3 weeks ago.  
History of anorexia   
and bulimia in HS - no   
treatment, In   
remission since age  
15. Dad helped her and   
she started adding   
protein shakes.  
Interval History -  
Awake - 0700  
B - 0745 Premier   
Protein shake  
S - none  
L - 1/2 cup cottage   
cheese with fruit  
S - none  
D - 1830 protein,   
pasta/potato/rice/swee  
t potato and veg -   
asparagus, cucumber  
salad, winter and   
summer squash/salad  
S - none  
Fluids - water,   
unsweetened tea  
Bedtime -   
She feels the   
medication is helping   
to lessen hunger and   
craving to candy  
controlled  
Exercise: stable   
sedentary job at bank.   
1 mile daily walk with   
dog  
Stress: increased -   
 wearing heart   
monitor  
Sleep: 7 hours, up to   
go to bathroom a few   
times LASHAWN -no  
Weight gain since last   
vist: 2 lbs since last   
visit  
3/21/2024 167 lb BMI:   
28.67  
2024 169 lb  
2024 166 lb   
metformin  
CrCl cannot be   
calculated (Patient's   
most recent lab result   
is older than the  
maximum 180 days   
allowed.).  
PAST MEDICAL HISTORY  
Diagnosis Date  
Exertional asthma   
2012  
Generalized anxiety   
disorder  
IFG (impaired fasting   
glucose) 2024  
Medial meniscus tear   
2012  
PCOS (polycystic   
ovarian syndrome)   
Unspecified otitis   
media  
Recurrent otitis  
Vocal cord dysfunction   
05/15/2012  
Current Outpatient   
Medications  
Medication Sig   
Dispense Refill  
metFORMIN (GLUCOPHAGE)   
500 mg tablet Take 2   
tablets by mouth two   
times a day  
with meals. 360 tablet   
0  
buPROPion XL   
(WELLBUTRIN XL) 150 mg   
24 hr tablet Take 150   
mg by mouth every  
morning.  
PNV/iron/folic acid   
(PRENATAL   
VITAMIN-IRON-FA ORAL)   
Take 1 tablet by mouth  
once daily.  
albuterol HFA   
(PROVENTIL HFA,   
VENTOLIN HFA) 90   
mcg/actuation inhaler   
Inhale 2  
Puffs as instructed   
every 4 hours as   
needed. 1 Inhaler 0  
No current   
facility-administered   
medications for this   
visit.  
Occupation: bank  
Contraception: none  
/82   Pulse 80     
Wt 167 lb (75.8 kg)     
LMP 2024   SpO2   
98%    
BMI 28.67 kg/m?  
Assessment/Plan:  
Don Mittal is a 24   
year old yo female   
with overweight   
(pre-obesity) who  
presented today for   
follow up for   
supervised weight loss   
to treat PCOS, IFG,  
elevated LDL, anxiety   
and depression and   
prevent related   
co-morbidities.  
ASSESSMENT/PLAN:  
1. PCOS (polycystic   
ovarian syndrome) -   
ICD9: 256.4, ICD10:   
E28.2 (primary  
diagnosis)  
- Whole food balanced   
protein low-carb   
nutrition  
- METFORMIN 500 MG   
TABLET  
- DESOGESTREL 0.15   
MG-ETHINYL ESTRADIOL   
0.03 MG TABLET  
- NALTREXONE 50 MG   
TABLET  
2. Irregular menses -   
ICD9: 626.4, ICD10:   
N92.6  
- DESOGESTREL 0.15   
MG-ETHINYL ESTRADIOL   
0.03 MG TABLET  
3. Anxiety and   
depression - ICD9:   
300.00, 311, ICD10:   
F41.9, F32.A  
- Continue bupropion   
prescribed by PCP- she   
plans to discuss   
increasing dose  
with PCP  
4. History of bulimia   
- ICD9: V11.8, ICD10:   
Z86.59  
- in remission since   
age 15  
5. History of anorexia   
nervosa - ICD9: V11.8,   
ICD10: Z86.59  
- in remission since   
age 15  
6. Provided repeat   
prescription for oral   
contraceptive - ICD9:   
V25.41, ICD10:  
Z30.41  
- RX for Apri given   
today.  
- discussed with   
patient on how to take   
OCP's.Given written   
information  
- counseled on   
benefits, risks and   
possible severe side   
effects of OCP's.  
- discussed need to   
use Condoms to help to   
prevent STD's   
including HIV etc.  
- DESOGESTREL 0.15   
MG-ETHINYL ESTRADIOL   
0.03 MG TABLET  
7. Overweight with   
body mass index (BMI)   
of 29 to 29.9 in adult   
- ICD9: 278.02,  
V85.25, ICD10: E66.3,   
Z68.29  
- METFORMIN 500 MG   
TABLET - decrease to   
500 mg with breakfast   
and 1 gm with  
dinner due to diarrhea   
with 1 gm twice daily  
- DESOGESTREL 0.15   
MG-ETHINYL ESTRADIOL   
0.03 MG TABLET  
- NALTREXONE 50 (more   
content not   
included)...        Normal                                  [facility]  
   
                                                    CNOVon 2024   
   
                                        CNOV                Office Visit (OBTIFFANYWVENICE  
)  
----------------------  
--------------  
DON MITTAL   
([id number]) 2000   
F  
Date Time Provider   
Department  
24 7:00 AM   
ERNESTINE SHER  
During your visit   
today, we recorded the   
following information   
about you:  
Blood pressure Weight   
Last Period  
116/84 76.7 kg   
24  
Ernestine Sher APRN.CNP   
2024 7:59 PM   
Signed  
Some documentation   
from previous visit of   
2023 was copied   
and pasted,  
documentation has been   
reviewed and edited as   
necessary for today's   
visit.  
Patient Summary:   
Don is a 23 year   
old female who   
presents for follow-up  
evaluation of her   
obesity/weight   
management to treat   
PCOS, IFG, elevated   
LDL,  
anxiety and depression   
and prevent   
relatedco-morbidities.   
In our previous  
visits we have   
discussed lifestyle   
intervention including   
a nutrition  
recommendations and   
physical activity   
optimization. Her last   
office visit was 1  
month ago.  
Irregular menses -   
Menses 2023 LMP   
-3 spotting  
20 lb weight gain with   
Lexapro for anxiety.   
Changed to bupropion   
by PCP.  
Assessment/plan from   
last visit:  
Metformin 500 mg twice   
a day with meals -   
tried 1 gm at dinner   
but had nausea  
and mild diarrhea   
(actually took at   
bedtime)  
Bupropion 150 mg 24/hr   
tab for anxiety and   
depression  
Was in [place] with   
 who races -   
very hectic travel.   
Tried to eat  
healthier.  
Quit second job so   
life is becoming more   
calm  
History of anorexia   
and bulimia in HS - no   
treatment, Dad helped   
her and she  
started adding protein   
shakes.  
Interval History -   
changes made in past   
month  
Awake - 0700 but is   
changing to 0520 to   
exercise before work  
B - 0745 Premier   
Protein shake  
S - none  
L - SKIP  
S - none  
D - 1830 protein,   
pasta/potato/rice/swee  
t potato and veg -   
asparagus, cucumber  
salad, winter and   
summer squash  
S - none or Skinny   
popcorn  
Fluids - water, zero   
sugar electrolyte   
drink mixes,   
unsweetened tea  
Bedtime -   
She feels the   
medication is helping   
to lessen hunger and   
craving to candy  
Exercise: stable   
sedentary job at bank.   
Just got gym   
membership - walking  
and free weights  
Stress: decreased -   
 races and   
season has not started   
yet and decreased  
stress after quitting   
second job  
Sleep: 7 hours, well   
rested LASHAWN -no  
Weight gain since last   
vist: 3 lbs since last   
visit  
2024 169 lb  
2024 166 lb   
metformin  
CrCl cannot be   
calculated (Patient's   
most recent lab result   
is older than the  
maximum 180 days   
allowed.).  
PAST MEDICAL HISTORY  
Diagnosis Date  
Exertional asthma   
2012  
Generalized anxiety   
disorder  
IFG (impaired fasting   
glucose) 2024  
Medial meniscus tear   
2012  
PCOS (polycystic   
ovarian syndrome)   
Unspecified otitis   
media  
Recurrent otitis  
Vocal cord dysfunction   
05/15/2012  
Current Outpatient   
Medications  
Medication Sig   
Dispense Refill  
buPROPion XL   
(WELLBUTRIN XL) 150 mg   
24 hr tablet Take 150   
mg by mouth every  
morning.  
PNV/iron/folic acid   
(PRENATAL   
VITAMIN-IRON-FA ORAL)   
Take 1 tablet by mouth  
once daily.  
metFORMIN (GLUCOPHAGE)   
500 mg tablet Take 1   
tablet by mouth two   
times a day  
with meals. 180 tablet   
0  
albuterol HFA   
(PROVENTIL HFA,   
VENTOLIN HFA) 90   
mcg/actuation inhaler   
Inhale 2  
Puffs as instructed   
every 4 hours as   
needed. 1 Inhaler 0  
No current   
facility-administered   
medications for this   
visit.  
/84   Wt 169 lb   
(76.7 kg)   LMP   
2024   BMI 29.01   
kg/m?  
Assessment/Plan:  
Don Mittal is a 23   
year old yo female   
with overweight   
(pre-obesity) who  
presented today for   
follow up for   
supervised weight loss   
to treat PCOS, IFG,  
elevated LDL, anxiety   
and depression and   
prevent related   
co-morbidities.  
ASSESSMENT/PLAN:  
1. PCOS (polycystic   
ovarian syndrome) -   
ICD9: 256.4, ICD10:   
E28.2 (primary  
diagnosis)  
- Whole food balanced   
protein low-carb   
nutrition  
- METFORMIN 500 MG   
TABLET  
2. Elevated LDL   
cholesterol level -   
ICD9: 272.0, ICD10:   
E78.00  
- benefits of weight   
loss discussed  
3. IFG (impaired   
fasting glucose) -   
ICD9: 790.21, ICD10:   
R73.01  
- METFORMIN 500 MG   
TABLET  
4. Irregular menses -   
ICD9: 626.4, ICD10:   
N92.6  
- METFORMIN 500 MG   
TABLET  
5. Anxiety and   
depression - ICD9:   
300.00, 311, ICD10:   
F41.9, F32.A  
- well-controlled with   
bupropion  
6. History of bulimia   
- ICD9: V11.8, ICD10:   
Z86.59  
- in remission  
7. History of anorexia   
nervosa - ICD9: V11.8,   
ICD10: Z86.59  
- in remission  
8. Overweight with   
body mass index (BMI)   
of 29 to 29.9 in adult   
- ICD9: 278.02,  
V85.25, ICD10: E66.3,   
Z68.29  
- METFORMIN 500 MG   
TABLET - continue to   
increase dose as   
tolerated  
- Recommended whole   
food low-carb diet   
with 30 g of protein 3   
times a day and  
30 g of carbs at lunch   
and dinner only. Given   
tracking log.  
- Given 15 gram carb   
whole food and protein   
suggestion list.  
- Given protein snack   
ideas  
- continue exercise  
Lengthy discussion   
regarding history of   
anorexia and bulimia   
which is in  
remission. Discussed   
appropriate amount of   
exercise. We agreed   
that in-office  
visits (more content   
not included)...    Normal                                  [facility]  
   
                                                    Office Visiton 2024   
   
                                        Follow-up visit     [id number] Lux Mittal   
2000 F  
Date Provider   
Department Center  
2024   
[id number] SANCHEZ [facility]UL[facility]IZ   
[facility]  
Family History  
Problem Relation Age   
of Onset  
Thyroid disease Mother  
Perkins syndrome Father  
Asthma Sister  
Breast cancer Maternal   
Grandmother  
Cancer Maternal   
Grandfather  
Cancer Paternal   
Grandfather  
Family Status -   
Relation Status Age at   
Death  
Mother Alive  
Father Alive  
Sister  
Sister   
Maternal Grandmother   
  
Maternal Grandfather   
  
Paternal Grandmother   
Alive  
Paternal Grandfather   
  
Level of Service:05199   
ND OFFICE/OUTPATIENT   
ESTABLISHED MOD MDM 30   
MIN  
Reason for Visit and   
Comments:  
Medication Check   
[[id number]]        [facility]  
   
                                                    PATINSon 2024   
   
                                        PATINS              Asked about extended  
   
release metformin.  
Give update in about a   
month- if you want to   
increase dose of   
Wellbutrin or not   [facility]  
   
                                                    Progress Noteon 2024   
   
                                        Progress Note       Recommend following   
up   
with OB/GYN regarding   
side effects of   
metformin and  
requesting if she can   
take extended release   
metformin to see if   
that is better  
tolerated.          Normal                                  [facility]  
   
                                        Progress Note       Denies any suicidal   
or   
homicidal ideation.   
Anxiety and depression   
have improved  
we will continue   
Wellbutrin 150 mg   
daily, patient to give   
us an update in about  
1 month via [employer] if   
she would like to   
increase the dose to   
300 mg. We will  
schedule her for   
follow-up in 3 months [facility]  
   
                                        Progress Note       Patient was identifi  
ed   
by name and Date of   
Birth.              Normal                                  [facility]  
   
                                        Progress Note       2024  
Don Mittal (:   
2000) is a 23   
y.o. female ,   
Established patient,   
here  
for evaluation of the   
following chief   
complaint(s):  
Medication Check  
ASSESSMENT/PLAN:  
1. Anxiety and   
depression  
Assessment & Plan:  
Denies any suicidal or   
homicidal ideation.   
Anxiety and depression   
have improved  
we will continue   
Wellbutrin 150 mg   
daily, patient to give   
us an update in about  
1 month via [employer] if   
she would like to   
increase the dose to   
300 mg. We will  
schedule her for   
follow-up in 3 months  
2. PCOS (polycystic   
ovarian syndrome)  
Assessment & Plan:  
Recommend following up   
with OB/GYN regarding   
side effects of   
metformin and  
requesting if she can   
take extended release   
metformin to see if   
that is better  
tolerated.  
Follow up for 3 month   
[facility].  
SUBJECTIVE/OBJECTIVE:  
HPI -  
Don Mittal (:   
2000) is a 23   
y.o. female ,   
Established patient,   
here  
for the evaluation of   
the following chief   
complaint(s):  
Medication Check  
RACING WITH  IN   
[place] WENT WELL, WAS   
SUPER BUSY. Did not   
get to do any  
sightseeing as they   
were busy at the track   
every day. Got back   
from [place] on  
2024.   
Patient reports she  
went to ob/gyn for   
pcos- was started on   
metformin and reports   
that she has been  
having nausea and mild   
diarrhea with it. She   
has a follow-up with   
them  
tomorrow.  
Reports that the   
wellbutrin has helped   
anxiety and mood. Is   
doing better  
handling things. Is   
not. Feeling as   
overwhelmed, reports   
sleeping is getting  
back on schedule.  
Still doing shreya and   
yoga. Most days.  
Would like to keep the   
Wellbutrin at the   
current dose for now  
Prior to Admission   
medications  
Medication Sig Start   
Date End Date Taking?   
Authorizing Provider  
buPROPion XL   
(Wellbutrin XL) 150 MG   
24 hr tablet Take 1   
tablet (150 mg) by   
mouth  
every morning. Do not   
crush, chew, or split.   
1/22/24 3/22/24 Yes   
Monica Sanchez, APRN - CNP  
metFORMIN (Glucophage)   
500 MG tablet Take 1   
tablet by mouth in the   
morning and 1  
tablet in the evening.   
Take with meals.   
24 Yes   
Historical Provider,  
MD  
Prenatal   
Multivit-Min-Fe-FA   
(PRENATAL/IRON PO)   
Take 1 tablet by mouth   
in the  
morning. Yes   
Historical Provider,   
MD  
Prenatal MV-Min-Fe   
Fum-FA-DHA (PRENATAL 1   
PO) Take by mouth. Yes   
Historical  
Provider, MD  
Review of Systems  
Constitutional:   
Negative for activity   
change, chills,   
fatigue and fever.  
HENT: Negative.   
Negative for   
congestion.  
Respiratory: Negative.  
Cardiovascular:   
Negative.  
Gastrointestinal:   
Positive for diarrhea   
and nausea. Negative   
for abdominal pain  
and vomiting.  
Genitourinary:   
Positive for menstrual   
problem (Irregular   
menses due to PCOS).  
Neurological:   
Negative.  
Vitals:  
24 0735  
BP: 112/69  
Pulse: 94  
Resp: 18  
Temp: 37 ?C (98.6 ?F)  
TempSrc: Infrared  
SpO2: 98%  
Weight: 170 lb 3.2 oz   
(77.2 kg)  
Physical Exam  
Constitutional:  
Appearance: Normal   
appearance.  
HENT:  
Head: Normocephalic   
and atraumatic.  
Mouth/Throat:  
Mouth: Mucous   
membranes are moist.  
Pharynx: Oropharynx is   
clear. No posterior   
oropharyngeal   
erythema.  
Cardiovascular:  
Rate and Rhythm:   
Normal rate and   
regular rhythm.  
Pulses: Normal pulses.  
Heart sounds: Normal   
heart sounds.  
Pulmonary:  
Effort: Pulmonary   
effort is normal.  
Breath sounds: Normal   
breath sounds.  
Skin:  
General: Skin is warm   
and dry.  
Neurological:  
Mental Status: She is   
alert and oriented to   
person, place, and   
time.  
Psychiatric:  
Mood and Affect: Mood   
normal.  
Behavior: Behavior   
normal.  
Thought Content:   
Thought content   
normal.  
Judgment: Judgment   
normal.  
An electronic   
signature was used to   
authenticate this   
note.  
SHANEKA Chacon CNP  
2024  
9:35 AM             [facility] 2024   
   
                                        [facility]                Office Visit (OBGYWM  
)  
----------------------  
--------------  
DON MITTAL   
([id number]) 2000   
F  
Date Time Provider   
Department  
24 7:00 AM   
ERNESTINE SHER  
During your visit   
today, we recorded the   
following information   
about you:  
Blood pressure Weight   
Last Period  
100/72 75.3 kg   
24  
Ernestine Sher APRN.CNP   
2024 1:09 PM   
Signed  
Don GRIMES Kyrie is a 23   
year old female who   
presents for problem   
visit missed  
menses x 2 months and   
multiple negative home   
pregnancy tests.  
HPI: Stopped OCP to   
attempt pregnancy in   
2023. Last   
menses in  
November. Multiple   
home pregnancy tests   
have all been   
negative.  
Has PCOS - recently   
noticing more facial   
hair although acne   
improving. 20 lb  
weight gain with   
Lexapro for anxiety.   
Changed to bupropion a   
few days ago by  
PCP.  
No previous   
pregnancies. Partner   
has 3 children.  
Shreya and yoga daily   
for exercise. Trying   
to eat healthy.  
OB History  
 T0  L0  
SAB0 IAB0 Ectopic0   
Multiple0 Live Births0  
Comment: 3   
stepchildren  
Gyn History  
LMP: 2024,   
Having periods  
Age at Menarche:  
Age at First   
Pregnancy:  
Age at Menopause:  
Gyn History Comments:  
Sexual Activity: Yes;   
Male; sexually active   
with   
Contraception: Pill  
PAST MEDICAL HISTORY  
Diagnosis Date  
Exertional asthma   
2012  
Generalized anxiety   
disorder  
Medial meniscus tear   
2012  
PCOS (polycystic   
ovarian syndrome)   
Unspecified otitis   
media  
Recurrent otitis  
Vocal cord dysfunction   
05/15/2012  
PAST SURGICAL HISTORY  
Procedure Laterality   
Date  
ANKLE ARTHROSCOPY   
Right 16  
Dr. Yeung  
FAMILY HISTORY  
Problem Relation Age   
of Onset  
other (Other [Other])   
Paternal Grandfather  
leukemia  
Cancer Maternal   
Grandmother  
breast  
Asthma Sister  
Social History  
Tobacco Use  
Smoking status: Never  
Smokeless tobacco:   
Never  
Vaping Use  
Vaping Use: Never used  
Substance Use Topics  
Alcohol use: Yes  
Drug use: No  
Current Outpatient   
Medications  
Medication Sig  
buPROPion XL   
(WELLBUTRIN XL) 150 mg   
24 hr tablet Take 150   
mg by mouth every  
morning.  
PNV/iron/folic acid   
(PRENATAL   
VITAMIN-IRON-FA ORAL)   
Take 1 tablet by mouth  
once daily.  
albuterol HFA   
(PROVENTIL HFA,   
VENTOLIN HFA) 90   
mcg/actuation inhaler   
Inhale 2  
Puffs as instructed   
every 4 hours as   
needed.  
Drospirenone-Ethinyl   
Estradiol (SINGH,   
28,) 3-0.03 mg per   
tablet Take 1  
tablet by mouth once   
daily. Take one active   
pill continuously x 3   
months-  
discard placebo pills   
(Patient not taking:   
Reported on 2024)  
No current   
facility-administered   
medications for this   
visit.  
Allergies As of Date:   
2024  
Allergen Noted   
Reaction  
GRASS POLLEN   
2013 Unknown  
SEASONAL ALLERGIES   
2013 Unknown  
TREES 2013   
Unknown  
Fully Assessed   
2024  
REVIEW OF SYSTEMS  
Abdomen: No bloating,   
early satiety,   
indigestion, or   
increased flatulence.   
No  
abdominal pain,   
nausea, vomiting,   
diarrhea, or   
constipation.  
Bladder: No dysuria,   
gross hematuria,   
urinary frequency,   
urinary urgency, or  
incontinence.  
Allergies and current   
medication updated:Yes  
EXAM: /72   Wt   
166 lb (75.3kg)   LMP   
2024  
GENERAL: pleasant,   
 female in no   
apparent distress  
CHEST: Normal   
inspiratory effort  
NEURO: alert and   
oriented x3,exam   
grossly non-focal  
ASSESSMENT/PLAN:  
1. Irregular menses -   
ICD9: 626.4, ICD10:   
N92.6 (primary   
diagnosis)  
- HCG QUAL UR B/O -   
negative  
- METFORMIN 500 MG   
TABLET  
- HGB A1C  
- INSULIN ASSAY BLOOD  
- GLUCOSE FASTING BLD  
2. PCOS (polycystic   
ovarian syndrome) -   
ICD9: 256.4, ICD10:   
E28.2  
- METFORMIN 500 MG   
TABLET  
Agreeable to begin   
Metformin 500 mg with   
dinner daily x 1 week.   
If tolerating  
will increase to 2   
tablets with dinner   
daily.  
We discussed common   
side effects of this   
medication including   
nausea, changes  
in bowel habits,   
abdominal discomfort,   
and flatulence.   
Discussed taking it   
with  
food and complication   
of lactic acidosis and   
signs/symptoms and   
medication  
handout given. Further   
instructed that if she   
experiences malaise,   
muscle  
aches, difficulty   
breathing, or severe   
abdominal pain to seek   
immediate medical  
attention.  
- HGB A1C  
- INSULIN ASSAY BLOOD  
- GLUCOSE FASTING BLD  
- - discussed with pt   
- review of PCOS with   
signs and symptoms.   
Increased risk  
of DMT2, CAD,   
infertility. Treatment   
discussed: weight loss   
to restore  
ovulatory cycles and   
reduce androgen   
concentrations,   
exercise, cyclic  
medroxyprogesterone to   
induce menses.   
Metformin discussed -   
explained that it  
is not considered a   
first-line treatment   
as it does not change   
pregnancy  
outcomes but that it   
will decrease HgbA1c   
although weight loss   
will do the  
same. Discussed that   
she may need   
medication to induce   
ovulation if she does  
not become pregnant   
with lifestyle changes   
and weight loss.  
- Eat primarily whole   
foods. Limit carbs,   
especially processed   
carbs.  
- Do not drink your   
calories  
- 30 grams of protein   
for your first meal of   
the day decreases your   
hu (more content not   
included)...        Normal                                  [facility]  
   
                                                    Glucose p fast SerPl-mCncon   
2024   
   
                                                    Glucose post fast   
[Mass/Vol]      101 mg/dL       High            74-99           [facility]  
   
                                        Comment on above:   Order Comment: Speci  
men Type: BLOOD SPECIMEN  
Ordering Facility: [facility]  
Address: [address] [place] [address]   
   
                                                            Result Comment: Amer  
[place] Diabetes Association guidelines state that   
a diabetes mellitus diagnosis is preliminarily made when the   
fasting plasma glucose meets or exceeds 126 mg/dL. In the absence   
of unequivocal hyperglycemia, results should be confirmed with   
repeat testing. Patients are at increased risk for diabetes   
mellitus (prediabetes) when the fasting glucose is 100 to 125   
mg/dL.   
   
                                                            Performed By: #### 1  
[id number] ####  
[facility]  
CLIA [id number]  
[address] [place] [address] [place]   
   
                                                    HbA1c (Bld)on 2024   
   
                                                    Average glucose   
Estimated from   
glycated   
hemoglobin (Bld)   
[Mass/Vol]      111 mg/dL       Normal                          [facility]  
   
                                        Comment on above:   Order Comment: Speci  
men Type: BLOOD SPECIMEN  
Ordering Facility: [facility]  
Address: [address] [place] [address]   
   
                                                            Result Comment: eAG:  
 (Estimated average glucose) is a calculated   
value from HgbA1c and is representative of the average blood   
glucose level in the last 2-3 month period.   
   
                                                            Performed By: #### 5  
[id number] ####  
[facility]  
CLIA [id number]  
[address] [place] [address] [place]   
   
                                                    HbA1c (Bld) [Mass   
fraction]       5.5 %           Normal          4.3-5.6         [facility]  
   
                                        Comment on above:   Order Comment: Speci  
men Type: BLOOD SPECIMEN  
Ordering Facility: [facility]  
Address: [address] [place] [address]   
   
                                                            Result Comment: Amer  
ican Diabetes Association guidelines indicate   
that patients with HgbA1c in the range 5.7-6.4% are at increased   
risk for development of diabetes, and intervention by lifestyle   
modification may be beneficial. HgbA1c greater or equal to 6.5% is   
considered diagnostic of diabetes.   
   
                                                            Performed By: #### 5  
[id number] ####  
[facility]  
CLIA [id number]  
[address] [place] [address] [place]   
   
                                                    Insulin SerPl-aCncon   
024   
   
                      Insulin Qn 13.0 u[IU]/mL Normal     3.0-25.0   [facility]  
   
                                        Comment on above:   Order Comment: Speci  
men Type: BLOOD SPECIMEN  
Ordering Facility: [facility]  
Address: [address] [place] [address]   
   
                                                            Performed By: #### 2  
[id number] ####  
[facility]  
CLIA [id number]  
[address] [place] [address]  
[address]   
   
                                                    Office Visiton 2024   
   
                                        Follow-up visit     [id number] Lux Mittal   
2000 F  
Date Provider   
Department Center  
2024   
[facility] MORRIS [facility]   
[facility]  
Family History  
Problem Relation Age   
of Onset  
Thyroid disease Mother  
Perkins syndrome Father  
Asthma Sister  
Breast cancer Maternal   
Grandmother  
Cancer Maternal   
Grandfather  
Cancer Paternal   
Grandfather  
Family Status -   
Relation Status Age at   
Death  
Mother Alive  
Father Alive  
Sister  
Sister   
Maternal Grandmother   
  
Maternal Grandfather   
  
Paternal Grandmother   
Alive  
Paternal Grandfather   
  
Level of Service:79048   
ND OFFICE/OUTPATIENT   
ESTABLISHED MOD MDM 30   
MIN  
Reason for Visit and   
Comments:  
Medication Check   
[[id number]]  
Weight Gain [180]   Normal                                  [facility]  
   
                                                    PATINSon 2024   
   
                                        PATINS              Start wellbutrin and  
   
decrease lexapro to 10   
mg daily x 7 days,   
then stop the  
lexapro.  
Check [employer] james   
for meditation      Normal                                  [facility]  
   
                                                    Progress Noteon 2024   
   
                                        Progress Note       Will have her follow  
   
up with her ob/gyn,   
suspect gain posssilby   
from pcos.  
Continue exercise,   
healthy eating and   
portion control. Will   
stop lexapro  
(possible weight gain)   
and switch to   
wellbutrin.         Normal                                  [facility]  
   
                                        Progress Note       Denies si/hi. Anxiet  
y   
and depression   
symptoms better but   
still is having   
trouble  
focusing. Will taper   
discontinue lexapro   
and start wellbutrin.   
Follow up in  
about 1 month       [facility]  
   
                                        Progress Note       Patient was identifi  
ed   
by name and Date of   
Birth.              Normal                                  [facility]  
   
                                        Progress Note       2024  
Don Mittal (:   
2000) is a 23   
y.o. female ,   
Established patient,   
here  
for evaluation of the   
following chief   
complaint(s):  
Medication Check and   
Weight Gain  
ASSESSMENT/PLAN:  
1. Anxiety and   
depression  
Assessment & Plan:  
Denies si/hi. Anxiety   
and depression   
symptoms better but   
still is having   
trouble  
focusing. Will taper   
discontinue lexapro   
and start wellbutrin.   
Follow up in  
about 1 month  
Orders:  
- buPROPion XL   
(Wellbutrin XL) 150 MG   
24 hr tablet; Take 1   
tablet (150 mg)  
by mouth every   
morning. Do not crush,   
chew, or split.,   
Starting Mon   
2024,  
Until Fri 3/22/2024,   
Normal  
2. Weight gain  
Assessment & Plan:  
Will have her follow   
up with her ob/gyn,   
suspect gain posssilby   
from pcos.  
Continue exercise,   
healthy eating and   
portion control. Will   
stop lexapro  
(possible weight gain)   
and switch to   
wellbutrin.  
Follow up in about 1   
month (around   
2024).  
SUBJECTIVE/OBJECTIVE:  
\[facility]\"" -  
Don Mittal (:   
2000) is a 23   
y.o. female ,   
Established patient,   
here  
for the evaluation of   
the following chief   
complaint(s):  
Medication Check and   
Weight Gain  
Stopped nicotine and   
stopped etoh since we   
last met. . Has gained   
some weight.  
She thinks it may be   
from going off of her   
birth control   
medication, Is working  
out with online [employer]   
classes daily,   
watching what she is   
eating. Is doing well  
as far as eating and   
exercising. She also   
is doing yoga.  
Stopped birth control.   
Has not had period   
since sept and   
November and has been  
pregnancy testing. Is   
trying to get   
pregnant. Will be   
following up with her  
ob/gyn.  
Reports anxiety is   
better, but is not   
able to focus well   
still. Denies si/hi.  
Her and her    
are getting ready to   
go to [place] for car   
racing for 11  
days. Her    
races cars. States she   
is looking forward to   
going.  
Prior to Admission   
medications  
Medication Sig Start   
Date End Date Taking?   
Authorizing Provider  
escitalopram (Lexapro)   
20 MG tablet Take 1   
tablet (20 mg) by   
mouth daily.  
12/15/23 3/14/24 Yes   
SHANEKA Chacon - CNP  
Prenatal MV-Min-Fe   
Fum-FA-DHA (PRENATAL 1   
PO) Take by mouth. Yes   
Historical  
Provider, MD  
Review of Systems  
Constitutional:   
Negative.  
HENT: Negative.  
Respiratory: Negative.  
Cardiovascular:   
Negative.  
Gastrointestinal:   
Negative.  
Genitourinary:   
Negative.  
Musculoskeletal:   
Negative.  
Neurological:   
Negative.  
Psychiatric/Behavioral  
: Positive for   
decreased   
concentration.   
Negative for  
self-injury, sleep   
disturbance and   
suicidal ideas. The   
patient is  
nervous/anxious.  
Vitals:  
24 1346  
BP: 133/89  
Pulse: 98  
Resp: 18  
Temp: 36.7 ?C (98.1   
?F)  
TempSrc: Infrared  
SpO2: 97%  
Weight: 164 lb (74.4   
kg)  
Physical Exam  
Constitutional:  
General: She is not in   
acute distress.  
Appearance: Normal   
appearance. She is not   
ill-appearing.  
HENT:  
Head: Normocephalic   
and atraumatic.  
Right Ear: Tympanic   
membrane normal.  
Left Ear: Tympanic   
membrane normal.  
Nose: No congestion or   
rhinorrhea.  
Mouth/Throat:  
Mouth: Mucous   
membranes are moist.  
Pharynx: Oropharynx is   
clear. No posterior   
oropharyngeal   
erythema.  
Eyes:  
Conjunctiva/sclera:   
Conjunctivae normal.  
Cardiovascular:  
Rate and Rhythm:   
Normal rate and   
regular rhythm.  
Pulmonary:  
Effort: Pulmonary   
effort is normal.  
Breath sounds: Normal   
breath sounds.  
Lymphadenopathy:  
Cervical: No cervical   
adenopathy.  
Skin:  
General: Skin is warm   
and dry.  
Neurological:  
Mental Status: She is   
alert and oriented to   
person, place, and   
time.  
Psychiatric:  
Mood and Affect: Mood   
normal.  
Behavior: Behavior   
normal.  
Thought Content:   
Thought content   
normal.  
Judgment: Judgment   
normal.  
An electronic   
signature was used to   
authenticate this   
note.  
SHANEKA Chacon - JAMES  
2024  
4:24 PM             Normal                                  [facility]  
   
                                                    Office Visiton 2023   
   
                                        Follow-up visit     [id number] Lux Mittal   
2000 F  
Date Provider   
Department Center  
2023   
[id number]  
Family History  
Problem Relation Age   
of Onset  
Thyroid disease Mother  
Perkins syndrome Father  
Asthma Sister  
Breast cancer Maternal   
Grandmother  
Cancer Maternal   
Grandfather  
Cancer Paternal   
Grandfather  
Family Status -   
Relation Status Age at   
Death  
Mother Alive  
Father Alive  
Sister  
Sister   
Maternal Grandmother   
  
Maternal Grandfather   
  
Paternal Grandmother   
Alive  
Paternal Grandfather   
  
Level of Service:25906   
ND PERIODIC PREVENTIVE   
MED EST PATIENT 18-39   
YRS  
Reason for Visit and   
Comments:  
Follow-up [478027]  
Health Maintenance   
[872] - Lipid-pended  
HIV/Hep  
C-declined  
Covid-declined  
PHQ-completed  
Pap-[facility] (will scan  
in)  
Tdap-declined  
Influenza-declined  
?                   Normal                                  [facility]  
   
                                                    PATINSon 2023   
   
                                        PATINS              Melatonin 1- 5 mg   
nightly to help with   
sleep.  
  
Send update via   
[place] to provider in   
1 month on how you are   
doing on the  
lexapro 10 mg daily. Normal                                  [facility]  
   
                                                    Progress Noteon 2023   
   
                                        Progress Note       Denies any suicidal   
or   
homicidal ideation.   
Reports improved   
symptoms. We will  
continue on Lexapro 10   
mg daily she is to   
provide an update   
through [facility] in  
approximately 4 weeks.   
Consider up titration   
if needed at that   
point.              Normal                                  [facility]  
   
                                        Progress Note       Patient was identifi  
ed   
by name and Date of   
Birth.  
  
Health Main:  
  
Lipid-pended  
HIV/Hep C-declined  
Covid-declined  
PHQ-completed  
Pap-[facility] (will scan in)  
Tdap-declined  
Influenza-declined  Normal                                  [facility]  
   
                                        Progress Note       2023  
Don Mittal (:   
2000) is a 23   
y.o. female ,   
Established patient,   
here  
for evaluation of the   
following chief   
complaint(s):  
Follow-up and Health   
Maintenance   
(Lipid-pended/HIV/Hep  
C-declined/Covid-decli  
edin/PHQ-completed/Pap-  
[facility] (will scan  
in)/Tdap-declined/Infl  
uenza-declined/ )  
ASSESSMENT/PLAN:  
1. Annual physical   
exam  
- Comprehensive   
metabolic panel  
- CBC  
- Lipid panel  
2. Screening for   
deficiency anemia  
- CBC  
3. Screening for   
cholesterol level  
- Lipid panel  
4. Anxiety and   
depression  
Assessment & Plan:  
Denies any suicidal or   
homicidal ideation.   
Reports improved   
symptoms. We will  
continue on Lexapro 10   
mg daily she is to   
provide an update   
through [facility] in  
approximately 4 weeks.   
Consider up titration   
if needed at that   
point.  
Follow up in about 3   
months (around   
2024).  
SUBJECTIVE/OBJECTIVE:  
HPI -  
Don Mittal (:   
2000) is a 23   
y.o. female ,   
Established patient,   
here  
for the evaluation of   
the following chief   
complaint(s):  
Follow-up and Health   
Maintenance   
(Lipid-pended/HIV/Hep  
C-declined/Covid-decli  
edin/PHQ-completed/Pap-  
[facility] (will scan  
in)/Tdap-declined/Infl  
uenza-declined/ )  
Recently newly   
established patient to   
us about 2 weeks ago   
who had presented for  
an acute complaint of   
anxiety and   
depression. She was   
started on Lexapro 10   
mg  
at that time and   
recommended to get set   
up with a counselor.   
No other acute  
complaints today.  
Anxiety/dep- feels   
like medication is   
helping some. Is able   
to think clearer. Is  
processing thoughts   
better. States that   
she has not broke down   
and cried like  
she was before. Denies   
any suicidal or   
homicidal ideation.  
She has yet to set up   
with a counselor.  
Has OB/GYN which she   
follows for women's   
health.  
Prior to Admission   
medications  
Medication Sig Start   
Date End Date Taking?   
Authorizing Provider  
escitalopram (Lexapro)   
10 MG tablet Take 1   
tablet (10 mg) by   
mouth daily.  
23 Yes   
SHANEKA Chacon - CNP  
Prenatal MV-Min-Fe   
Fum-FA-DHA (PRENATAL 1   
PO) Take by mouth. Yes   
Historical  
Provider, MD  
Review of Systems  
Constitutional:   
Negative.  
HENT: Negative.  
Respiratory: Negative.  
Cardiovascular:   
Negative.  
Gastrointestinal:   
Negative.  
Genitourinary:   
Negative.  
Musculoskeletal:   
Negative.  
Neurological:   
Negative.  
Psychiatric/Behavioral  
: Positive for   
decreased   
concentration and   
sleep  
disturbance (still not   
sleeping well.).   
Negative for   
self-injury and   
suicidal  
ideas. The patient is   
nervous/anxious.  
Vitals:  
23 0929  
BP: 116/77  
Pulse: 98  
Resp: 18  
Temp: 36.4 ?C (97.6   
?F)  
TempSrc: Infrared  
SpO2: 99%  
Weight: 151 lb (68.5   
kg)  
Physical Exam  
Constitutional:  
General: She is not in   
acute distress.  
Appearance: Normal   
appearance. She is not   
ill-appearing.  
HENT:  
Head: Normocephalic   
and atraumatic.  
Right Ear: Tympanic   
membrane normal.  
Left Ear: Tympanic   
membrane normal.  
Mouth/Throat:  
Mouth: Mucous   
membranes are moist.  
Pharynx: Oropharynx is   
clear. No posterior   
oropharyngeal   
erythema.  
Eyes:  
Conjunctiva/sclera:   
Conjunctivae normal.  
Cardiovascular:  
Rate and Rhythm:   
Normal rate and   
regular rhythm.  
Pulses: Normal pulses.  
Heart sounds: Normal   
heart sounds.  
Musculoskeletal:  
Right lower leg: No   
edema.  
Left lower leg: No   
edema.  
Skin:  
General: Skin is warm   
and dry.  
Neurological:  
Mental Status: She is   
alert and oriented to   
person, place, and   
time.  
Psychiatric:  
Mood and Affect: Mood   
normal.  
Behavior: Behavior   
normal.  
Thought Content:   
Thought content   
normal.  
Judgment: Judgment   
normal.  
An electronic   
signature was used to   
authenticate this   
note.  
SHANEKA Chacon CNP  
2023  
1:22 PM             [facility]  
   
                                                    Office Visiton 2023   
   
                                        Follow-up visit     [id number] Lux Mittal   
2000 F  
Date Provider   
Department Center  
2023   
[id number] SANCHEZ [facility]  
Family History  
Problem Relation Age   
of Onset  
Thyroid disease Mother  
Perkins syndrome Father  
Asthma Sister  
Breast cancer Maternal   
Grandmother  
Cancer Maternal   
Grandfather  
Cancer Paternal   
Grandfather  
Family Status -   
Relation Status Age at   
Death  
Mother Alive  
Father Alive  
Sister  
Sister   
Maternal Grandmother   
  
Maternal Grandfather   
  
Paternal Grandmother   
Alive  
Paternal Grandfather   
  
Level of Service:70519   
ND OFFICE/OUTPATIENT   
NEW MODERATE MDM 45-59   
MINUTES  
Reason for Visit and   
Comments:  
New Patient [542]   Normal                                  [facility]  
   
                                                    PATINSon 2023   
   
                                        PATINS              Start with 1/2 tab   
daily x 1 week, then   
increase to 1 whole   
tablet  
If you or someone you   
know needs support   
now, call or text [phone]   
or chat  
[email or web address]  
----------------------  
--------------  
----------------------  
----------------------  
---  
To find providers in   
your area for mental   
health services  
https://findtreatment.  
gov/  
To find additional   
support and   
information regarding   
mental health services   
and  
substance abuse  
https://www.samhsa.gov  
/  
Mental Health and   
Psychiatry Resources  
Counseling  
[place] [address] [address]  
[phone]  
[address]  
[phone]  
[facility]   
Office  
[address],   
[place]  
[phone]  
[facility]   
and Consultation   
Services (LGBTQIA+   
inclusive)  
[phone]  
[place] Office  
[address] [facility], [address]   
[address]  
[place]   
[address]  
[place] Office  
[facility] [place] [address]  
[email or web address]  
[facility]   
[facility]  
[address] [place]  
[phone]  
Psychiatry  
[facility] Psychiat[facility]  
[facility]  
[phone]  
[place]  
Ladonna Izquierod, APRN-CNP  
(commercial insurance   
only)  
Behavioral Health   
Services  
[facility]  
[phone]  
Counseling and   
Psychiatry  
[facility]  
[address] [place] [address] Drive   
[address], [place]  
[phone]  
[facility]  
[place] [facility]   
Clinic: [phone]  
[place] [facility]   
[facility]: [phone]  
[facility]   
Outpatient Clinic:   
[phone]  
[facility]   
Services, [address]   
[place] (OPEN   
): [phone]  
[facility] Behavioral   
Health, Psychiatry and   
Traumatic Stress   
Center  
[facility] [address]   
[address]  
[phone]  
Estrogen Gene Test [facility]  
Multiple locations in   
[place]  
Go to [email or web address] Normal                                  [facility]  
   
                                                    Progress Noteon 2023   
   
                                        Progress Note       Denies any active   
suicidal or homicidal   
ideation. Symptoms are   
poorly  
controlled. Will start   
Lexapro 10 mg daily,   
recommend that she   
start cognitive  
behavioral   
therapy-resources   
provided. Close   
follow-up in 2 weeks   
sooner for  
any worsening symptoms Normal                                  [facility]  
   
                                        Progress Note       Follow-up with OB/GY  
N   
as directed         Normal                                  [facility]  
   
                                        Progress Note       Symptoms are   
controlled.         Normal                                  [facility]  
   
                                        Progress Note       Controlled. Has not   
needed her rescue   
inhaler over a year,   
continue albuterol  
as needed as needed Normal                                  [facility]  
   
                                        Progress Note       2023  
Don Mittal (:   
2000) is a 23   
y.o. female , New   
patient, here for  
evaluation of the   
following chief   
complaint(s):  
New Patient  
ASSESSMENT/PLAN:  
1. Anxiety and   
depression  
Assessment & Plan:  
Denies any active   
suicidal or homicidal   
ideation. Symptoms are   
poorly  
controlled. Will start   
Lexapro 10 mg daily,   
recommend that she   
start cognitive  
behavioral   
therapy-resources   
provided. Close   
follow-up in 2 weeks   
sooner for  
any worsening symptoms  
Orders:  
- escitalopram   
(Lexapro) 10 MG   
tablet; Take 1 tablet   
(10 mg) by mouth  
daily., Starting 2023, Until Mon   
2024, Normal  
2. Mild intermittent   
asthma, unspecified   
whether complicated  
Assessment & Plan:  
Controlled. Has not   
needed her rescue   
inhaler over a year,   
continue albuterol  
as needed as needed  
3. Vocal cord   
dysfunction  
Assessment & Plan:  
Symptoms are   
controlled.  
4. PCOS (polycystic   
ovarian syndrome)  
Assessment & Plan:  
Follow-up with OB/GYN   
as directed  
Follow up in about 2   
weeks (around   
2023) for   
Recheck.  
SUBJECTIVE/OBJECTIVE:  
HPI -  
Don Mittal presents   
as new patient,   
previous primary care   
provider Virginie Mann III, last seen 10   
years by previous   
provider.  
Specialists/other   
providers? Yes,   
describe: ob/gyn.  
Chief complaint(s):   
New Patient  
Patient presents today   
to establish and for   
an acute complaint of   
anxiety and  
depression symptoms.   
Reports these have   
been worsening over   
the past few months  
and is not sure if it   
is related to the   
amount of stressors   
that she is  
currently   
experiencing. States   
she has a history of   
some trauma in her  
childhood. Her father   
is currently   
estranged, has a good   
relationship now with  
her mother.  
Is working full-time   
at [employer]   
and then part-time at   
[employer].  
She also helps with   
administrative and   
business aspects with   
her  and  
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   
business. Was    
2 years ago to her   
 and is  
now his stepmom to 3   
young children, they   
have them usually   
every other  
weekend. She reports   
this is stressful for   
her and she tries the   
best she can  
but struggles with   
figuring out how to be   
a good mom. There are   
some stressors  
with their biological   
mother whom may or may   
not have bipolar   
disorder or some  
type of mood disorder   
as she is not always   
consistent with   
behaviors/attitude  
toward her.  
States that she has   
struggled with anxiety   
and some depression   
symptoms since  
childhood but has   
never been on any   
medications and only   
briefly saw a   
counselor  
1 time about 6 years   
ago. Reports daily   
symptoms of feeling   
overwhelmed,  
difficulty   
concentrating, and   
difficulty in daily   
activities. Reports   
fleeting  
thoughts of suicide   
when feeling   
overwhelmed with no   
plan. States she would  
never act on any of   
those thoughts due to   
her stepchildren and   
has been.  
Vocal cord   
dysfunction-reports   
diagnosed as a child   
and has gone through   
speech  
therapy for this which   
has helped. States she   
has not had any   
flareups. Also  
thinks that it helped   
after she quit vaping.  
Asthma,-reports   
symptoms controlled.   
Has not needed her   
rescue inhaler for a  
very long time, no   
hospitalizations  
PCOS-was briefly on   
birth control through   
her OB/GYN however   
decided not to take  
it any longer . Is   
currently taking   
prenatal vitamins   
states she would be   
okay  
if she got pregnant  
Past Medical History:  
Diagnosis Date  
Asthma   
Past Surgical History:  
Procedure Laterality   
Date  
ANKLE SURGERY Right  
x 3. Dr. Reza Verdin- [facility] /   
previously [place]   
[facility]- last  
sx 2019  
WISDOM TOOTH   
EXTRACTION Bilateral  
  
Family History  
Problem Relation Name   
Age of Onset  
Thyroid disease Mother  
Perkins syndrome Father  
Asthma Sister Karo Kincaid  
Breast cancer Maternal   
Grandmother  
Cancer Maternal   
Grandfather  
Cancer Paternal   
Grandfather  
Social History  
Socioeconomic History  
Marital status:   
  
Spouse name: Not on   
file  
Number of children:   
Not on file  
Years of education:   
Not on file  
Highest education   
level: Not on file  
Occupational History  
Not on file  
Tobacco Use  
Smoking status: Never  
Smokeless tobacco:   
Current  
Types: Chew  
Tobacco comments:  
Nicotine pouch,  
Vaping Use  
Vaping Use: Former  
Substance and Sexual   
Activity  
Alcohol use: Yes  
Alcohol/week: 5.0   
standard drinks of   
alcohol  
Types: 5 Shots of   
liquor per week  
Comment: occ.  
Drug use: Never  
Sexual activity: Yes  
Partners: Male  
Birth   
control/protection:   
None  
Other Topics Concern  
Not on file  
Social History   
Narrative  
Lives with -   
padmini, 2 dogs- nancy and   
lev, dalmation and   
rott- both 2 yo.  has 3   
kids- live wth them   
occasionally- every   
other weekend (10-  
Cristian boy,8- Jenny-   
girl, 2- Akash- boy)  
Works at [employer]   
bank full time and   
part time [employer]   
[employer]  
 races cars.   
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   
and then lives in   
motor home when he  
is racing.  
Social Determinants of   
Health  
Financial Resource   
Strain: Not on file  
Food Insecurity: Not   
on file  
Transp (more content   
not included)...    [facility]  
   
                                                    CNOVon 2023   
   
                                        CNOV                Office Visit ([facility]  
)  
----------------------  
--------------  
DON MITTAL   
([id number]) 2000   
F  
Date Time Provider   
Department  
23 2:30 PM AWILDA BELL   
OB[facility]  
During your visit   
today, we recorded the   
following information   
about you:  
Blood pressure Weight   
Height Last Period  
118/68 67.6 kg 1.626 m   
23  
Awilda Bell MD 2023   
3:09 PM Signed  
Chaperone offered:   
Patient declinesJoe Gloria is a 23 year   
old  who   
presents for an annual   
gynecologic exam  
without complaints.   
Does have irregular   
bleeding with pills.   
 does  
sprint car racing. Has   
three step children.   
Thinking about kids.  
Menses: cycles every   
28 days and 5-8 days   
but does have a lot of   
irregular  
bleeding for 3-4 days   
per month- not as   
heavy. Does not miss   
pills or take them  
late.  
Contraception:   
combined hormonal   
contraceptives  
HPV vaccine: Yes  
Last Pap: 2022   
normal  
HPV: N/A  
History of abnormal   
pap: No  
Last mammogram: never  
Sexually active: Yes  
History of STDS: None  
Patient concerns for   
STD exposure: No.  
Pain with intercourse:   
No  
Postcoital bleeding:   
No  
Exercise:active   
walking  
Diet: balanced  
OB History  
 T0  L0  
SAB0 IAB0 Ectopic0   
Multiple0 Live Births0  
Comment: 3   
stepchildren  
Gyn History  
LMP: 2022,   
Having periods  
Age at Menarche:  
Age at First   
Pregnancy:  
Age at Menopause:  
Gyn History Comments:  
Sexual Activity: Yes;   
Male; sexually active   
with   
Contraception: Pill  
PAST MEDICAL HISTORY  
Diagnosis Date  
Exertional asthma   
2012  
Medial meniscus tear   
2012  
Unspecified otitis   
media  
Recurrent otitis  
Vocal cord dysfunction   
5/15/2012  
PAST SURGICAL HISTORY  
Procedure Laterality   
Date  
ANKLE ARTHROSCOPY   
Right 14  
Dr. Yeung  
FAMILY HISTORY  
Problem Relation Age   
of Onset  
other (Other [Other])   
Paternal Grandfather  
leukemia  
Cancer Maternal   
Grandmother  
breast  
Asthma Sister  
SOCIAL HISTORY  
Social History  
Tobacco Use  
Smoking status: Never  
Smokeless tobacco:   
Never  
Vaping Use  
Vaping Use: Never used  
Substance Use Topics  
Alcohol use: Yes  
Drug use: No  
REVIEW OF SYSTEMS  
Abdomen: No abdominal   
pain, nausea,   
vomiting, diarrhea, or   
constipation. No  
bloating, early   
satiety, indigestion,   
or increased   
flatulence.  
Bladder: No dysuria,   
gross hematuria,   
urinary frequency,   
urinary urgency, or  
incontinence.  
Breast: No breast   
lumps, nipple d/c,   
overlying skin   
changes, redness or   
skin  
retraction.  
Allergies and current   
medication updated:Yes  
EXAM: /68   Ht   
5' 4  (1.63m)   Wt 149   
lb (67.6kg)   LMP   
2023   BMI  
25.56 kg/(m2).  
GENERAL: pleasant,   
 female in no   
apparent distress  
HEENT: Normocephalic,   
atraumatic, mucus   
membranes moist, and   
no lesions  
NECK: Supple, full   
range of motion, no   
adenopathy, and   
thyroid normal  
DERMATOLOGY: Normal,   
without lesions,   
non-icteric, and   
non-hirsute  
BREAST: soft,   
non-tender, symmetric,   
no dominant mass,   
normal nipple-areolar  
complex, no   
lymphadenopathy, and   
no nipple discharge  
ABDOMEN: soft,   
non-tender, and no   
masses  
PELVIC: external   
genitalia normal,   
normal Bartholin's   
glands, urethra,   
[place]'s  
glands, no vulvar   
lesions, no cervical   
lesions, good vaginal   
support,  
physiologic discharge   
present, normal   
appearing perineal   
body and perianal  
region  
BIMANUAL: uterus   
normal size, shape and   
consistency, no   
adnexal masses, and  
non-tender  
RECTOVAGINAL:   
deferred.  
NEURO: alert and   
oriented x3,exam   
grossly non-focal  
EXTREMITIES: normal  
ASSESSMENT/PLAN:  
1) Health maintenance:  
Pap/HPV up to date.  
Mammogram starting age   
38.  
Nutrition, exercise   
and routine health   
maintenance exams   
reviewed.  
Calcium/Vitamin D   
supplementation   
information provided.  
2) Contraception:   
combined hormonal   
contraceptives.   
Contraceptive options  
reviewed and   
information provided.   
Changed to singh  
3) STD screening:   
Declined STD check.  
4) Follow up one year   
or sooner as needed  
Awilda Schulz MD  
Allergies As of Date:   
2023 Noted   
Allergy Reaction  
GRASS POLLEN   
2013 16 -   
Unknown  
SEASONAL ALLERGIES   
2013 16 -   
Unknown  
Comments: [place]  
TREES 2013 16 -   
Unknown  
Date Reviewed:   
2023  
Reviewed by: Terrell   
Ma, Queta - Fully   
Assessed  
Reason for Visit:  
Yearly Exam [187]  
Primary Visit   
Diagnosis:Encounter   
for gynecological   
examination (general)  
(routine) without   
abnormal findings   
[Z01.419]  
Other Visit   
Diagnosis:PCOS   
(polycystic ovarian   
syndrome) [E28.2]  
Order(s):Drospirenone-  
Ethinyl Estradiol   
(SINGH, 28,) 3-0.03   
mg per tabletTake  
1 tablet by mouth once   
daily. Take one active   
pill continuously x 3  
months- discard   
placebo pillsDisp: 112   
tabletRfl: 3  
Prescriptions as of   
2023  
- Drospirenone-Ethinyl   
Estradiol (SINGH,   
28,) 3-0.03 mg per   
tablet  
Take 1 tablet by mouth   
once daily. Take one   
active pill   
continuously x 3  
months- discard   
placebo pills  
- albuterol HFA   
(PROVENTIL HFA,   
VENTOLIN HFA) 90   
mcg/actuatio (more   
content not   
included)...        Normal                                  [facility]  
   
                                                    CBC W Auto Differential pane  
l (Bld)on 2022   
   
                      Abs Immature Gran <0.03                 <0.10 k/uL [facility]  
   
                                                    Basophils (Bld)   
[#/Vol]         0.03 10*3/uL                    <0.11 k/uL      [facility]  
   
                                                    Basophils/100 WBC   
(Bld)           0.4 %                                           [facility]  
   
                                                    Differential cell   
count method Nom   
(Bld)           Auto                                            [facility]  
   
                                                    Eosinophils (Bld)   
[#/Vol]         0.18 10*3/uL                    <0.46 k/uL      [facility]  
   
                                                    Eosinophils/100   
WBC (Bld)       2.5 %                                           [facility]  
   
                                                    Erythrocyte   
distribution width   
(RBC) [Ratio]   13.0 %                          11.5 - 15.0 %   [facility]  
   
                                                    Hematocrit (Bld)   
[Volume fraction] 37.3 %                          36.0 - 46.0 %   [facility]  
   
                                                    Hemoglobin (Bld)   
[Mass/Vol]          12.3 g/dL                               11.5 - 15.5   
g/dL                                    [facility]  
   
                      Immature Gran % 0.1 %                            [facility]  
   
                                                    Lymphocytes (Bld)   
[#/Vol]             2.81 10*3/uL                            1.00 - 4.00   
k/uL                                    [facility]  
   
                                                    Lymphocytes/100   
WBC (Bld)       39.7 %                                          [facility]  
   
                                                    MCH (RBC) [Entitic   
mass]           28.6 pg                         26.0 - 34.0 pg  [facility]  
   
                                                    MCHC (RBC)   
[Mass/Vol]          33.0 g/dL                               30.5 - 36.0   
g/dL                                    [facility]  
   
                                                    MCV (RBC) [Entitic   
vol]            86.7 fL                         80.0 - 100.0 fL [facility]  
   
                                                    Monocytes (Bld)   
[#/Vol]         0.46 10*3/uL                    <0.87 k/uL      [facility]  
   
                                                    Monocytes/100 WBC   
(Bld)           6.5 %                                           [facility]  
   
                                                    Neutrophils (Bld)   
[#/Vol]             3.58 10*3/uL                            1.45 - 7.50   
k/uL                                    [facility]  
   
                                                    Neutrophils/100   
WBC (Bld)       50.8 %                                          [facility]  
   
                                                    Nucleated RBC   
(Bld) [#/Vol]   10*3/uL                         <0.01 k/uL      [facility]  
   
                                                    Nucleated RBC/100   
WBC (Bld) [Ratio] 0.0 /100 WBC                                    [facility]  
   
                                                    Platelet mean   
volume (Bld)   
[Entitic vol]   11.4 fL                         9.0 - 12.7 fL   [facility]  
   
                                                    Platelets (Bld)   
[#/Vol]         276 10*3/uL                     150 - 400 k/uL  [facility]  
   
                          RBC (Bld) [#/Vol] 4.30 10*6/uL              3.90 - 5.2  
0   
m/uL                                    [facility]  
   
                          WBC (Bld) [#/Vol] 7.07 10*3/uL              3.70 - 11.  
00   
k/uL                                    [facility]  
   
                                                    HCG QUAL UR B/Oon 2022  
   
   
                      Pregnancy status Negative              neg - pos  [facility]   
[facility]  
   
                      Quality Check Yes                              [facility]  
  
  
  
Vital Signs  
  
  
                          Date Time    Vital Sign   Value        Performing   
Clinician                               Facility  
   
                                                    2024   
14:          Body height         161.3 cm            Desiree MUNROE-CNP  
Work Phone:   
[phone]                          [facility]  
   
                                                    2024   
14:                              Body mass index   
(BMI) [Ratio]             28.42 kg/m2               Desiree Cool APRN-CNP  
Work Phone:   
[phone]                          [facility]  
   
                                                    2024   
14:          Body temperature    98.91 [degF]        Desiree Cool APRN-CNP  
Work Phone:   
[phone]                          [facility]  
   
                                                    2024   
14:          Body weight         73.94 kg            Tia Mandela   
APRN-CNP  
Work Phone:   
[phone]                          [facility]  
   
                                                    2024   
14:                              Diastolic blood   
pressure                  74 mm[Hg]                 Tia Mandela   
APRN-CNP  
Work Phone:   
[phone]                          [facility]  
   
                                                    2024   
14:          Heart rate          90 /min             Tia Mandela   
APRN-CNP  
Work Phone:   
[phone]                          [facility]  
   
                                                    2024   
14:          Respiratory rate    20 /min             Tia Mandela   
APRN-CNP  
Work Phone:   
[phone]                          [facility]  
   
                                                    2024   
14:                              SaO2% (BldA) [Mass   
fraction]                 98 %                      Tia Mandela   
APRN-CNP  
Work Phone:   
[phone]                          [facility]  
   
                                                    2024   
14:                              Systolic blood   
pressure                  125 mm[Hg]                Tia Mandela   
APRN-CNP  
Work Phone:   
[phone]                          [facility]  
   
                                                    2024   
13:                              Body mass index   
(BMI) [Ratio]             27.74 kg/m2               Monica Bridenthal   
APRN - CNP  
Work Phone:   
[phone]                          [facility]  
   
                                                    2024   
13:          Body temperature    98.4 [degF]         Monica Bridenthal   
APRN - CNP  
Work Phone:   
[phone]                          [facility]  
   
                                                    2024   
13:          Body weight         73.3 kg             Monica Bridenthal   
APRN - CNP  
Work Phone:   
[phone]                          [facility]  
   
                                                    2024   
13:                              Diastolic blood   
pressure                  85 mm[Hg]                 Monica Bridenthal   
APRN - CNP  
Work Phone:   
[phone]                          [facility]  
   
                                                    2024   
13:          Heart rate          102 /min            Monica Bridenthal   
APRN - CNP  
Work Phone:   
[phone]                          [facility]  
   
                                                    2024   
13:          Respiratory rate    18 /min             Monica Bridenthal   
APRN - CNP  
Work Phone:   
[phone]                          [facility]  
   
                                                    2024   
13:                              SaO2% (BldA) [Mass   
fraction]                 97 %                      Monicafaith Sanchez   
APRN - CNP  
Work Phone:   
[phone]                          [facility]  
   
                                                    2024   
13:                              Systolic blood   
pressure                  129 mm[Hg]                Monica Luis Antonioenthal   
APRN - CNP  
Work Phone:   
[phone]                          [facility]  
   
                                                    2024   
07:          Body weight         75.75 kg            Ernestine MUNROE.CNP  
Work Phone:   
[phone]                          [facility]  
   
                                                    2024   
07:                              Diastolic blood   
pressure                  82 mm[Hg]                 Ernestine MUNROE.CNP  
Work Phone:   
[phone]                          [facility]  
   
                                                    2024   
07:          Heart rate          80 /min             Ernestine MUNROE.CNP  
Work Phone:   
[phone]                          [facility]  
   
                                                    2024   
07:                              SaO2% (BldA) [Mass   
fraction]                 98 %                      Ernestine MUNROE.CNP  
Work Phone:   
[phone]                          [facility]  
   
                                                    2024   
07:                              Systolic blood   
pressure                  126 mm[Hg]                Ernestine MUNROE.CNP  
Work Phone:   
[phone]                          [facility]  
   
                                                    2024   
06:          Body weight         76.66 kg            Ernestine MUNROE.CNP  
Work Phone:   
[phone]                          [facility]  
   
                                                    2024   
06:                              Diastolic blood   
pressure                  84 mm[Hg]                 Ernestine MUNROE.CNP  
Work Phone:   
[phone]                          [facility]  
   
                                                    2024   
06:                              Systolic blood   
pressure                  116 mm[Hg]                Ernestine MUNROE.CNP  
Work Phone:   
[phone]                          [facility]  
   
                                                    2024   
07:                              Body mass index   
(BMI) [Ratio]             29.21 kg/m2               Monicafaith Nogueiraal   
APRN - CNP  
Work Phone:   
[phone]                          [facility]  
   
                                                    2024   
07:          Body temperature    98.6 [degF]         Monica Bridenthal   
APRN - CNP  
Work Phone:   
[phone]                          [facility]  
   
                                                    2024   
07:          Body weight         77.2 kg             Monica Bridenthal   
APRN - CNP  
Work Phone:   
[phone]                          [facility]  
   
                                                    2024   
07:                              Diastolic blood   
pressure                  69 mm[Hg]                 Monica Bridenthal   
APRN - CNP  
Work Phone:   
[phone]                          [facility]  
   
                                                    2024   
07:          Heart rate          94 /min             Monica Bridenthal   
APRN - CNP  
Work Phone:   
[phone]                          [facility]  
   
                                                    2024   
07:          Respiratory rate    18 /min             Monica Bridenthal   
APRN - CNP  
Work Phone:   
[phone]                          [facility]  
   
                                                    2024   
07:                              SaO2% (BldA) [Mass   
fraction]                 98 %                      Monica Bridenthal   
APRN - CNP  
Work Phone:   
[phone]                          [facility]  
   
                                                    2024   
07:                              Systolic blood   
pressure                  112 mm[Hg]                Monica Bridenthal   
APRN - CNP  
Work Phone:   
[phone]                          [facility]  
   
                                                    2024   
13:                              Body mass index   
(BMI) [Ratio]             28.15 kg/m2               Monica Bridenthal   
APRN - CNP  
Work Phone:   
[phone]                          [facility]  
   
                                                    2024   
13:          Body temperature    98.1 [degF]         Monica Bridenthal   
APRN - CNP  
Work Phone:   
[phone]                          [facility]  
   
                                                    2024   
13:          Body weight         74.39 kg            Monica Bridenthal   
APRN - CNP  
Work Phone:   
[phone]                          [facility]  
   
                                                    2024   
13:                              Diastolic blood   
pressure                  89 mm[Hg]                 Monica Bridenthal   
APRN - CNP  
Work Phone:   
[phone]                          [facility]  
   
                                                    2024   
13:          Heart rate          98 /min             Monica Bridenthal   
APRN - CNP  
Work Phone:   
[phone]                          [facility]  
   
                                                    2024   
13:          Respiratory rate    18 /min             Monica Bridenthal   
APRN - CNP  
Work Phone:   
[phone]                          [facility]  
   
                                                    2024   
13:                              SaO2% (BldA) [Mass   
fraction]                 97 %                      Monica Bridenthal   
APRN - CNP  
Work Phone:   
[phone]                          [facility]  
   
                                                    2024   
13:                              Systolic blood   
pressure                  133 mm[Hg]                Monica Bridenthal   
APRN - CNP  
Work Phone:   
[phone]                          [facility]  
   
                                                    2023   
09:                              Body mass index   
(BMI) [Ratio]             25.92 kg/m2               Monica Bridenthal   
APRN - CNP  
Work Phone:   
[phone]                          [facility]  
   
                                                    2023   
09:          Body temperature    97.59 [degF]        Monica Bridenthal   
APRN - CNP  
Work Phone:   
[phone]                          [facility]  
   
                                                    2023   
09:          Body weight         68.49 kg            Monica Bridenthal   
APRN - CNP  
Work Phone:   
[phone]                          [facility]  
   
                                                    2023   
09:                              Diastolic blood   
pressure                  77 mm[Hg]                 Monica Bridenthal   
APRN - CNP  
Work Phone:   
[phone]                          [facility]  
   
                                                    2023   
09:          Heart rate          98 /min             Monica Bridenthal   
APRN - CNP  
Work Phone:   
[phone]                          [facility]  
   
                                                    2023   
09:          Respiratory rate    18 /min             Monica Bridenthal   
APRN - CNP  
Work Phone:   
[phone]                          [facility]  
   
                                                    2023   
09:                              SaO2% (BldA) [Mass   
fraction]                 99 %                      Monica Bridenthal   
APRN - CNP  
Work Phone:   
[phone]                          [facility]  
   
                                                    2023   
09:                              Systolic blood   
pressure                  116 mm[Hg]                Monica Mirlandeal   
APRN - CNP  
Work Phone:   
[phone]                          [facility]  
   
                                                    2023   
14:          Body height         162.6 cm            Awilda Leonard MD  
Work Phone:   
[phone]                          [facility]  
   
                                                    2023   
14:          Body weight         67.59 kg            Awilda Leonard MD  
Work Phone:   
[phone]                          [facility]  
   
                                                    2023   
14:                              Diastolic blood   
pressure                  68 mm[Hg]                 Awilda Leonard MD  
Work Phone:   
[phone]                          [facility]  
   
                                                    2023   
14:                              Systolic blood   
pressure                  118 mm[Hg]                Awilda Leonard MD  
Work Phone:   
[phone]                          [facility]  
   
                                                    2022   
07:          Body height         163 cm              Lauryn Ryan MD  
Work Phone:   
[phone]                          [facility]  
   
                                                    2022   
07:          Body weight         67.59 kg            Lauryn Ryan MD  
Work Phone:   
[phone]                          [facility]  
   
                                                    2022   
07:                              Diastolic blood   
pressure                  64 mm[Hg]                 Lauryn Ryan MD  
Work Phone:   
[phone]                          [facility]  
   
                                                    2022   
07:                              Systolic blood   
pressure                  104 mm[Hg]                Lauryn Ryan MD  
Work Phone:   
[phone]                          [facility]  
  
  
  
Encounters  
  
  
                          Encounter Date Encounter Type Care Provider Facility  
   
                                                    Start: 2024  
End: 2024                         Office outpatient new 45   
minutes                                 Desiree Cool APRN-CNP  
Work Phone:   
[phone]                           [facility]   
[place]  
   
                                        Comment on above:   Contact dermatitis,   
unspecified contact dermatitis type,   
unspecified trigger (Primary Dx)   
   
                                                    Start: 2024  
End: 2024                         Office outpatient visit 15   
minutes                                 Monica Mirlandeal   
APRN - CNP  
Work Phone:   
[phone]                          [facility]   
[facility]   
Medicine  
   
                                        Comment on above:   Anxiety and depressi  
on (Primary Dx);  
Skin sensation disturbance   
   
                                                    Start: 2024  
End: 2024     ambulatory          MONICA BRIDENTHAL  [facility]  
   
                                                    Start: 2024  
End: 2024     ambulatory          MONICA BRIDENTHAL  Facility:[facility]  
   
                                                    Start: 2024  
End: 2024                         Patient encounter   
procedure                               Ernestine Sher APRN.CNP  
Work Phone:   
[phone]                          OB/Gynecology  
   
                                        Comment on above:   PCOS (polycystic ova  
cheo syndrome) (Primary Dx);  
Irregular menses;  
Anxiety and depression;  
History of bulimia;  
History of anorexia nervosa;  
Provided repeat prescription for oral contraceptive;  
Overweight with body mass index (BMI) of 29 to 29.9 in adult   
   
                                                    Start: 2024  
End: 2024     [facility]          [facility]  [facility]  
   
                                                    Start: 2024  
End: 2024                         Patient encounter   
procedure                               Ernestine ALANISCNP  
Work Phone:   
[phone]                          OB/Gynecology  
   
                                        Comment on above:   PCOS (polycystic ova  
cheo syndrome) (Primary Dx);  
Elevated LDL cholesterol level;  
IFG (impaired fasting glucose);  
Irregular menses;  
Anxiety and depression;  
History of bulimia;  
History of anorexia nervosa;  
Overweight with body mass index (BMI) of 29 to 29.9 in adult   
   
                                                    Start: 2024  
End: 2024                         Office outpatient visit 15   
minutes                                 Monica Bridenthal   
APRN - CNP  
Work Phone:   
[phone]                          [facility]   
Medicine  
   
                                        Comment on above:   Anxiety and depressi  
on (Primary Dx);  
PCOS (polycystic ovarian syndrome)   
   
                                                    Start: 2024  
End: 2024                         Office outpatient visit 25   
minutes                                 Monica Bridenthal   
APRN - CNP  
Work Phone:   
[phone]                          [facility]   
Medicine  
   
                                        Comment on above:   Anxiety and depressi  
on (Primary Dx);  
PCOS (polycystic ovarian syndrome)   
   
                                                    Start: 2024  
End: 2024     ambulatory          MONICA BRIDENTHAL  [facility]  
   
                                                    Start: 2024  
End: 2024     ambulatory          ERNESTINE SHER         Facility:[facility]  
   
                                                    Start: 2024  
End: 2024                         Office outpatient visit 15   
minutes                                 Monica Bridenthal   
APRN - CNP  
Work Phone:   
[phone]                          [facility]   
Medicine  
   
                                        Comment on above:   Anxiety and depressi  
on (Primary Dx);  
Weight gain   
   
                                                    Start: 2024  
End: 2024                         Office outpatient visit 25   
minutes                                 Monica Mirlandeal   
APRN - CNP  
Work Phone:   
[phone]                          [facility]   
Medicine  
   
                                        Comment on above:   Anxiety and depressi  
on (Primary Dx);  
Weight gain   
   
                                                    Start: 2024  
End: 2024     [facility]  
   
                                                    Start: 2023  
End: 2023                         Patient encounter   
procedure                               Monicafaith Nogueiraal   
APRN - CNP  
Work Phone:   
[phone]                          [facility]  
Work Phone:   
[phone]  
   
                                                    Start: 2023  
End: 2023                         Periodic preventive med   
est patient 18-39 yrs                   Monica Luis Antonioenthal   
APRN - CNP  
Work Phone:   
[phone]                          [facility]   
[facility]  
   
                                        Comment on above:   Annual physical exam  
 (Primary Dx);  
Screening for deficiency anemia;  
Screening for cholesterol level;  
Anxiety and depression   
   
                                                    Start: 2023  
End: 2023     ambulatory          [facility]  
   
                                                    Start: 2023  
End: 2023                         Encounter for general   
adult medical examination   
without abnormal findings [facility]  
   
                                                    Start: 2023  
End: 2023     ambulatory          [facility]  
   
                                                    Start: 2023  
End: 2023           ambulatory                AWILDA LEONARD                                Facility:[facility]  
   
                                                    Start: 2023  
End: 2023                         Patient encounter   
procedure                               Awilda Leonard MD  
Work Phone:   
[phone]                          OB/Gynecology  
   
                                        Comment on above:   Encounter for gyneco  
logical examination (general) (routine)   
without   
abnormal findings (Primary Dx);  
PCOS (polycystic ovarian syndrome)   
   
                                                    Start: 2023  
End: 2023           Patient encounter status  Awilda Leonard MD  
Work Phone:   
[phone]                          [facility]  
   
                                Start: 2023 Refill          Rachel small   
APRN.CNM  
Work Phone:   
[phone]                          [facility]/Gynecology  
   
                                        Comment on above:   Refill Request; Refi  
ll Request   
   
                                Start: 2023 ambulatory      Lauryn CHAPA  
Work Phone:   
[phone]                          OB/Gynecology  
   
                                        Comment on above:   Birth Control   
   
                                Start: 2023 Telephone encounter Lauryn grady MD  
Work Phone:   
[phone]                          OB/Gynecology  
   
                                        Comment on above:   Orders   
   
                                Start: 2023 ambulatory      Lauryn CHAPA  
Work Phone:   
[phone]                          OB/Gynecology  
   
                                        Comment on above:   Period Update   
   
                                Start: 2022 ambulatory      Lauryn CHAPA  
Work Phone:   
[phone]                          OB/Gynecology  
   
                                        Comment on above:   Period/Pregnancy Birdie  
t Update   
   
                                Start: 2022 Telephone encounter Lauryn grady MD  
Work Phone:   
[phone]                          OB/Gynecology  
   
                                        Comment on above:   Follow Up   
   
                                                    Start: 2022  
End: 2022           ambulatory                Lauryn Rayn MD  
Work Phone:   
[phone]                          OB/Gynecology  
   
                                        Comment on above:   PCOS (polycystic ova  
cheo syndrome) (Primary Dx);  
Missed menses   
   
                                                    Start: 2022  
End: 2022                         Telemedicine consultation   
with patient                            Lauryn Ryan MD  
Work Phone:   
[phone]                          [facility]  
   
                                Start: 2022 Telephone encounter Lauryn grady MD  
Work Phone:   
[phone]                          OB/Gynecology  
   
                                        Comment on above:   Patient Question   
   
                                Start: 2022 Telephone encounter Lauryn grady MD  
Work Phone:   
[phone]                          OB/Gynecology  
   
                                        Comment on above:   Patient Question   
   
                                Start: 2022 ambulatory      Awilda Leonard MD  
Work Phone:   
[phone]                          [facility] [place]  
   
                                Start: 2022 Manual pelvic examination Shaggy Leonard MD  
Work Phone:   
[phone]                          OB/Gynecology  
   
                                        Comment on above:   Question regarding P  
[facility]   
   
                                                    Start: 2022  
End: 2022           ambulatory                Awilda Leonard MD  
Work Phone:   
[phone]                          OB/Gynecology  
   
                                        Comment on above:   GYN Ultrasound   
   
                                                    Start: 2022  
End: 2022                         Patient encounter   
procedure                               Awilda Leonard MD  
Work Phone:   
[phone]                          [facility]  
   
                                                    Start: 2022  
End: 2022                         Patient encounter   
procedure                               Lauryn Ryan MD  
Work Phone:   
[phone]                          OB/Gynecology  
   
                                        Comment on above:   Encounter for gyneco  
logical examination without abnormal   
finding   
(Primary Dx);  
Encounter for screening for malignant neoplasm of cervix;  
Irregular intermenstrual bleeding   
   
                                                    Start: 2022  
End: 2022           Patient encounter status  Lauryn Ryan MD  
Work Phone:   
[phone]                          OB/Gynecology  
  
  
  
Procedures  
  
  
                          Date         Procedure    Procedure Detail Performing   
Clinician  
   
                                        Start: 2023   Lipid 1996 panel - S  
eun   
or Plasma                                           Monica Sanchez APRN   
- CNP  
Work Phone:   
[phone]  
   
                                        Start: 2022   Urine pregnancy test  
   
visual color cmprsn meths                           Lauryn Ryan MD  
Work Phone:   
[phone]  
   
                                        Start: 2022   Microscopic observat  
ion   
[Identifier] in Cervix by   
Cyto stain                                          Monica Sanchez APRN   
- CNP  
Work Phone:   
[phone]  
   
                                        Start: 2017   Adult depression scr  
eening   
assessment                                          Lauryn Ryan MD  
Work Phone:   
[phone]  
  
  
  
Plan of Treatment  
  
  
                          Date         Care Activity Detail       Author  
   
                                        Start: 2058   RSV Immunization age  
d   
60 or older (1 - 1-dose   
60+ series)                             RSV Immunization aged 60   
or older (1 - 1-dose 60+   
series)                                 [facility]  
   
                                        Start: 2048   Zoster Vaccines (1 o  
f   
2)                        Zoster Vaccines (1 of 2)  [facility]  
   
                          Start: 2028 Lipid panel  Lipid Panel  [facility]  
th  
   
                          Start: 2025 PAP TESTING  PAP TESTING  [facility]  
   
                                        Start: 2025   Screening for malign  
ant   
neoplasm of cervix                                  [facility]  
   
                          Start: 2024 COVID-19 Vaccine (#1) COVID-19 Vacci  
ne (#1) [facility]  
   
                                        Comment on above:   Postponed from  (Patient Refused)   
   
                                        Start: 2024   COVID-19 Vaccine ( season)                         COVID-19 Vaccine ( season)                         [facility]  
   
                                        Comment on above:   Postponed from  (Patient Refused)   
   
                                        Start: 2024   DTaP/Tdap/Td Vaccine  
s   
(7 - Td or Tdap)                        DTaP/Tdap/Td Vaccines (7   
- Td or Tdap)                           [facility]  
   
                                        Comment on above:   Postponed from  (Patient Refused)   
   
                          Start: 2024 Hepatitis C screening Hepatitis C Sc  
reening [facility]  
   
                                        Comment on above:   Postponed from  (Patient Refused)   
   
                          Start: 2024 HIV screening HIV Screening [facility]  
   
                                        Comment on above:   Postponed from  (Patient Refused)   
   
                                                    Start: 2024  
End: 2024                         Patient encounter   
procedure                               2024 7:40 AM EST   
Office Visit [facility] [address]   
[place]  [address] [place] [address] [phone]   
Luis AntonioenthMonica ozuna,   
APRN - CNP [address] [place], [place]   
[id number] [phone]   
(Work) [phone]   
(Fax)                                   [facility]  
   
                                Start: 2024 Influenza vaccination Influenz  
a Vaccine   
(Season Ended)                          [facility]  
   
                          Start: 2024 Influenza vaccination Influenza Vacc  
ine (#1) [facility]  
   
                                        Comment on above:   Postponed from  (Patient Refused)   
   
                          Start: 2024 Depression Monitoring Depression Mon  
itoring [facility]  
   
                          Start: 2024 Depresssion Monitoring Depresssion M  
onitoring [facility]  
   
                                                    Start: 2024  
End: 2024                         Patient encounter   
procedure                               2024 1:00 PM EDT   
Office Visit [facility] [address]   
[place]  [address] [place] [address] [phone]   
BridenthMonica ozuna,   
APRN - CNP [address] [place], [place]   
[id number] [phone]   
(Work) [phone]   
(Fax)                                   [facility]   
Medicine  
   
                                                    Start: 2024  
End: 2024                         Patient encounter   
procedure                               2024 2:00 PM EST   
Office Visit [facility] [address]   
[place] [address] [place] [address] [phone]   
Bridenthal, Monica,   
APRN - CNP [address]   
[address] [place]   
[address] [phone]   
(Work) [phone]   
(Fax)                                   [facility]  
   
                                                    Start: 2024  
End: 2024                         Patient encounter   
procedure                               2024 7:40 AM EST   
Office Visit [facility] [address]   
[place] [address] [place] [address] [phone]   
Bridenthal, Monica,   
APRN - CNP [address]   
[address] [place]   
[address] [phone]   
(Work) [phone]   
(Fax)                                   [facility]  
   
                          Start: 2024 Depression Assessment Depression Ass  
[facility]  
   
                                                    Start: 2023  
End: 2024                         CBC panel - Blood by   
Automated count                         CBC Lab Routine   
Screening for deficiency   
anemia Annual physical   
exam Expected:   
2023   
(Approximate), Expires:   
2024                              [facility]  
   
                                        Comment on above:   Expected: 2023  
 (Approximate), Expires: 2024   
   
                                                    Start: 2023  
End: 2024                         Comprehensive metabolic   
1998 panel - Serum or   
Plasma                                  Comprehensive metabolic   
panel Lab Routine Annual   
physical exam Expected:   
2023   
(Approximate), Expires:   
2024                              [facility]  
Work Phone:   
[phone]  
   
                                        Comment on above:   Expected: 2023  
 (Approximate), Expires: 2024   
   
                                                    Start: 2023  
End: 2024                         Lipid 1996 panel -   
Serum or Plasma                         Lipid panel Lab Routine   
Screening for   
cholesterol level Annual   
physical exam Expected:   
2023   
(Approximate), Expires:   
[address] [place] [address]  
   
                                        Comment on above:   Expected: 2023  
 (Approximate), Expires: 2024   
   
                                        Start: 2023   Covid-19 Vaccine (2   
-   
2023-24 season)                         Covid-19 Vaccine (2 -   
2023-24 season)                         [facility]  
   
                          Start: 2023 Influenza vaccination              C  
[facility]  
   
                                                    Start: 2023  
End: 2023                         Progesterone   
[Mass/volume] in Serum   
or Plasma                               PROGESTERONE BLD Lab   
Routine Anovulation   
Expected: 2023,   
Expires: 2023                     [facility]  
Work Phone:   
[phone]  
   
                                        Comment on above:   Expected: 2023  
, Expires: 2023   
   
                          Start: 2023 DEPRESSION ASSESSMENT DEPRESSION ASS  
[facility]  
   
                          Start: 2022 PAP TESTING  PAP TESTING  [facility]  
   
                                                    Start: 2022  
End: 2023                         Choriogonadotropin.beta   
subunit [Units/volume]   
in Serum or Plasma                      [facility]AT[facility] Lab   
Routine Missed menses   
Expected: 2022,   
Expires: 2023                     [facility]  
Work Phone:   
[phone]  
   
                                        Comment on above:   Expected: 2022  
, Expires: 2023   
   
                          Start: 2022 Influenza vaccination              [facility]  
   
                                                    Start: 2022  
End: 2022                         Thyrotropin   
[Units/volume] in Serum   
or Plasma                                           [facility]  
Work Phone:   
[phone]  
   
                                        Comment on above:   Expected: 2022  
, Expires: 2022   
   
                          Start: 2022 DEPRESSION ASSESSMENT [facility]  
[facility]  
   
                                        Start: 2021   COVID-19 VACCINE (2   
-   
Booster for Gilbert   
series)                                 COVID-19 VACCINE (2 -   
Booster for Gilbert   
series)                                 [facility]  
   
                                        Start: 2020   Urine microalbumin   
profile                                             [facility]  
   
                                        Start: 2018   Adult depression   
screening assessment      DEPRESSION SCREENING      [facility]  
   
                          Start: 2016 HEPATITIS C SCREENING HEPATITIS C [facility]  
   
                          Start: 2016 Hepatitis C screening Hepatitis C [facility]  
   
                          Start: 2016 HIV SCREENING HIV SCREENING [facility]  
   
                          Start: 2016 HIV screening HIV Screening [facility]  
   
                                        Start: 2012   PEDS TO ADULT   
TRANSITION ANNUAL   
ASSESSMENT                              PEDS TO ADULT TRANSITION   
ANNUAL ASSESSMENT                       [facility]  
   
                                        Start: 2010   PEDS TO ADULT   
TRANSITION INITIAL   
DISCUSSION                              PEDS TO ADULT TRANSITION   
INITIAL DISCUSSION                      [facility]  
   
                                        Start: 2008   MENINGOCOCCAL B:   
Consider based on risk   
(1 of 2 - Risk Bexsero   
2-dose series)                          MENINGOCOCCAL B:   
Consider based on risk   
(1 of 2 - Risk Bexsero   
2-dose series)                          [facility]  
   
                                        Start: 2004   Pneumococcal Vaccine  
:   
Pediatrics (0 to 5   
Years) and At-Risk   
Patients (6 to 64   
Years) (1 of 2 - PCV)                   Pneumococcal Vaccine:   
Pediatrics (0 to 5   
Years) and At-Risk   
Patients (6 to 64 Years)   
(1 of 2 - PCV)                          [facility]  
   
                          Start: 1998 Lipid panel  Lipid Panel  [facility]  
th  
   
                                                            PAP FLUID CERVICAL   
SCREENING                               PAP FLUID CERVICAL   
SCREENING Lab Routine   
Encounter for screening   
for malignant neoplasm   
of cervix 2022   
8:07 AM EDT                             [facility]  
Work Phone:   
[phone]  
   
                                                PELVIC US [facility]I   PELVIC US [facility] An  
c   
Imaging Routine   
Irregular intermenstrual   
bleeding Ordered:   
2022                              [facility]  
Work Phone:   
[phone]  
   
                                        Comment on above:   Ordered: 2022   
   
                                                                 [facility]  
c  
   
                                                                 [facility]  
c  
   
                                                                 [facility]  
c  
   
                                                                 [facility]  
c  
   
                                                                 [facility]  
c  
   
                                                                 [facility]  
c  
   
                                                                 [facility]  
c  
   
                                                                 [facility]  
c  
   
                                                                 [facility]  
c  
  
  
  
Immunizations  
  
  
                      Immunization Date Immunization Notes      Care Provider Erwin alexander  
   
                                        2021          [facility] SARS-CoV-2   
Vaccination                                         Monica Sanchez   
APRN - CNP  
Work Phone:   
[phone]                          [facility]  
   
                                        2017          influenza, injectabl  
e,   
quadrivalent, contains   
preservative                                        Lauryn Ryan MD  
Work Phone:   
[phone]                          [facility]  
   
                                        2017          influenza virus vacc  
ine,   
unspecified formulation                             Monica Sanchez   
APRN - CNP  
Work Phone:   
[phone]                          [facility]  
   
                                        2014          meningococcal   
polysaccharide (groups   
A, C, Y and W-135)   
diphtheria toxoid   
conjugate vaccine   
(MCV4P)                                             Lauryn Ryan MD  
Work Phone:   
[phone]                          [facility]  
   
                                        2013          human papilloma viru  
s   
vaccine, quadrivalent                               Lauryn Ryan MD  
Work Phone:   
[phone]                          [facility]  
   
                                        2013          human papilloma viru  
s   
vaccine, quadrivalent                               Lauryn Ryan MD  
Work Phone:   
[phone]                          [facility]  
   
                                        2013          human papilloma viru  
s   
vaccine, quadrivalent                               Lauryn Ryan MD  
Work Phone:   
[phone]                          [facility]  
   
                                        2010          Meningococcal, MCV4,  
   
unspecified conjugate   
formulation(groups A, C,   
Y and W-135)                                        Lauryn Ryan MD  
Work Phone:   
[phone]                          [facility]  
   
                                        2010          tetanus toxoid, redu  
pastor   
diphtheria toxoid, and   
acellular pertussis   
vaccine, adsorbed                                   Lauryn Ryan MD  
Work Phone:   
[phone]                          [facility]  
   
                          2009   varicella virus vaccine              Marianne Ryan MD  
Work Phone:   
[phone]                          [facility]  
   
                                        2005          poliovirus vaccine,   
inactivated                                         Lauryn Ryan MD  
Work Phone:   
[phone]                          [facility]  
Work Phone:   
[phone]  
   
                                        2004          measles, mumps and   
rubella virus vaccine                               Lauryn Ryan MD  
Work Phone:   
[phone]                          [facility]  
Work Phone:   
[phone]  
   
                                        2004          diphtheria, tetanus   
toxoids and acellular   
pertussis vaccine                                   Lauryn Ryan MD  
Work Phone:   
[phone]                          [facility]  
Work Phone:   
[phone]  
   
                                        1999          DTaP-Haemophilus   
influenzae type b   
conjugate vaccine                                   Lauryn Ryan MD  
Work Phone:   
[phone]                          [facility]  
   
                                        1999          poliovirus vaccine,   
inactivated                                         Lauryn Ryan MD  
Work Phone:   
[phone]                          [facility]  
   
                          06-   varicella virus vaccine              Marianne Ryan MD  
Work Phone:   
[phone]                          [facility]  
   
                                        1999          measles, mumps and   
rubella virus vaccine                               Lauryn Ryan MD  
Work Phone:   
[phone]                          [facility]  
   
                                        1998          hepatitis B vaccine,  
   
pediatric or   
pediatric/adolescent   
dosage                                              Lauryn Ryan MD  
Work Phone:   
[phone]                          [facility]  
   
                                        1998          DTaP-Haemophilus   
influenzae type b   
conjugate vaccine                                   Lauryn Ryan MD  
Work Phone:   
[phone]                          [facility]  
   
                                        1998          poliovirus vaccine,   
inactivated                                         Lauryn Ryan MD  
Work Phone:   
[phone]                          [facility]  
   
                                        1998          DTaP-Haemophilus   
influenzae type b   
conjugate vaccine                                   Lauryn Ryan MD  
Work Phone:   
[phone]                          [facility]  
   
                                        1998          poliovirus vaccine,   
inactivated                                         Lauryn Ryan MD  
Work Phone:   
[phone]                          [facility]  
   
                                        1998          DTaP-Haemophilus   
influenzae type b   
conjugate vaccine                                   Lauryn Ryan MD  
Work Phone:   
[phone]                          [facility]  
   
                                        1998          hepatitis B vaccine,  
   
pediatric or   
pediatric/adolescent   
dosage                                              Lauryn Ryan MD  
Work Phone:   
[phone]                          [facility]  
   
                                        1998          poliovirus vaccine,   
inactivated                                         Lauryn Ryan MD  
Work Phone:   
[phone]                          [facility]  
   
                                        1998          hepatitis B vaccine,  
   
pediatric or   
pediatric/adolescent   
dosage                                              Lauryn Ryan MD  
Work Phone:   
[phone]                          [facility]  
  
  
  
Payers  
  
  
                          Date         Payer Category Payer        Policy ID  
   
                                        2023          [facility] Member   
Subscriber Plan / Payer   
(Effective   
2023-Present) Name:   
Don Mittal Member ID:   
[id number] Relation to   
Subscriber: Spouse Name:   
KYRIEPADMINI Subscriber   
ID: [id number] YOB: 1994 Address:   
[address] [place] [address] Payer ID:   
[id number] (NAIC) Group ID:   
[id number] Type: Not on   
file Address: [address]   
[address] [place]   
[address]                              [id number]  
[id number]  
[id number]  
   
                          2023   Unknown                   [id number]  
   
                                2022      Unknown         [employer] MMO SUPERMED  
 PLUS   
[id number] 3/12/2022-Present   
[phone] [address]   
[place] [address]   
[facility]                                     [id number]   
[id number]  
[id number]  
   
                          2022   Unknown                   [id number]  
[id number]  
[id number]  
  
  
  
Social History  
  
  
                          Date         Type         Detail       Facility  
   
                                                    Start:   
08-  
End: 2024                         Tobacco smoking status   
NHIS                      Never smoked tobacco      [facility]  
Work Phone:   
[phone]  
   
                                                    Start:   
2022  
End: 2024           Alcohol intake            Current drinker of   
alcohol (finding)                       [facility]  
   
                                                    Start:   
05-                              History SDOH Alcohol   
Frequency                 3                         [facility]  
   
                                                    Start:   
05-                              History SDOH Alcohol Std   
Drinks                    1                         [facility]  
   
                                                    Start:   
05-                              History SDOH Alcohol   
Binge                     2                         [facility]  
   
                                                    Start:   
05-                              History SDOH Social   
Connections Phone         5                         [facility]  
   
                                                    Start:   
05-                              History SDOH Social   
Connections Living        7                         [facility]  
   
                                                    Start:   
05-                              History SDOH Physical   
Activity DPW              4                         [facility]  
   
                                                    Start:   
05-                              History SDOH Physical   
Activity MPS              6                         [facility]  
   
                                                    Start:   
05-          Education           14                  [facility]  
   
                                                    Start:   
1998          Sex Assigned At Birth Female              [facility]  
   
                                                    Start:   
2022  
End: 2022                         Exposure to SARS-CoV-2   
(event)                   Not sure                  [facility]  
   
                                                    Start:   
08-  
End: 2024           Tobacco use and exposure  Smokeless tobacco   
non-user                                [facility]  
   
                                                    Start:   
05-  
End: 2023                         History of Social   
function                                            [facility]  
   
                                                    Start:   
05-  
End: 2023                         Social connection and   
isolation panel                                     [facility]  
   
                                                            Do you belong to any  
   
clubs or organizations   
such as [employer] groups,   
unions, fraternal or   
athletic groups, or   
school groups?            Yes                       [facility]  
   
                                                            Are you now ,  
   
, ,   
, never    
or living with a partner? Never              [facility]  
   
                                                            How often to you hav  
e a   
drink containing alcohol? 2-4 times a month         [facility]  
   
                                                            How many standard dr  
inks   
containing alcohol do you   
have on a typical day?    1 or 2                    [facility]  
   
                                                            How often do you hav  
e 6   
or more drinks on 1   
occasion?                 Less than monthly         [facility]  
   
                                                            How hard is it for y  
ou to   
pay for the very basics   
like food, housing,   
medical care, and heating Not very hard             [facility]  
   
                                                            Do you feel stress -  
   
tense, restless, nervous,   
or anxious, or unable to   
sleep at night because   
your mind is troubled all   
the time - these days   
[OSQ]                     Rather much               [facility]  
   
                                                            (I/We) worried whereji  
er   
(my/our) food would run   
out before (I/we) got   
money to buy more.        Never true                [facility]  
   
                                                            In the past 12 month  
s,   
has lack of   
transportation kept you   
from medical appointments   
or from getting   
medications?              No                        [facility]  
   
                                                            In the past 12 month  
s,   
was there a time when you   
were not able to pay the   
mortgage or rent on time? No                        [facility]  
   
                                                    Start:   
2022                Gender identity           Identifies as female   
gender (finding)                        [facility]  
   
                                                    Start:   
2022          Sexual orientation  Heterosexual (finding) [facility]  
   
                                                    Start:   
2023                Tobacco use and exposure  User of smokeless   
tobacco                                 [facility]  
   
                                       History of tobacco use Chews Tobacco [facility]  
   
                                                    Start:   
2023          Tobacco Comment     Nicotine pouch,     [facility]  
   
                                                    Start:   
2023          Alcohol Comment     occ.                [facility]  
   
                                                    Start:   
2024          Alcoholic beverage intake Ex-drinker (finding) [facility]  
Work Phone:   
[phone]  
   
                                                    Start:   
2000          Sex assigned at birth Not on file         [facility]  
Work Phone:   
[phone]  
  
  
  
Clinical Notes 2013 to 2024  
 DORIS LindseyCNP - 2024 2:40 PM ESTAssessment & Plan Note - SHANEKA Chacon CNP - 2024 4:40 PM EDTMandi D'Amico - 2024 1:00 
PM EDTPatient Instructions  
  
                                Note Date & Type Note            Facility  
   
                                                    2024 History of   
Present illness Narrative               Formatting of this note is   
different from the original.  
Subjective  
Patient ID: Don Mittal is a 24   
y.o. female. They present today   
with a chief complaint of Rash.  
  
History of Present Illness  
Patient is a 23 y/o female c/o   
red, raised, rash to right ABD,   
neck, chest x5 days. Denies   
ingestion of new foods/drinks,   
use of new   
detergents/soap/conditioner/shamp  
oos. Patient reports symptoms   
began shortly after utilizing   
Witch Hazel. Patient denies   
symptoms of fever, chills,   
bodyaches, lethargy, weakness,   
chest pain/tightness/pressure,   
SOB, wheezing, N/V/D, ABD pain.   
No OTC medication reported to be   
taken.  
  
Rash  
  
Past Medical History  
Allergies as of 2024 -   
Reviewed 2024  
Allergen Reaction Noted  
Bee pollen Other 2013  
  
(Not in a hospital admission)  
  
  
No past medical history on file.  
  
No past surgical history on file.  
  
reports that she has never   
smoked. She has never used   
smokeless tobacco. She reports   
that she does not currently use   
alcohol.  
  
Review of Systems  
Review of Systems  
Constitutional: Negative.  
HENT: Negative.  
Eyes: Negative.  
Cardiovascular: Negative.  
Gastrointestinal: Negative.  
Endocrine: Negative.  
Genitourinary: Negative.  
Musculoskeletal: Negative.  
Skin: Positive for rash.  
Allergic/Immunologic: Negative.  
Neurological: Negative.  
Hematological: Negative.  
Psychiatric/Behavioral: Negative.  
  
  
  
  
  
  
  
  
  
  
  
Objective  
Vitals:  
24 1445  
BP: 125/74  
Pulse: 90  
Resp: 20  
Temp: 37.2 C (98.9 F)  
TempSrc: Oral  
SpO2: 98%  
Weight: 73.9 kg (163 lb)  
Height: 1.613 m (5' 3.5 )  
  
No LMP recorded.  
  
Physical Exam  
Constitutional:  
Comments: Patient A/O x4, LOC 5,   
calm and cooperative. Patient   
self-ambulatory to treatment area   
and is in no acute distress.  
HENT:  
Head: Normocephalic and   
atraumatic.  
Right Ear: Tympanic membrane   
normal.  
Left Ear: Tympanic membrane   
normal.  
Nose: Nose normal.  
Mouth/Throat:  
Mouth: Mucous membranes are dry.  
Pharynx: Oropharynx is clear.  
Eyes:  
Extraocular Movements:   
Extraocular movements intact.  
Pupils: Pupils are equal, round,   
and reactive to light.  
Cardiovascular:  
Rate and Rhythm: Normal rate and   
regular rhythm.  
Pulses: Normal pulses.  
Heart sounds: Normal heart   
sounds.  
Pulmonary:  
Effort: Pulmonary effort is   
normal.  
Breath sounds: Normal breath   
sounds.  
Abdominal:  
General: Abdomen is flat.  
Palpations: Abdomen is soft.  
Musculoskeletal:  
General: Normal range of motion.  
Cervical back: Neck supple.  
Skin:  
Capillary Refill: Capillary   
refill takes less than 2 seconds.  
Comments: Multiple erythematous,   
raised, wheals present to right   
hip, ABD, neck. Blanchable and   
pruritic to touch. No open tissue   
or active exudates. All other   
visible skin intact  
Neurological:  
General: No focal deficit   
present.  
Mental Status: She is oriented to   
person, place, and time.  
Psychiatric:  
Mood and Affect: Mood normal.  
Behavior: Behavior normal.  
  
Procedures  
  
Point of Care Test & Imaging   
Results from this visit  
No results found for this visit   
on 24.  
No results found.  
  
Diagnostic study results (if any)   
were reviewed by CHRISTIE Lindsey.  
  
Assessment/Plan  
Allergies, medications, history,   
and pertinent labs/EKGs/Imaging   
reviewed by CHRISTIE Lindsey.  
  
Medical Decision Making  
Patient to follow up with   
dermatology if symptoms persist.   
Will treat with prednisone taper   
and triamcinolone cream.  
  
At time of discharge, patient was   
clinically well-appearing and   
appropriate for outpatient   
management. The   
patient/parent/guardian was   
educated regarding diagnosis,   
supportive care, OTC and Rx   
medications. The   
patient/parent/guardian was given   
the opportunity to ask questions   
prior to discharge. They   
verbalized understanding of   
discussion of treatment plan,   
expected course of illness and/or   
injury, indications on when to   
return to , when to seek   
further evaluation in ED/call   
911, and the need to follow up   
with PCP and/or specialist as   
referred. Patient/parent/guardian   
was provided with work/school   
documentation if requested.   
Patient stable upon discharge.  
  
Orders and Diagnoses  
Diagnoses and all orders for this   
visit:  
Contact dermatitis, unspecified   
contact dermatitis type,   
unspecified trigger  
- predniSONE (Deltasone) 10 mg   
tablet; Take 4 tablets (40 mg) by   
mouth once daily for 3 days, THEN   
3 tablets (30 mg) once daily for   
3 days, THEN 2 tablets (20 mg)   
once daily for 3 days, THEN 1   
tablet (10 mg) once daily for 3   
days. Take in the mornings with   
food.  
- triamcinolone (Kenalog) 0.1 %   
cream; Apply a thin film   
topically to affected areas 2-3   
times daily as needed for   
rash/itching  
  
Medical Admin Record  
  
Patient disposition: Home  
  
Electronically signed by CHRISTIE Lindsey  
3:07 PM  
  
  
Electronically signed by CHRISTIE Lindsey at 2024   
3:07 PM EST  
documented in this encounter            [facility]  
Work Phone:   
[phone]  
   
                                                    2024 Evaluation + Plan  
   
note                                    Associated Problem(s): Anxiety   
and depression  
Formatting of this note might be   
different from the original.  
Stable. Continue Wellbutrin 300   
mg daily  
Electronically signed by SHANEKA Chacon CNP at   
2024 4:40 PM EDT  
                                        [facility]  
   
                                                    2024 Evaluation + Plan  
   
note                                    Associated Problem(s): Skin   
sensation disturbance  
Formatting of this note might be   
different from the original.  
Left foot exam unremarkable and   
currently asymptomatic. Likely   
superficial nerve inflammation.   
Recommend avoiding shoes that are   
tight across the top of the foot,   
ice and elevate 2-3 times daily   
and follow-up if fails to   
resolve. Unknown etiology at this   
time otherwise  
Electronically signed by SHANEKA Chacon CNP at   
2024 4:40 PM EDT  
                                        [facility]  
   
                                                    2024 Miscellaneous   
Notes                                   Associated Problem(s): Anxiety   
and depression  
Formatting of this note might be   
different from the original.  
Stable. Continue Wellbutrin 300   
mg daily  
Electronically signed by SHANEKA Chacon CNP at   
2024 4:40 PM EDT  
Associated Problem(s): Skin   
sensation disturbance  
Formatting of this note might be   
different from the original.  
Left foot exam unremarkable and   
currently asymptomatic. Likely   
superficial nerve inflammation.   
Recommend avoiding shoes that are   
tight across the top of the foot,   
ice and elevate 2-3 times daily   
and follow-up if fails to   
resolve. Unknown etiology at this   
time otherwise  
Electronically signed by SHANEKA Chacon CNP at   
2024 4:40 PM EDT  
documented in this encounter            [facility]  
   
                                                    2024 History of   
Present illness Narrative               Formatting of this note might be   
different from the original.  
Patient was identified by name   
and Date of Birth.  
  
Electronically signed by Mandi D'Amico at 2024 4:41 PM EDT  
Formatting of this note is   
different from the original.  
Images from the original note   
were not included.  
  
  
2024  
  
Don Mittal (: 2000) is   
a 24 y.o. female , Established   
patient, here for evaluation of   
the following chief complaint(s):  
Medication Check and Foot Problem  
  
ASSESSMENT/PLAN:  
  
1. Anxiety and depression  
Assessment & Plan:  
Stable. Continue Wellbutrin 300   
mg daily  
2. Skin sensation disturbance  
Assessment & Plan:  
Left foot exam unremarkable and   
currently asymptomatic. Likely   
superficial nerve inflammation.   
Recommend avoiding shoes that are   
tight across the top of the foot,   
ice and elevate 2-3 times daily   
and follow-up if fails to   
resolve. Unknown etiology at this   
time otherwise  
  
Follow up in about 6 months   
(around 2024) for Yearly   
Wellness Visit.  
  
SUBJECTIVE/OBJECTIVE:  
  
HPI -  
Don Mittal (: 2000) is   
a 24 y.o. female , Established   
patient, here for the evaluation   
of the following chief   
complaint(s):  
Medication Check and Foot Problem  
  
Anxiety/depression- Doing pretty   
well, wellbutrin  mg daily.   
Denies any SI or HI. Not doing   
counseling. Reports being able to   
handle stress better and not   
feeling as overwhelmed.  
  
Left foot problem:  
Is having a hot   
sensation/tingling to the top of   
the left foot and numbness. No   
known trigger. No known injury.   
Happening for about 1.5 weeks,   
will happen intermittently   
through the day (?50 times or   
so)- lasts only for a few seconds   
or so.  
No swelling or redness noted.  
Currently asymptomatic  
  
Prior to Admission medications  
Medication Sig Start Date End   
Date Taking? Authorizing Provider  
buPROPion XL (Wellbutrin XL) 300   
MG 24 hr tablet take 1 tablet by   
mouth every morning DO NOT CRUSH,   
CHEW, AND/OR DIVIDE 24 Yes   
SHANEKA Phan - CNP  
Juleber 0.15-30 MG-MCG tablet   
Take 1 tablet by mouth daily. Yes   
Historical Provider, MD  
metFORMIN (Glucophage) 500 MG   
tablet Take 1 tablet by mouth in   
the morning and 1 tablet in the   
evening. Take with meals. 24 Yes Historical Provider,   
MD  
naltrexone (Depade) 50 MG tablet   
Take 25 mg by mouth in the   
morning and 25 mg in the evening.   
Yes Historical Provider, MD  
Prenatal Multivit-Min-Fe-FA   
(PRENATAL/IRON PO) Take 1 tablet   
by mouth in the morning. Yes   
Historical Provider, MD  
Prenatal MV-Min-Fe Fum-FA-DHA   
(PRENATAL 1 PO) Take by mouth.   
Historical Provider, MD  
  
  
  
Review of Systems  
Constitutional: Negative for   
activity change, chills, fatigue   
and fever.  
Respiratory: Negative.  
Cardiovascular: Negative.  
Gastrointestinal: Negative.  
Genitourinary: Negative for   
difficulty urinating.  
Musculoskeletal:  
Left foot.  
Psychiatric/Behavioral: Positive   
for decreased concentration.   
Negative for agitation, dysphoric   
mood and sleep disturbance. The   
patient is not nervous/anxious.  
  
Vitals:  
24 1307  
BP: 129/85  
Pulse: 102  
Resp: 18  
Temp: 36.9 C (98.4 F)  
TempSrc: Infrared  
SpO2: 97%  
Weight: 161 lb 9.6 oz (73.3 kg)  
  
Physical Exam  
Constitutional:  
General: She is not in acute   
distress.  
Appearance: Normal appearance.   
She is not ill-appearing.  
HENT:  
Head: Normocephalic and   
atraumatic.  
Cardiovascular:  
Rate and Rhythm: Normal rate and   
regular rhythm.  
Pulses: Normal pulses.  
Heart sounds: Normal heart   
sounds.  
Pulmonary:  
Effort: Pulmonary effort is   
normal.  
Breath sounds: Normal breath   
sounds.  
Musculoskeletal:  
Right foot: Normal.  
Left foot: Normal.  
Comments: Left foot exam normal  
Skin:  
General: Skin is warm and dry.  
Neurological:  
Mental Status: She is alert and   
oriented to person, place, and   
time.  
Psychiatric:  
Mood and Affect: Mood normal.  
Behavior: Behavior normal.  
Thought Content: Thought content   
normal.  
Judgment: Judgment normal.  
  
An electronic signature was used   
to authenticate this note.  
  
SHANEKA Rao CNP  
2024  
4:40 PM  
Electronically signed by SHANEKA Chacon CNP at   
2024 4:41 PM EDT  
documented in this encounter            [facility]  
   
                                        2024 Instructions   
  
  
SHANEKA Chacon CNP -   
2024 1:00 PM EDTFormatting   
of this note might be different   
from the original.  
Ice and elevate foot couple times   
a day, be careful not to wear   
shoes that pinch/pressure to much   
on top of foot.  
Electronically signed by SHANEKA Chacon CNP at   
2024 1:25 PM EDT  
  
documented in this encounter            [facility]  
   
                                        2024 Note     HNO ID: [id number]  
Author: ERNESTINE SHER APRN.CNP  
Service: ?  
Author Type: Nurse Practitioner  
Type: Progress Notes  
Filed: 2024 08:47  
Note Text:  
Some documentation from previous   
visit of 2024 was copied and   
pasted,  
documentation has been reviewed   
and edited as necessary for   
today's visit.  
Patient Summary: Don is a 24   
year old female who presents for   
follow-up  
evaluation of her obesity/weight   
management to treat PCOS, IFG,   
elevated LDL,  
anxiety and depression and   
prevent relatedco-morbidities. In   
our previous  
visits we have discussed   
lifestyle intervention including   
a nutrition  
recommendations and physical   
activity optimization. Her last   
office visit was 1  
month ago.  
Irregular menses - Menses   
2023 LMP /-3 spotting  
No contraception.  
20 lb weight gain with Lexapro   
for anxiety. Changed to bupropion   
by PCP plans  
to discuss increasing dose with   
PCP  
Assessment/plan from last visit:  
Metformin 1 gm twice a day with   
meals - Adjusted to 500 mg at   
breakfast and 1 gm  
at dinner but has had frequent   
diarrhea since increasing to 1 gm   
twice a day 3  
weeks ago.  
History of anorexia and bulimia   
in HS - no treatment, In   
remission since age 15.  
Dad helped her and she started   
adding protein shakes.  
Interval History -  
Awake - 0700  
B - 0745 Premier Protein shake  
S - none  
L - 1/2 cup cottage cheese with   
fruit  
S - none  
D - 1830 protein,   
pasta/potato/rice/sweet potato   
and veg - asparagus, cucumber  
salad, winter and summer   
squash/salad  
S - none  
Fluids - water, unsweetened tea  
Bedtime -   
She feels the medication is   
helping to lessen hunger and   
craving to candy  
controlled  
Exercise: stable sedentary job at   
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  
Stress: increased -    
wearing heart monitor  
Sleep: 7 hours, up to go to   
bathroom a few times LASHAWN -no  
Weight gain since last vist: 2   
lbs since last visit  
3/21/2024 167 lb BMI: 28.67  
2024 169 lb  
2024 166 lb metformin  
CrCl cannot be calculated   
(Patient's most recent lab result   
is older than the  
maximum 180 days allowed.).  
PAST MEDICAL HISTORY  
Diagnosis Date  
Exertional asthma 2012  
Generalized anxiety disorder  
IFG (impaired fasting glucose)   
2024  
Medial meniscus tear 2012  
PCOS (polycystic ovarian   
syndrome)   
Unspecified otitis media  
Recurrent otitis  
Vocal cord dysfunction 05/15/2012  
Current Outpatient Medications  
Medication Sig Dispense Refill  
metFORMIN (GLUCOPHAGE) 500 mg   
tablet Take 2 tablets by mouth   
two times a day  
with meals. 360 tablet 0  
buPROPion XL (WELLBUTRIN XL) 150   
mg 24 hr tablet Take 150 mg by   
mouth every  
morning.  
PNV/iron/folic acid (PRENATAL   
VITAMIN-IRON-FA ORAL) Take 1   
tablet by mouth once  
daily.  
albuterol HFA (PROVENTIL HFA,   
VENTOLIN HFA) 90 mcg/actuation   
inhaler Inhale 2  
Puffs as instructed every 4 hours   
as needed. 1 Inhaler 0  
No current facility-administered   
medications for this visit.  
Occupation: bank  
Contraception: none  
/82   Pulse 80   Wt 167 lb   
(75.8 kg)   LMP 2024   SpO2   
98%    
BMI 28.67 kg/m?  
Assessment/Plan:  
Don Mittal is a 24 year old   
yo female with overweight   
(pre-obesity) who  
presented today for follow up for   
supervised weight loss to treat   
PCOS, IFG,  
elevated LDL, anxiety and   
depression and prevent related   
co-morbidities.  
ASSESSMENT/PLAN:  
1. PCOS (polycystic ovarian   
syndrome) - ICD9: 256.4, ICD10:   
E28.2 (primary  
diagnosis)  
- Whole food balanced protein   
low-carb nutrition  
- METFORMIN 500 MG TABLET  
- DESOGESTREL 0.15 MG-ETHINYL   
ESTRADIOL 0.03 MG TABLET  
- NALTREXONE 50 MG TABLET  
2. Irregular menses - ICD9:   
626.4, ICD10: N92.6  
- DESOGESTREL 0.15 MG-ETHINYL   
ESTRADIOL 0.03 MG TABLET  
3. Anxiety and depression - ICD9:   
300.00, 311, ICD10: F41.9, F32.A  
- Continue bupropion prescribed   
by PCP- she plans to discuss   
increasing dose  
with PCP  
4. History of bulimia - ICD9:   
V11.8, ICD10: Z86.59  
- in remission since age 15  
5. History of anorexia nervosa -   
ICD9: V11.8, ICD10: Z86.59  
- in remission since age 15  
6. Provided repeat prescription   
for oral contraceptive - ICD9:   
V25.41, ICD10:  
Z30.41  
- RX for Apri given today.  
- discussed with patient on how   
to take OCP's.Given written   
information  
- counseled on benefits, risks   
and possible severe side effects   
of OCP's.  
- discussed need to use Condoms   
to help to prevent STD's   
including HIV etc.  
- DESOGESTREL 0.15 MG-ETHINYL   
ESTRADIOL 0.03 MG TABLET  
7. Overweight with body mass   
index (BMI) of 29 to 29.9 in   
adult - ICD9: 278.02,  
V85.25, ICD10: E66.3, Z68.29  
- METFORMIN 500 MG TABLET -   
decrease to 500 mg with breakfast   
and 1 gm with  
dinner due to diarrhea with 1 gm   
twice daily  
- DESOGESTREL 0.15 MG-ETHINYL   
ESTRADIOL 0.03 MG TABLET  
- NALTREXONE 50 MG TABLET  
Agreeable to adding naltrexone to   
Wellbutrin to mimic the effect of   
Contrave.  
Confirmed no   
allergies/contraindications to   
naltrexone. Reviewed potential   
side  
effects. Patient will notify me   
if there are any adverse effec   
(more content not included)...          [facility]  
   
                                        2024 Instructions   
  
  
Ernestine Sher APRN.CNP -   
2024 8:28 AM EDTFormatting   
of this note is different from   
the original.  
  
The addition of naltrexone to   
your current buprioprion   
prescription will mimic the brand   
name weight loss drug called   
Contrave.  
  
Here is a link to the brand name   
medication:  
https://contrave.com/about/  
  
AM PM (early afternoon)  
Week 1 Naltrexone 12.5 mg (1/4   
tablet) None  
Week 2 Naltrexone 12.5 mg (1/4   
tablet) Naltrexone 12.5 mg (1/4   
tablet)  
Week 3 Naltrexone 25 mg (1/2   
tablet) Naltrexone 25 mg (1/2   
tablet)  
Week 4 Naltrexone 25 mg (1/2   
tablet) Naltrexone 25 mg (1/2   
tablet)  
  
- Please take your bupropion as   
usual, these medications need to   
work together to formulate the   
desired effect.  
- If at anytime you feel a   
positive effect, you can stay at   
that dose and do not need to   
increase.  
- Please stop the medication if   
you develop symptoms that are   
concerning.  
  
Take low dose naltrexone to:  
  
Regulate appetite: Naltrexone   
helps normalize your metabolism,   
matching your appetite to resting   
energy expenditures.  
  
Reduce insulin resistance:   
Naltrexone modulates cellular   
resistance to insulin, which may   
lead to weight loss.  
  
Improve sleep: Lack of sleep has   
negative effects on your body s   
hormonal system, which can lead   
to weight gain. Naltrexone   
combats this unhealthy cycle.  
  
Improve mood: Combination LDN   
weight loss medications trigger   
an increase in serotonin and   
dopamine production, which   
decreases anxiety and stress and   
reduces emotional eating.  
  
https://www.7digital.TigerText/n  
altrexone#:~:text=Regulate%20appe  
tite%3A%20Naltrexone%20helps%20no  
rmalize,may%20lead%20to%20weight%  
20loss.  
  
Does Wellbutrin cause weight   
loss?  
It can. Bupropion (the generic   
form of Wellbutrin) was initially   
prescribed as an antidepressant.   
It is the only antidepressant   
associated with weight loss   
(Carolyne, 2019).   
Healthcare providers noticed that   
mostly pleasant side effect, and   
today bupropion is sometimes   
prescribed as part of a   
medication for weight loss   
(naltrexone/bupropion, brand name   
Contrave), as well as a   
stop-smoking aid (brand name   
Zyban).  
  
As far as the evidence that   
bupropion by itself causes weight   
loss:  
  
A 2016 study that analyzed the   
long-term weight loss effect of   
various antidepressant   
medications found that   
non-smokers who took bupropion   
lost 7.1 pounds over two years.   
(This effect was not seen in   
smokers). Users of the other   
antidepressants in the study   
gained weight (Arterburn, 2016).  
Bupropion seems to be effective   
for weight-loss maintenance as   
well. A 2012 study found that   
obese adults who took bupropion   
SR (standard release) in 300mg or   
400mg doses lost 7.2% and 10% of   
their body weight, respectively,   
over 24 weeks and maintained that   
weight loss at 48 weeks   
(Zurdo, 2012).  
And a 2019 review of 27 studies   
on antidepressants and weight   
gain found that antidepressant   
use increases body weight by an   
average of 5%--except bupropion,   
which is associated with weight   
loss (Raudel-Pedrero, 2019).  
  
https://ro.co/health-guide/wellbu  
ramez-for-weight-loss/  
  
Bupropion and naltrexone: Patient   
drug information  
Access [employer] for   
additional drug information,   
tools, and databases.  
Copyright 3544-9382 [employer]   
[employer]. All rights reserved.  
(For additional information see   
 Bupropion and naltrexone: Drug   
information )  
  
Contrave (naltrexone/bupropion)   
Patient Information  
  
Who Is Contrave For?  
Contrave is a medication for   
chronic weight management. It is   
for people with overweight and   
weight-related complications or   
obesity. It is meant to be used   
together with a lifestyle therapy   
regimen involving a reduced   
calorie diet and increased   
physical activity.  
  
How Does Contrave Work?  
Contrave works in the brain as an   
appetite suppressant.  
  
Who Should Not Take Contrave?   
Women who are pregnant, nursing,   
or planning to become pregnant  
People who have uncontrolled high   
blood pressure  
People who have or have had   
seizures  
People with a history of eating   
disorders such as anorexia   
nervosa or bulimia  
People who have glaucoma or are   
at risk for glaucoma  
People who used to drink a lot of   
alcohol and abruptly stop   
drinking  
People who use other medications   
containing bupropion, such as   
Wellbutrin or  
Aplenzin  
People who are taking opioid pain   
relievers or are in opioid   
withdrawal, or use  
medicines to help stop taking   
opioids such as methadone or   
buprenorphine  
People who are taking a monoamine   
oxidase inhibitor (MAOI) now or   
have taken one  
within the past 14 days  
  
Do not take Contrave with a high   
fat meal.  
Swallow Contrave tablets whole.   
Do not cut, chew, or crush   
Contrave tablets.  
If you miss a dose of Contrave,   
wait until your next regular time   
to take it. Do not take more than   
1 dose of Contrave at a time.  
  
Is Contrave a Controlled   
Substance?  
No, Contrave is not a controlled   
substance.  
Which Medications Might Not Be   
Safe to Use with Contrave?  
Contrave can affect how other   
medicines work in your body, and   
other medicines can affect how   
Contrave works. Tell your doctor   
about all the medicines and   
supplements you take, especially:  
Carbamazepine, phenobarbital, or   
phenytoin--usually given to treat   
seizures  
Efavirenz, lopinavir, or   
ritonavir--used to treat HIV   
infection Antidepressants  
Pain medications  
What Are the Most Common Side   
Effects of Contrave?  
Nausea or vomiting  
Dizziness  
Changes in the way foods taste or   
loss of taste  
Trouble sleeping  
Constipation or diarrhea  
Headache  
Dry mouth  
  
What Are the Possible Serious   
Side Effects of Contrave?  
Suicidal Thoughts or Actions  
One of the ingredients in   
Contrave is bupropion, which has   
caused some people to have   
suicidal thoughts or actions or   
unusual changes in behavior,   
whether or not they are taking   
medicines used to treat   
depression.  
If you already have depression or   
another mental illness, taking   
bupropion may cause your   
condition to get worse,   
especially within the first few   
months of treatment. Let your   
doctor know if you experience an   
increase in symptoms of   
depression, anxiety,   
irritability, suicidal thoughts,   
agitation, anger, or other   
unusual changes in behavior or   
mood.  
Seizures  
There is a risk of having a   
seizure when you take Contrave.   
The risk of seizure is higher in   
people who take higher doses of   
Contrave, have certain medical   
conditions, or take Contrave with   
certain other medicines. If you   
have a seizure while taking   
Contrave, stop taking Contrave   
and call your doctor right away.   
Do not take Contrave again if you   
have a seizure.  
2  
  
Contrave (naltrexone/bupropion)   
Patient Information  
  
Risk of Opioid Overdose  
One of the ingredients in   
Contrave (naltrexone) can   
increase your chance of having an   
opioid overdose if you take   
opioid medicines while taking   
Contrave. Opioids are common pain   
medications. Do not use any   
opioid medications while taking   
Contrave. Using opioids in the 7   
to 10 days before you start   
taking Contrave may cause you to   
suddenly have symptoms of opioid   
withdrawal when you take it. Tell   
your doctor if you have used   
these medications in the past 10   
days. Inform your doctor that you   
are taking Contrave before any   
medical procedure or surgery.  
  
Severe Allergic Reactions  
Some people have had a severe   
allergic reaction to bupropion,   
one of the ingredients in   
Contrave. Stop taking Contrave   
and call your doctor or go to the   
nearest hospital emergency room   
right away if you have signs and   
symptoms of an allergic reaction   
such as rash, itching, swelling   
of the lips or tongue, fever,   
chest pain, or trouble breathing.  
Increases in Blood Pressure or   
Heart Rate  
Some people may get high blood   
pressure or have a higher heart   
rate when taking Contrave. Your   
doctor should check your blood   
pressure and heart rate before   
you start taking Contrave and   
while you take it.  
  
Liver Damage or Hepatitis  
One of the ingredients in   
Contrave--naltrexone--can cause   
liver damage or hepatitis. Stop   
taking Contrave and tell your   
doctor if you have any signs or   
symptoms of liver problems such   
as stomach pain, dark urine,   
yellow eyes (jaundice), or   
extreme fatigue.  
  
Manic Episodes  
One of the ingredients in   
Contrave--bupropion--can cause   
some people who were manic or   
depressed in the past, or who   
have bipolar disease, to become   
manic or depressed again.  
Vision Problems (Angle-Closure   
Glaucoma)  
One of the ingredients in   
Contrave--bupropion--can cause   
some people to have problems with   
their vision (angle-closure   
glaucoma). Signs and symptoms of   
angle-closure glaucoma may   
include eye pain, vision changes,   
or swelling or redness in or   
around the eye. Ask your eye   
doctor if you are at risk for   
angle-closure glaucoma and do not   
use Contrave if you are at risk.  
  
Low Blood Sugar (Hypoglycemia)  
Weight loss can cause low blood   
sugar in people with type 2   
diabetes who also take medicines   
used to treat type 2 diabetes   
(such as insulin or   
sulfonylureas). You should check   
your blood sugar before you start   
taking Contrave and while you   
take Contrave.  
  
This is not intended to be a   
complete list. For additional   
information please see the   
 s website:   
https://www.contrave.[employer].  
  
Oral Contraceptives: The Pill  
  
Beginning the Pill  
  
Pills come in either a 21 day   
pack or a 28 day pack. With the   
21 day pack you will take one   
pill for 21 days then no pill for   
7 days, during which time you   
will have what is known as   
withdrawal bleeding. The 28 day   
pack allows you to take a pill   
every day of the cycle with no   
interruptions. The first 21 pills   
are the pills with the active   
ingredients and the last 7 are   
the nonmedical pills (placebo) or   
they may contain iron. There will   
be bleeding during the week you   
are taking the nonmedical pills.   
The advantage to the 28 day pack   
is that you don t have to keep   
track of when you stopped the   
pill. There are a group of 28 day   
pills that contain 24 active   
pills and only 4 placebo pills.   
These are formulated to give you   
a lighter period.  
  
Unless otherwise instructed, you   
should start your pills the   
 following your first day   
of bleeding with your next period   
(if your period starts on a   
, you should start pills   
the same day)  
Read your information packet that   
comes with the pills.  
  
Pill Benefits  
  
The pill is the most popular   
method of reversible birth   
control being used today.   
Millions of women rely on oral   
contraceptives as their birth   
control method. It is important   
to have an examination by your   
physician to determine if the   
pill is safe for you. There are   
several advantages associated   
with the pill: it is 97-98%   
effective when used correctly;   
may improve acne; periods are   
more regular and less painful;   
there is less iron deficiency   
anemia in pill users. Long term   
use is associated with a   
decreased incidence of ovarian   
and uterine cancer. There is also   
no evidence that the pill   
increases the incidence of any   
cancer.  
  
How Oral Contraceptives Work  
  
Oral contraceptives come in two   
varieties. One is the combination   
pill which contains both estrogen   
and progesterone. Combination   
pills are considered 98-99%   
effective in preventing   
pregnancy. This pill comes in   
either monophasic, which delivers   
the same amount of estrogen and   
progesterone throughout the   
cycle; and triphasic, which try   
tries to mimic the normal hormone   
cycle by changing the levels of   
the hormones in the pills during   
the month. There is no real   
advantage to taking the one over   
the other. The other type of pill   
only contains progesterone. It is   
best used for women who can t   
take estrogen. This type of pill   
is slightly less effective than   
the combination pill in   
preventing pregnancy. It is VERY   
important to take the   
progesterone only pill at the   
same time every day. Oral   
contraceptives prevent ovulation   
(release of an egg from the   
ovary) by suppressing the   
pituitary gland s action. The   
pill does NOT prevent sexually   
transmitted disease.  
  
Obtaining a Prescription  
It is important to see your   
doctor before starting oral   
contraceptives so that you can   
have a full medical history taken   
and a physical examination given.   
Certain medical conditions may   
make the pill inappropriate for   
you, therefore it is very   
important to be honest and as   
complete as possible with the   
information you share with your   
doctor.  
  
The types of predisposing factors   
which would make the pill a poor   
choice of birth control would   
include:  
History of blood clots  
Stroke  
Serious liver disease or impaired   
liver function  
Unexplained vaginal bleeding or   
pregnancy  
Cancer of the reproductive system  
Active gall bladder disease  
Hypertension  
  
Possible Side Effects  
  
It can take up to three months   
for your body to become adjusted   
to the pill. The more common side   
effects experienced at this time   
are: breakthrough spotting or   
bleeding, which is bleeding at   
any other time other than when   
you should be having a period;   
nausea or vomiting; breast   
tenderness; and mild fluid   
retention. There is no long term   
weight gain with the use of the   
pill. Breakthrough bleeding is   
the most common complaint of new   
pill users. There is no way to   
predict who will have it and   
there is no way of preventing it.   
Breakthrough bleeding usually   
subsides on its own with no   
further treatment after the first   
three months of taking the pill.   
If these symptoms continue to   
occur after the first three   
months you should check with your   
physician to see if there is any   
physical cause and possibly   
change to another birth control   
pill.  
  
Problems:  
  
Missed 1 pill: Take 2 pills the   
next day.  
Missed 2 pills: Take 2 pills the   
next day and 2 pills the   
following day. Also use another   
form of birth control (condoms)   
along with the pill for the rest   
of the month.  
Missed 3 or more pills: You have   
two choices. You can take two   
pills each day until you are on   
schedule, plus use an additional   
form of birth control along with   
the pill for the rest of the   
month. Or you can stop the pill   
and start a completely new pack   
of pills the next . You   
must use another form of birth   
control with the pill for at   
least the first two weeks of the   
new pack.  
You re ill and you have been   
vomiting or have diarrhea: You   
must use another form of birth   
control with the pill since the   
pill may not be fully absorbed   
during your illness. Continue to   
use the added birth control until   
the end of the cycle.  
Desire to become pregnant: Stop   
using the pill for one month   
before trying to become pregnant.  
Taking other medications: The   
birth control pill is less   
effective when you take the   
antibiotic Rifampin, epilepsy   
(seizure) drugs such as   
phenytoin, carbamazepine,   
phenobarbital, topiramate and   
some medications for HIV. Let   
your doctor know if you start   
taking any of these medications   
while on the pill.  
  
Symptoms to Notify Your Doctor   
with Immediately:  
  
Pain in your chest or legs  
Continuous blurred vision  
Severe headaches  
Slurred speech  
Tingling or weakness on one side   
of your body  
Shortness of breath  
Swelling of one leg  
  
Refills of Birth Control Pills  
  
You need to see a doctor every   
year for a refill of your   
prescription. This is necessary   
in order that your health can be   
monitored closely while you are   
taking birth control pills. If   
your prescription should    
before your next scheduled   
appointment you can usually get a   
one month extension from your   
doctors office if you call during   
regular business hours about one   
week before you need to start the   
new package of pills. This allows   
the physician to refer to your   
chart for necessary health   
information.  
  
Bupropion: Patient drug   
information  
  
Access [employer] for   
additional drug information,   
tools, and databases.  
Copyright 7900-7935 [employer]   
[employer]. All rights reserved.  
Contributor Disclosures  
(For additional information see   
 Bupropion: Drug information  and   
see  Bupropion: Pediatric drug   
information )  
  
You must carefully read the   
 Consumer Information Use and   
Disclaimer  below in order to   
understand and correctly use this   
information.  
  
Brand Names: [place]  
Aplenzin;  
Forfivo XL;  
Wellbutrin SR;  
Wellbutrin XL;  
Zyban [DSC]  
  
Brand Names: Yenny  
MYLAN-BuPROPion XL;  
ODAN Bupropion SR;  
PMS-BuPROPion SR;  
RATIO-BuPROPion SR [DSC];  
TARO-Bupropion XL;  
TEVA-Bupropion XL;  
Wellbutrin SR;  
Wellbutrin XL;  
Zyban  
  
Warning  
Drugs like this one have raised   
the chance of suicidal thoughts   
or actions in children and young   
adults. The risk may be greater   
in people who have had these   
thoughts or actions in the past.   
All people who take this drug   
need to be watched closely. Call   
the doctor right away if signs   
like low mood (depression),   
nervousness, restlessness,   
grouchiness, panic attacks, or   
changes in mood or actions are   
new or worse. Call the doctor   
right away if any thoughts or   
actions of suicide occur.  
  
What is this drug used for?  
It is used to treat low mood   
(depression).  
It is used to prevent seasonal   
affective disorder (SAD).  
It is used to help you stop   
smoking.  
It may be given to you for other   
reasons. Talk with the doctor.  
  
What do I need to tell my doctor   
BEFORE I take this drug?  
If you are allergic to this drug;   
any part of this drug; or any   
other drugs, foods, or   
substances. Tell your doctor   
about the allergy and what signs   
you had.  
If you have ever had seizures.  
If you drink a lot of alcohol and   
you stop drinking all of a   
sudden.  
If you use certain other drugs   
like drugs for seizures or   
anxiety and you stop using them   
all of a sudden.  
If you have ever had an eating   
problem like anorexia or bulimia.  
If you have any of these health   
problems: Kidney disease or liver   
disease.  
If you have taken certain drugs   
for depression or Parkinson's   
disease in the last 14 days. This   
includes isocarboxazid,   
phenelzine, tranylcypromine,   
selegiline, or rasagiline. Very   
high blood pressure may happen.  
If you are taking any of these   
drugs: Linezolid or methylene   
blue.  
If you are taking another drug   
that has the same drug in it.  
This is not a list of all drugs   
or health problems that interact   
with this drug.  
Tell your doctor and pharmacist   
about all of your drugs   
(prescription or OTC, natural   
products, vitamins) and health   
problems. You must check to make   
sure that it is safe for you to   
take this drug with all of your   
drugs and health problems. Do not   
start, stop, or change the dose   
of any drug without checking with   
your doctor.  
  
What are some things I need to   
know or do while I take this   
drug?  
For all patients taking this   
drug:  
Tell all of your health care   
providers that you take this   
drug. This includes your doctors,   
nurses, pharmacists, and   
dentists.  
Avoid driving and doing other   
tasks or actions that call for   
you to be alert or have clear   
eyesight until you see how this   
drug affects you.  
This drug may affect certain lab   
tests. Tell all of your health   
care providers and lab workers   
that you take this drug.  
Do not stop taking this drug all   
of a sudden without calling your   
doctor. You may have a greater   
risk of side effects. If you need   
to stop this drug, you will want   
to slowly stop it as ordered by   
your doctor.  
High blood pressure has happened   
with this drug. Have your blood   
pressure checked as you have been   
told by your doctor.  
This drug may raise the chance of   
seizures. The risk may be higher   
in people who take higher doses,   
have certain health problems, or   
take certain other drugs. People   
who suddenly stop drinking a lot   
of alcohol or suddenly stop   
taking certain drugs (like drugs   
used for anxiety, sleep, or   
seizures) may also have a higher   
risk. Talk to your doctor to see   
if you have a greater chance of   
seizures.  
Avoid drinking alcohol while   
taking this drug.  
Talk with your doctor before you   
use marijuana, other forms of   
cannabis, or prescription or OTC   
drugs that may slow your actions.  
It may take several weeks to see   
the full effects.  
This drug is not approved for use   
in children. Talk with the   
doctor.  
If you are 65 or older, use this   
drug with care. You could have   
more side effects.  
Tell your doctor if you are   
pregnant, plan on getting   
pregnant, or are breast-feeding.   
You will need to talk about the   
benefits and risks to you and the   
baby.  
If you smoke:  
Not all products are approved for   
use to help stop smoking. Talk   
with the doctor to make sure that   
you have the right product.  
New or worse mental, mood, or   
behavior problems have happened   
when bupropion has been used to   
stop smoking. These problems   
include thoughts of suicide or   
murder, depression, forceful   
actions, fury, anxiety, and   
anger. These problems have   
happened in people with and   
without a history of mental or   
mood problems. Talk with the   
doctor.  
  
What are some side effects that I   
need to call my doctor about   
right away?  
WARNING/CAUTION: Even though it   
may be rare, some people may have   
very bad and sometimes deadly   
side effects when taking a drug.   
Tell your doctor or get medical   
help right away if you have any   
of the following signs or   
symptoms that may be related to a   
very bad side effect:  
Signs of an allergic reaction,   
like rash; hives; itching; red,   
swollen, blistered, or peeling   
skin with or without fever;   
wheezing; tightness in the chest   
or throat; trouble breathing,   
swallowing, or talking; unusual   
hoarseness; or swelling of the   
mouth, face, lips, tongue, or   
throat.  
Signs of high blood pressure like   
very bad headache or dizziness,   
passing out, or change in   
eyesight.  
Feeling confused, not able to   
focus, or change in behavior.  
Hallucinations (seeing or hearing   
things that are not there).  
If seizures are new or worse   
after starting this drug.  
Chest pain or pressure, a fast   
heartbeat, or an abnormal   
heartbeat.  
Swelling.  
Shortness of breath.  
Change in hearing.  
Ringing in ears.  
Passing urine more often.  
Swollen gland.  
Trouble moving around.  
Some people may have a higher   
chance of eye problems with this   
drug. Your doctor may want you to   
have an eye exam to see if you   
have a higher chance of these eye   
problems. Call your doctor right   
away if you have eye pain, change   
in eyesight, or swelling or   
redness in or around the eye.  
A severe skin reaction   
(Amador-Jl syndrome/toxic   
epidermal necrolysis) may happen.   
It can cause severe health   
problems that may not go away,   
and sometimes death. Get medical   
help right away if you have signs   
like red, swollen, blistered, or   
peeling skin (with or without   
fever); red or irritated eyes; or   
sores in your mouth, throat,   
nose, or eyes.  
  
What are some other side effects   
of this drug?  
All drugs may cause side effects.   
However, many people have no side   
effects or only have minor side   
effects. Call your doctor or get   
medical help if any of these side   
effects or any other side effects   
bother you or do not go away:  
All products:  
Dizziness or headache.  
Constipation, diarrhea, stomach   
pain, upset stomach, throwing up,   
or feeling less hungry.  
Shakiness.  
Feeling nervous and excitable.  
Strange or odd dreams.  
Gas.  
Dry mouth.  
Trouble sleeping.  
Muscle or joint pain.  
Nose or throat irritation.  
Sweating a lot.  
A change in weight without   
trying.  
Extended-release tablets:  
For some brands, you may see the   
tablet shell in your stool. For   
these brands, this is normal and   
not a cause for concern. If you   
have questions, talk with your   
doctor.  
These are not all of the side   
effects that may occur. If you   
have questions about side   
effects, call your doctor. Call   
your doctor for medical advice   
about side effects.  
You may report side effects to   
your national health agency.  
  
How is this drug best taken?  
Use this drug as ordered by your   
doctor. Read all information   
given to you. Follow all   
instructions closely.  
For all uses of this drug:  
Do not take this drug more often   
than you are told. This may raise   
the risk of seizures. Be sure you   
know how far apart to take your   
doses.  
Take in the morning if taking   
once a day.  
Take with or without food.  
If you are not able to sleep, do   
not take this drug too close to   
bedtime. Talk with your doctor.  
Swallow whole. Do not chew,   
break, or crush.  
Keep taking this drug as you have   
been told by your doctor or other   
health care provider, even if you   
feel well.  
If you have trouble swallowing,   
talk with your doctor.  
For stopping smoking:  
You may take this drug for 1 week   
before you stop smoking.  
Nicotine products and counseling   
may be used at the same time for   
best results.  
If you have not been able to quit   
smoking after taking this drug   
for 12 weeks, talk with your   
doctor.  
You may have signs of nicotine   
withdrawal when you try to quit   
smoking even when using drugs   
like this one to help you quit   
smoking. There are many signs of   
nicotine withdrawal. Rarely   
depression and suicidal thoughts   
have happened in people trying to   
quit smoking. Talk with your   
doctor.  
  
What do I do if I miss a dose?  
Skip the missed dose and go back   
to your normal time.  
Do not take 2 doses at the same   
time or extra doses.  
  
How do I store and/or throw out   
this drug?  
Store at room temperature   
protected from light. Store in a   
dry place. Do not store in a   
bathroom.  
Keep all drugs in a safe place.   
Keep all drugs out of the reach   
of children and pets.  
Throw away unused or    
drugs. Do not flush down a toilet   
or pour down a drain unless you   
are told to do so. Check with   
your pharmacist if you have   
questions about the best way to   
throw out drugs. There may be   
drug take-back programs in your   
area.  
  
General drug facts  
If your symptoms or health   
problems do not get better or if   
they become worse, call your   
doctor.  
Do not share your drugs with   
others and do not take anyone   
else's drugs.  
Some drugs may have another   
patient information leaflet. If   
you have any questions about this   
drug, please talk with your   
doctor, nurse, pharmacist, or   
other health care provider.  
If you think there has been an   
overdose, call your poison   
control center or get medical   
care right away. Be ready to tell   
or show what was taken, how much,   
and when it happened.  
  
Last Reviewed [id number]  
Consumer Information Use and   
Disclaimer  
This generalized information is a   
limited summary of diagnosis,   
treatment, and/or medication   
information. It is not meant to   
be comprehensive and should be   
used as a tool to help the user   
understand and/or assess   
potential diagnostic and   
treatment options. It does NOT   
include all information about   
conditions, treatments,   
medications, side effects, or   
risks that may apply to a   
specific patient. It is not   
intended to be medical advice or   
a substitute for the medical   
advice, diagnosis, or treatment   
of a health care provider based   
on the health care provider's   
examination and assessment of a   
patient's specific and unique   
circumstances. Patients must   
speak with a health care provider   
for complete information about   
their health, medical questions,   
and treatment options, including   
any risks or benefits regarding   
use of medications. This   
information does not endorse any   
treatments or medications as   
safe, effective, or approved for   
treating a specific patient.   
[employer] and its affiliates   
disclaim any warranty or   
liability relating to this   
information or the use thereof.   
The use of this information is   
governed by the Terms of Use,   
available at   
https://www.woltersTerraEchosuwer.com/en/  
know/clinical-effectiveness-terms  
.  
  
 [employer] and its   
affiliates and/or licensors. All   
rights reserved.  
  
  
Electronically signed by Ernestine Sher APRN.CNP at 2024 8:29   
AM EDT  
  
documented in this encounter            [facility]  
   
                                                    2024 History of   
Present illness Narrative               Formatting of this note is   
different from the original.  
Some documentation from previous   
visit of 2024 was copied and   
pasted, documentation has been   
reviewed and edited as necessary   
for today's visit.  
  
Patient Summary: Don is a 24   
year old female who presents for   
follow-up evaluation of her   
obesity/weight management to   
treat PCOS, IFG, elevated LDL,   
anxiety and depression and   
prevent relatedco-morbidities. In   
our previous visits we have   
discussed lifestyle intervention   
including a nutrition   
recommendations and physical   
activity optimization. Her last   
office visit was 1 month ago.  
  
Irregular menses - Menses   
2023 LMP 1/2-3 spotting  
No contraception.  
20 lb weight gain with Lexapro   
for anxiety. Changed to bupropion   
by PCP plans to discuss   
increasing dose with PCP  
  
Assessment/plan from last visit:  
Metformin 1 gm twice a day with   
meals - Adjusted to 500 mg at   
breakfast and 1 gm at dinner but   
has had frequent diarrhea since   
increasing to 1 gm twice a day 3   
weeks ago.  
History of anorexia and bulimia   
in HS - no treatment, In   
remission since age 15. Dad   
helped her and she started adding   
protein shakes.  
  
Interval History -  
Awake - 0700  
B - 0745 Premier Protein shake  
S - none  
L - 1/2 cup cottage cheese with   
fruit  
S - none  
D -  protein,   
pasta/potato/rice/sweet potato   
and veg - asparagus, cucumber   
salad, winter and summer   
squash/salad  
S - none  
Fluids - water, unsweetened tea  
Bedtime -   
  
She feels the medication is   
helping to lessen hunger and   
craving to candy controlled  
  
Exercise: stable sedentary job at   
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  
  
Stress: increased -    
wearing heart monitor  
  
Sleep: 7 hours, up to go to   
bathroom a few times LASHAWN -no  
  
Weight gain since last vist: 2   
lbs since last visit  
  
3/21/2024 167 lb BMI: 28.67  
2024 169 lb  
2024 166 lb metformin  
  
CrCl cannot be calculated   
(Patient's most recent lab result   
is older than the maximum 180   
days allowed.).  
  
PAST MEDICAL HISTORY  
Diagnosis Date  
Exertional asthma 2012  
Generalized anxiety disorder  
IFG (impaired fasting glucose)   
2024  
Medial meniscus tear 2012  
PCOS (polycystic ovarian   
syndrome)   
Unspecified otitis media  
Recurrent otitis  
Vocal cord dysfunction 05/15/2012  
  
Current Outpatient Medications  
Medication Sig Dispense Refill  
metFORMIN (GLUCOPHAGE) 500 mg   
tablet Take 2 tablets by mouth   
two times a day with meals. 360   
tablet 0  
buPROPion XL (WELLBUTRIN XL) 150   
mg 24 hr tablet Take 150 mg by   
mouth every morning.  
PNV/iron/folic acid (PRENATAL   
VITAMIN-IRON-FA ORAL) Take 1   
tablet by mouth once daily.  
albuterol HFA (PROVENTIL HFA,   
VENTOLIN HFA) 90 mcg/actuation   
inhaler Inhale 2 Puffs as   
instructed every 4 hours as   
needed. 1 Inhaler 0  
  
No current facility-administered   
medications for this visit.  
  
Occupation: bank  
Contraception: none  
  
/82   Pulse 80   Wt 167 lb   
(75.8 kg)   LMP 2024   SpO2   
98%   BMI 28.67 kg/m  
  
Assessment/Plan:  
Don Mittal is a 24 year old   
yo female with overweight   
(pre-obesity) who presented today   
for follow up for supervised   
weight loss to treat PCOS, IFG,   
elevated LDL, anxiety and   
depression and prevent related   
co-morbidities.  
  
ASSESSMENT/PLAN:  
1. PCOS (polycystic ovarian   
syndrome) - ICD9: 256.4, ICD10:   
E28.2 (primary diagnosis)  
- Whole food balanced protein   
low-carb nutrition  
- METFORMIN 500 MG TABLET  
- DESOGESTREL 0.15 MG-ETHINYL   
ESTRADIOL 0.03 MG TABLET  
- NALTREXONE 50 MG TABLET  
  
2. Irregular menses - ICD9:   
626.4, ICD10: N92.6  
- DESOGESTREL 0.15 MG-ETHINYL   
ESTRADIOL 0.03 MG TABLET  
  
3. Anxiety and depression - ICD9:   
300.00, 311, ICD10: F41.9, F32.A  
- Continue bupropion prescribed   
by PCP- she plans to discuss   
increasing dose with PCP  
  
4. History of bulimia - ICD9:   
V11.8, ICD10: Z86.59  
- in remission since age 15  
  
5. History of anorexia nervosa -   
ICD9: V11.8, ICD10: Z86.59  
- in remission since age 15  
  
6. Provided repeat prescription   
for oral contraceptive - ICD9:   
V25.41, ICD10: Z30.41  
- RX for Apri given today.  
- discussed with patient on how   
to take OCP's.Given written   
information  
- counseled on benefits, risks   
and possible severe side effects   
of OCP's.  
- discussed need to use Condoms   
to help to prevent STD's   
including HIV etc.  
- DESOGESTREL 0.15 MG-ETHINYL   
ESTRADIOL 0.03 MG TABLET  
  
7. Overweight with body mass   
index (BMI) of 29 to 29.9 in   
adult - ICD9: 278.02, V85.25,   
ICD10: E66.3, Z68.29  
- METFORMIN 500 MG TABLET -   
decrease to 500 mg with breakfast   
and 1 gm with dinner due to   
diarrhea with 1 gm twice daily  
- DESOGESTREL 0.15 MG-ETHINYL   
ESTRADIOL 0.03 MG TABLET  
- NALTREXONE 50 MG TABLET  
Agreeable to adding naltrexone to   
Wellbutrin to mimic the effect of   
Contrave. Confirmed no   
allergies/contraindications to   
naltrexone. Reviewed potential   
side effects. Patient will notify   
me if there are any adverse   
effects with the medication.   
Prescription provided today and   
dosing schedule provided. Patient   
advised to increase dose only if   
tolerated. Will remain on lower   
dose if effective.  
Discussed increased risk for   
opioid overdose if opioid   
medicines are taken while taking   
naltrexone. Aware to not use any   
opioid medications while taking   
this medication. Has not taken   
any opioid medication in the last   
7-10 days. Will discontinue   
naltrexone before any medical   
procedure or surgery.  
- Continue bupropion prescribed   
by PCP- she plans to discuss   
increasing dose with PCP  
  
- Continue whole food low-carb   
diet with 30 g of protein 3 times   
a day and 30 g of carbs at lunch   
and dinner only.  
- continue exercise. Discussed   
benefits of walking 15 minutes   
immediately after meals  
  
Due to history of anorexia and   
bulimia, we agreed that in-office   
visits would be best for now.  
  
Follow up in 6 weeks  
  
Ernestine Sher APRN.CNP  
Advanced Education from the   
Obesity [employer] Association  
  
Medical Decision Making:  
  
Problems:  
Moderate: 1+ chronic illnesses   
with change  
Risk:  
Moderate: Drug management and   
Moderate risk from   
testing/treatment  
  
Medical Decision Making Level: 4   
- Moderate  
  
  
Electronically signed by Ernestine Sher APRN.CNP at 2024 8:47   
AM EDT  
documented in this encounter            [facility]  
   
                                        2024 Note     HNO ID: [id number]  
Author: ERNESTINE SHER APRN.CNP  
Service: ?  
Author Type: Nurse Practitioner  
Type: Progress Notes  
Filed: 2024 19:59  
Note Text:  
Some documentation from previous   
visit of 2023 was copied and   
pasted,  
documentation has been reviewed   
and edited as necessary for   
today's visit.  
Patient Summary: Don is a 23   
year old female who presents for   
follow-up  
evaluation of her obesity/weight   
management to treat PCOS, IFG,   
elevated LDL,  
anxiety and depression and   
prevent relatedco-morbidities. In   
our previous  
visits we have discussed   
lifestyle intervention including   
a nutrition  
recommendations and physical   
activity optimization. Her last   
office visit was 1  
month ago.  
Irregular menses - Menses   
2023 LMP 1/2-3 spotting  
20 lb weight gain with Lexapro   
for anxiety. Changed to bupropion   
by PCP.  
Assessment/plan from last visit:  
Metformin 500 mg twice a day with   
meals - tried 1 gm at dinner but   
had nausea  
and mild diarrhea (actually took   
at bedtime)  
Bupropion 150 mg 24/hr tab for   
anxiety and depression  
Was in [place] with  who   
races - very hectic travel. Tried   
to eat  
healthier.  
Quit second job so life is   
becoming more calm  
History of anorexia and bulimia   
in HS - no treatment, Dad helped   
her and she  
started adding protein shakes.  
Interval History - changes made   
in past month  
Awake - 0700 but is changing to   
0520 to exercise before work  
B - 07 Premier Protein shake  
S - none  
L - SKIP  
S - none  
D - 183 protein,   
pasta/potato/rice/sweet potato   
and veg - asparagus, cucumber  
salad, winter and summer squash  
S - none or Skinny popcorn  
Fluids - water, zero sugar   
electrolyte drink mixes,   
unsweetened tea  
Bedtime -   
She feels the medication is   
helping to lessen hunger and   
craving to candy  
Exercise: stable sedentary job at   
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   
walking and  
free weights  
Stress: decreased -  races   
and season has not started yet   
and decreased  
stress after quitting second job  
Sleep: 7 hours, well rested LASHAWN   
-no  
Weight gain since last vist: 3   
lbs since last visit  
2024 169 lb  
2024 166 lb metformin  
CrCl cannot be calculated   
(Patient's most recent lab result   
is older than the  
maximum 180 days allowed.).  
PAST MEDICAL HISTORY  
Diagnosis Date  
Exertional asthma 2012  
Generalized anxiety disorder  
IFG (impaired fasting glucose)   
2024  
Medial meniscus tear 2012  
PCOS (polycystic ovarian   
syndrome)   
Unspecified otitis media  
Recurrent otitis  
Vocal cord dysfunction 05/15/2012  
Current Outpatient Medications  
Medication Sig Dispense Refill  
buPROPion XL (WELLBUTRIN XL) 150   
mg 24 hr tablet Take 150 mg by   
mouth every  
morning.  
PNV/iron/folic acid (PRENATAL   
VITAMIN-IRON-FA ORAL) Take 1   
tablet by mouth once  
daily.  
metFORMIN (GLUCOPHAGE) 500 mg   
tablet Take 1 tablet by mouth two   
times a day  
with meals. 180 tablet 0  
albuterol HFA (PROVENTIL HFA,   
VENTOLIN HFA) 90 mcg/actuation   
inhaler Inhale 2  
Puffs as instructed every 4 hours   
as needed. 1 Inhaler 0  
No current facility-administered   
medications for this visit.  
/84   Wt 169 lb (76.7 kg)     
LMP 2024   BMI 29.01 kg/m?  
Assessment/Plan:  
Don Mittal is a 23 year old   
yo female with overweight   
(pre-obesity) who  
presented today for follow up for   
supervised weight loss to treat   
PCOS, IFG,  
elevated LDL, anxiety and   
depression and prevent related   
co-morbidities.  
ASSESSMENT/PLAN:  
1. PCOS (polycystic ovarian   
syndrome) - ICD9: 256.4, ICD10:   
E28.2 (primary  
diagnosis)  
- Whole food balanced protein   
low-carb nutrition  
- METFORMIN 500 MG TABLET  
2. Elevated LDL cholesterol level   
- ICD9: 272.0, ICD10: E78.00  
- benefits of weight loss   
discussed  
3. IFG (impaired fasting glucose)   
- ICD9: 790.21, ICD10: R73.01  
- METFORMIN 500 MG TABLET  
4. Irregular menses - ICD9:   
626.4, ICD10: N92.6  
- METFORMIN 500 MG TABLET  
5. Anxiety and depression - ICD9:   
300.00, 311, ICD10: F41.9, F32.A  
- well-controlled with bupropion  
6. History of bulimia - ICD9:   
V11.8, ICD10: Z86.59  
- in remission  
7. History of anorexia nervosa -   
ICD9: V11.8, ICD10: Z86.59  
- in remission  
8. Overweight with body mass   
index (BMI) of 29 to 29.9 in   
adult - ICD9: 278.02,  
V85.25, ICD10: E66.3, Z68.29  
- METFORMIN 500 MG TABLET -   
continue to increase dose as   
tolerated  
- Recommended whole food low-carb   
diet with 30 g of protein 3 times   
a day and  
30 g of carbs at lunch and dinner   
only. Given tracking log.  
- Given 15 gram carb whole food   
and protein suggestion list.  
- Given protein snack ideas  
- continue exercise  
Lengthy discussion regarding   
history of anorexia and bulimia   
which is in  
remission. Discussed appropriate   
amount of exercise. We agreed   
that in-office  
visits would be best for now.  
Follow up in 4 weeks  
SHANEKA Wan.CNP  
Advanced Education from the   
Obesity Medicine Association  
Medical Decision Making:  
Problems:  
Moderate: 1+ chronic illnesses   
with change  
Risk:  
Moderate: Drug man (more content   
not included)...                        [facility]  
   
                                        2024 Instructions   
  
  
Ernestine Sher APRN.CNP -   
2024 7:10 AM ESTFormatting   
of this note is different from   
the original.  
- Whole food low-carb diet with   
30 g of protein 3 times a day and   
up to 30 g of carbs at lunch and   
dinner only.  
Meals - protein is a goal and   
carbohydrates are a limit.  
Snacks - all protein or more   
protein than carbs  
Use tracking log as a worksheet   
and bring with you to your next   
appointment.  
  
Protein - no carbs  
Egg 1 large - 6g  
Egg white 1 large 3.6g  
  
3 oz is approximately the size of   
a deck of cards and equals 21 g   
protein  
Beef, Chicken, Turkey, Pork, Lamb   
1 oz 7g  
Fish, Tuna Fish 1 oz 7g (Starkist   
tuna packet 2.6 oz 17 gm protein)  
Seafood (Crabmeat, Shrimp,   
Lobster) 1 oz 6g  
  
Protein shakes (read labels)  
Premier Protein or generic   
WalMart Equate, Aldi Elevate,   
Meijer High Performance- 30g   
protein & 1g carb - meal   
replacement  
Premier Protein plant protein   
powder - 25 gm protein, 0 suger/2   
carb Vanilla and chocolate (not a   
meal replacement)  
Fairlife 30 gram protein - 30g   
protein & 3g carb  
BOOST Glucose Control Max 30g   
Protein Nutritional Drink - 30g   
protein & 1 carb - meal   
replacement  
Slimfast High Protein - 20g   
protein & 1g carb  
Ensure Max Protein Nutrition   
Shake 30g protein & 2 carb  
_________________________________  
________________  
Protein AND carbs  
Beef/Turkey Jerky 1 oz dried   
10-15g protein - check carb   
count, can be high if sugar added  
Slim Thompson - 6 gm protein and 4 net   
carb  
Great Value original turkey   
sausage sticks - 7 gm protein and   
2 gm carb  
Imitation Crab Meat 1 oz - 2g   
protein & 4g carb  
  
Milk, skim 2% or 1% 8 oz - 8g   
protein & 12g carb  
  
Greek yogurt  
Full Fat Greek Yogurt 1 cup -   
20.4g protein & 9.1g carb  
2% Greek Yogurt 1 cup - 22.7g   
protein & 9.1g carb  
0% (fat-free) Greek Yogurt - 1   
cup 24g protein & 9.3g carb  
:ratio, KETO Friendly Dairy Snack   
1 single svg - 15g protein & 2g   
carb  
:ratio Protein 1 single svg - 25g   
protein & 8g carb  
Dannon Light + Fit 1 single csvg   
- 12g protein & 9g carb  
Two Good Lowfat Greek Yogurt,   
Strawberry, Lower Sugar - 12g   
protein & 2g carb  
Oikos Triple Zero Greek Nonfat   
Yogurt 1 single svg - 15g protein   
& 7g carb  
Aldi Greek yogurt 10g protein & 4   
g carb  
  
Cheese each oz  
Brie 5.9g protein & 0.1g carb  
Cheddar Cheese 7g protein & 0.4g   
carb  
Mozzarella Cheese 6.3g protein &   
0.6g carb  
Bassem Cheese 6.7g protein & 0.7g   
carb  
Parmesan Cheese 10g protein &   
0.9g carb  
Cream Cheese 1.7g protein & 1.2g   
carb  
Feta 4g protein & 1.2g carb  
Swiss Cheese 7.6g protein & 1.5g   
carb  
  
Franklin s Low Fat Cottage Cheese   
1/2cup 12g protein & 4g carb  
  
Legumes  
Lentils cup 9g protein & 20g carb  
Lima beans cup 7g protein & 20g   
carb  
Kidney, Black, Navy, Cannellini   
beans cup 8g protein & 20g carb  
Soybeans 1/2 c 14g protein & 8.5g   
carb  
Peanut butter, natural 2 Tbsp   
7-8g protein & 4g net carbs, 190   
calories  
Braggadocio milk, unsweetened 8 oz 1g   
protein & 2g carb  
Soy milk 8 oz 3.5g protein & 1.6g   
carb  
Tofu 1/2 cup 10g protein & 2.3g   
carb  
  
Nuts and Seeds per oz  
Pumpkin Seeds - 6.9g protein & 5g   
carb  
Almonds - 5.9g protein & 6.1g   
carb  
Sunflower Seeds - 5.8g protein &   
5.6g carb  
Pistachios - 5.8g protein & 7.8g   
carb  
Cashews - 5.1g protein & 9.2g   
carb  
Walnuts - 4.3g protein & 3.8g   
carb  
Hazelnuts - 4.2g protein & 4.7g   
carb  
Brazil Nuts - 4.0g protein & 3.4g   
carb  
Pecans - 2.6g protein & 3.9g carb  
Peanuts - 7g protein & 4.6g carb  
  
<15 gram carb fruit options  
Berries have the lowest sugar   
content  
  
1/2 cup diced honeydew melon - 8   
carbs  
1/2 cup diced watermelon - 6   
carbs  
One half medium grapefruit - 10.5   
carbs  
1 medium orange -15.5 carbs  
1 medium peach -14.5 carbs  
1/2 cup fresh cranberries - 6.5   
carbs  
1 medium plum -7.5 carbs  
1/2 cup raspberries -7.5 carbs  
1 medium Mona -9 carbs  
1/2 cup fresh pineapple -11 carbs  
1 medium nectarine - 15 carbs  
1/2 cup blueberries - 11 carbs -   
may actually help you lose weight  
1 medium kiwi without skin - 11   
carbs  
1/2 cup fresh cherries -11 carbs  
1 medium tangerine -12 carbs  
1/2 cup sliced franky -14 carbs  
1/2 medium banana  
1/2 c grapes  
1/2 medium apple - 12.5 carbs  
1/2 c strawberries - 12.7 carbs  
  
5 (FIVE) gram carb vegetable   
options  
  
1 cup raw OR cup cooked:  
Asparagus  
Cabbage  
Spinach  
Peppers  
Green beans  
Carrots  
Tomato  
Cucumber  
Bean sprouts  
Cauliflower  
Lettuce  
Snap peas  
Broccoli  
Eggplant  
Zucchini  
Turnips  
Spaghetti squash  
Brussel sprouts  
  
15 gram carb vegetable options  
  
cup cooked green peas  
cup cooked corn or hominy  
corn on the cob, large (5 oz)  
cup cooked sweet potato, plain  
cup cooked potato, plain  
1 small potato or sweet potato  
1 cup winter squash (pumpkin,  
acorn, butternut)  
1 cup marinara or pasta sauce -   
check label  
cup tomato juice  
cup tomato puree  
Beans, Seeds, Nuts  
cup cooked beans (kidney, elizabeth,  
red, green, etc.)  
cup cooked lentils  
cup baked beans  
4 tablespoons nut butter  
  
Grains  
White long-grain rice 1/2 c 2g   
protein 22.5 carb  
Brown rice 1/2 c 5.5g protein 24   
carb  
Quinoa 1/2 c 4 gm complete   
protein 25 carbs  
Oatmeal  
  
30 High Protein Snack Ideas  
  
1. Jerky  
2. Trail mix without or minimal   
dried fruit  
3. Turkey roll-ups  
4. Greek yogurt  
5. Veggies and yogurt dip  
6. Tuna  
7. Hard-boiled eggs  
8. Peanut butter celery sticks  
9. No-bake energy bites  
10. Cheese slices/ Cheese Stick  
11. Handful of almonds  
12. Roasted chickpeas  
13. Hummus and veggies  
14. Cottage Cheese  
15. Celery/fruit with peanut   
butter  
16. Beef sticks (Grass-fed,   
natural ingredients)  
17. Protein bars  
18. Canned [place]  
19. Jose pudding  
20. Homemade granola - rolled   
oats, nuts, and a little   
sweetener - 1/4 cup serving  
21. Pumpkin seeds  
22. Nut butter  
23. Protein shakes  
24. Edamame  
25. Avocado and chicken salad  
26. Fruit and nut bars - natural   
ingredients without added sugar.  
27. Lentil salad  
28. Overnight oatmeal  
29. Egg muffins  
30. Leftover protein or lunch   
meat  
Electronically signed by Ernestine Sher APRN.CNP at 2024 7:36   
AM EST  
  
documented in this encounter            [facility]  
   
                                                    2024 History of   
Present illness Narrative               Formatting of this note is   
different from the original.  
Some documentation from previous   
visit of 2023 was copied and   
pasted, documentation has been   
reviewed and edited as necessary   
for today's visit.  
  
Patient Summary: Don is a 23   
year old female who presents for   
follow-up evaluation of her   
obesity/weight management to   
treat PCOS, IFG, elevated LDL,   
anxiety and depression and   
prevent relatedco-morbidities. In   
our previous visits we have   
discussed lifestyle intervention   
including a nutrition   
recommendations and physical   
activity optimization. Her last   
office visit was 1 month ago.  
  
Irregular menses - Menses   
2023 LMP 1/2-3 spotting  
20 lb weight gain with Lexapro   
for anxiety. Changed to bupropion   
by PCP.  
  
Assessment/plan from last visit:  
Metformin 500 mg twice a day with   
meals - tried 1 gm at dinner but   
had nausea and mild diarrhea   
(actually took at bedtime)  
Bupropion 150 mg 24/hr tab for   
anxiety and depression  
Was in [place] with  who   
races - very hectic travel. Tried   
to eat healthier.  
Quit second job so life is   
becoming more calm  
History of anorexia and bulimia   
in HS - no treatment, Dad helped   
her and she started adding   
protein shakes.  
  
Interval History - changes made   
in past month  
Awake - 0700 but is changing to   
0520 to exercise before work  
B - 0745 Premier Protein shake  
S - none  
L - SKIP  
S - none  
D - 1830 protein,   
pasta/potato/rice/sweet potato   
and veg - asparagus, cucumber   
salad, winter and summer squash  
S - none or Skinny popcorn  
Fluids - water, zero sugar   
electrolyte drink mixes,   
unsweetened tea  
Bedtime -   
  
She feels the medication is   
helping to lessen hunger and   
craving to candy  
Exercise: stable sedentary job at   
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   
walking and free weights  
  
Stress: decreased -  races   
and season has not started yet   
and decreased stress after   
quitting second job  
  
Sleep: 7 hours, well rested LASHAWN   
-no  
  
Weight gain since last vist: 3   
lbs since last visit  
2024 169 lb  
2024 166 lb metformin  
  
CrCl cannot be calculated   
(Patient's most recent lab result   
is older than the maximum 180   
days allowed.).  
  
PAST MEDICAL HISTORY  
Diagnosis Date  
Exertional asthma 2012  
Generalized anxiety disorder  
IFG (impaired fasting glucose)   
2024  
Medial meniscus tear 2012  
PCOS (polycystic ovarian   
syndrome)   
Unspecified otitis media  
Recurrent otitis  
Vocal cord dysfunction 05/15/2012  
  
Current Outpatient Medications  
Medication Sig Dispense Refill  
buPROPion XL (WELLBUTRIN XL) 150   
mg 24 hr tablet Take 150 mg by   
mouth every morning.  
PNV/iron/folic acid (PRENATAL   
VITAMIN-IRON-FA ORAL) Take 1   
tablet by mouth once daily.  
metFORMIN (GLUCOPHAGE) 500 mg   
tablet Take 1 tablet by mouth two   
times a day with meals. 180   
tablet 0  
albuterol HFA (PROVENTIL HFA,   
VENTOLIN HFA) 90 mcg/actuation   
inhaler Inhale 2 Puffs as   
instructed every 4 hours as   
needed. 1 Inhaler 0  
  
No current facility-administered   
medications for this visit.  
  
/84   Wt 169 lb (76.7 kg)     
LMP 2024   BMI 29.01 kg/m  
  
Assessment/Plan:  
Don Mittal is a 23 year old   
yo female with overweight   
(pre-obesity) who presented today   
for follow up for supervised   
weight loss to treat PCOS, IFG,   
elevated LDL, anxiety and   
depression and prevent related   
co-morbidities.  
  
ASSESSMENT/PLAN:  
1. PCOS (polycystic ovarian   
syndrome) - ICD9: 256.4, ICD10:   
E28.2 (primary diagnosis)  
- Whole food balanced protein   
low-carb nutrition  
- METFORMIN 500 MG TABLET  
  
2. Elevated LDL cholesterol level   
- ICD9: 272.0, ICD10: E78.00  
- benefits of weight loss   
discussed  
  
3. IFG (impaired fasting glucose)   
- ICD9: 790.21, ICD10: R73.01  
- METFORMIN 500 MG TABLET  
  
4. Irregular menses - ICD9:   
626.4, ICD10: N92.6  
- METFORMIN 500 MG TABLET  
  
5. Anxiety and depression - ICD9:   
300.00, 311, ICD10: F41.9, F32.A  
- well-controlled with bupropion  
  
6. History of bulimia - ICD9:   
V11.8, ICD10: Z86.59  
- in remission  
  
7. History of anorexia nervosa -   
ICD9: V11.8, ICD10: Z86.59  
- in remission  
  
8. Overweight with body mass   
index (BMI) of 29 to 29.9 in   
adult - ICD9: 278.02, V85.25,   
ICD10: E66.3, Z68.29  
- METFORMIN 500 MG TABLET -   
continue to increase dose as   
tolerated  
- Recommended whole food low-carb   
diet with 30 g of protein 3 times   
a day and 30 g of carbs at lunch   
and dinner only. Given tracking   
log.  
- Given 15 gram carb whole food   
and protein suggestion list.  
- Given protein snack ideas  
- continue exercise  
  
Lengthy discussion regarding   
history of anorexia and bulimia   
which is in remission. Discussed   
appropriate amount of exercise.   
We agreed that in-office visits   
would be best for now.  
  
Follow up in 4 weeks  
  
Ernestine Sher APRN.CNP  
Advanced Education from the   
[employer]  
  
Medical Decision Making:  
  
Problems:  
Moderate: 1+ chronic illnesses   
with change  
Risk:  
Moderate: Drug management and   
Moderate risk from   
testing/treatment  
  
Medical Decision Making Level: 4   
- Moderate  
  
  
Electronically signed by Ernestine Sher APRN.CNP at 2024 7:59   
PM EST  
documented in this encounter            [facility]  
   
                                                    2024 Evaluation + Plan  
   
note                                    Associated Problem(s): PCOS   
(polycystic ovarian syndrome)  
Formatting of this note might be   
different from the original.  
Recommend following up with   
OB/GYN regarding side effects of   
metformin and requesting if she   
can take extended release   
metformin to see if that is   
better tolerated.  
Electronically signed by SHANEKA Chacon CNP at   
2024 9:35 AM EST  
                                        [facility]  
   
                                                    2024 Miscellaneous   
Notes                                   Associated Problem(s): PCOS   
(polycystic ovarian syndrome)  
Formatting of this note might be   
different from the original.  
Recommend following up with   
OB/GYN regarding side effects of   
metformin and requesting if she   
can take extended release   
metformin to see if that is   
better tolerated.  
Electronically signed by SHANEKA Chacon CNP at   
2024 9:35 AM EST  
Associated Problem(s): Anxiety   
and depression  
Formatting of this note might be   
different from the original.  
Denies any suicidal or homicidal   
ideation. Anxiety and depression   
have improved we will continue   
Wellbutrin 150 mg daily, patient   
to give us an update in about 1   
month via [facility] if she would   
like to increase the dose to 300   
mg. We will schedule her for   
follow-up in 3 months  
Electronically signed by SHANEKA Chacon CNP at   
2024 9:35 AM EST  
documented in this encounter            [facility]  
   
                                                    2024 Miscellaneous   
Notes                                   Associated Problem(s): PCOS   
(polycystic ovarian syndrome)  
Formatting of this note might be   
different from the original.  
Recommend following up with   
OB/GYN regarding side effects of   
metformin and requesting if she   
can take extended release   
metformin to see if that is   
better tolerated.  
Electronically signed by SHANEKA Chacon CNP at   
2024 9:35 AM EST  
Associated Problem(s): Anxiety   
and depression  
Formatting of this note might be   
different from the original.  
Denies any suicidal or homicidal   
ideation. Anxiety and depression   
have improved we will continue   
Wellbutrin 150 mg daily, patient   
to give us an update in about 1   
month via [facility] if she would   
like to increase the dose to 300   
mg. We will schedule her for   
follow-up in 3 months  
Electronically signed by SHANEKA Chacon CNP at   
2024 9:35 AM EST  
Addended by: MONICA SANCHEZ   
on: 2024 08:32 AM  
  
Modules accepted: Level of   
Service  
  
  
Electronically signed by SHANEKA Chacon CNP at   
2024 8:32 AM EST  
documented in this encounter            [facility]  
   
                                                    2024 Evaluation + Plan  
   
note                                    Associated Problem(s): Anxiety   
and depression  
Formatting of this note might be   
different from the original.  
Denies any suicidal or homicidal   
ideation. Anxiety and depression   
have improved we will continue   
Wellbutrin 150 mg daily, patient   
to give us an update in about 1   
month via [facility] [facility] if she would   
like to increase the dose to 300   
mg. We will schedule her for   
follow-up in 3 months  
Electronically signed by SHANEKA Chacon CNP at   
2024 9:35 AM EST  
                                        [facility]  
   
                                                    2024 History of   
Present illness Narrative               Formatting of this note might be   
different from the original.  
Patient was identified by name   
and Date of Birth.  
  
Electronically signed by Mandi D'Amico at 2024 9:37 AM EST  
Formatting of this note is   
different from the original.  
Images from the original note   
were not included.  
  
  
2024  
  
Don Mittal (: 2000) is   
a 23 y.o. female , Established   
patient, here for evaluation of   
the following chief complaint(s):  
Medication Check  
  
ASSESSMENT/PLAN:  
  
1. Anxiety and depression  
Assessment & Plan:  
Denies any suicidal or homicidal   
ideation. Anxiety and depression   
have improved we will continue   
Wellbutrin 150 mg daily, patient   
to give us an update in about 1   
month via [employer] if she would   
like to increase the dose to 300   
mg. We will schedule her for   
follow-up in 3 months  
2. PCOS (polycystic ovarian   
syndrome)  
Assessment & Plan:  
Recommend following up with   
OB/GYN regarding side effects of   
metformin and requesting if she   
can take extended release   
metformin to see if that is   
better tolerated.  
  
Follow up for 3 month [facility].  
  
SUBJECTIVE/OBJECTIVE:  
  
HPI -  
Don Mittal (: 2000) is   
a 23 y.o. female , Established   
patient, here for the evaluation   
of the following chief   
complaint(s):  
Medication Check  
  
RACING WITH  IN [place]   
WENT WELL, WAS SUPER BUSY. Did   
not get to do any sightseeing as   
they were busy at the track every   
day. Got back from [place] on   
2024. Patient   
reports she  
went to ob/gyn for pcos- was   
started on metformin and reports   
that she has been having nausea   
and mild diarrhea with it. She   
has a follow-up with them   
tomorrow.  
  
Reports that the wellbutrin has   
helped anxiety and mood. Is doing   
better handling things. Is not.   
Feeling as overwhelmed, reports   
sleeping is getting back on   
schedule.  
Still doing shreya and yoga. Most   
days.  
Would like to keep the Wellbutrin   
at the current dose for now  
Prior to Admission medications  
Medication Sig Start Date End   
Date Taking? Authorizing Provider  
buPROPion XL (Wellbutrin XL) 150   
MG 24 hr tablet Take 1 tablet   
(150 mg) by mouth every morning.   
Do not crush, chew, or split.   
1/22/24 3/22/24 Yes SHANEKA Chacon - CNP  
metFORMIN (Glucophage) 500 MG   
tablet Take 1 tablet by mouth in   
the morning and 1 tablet in the   
evening. Take with meals. 24 Yes Historical Provider,   
MD  
Prenatal Multivit-Min-Fe-FA   
(PRENATAL/IRON PO) Take 1 tablet   
by mouth in the morning. Yes   
Historical Provider, MD  
Prenatal MV-Min-Fe Fum-FA-DHA   
(PRENATAL 1 PO) Take by mouth.   
Yes Historical Provider, MD  
  
  
  
Review of Systems  
Constitutional: Negative for   
activity change, chills, fatigue   
and fever.  
HENT: Negative. Negative for   
congestion.  
Respiratory: Negative.  
Cardiovascular: Negative.  
Gastrointestinal: Positive for   
diarrhea and nausea. Negative for   
abdominal pain and vomiting.  
Genitourinary: Positive for   
menstrual problem (Irregular   
menses due to PCOS).  
Neurological: Negative.  
  
Vitals:  
24 0735  
BP: 112/69  
Pulse: 94  
Resp: 18  
Temp: 37 C (98.6 F)  
TempSrc: Infrared  
SpO2: 98%  
Weight: 170 lb 3.2 oz (77.2 kg)  
  
Physical Exam  
Constitutional:  
Appearance: Normal appearance.  
HENT:  
Head: Normocephalic and   
atraumatic.  
Mouth/Throat:  
Mouth: Mucous membranes are   
moist.  
Pharynx: Oropharynx is clear. No   
posterior oropharyngeal erythema.  
Cardiovascular:  
Rate and Rhythm: Normal rate and   
regular rhythm.  
Pulses: Normal pulses.  
Heart sounds: Normal heart   
sounds.  
Pulmonary:  
Effort: Pulmonary effort is   
normal.  
Breath sounds: Normal breath   
sounds.  
Skin:  
General: Skin is warm and dry.  
Neurological:  
Mental Status: She is alert and   
oriented to person, place, and   
time.  
Psychiatric:  
Mood and Affect: Mood normal.  
Behavior: Behavior normal.  
Thought Content: Thought content   
normal.  
Judgment: Judgment normal.  
  
An electronic signature was used   
to authenticate this note.  
  
SHANEKA Chacon CNP  
2024  
9:35 AM  
Electronically signed by SHANEKA Chacon CNP at   
2024 9:37 AM EST  
documented in this encounter            [facility]  
   
                                                    2024 History of   
Present illness Narrative               Formatting of this note might be   
different from the original.  
Patient was identified by name   
and Date of Birth.  
  
Electronically signed by Mandi D'Amico at 2024 9:37 AM EST  
Formatting of this note is   
different from the original.  
Images from the original note   
were not included.  
  
  
2024  
  
Don Mittal (: 2000) is   
a 23 y.o. female , Established   
patient, here for evaluation of   
the following chief complaint(s):  
Medication Check  
  
ASSESSMENT/PLAN:  
  
1. Anxiety and depression  
Assessment & Plan:  
Denies any suicidal or homicidal   
ideation. Anxiety and depression   
have improved we will continue   
Wellbutrin 150 mg daily, patient   
to give us an update in about 1   
month via [facility] [facility] if she would   
like to increase the dose to 300   
mg. We will schedule her for   
follow-up in 3 months  
2. PCOS (polycystic ovarian   
syndrome)  
Assessment & Plan:  
Recommend following up with   
OB/GYN regarding side effects of   
metformin and requesting if she   
can take extended release   
metformin to see if that is   
better tolerated.  
  
Follow up for 3 month [facility].  
  
SUBJECTIVE/OBJECTIVE:  
  
HPI -  
Don Mittal (: 2000) is   
a 23 y.o. female , Established   
patient, here for the evaluation   
of the following chief   
complaint(s):  
Medication Check  
  
RACING WITH  IN [place]   
WENT WELL, WAS SUPER BUSY. Did   
not get to do any sightseeing as   
they were busy at the track every   
day. Got back from [place] on   
2024. Patient   
reports she  
went to ob/gyn for pcos- was   
started on metformin and reports   
that she has been having nausea   
and mild diarrhea with it. She   
has a follow-up with them   
tomorrow.  
  
Reports that the wellbutrin has   
helped anxiety and mood. Is doing   
better handling things. Is not.   
Feeling as overwhelmed, reports   
sleeping is getting back on   
schedule.  
Still doing shreya and yoga. Most   
days.  
Would like to keep the Wellbutrin   
at the current dose for now  
Prior to Admission medications  
Medication Sig Start Date End   
Date Taking? Authorizing Provider  
buPROPion XL (Wellbutrin XL) 150   
MG 24 hr tablet Take 1 tablet   
(150 mg) by mouth every morning.   
Do not crush, chew, or split.   
1/22/24 3/22/24 Yes SHANEKA Chacon CNP  
metFORMIN (Glucophage) 500 MG   
tablet Take 1 tablet by mouth in   
the morning and 1 tablet in the   
evening. Take with meals. 24 Yes Historical Provider,   
MD  
Prenatal Multivit-Min-Fe-FA   
(PRENATAL/IRON PO) Take 1 tablet   
by mouth in the morning. Yes   
Historical Provider, MD  
Prenatal MV-Min-Fe Fum-FA-DHA   
(PRENATAL 1 PO) Take by mouth.   
Yes Historical Provider, MD  
  
  
  
Review of Systems  
Constitutional: Negative for   
activity change, chills, fatigue   
and fever.  
HENT: Negative. Negative for   
congestion.  
Respiratory: Negative.  
Cardiovascular: Negative.  
Gastrointestinal: Positive for   
diarrhea and nausea. Negative for   
abdominal pain and vomiting.  
Genitourinary: Positive for   
menstrual problem (Irregular   
menses due to PCOS).  
Neurological: Negative.  
  
Vitals:  
24 0735  
BP: 112/69  
Pulse: 94  
Resp: 18  
Temp: 37 C (98.6 F)  
TempSrc: Infrared  
SpO2: 98%  
Weight: 170 lb 3.2 oz (77.2 kg)  
  
Physical Exam  
Constitutional:  
Appearance: Normal appearance.  
HENT:  
Head: Normocephalic and   
atraumatic.  
Mouth/Throat:  
Mouth: Mucous membranes are   
moist.  
Pharynx: Oropharynx is clear. No   
posterior oropharyngeal erythema.  
Cardiovascular:  
Rate and Rhythm: Normal rate and   
regular rhythm.  
Pulses: Normal pulses.  
Heart sounds: Normal heart   
sounds.  
Pulmonary:  
Effort: Pulmonary effort is   
normal.  
Breath sounds: Normal breath   
sounds.  
Skin:  
General: Skin is warm and dry.  
Neurological:  
Mental Status: She is alert and   
oriented to person, place, and   
time.  
Psychiatric:  
Mood and Affect: Mood normal.  
Behavior: Behavior normal.  
Thought Content: Thought content   
normal.  
Judgment: Judgment normal.  
  
An electronic signature was used   
to authenticate this note.  
  
SHANEKA Chacon CNP  
2024  
9:35 AM  
Electronically signed by SHANEKA Chacon CNP at   
2024 9:37 AM EST  
documented in this encounter            [facility]  
   
                                        2024 Instructions   
  
  
SHANEKA Chacon CNP -   
2024 7:40 AM ESTFormatting   
of this note might be different   
from the original.  
Asked about extended release   
metformin.  
Give update in about a month- if   
you want to increase dose of   
Wellbutrin or not  
Electronically signed by SHANEKA Chacon CNP at   
2024 7:56 AM EST  
  
documented in this encounter            [facility]  
   
                                        2024 Instructions   
  
  
SHANEKA Chacon CNP -   
2024 7:40 AM ESTFormatting   
of this note might be different   
from the original.  
Asked about extended release   
metformin.  
Give update in about a month- if   
you want to increase dose of   
Wellbutrin or not  
Electronically signed by SHANEKA Chacon CNP at   
2024 7:56 AM EST  
  
documented in this encounter            [facility]  
   
                                        2024 Note     Addended by: MONICA MCADAMS   
on: 2024 08:32 AM  
  
Modules accepted: Level of   
Service  
  
  
Electronically signed by SHANEKA Chacon CNP at   
2024 8:32 AM EST  
                                        [facility]  
   
                                        2024 Note     Addended by: MONICA MCADAMS   
on: 2024 08:32 AM  
  
Modules accepted: Level of   
Service                                 [facility]  
   
                                        2024 Note     HNO ID: [id number]  
Author: ERNESTINE SHER APRN.CNP  
Service: ?  
Author Type: Nurse Practitioner  
Type: Progress Notes  
Filed: 2024 13:09  
Note Text:  
Don Mittal is a 23 year old   
female who presents for problem   
visit missed  
menses x 2 months and multiple   
negative home pregnancy tests.  
HPI: Stopped OCP to attempt   
pregnancy in 2023. Last   
menses in  
November. Multiple home pregnancy   
tests have all been negative.  
Has PCOS - recently noticing more   
facial hair although acne   
improving. 20 lb  
weight gain with Lexapro for   
anxiety. Changed to bupropion a   
few days ago by  
PCP.  
No previous pregnancies. Partner   
has 3 children.  
Shreya and yoga daily for   
exercise. Trying to eat healthy.  
OB History  
 T0  L0  
SAB0 IAB0 Ectopic0 Multiple0 Live   
Births0  
Comment: 3 stepchildren  
Gyn History  
LMP: 2024, Having periods  
Age at Menarche:  
Age at First Pregnancy:  
Age at Menopause:  
Gyn History Comments:  
Sexual Activity: Yes; Male;   
sexually active with   
Contraception: Pill  
PAST MEDICAL HISTORY  
Diagnosis Date  
Exertional asthma 2012  
Generalized anxiety disorder  
Medial meniscus tear 2012  
PCOS (polycystic ovarian   
syndrome)   
Unspecified otitis media  
Recurrent otitis  
Vocal cord dysfunction 05/15/2012  
PAST SURGICAL HISTORY  
Procedure Laterality Date  
ANKLE ARTHROSCOPY Right 16  
Dr. Yeung  
FAMILY HISTORY  
Problem Relation Age of Onset  
other (Other [Other]) Paternal   
Grandfather  
leukemia  
Cancer Maternal Grandmother  
breast  
Asthma Sister  
Social History  
Tobacco Use  
Smoking status: Never  
Smokeless tobacco: Never  
Vaping Use  
Vaping Use: Never used  
Substance Use Topics  
Alcohol use: Yes  
Drug use: No  
Current Outpatient Medications  
Medication Sig  
buPROPion XL (WELLBUTRIN XL) 150   
mg 24 hr tablet Take 150 mg by   
mouth every  
morning.  
PNV/iron/folic acid (PRENATAL   
VITAMIN-IRON-FA ORAL) Take 1   
tablet by mouth once  
daily.  
albuterol HFA (PROVENTIL HFA,   
VENTOLIN HFA) 90 mcg/actuation   
inhaler Inhale 2  
Puffs as instructed every 4 hours   
as needed.  
Drospirenone-Ethinyl Estradiol   
(SINGH, 28,) 3-0.03 mg per   
tablet Take 1 tablet  
by mouth once daily. Take one   
active pill continuously x 3   
months- discard  
placebo pills (Patient not   
taking: Reported on 2024)  
No current facility-administered   
medications for this visit.  
Allergies As of Date: 2024  
Allergen Noted Reaction  
GRASS POLLEN 2013 Unknown  
SEASONAL ALLERGIES 2013   
Unknown  
TREES 2013 Unknown  
Fully Assessed 2024  
REVIEW OF SYSTEMS  
Abdomen: No bloating, early   
satiety, indigestion, or   
increased flatulence. No  
abdominal pain, nausea, vomiting,   
diarrhea, or constipation.  
Bladder: No dysuria, gross   
hematuria, urinary frequency,   
urinary urgency, or  
incontinence.  
Allergies and current medication   
updated:Yes  
EXAM: /72   Wt 166 lb   
(75.3kg)   LMP 2024  
GENERAL: pleasant,    
female in no apparent distress  
CHEST: Normal inspiratory effort  
NEURO: alert and oriented x3,exam   
grossly non-focal  
ASSESSMENT/PLAN:  
1. Irregular menses - ICD9:   
626.4, ICD10: N92.6 (primary   
diagnosis)  
- HCG QUAL UR B/O - negative  
- METFORMIN 500 MG TABLET  
- HGB A1C  
- INSULIN ASSAY BLOOD  
- GLUCOSE FASTING BLD  
2. PCOS (polycystic ovarian   
syndrome) - ICD9: 256.4, ICD10:   
E28.2  
- METFORMIN 500 MG TABLET  
Agreeable to begin Metformin 500   
mg with dinner daily x 1 week. If   
tolerating  
will increase to 2 tablets with   
dinner daily.  
We discussed common side effects   
of this medication including   
nausea, changes in  
bowel habits, abdominal   
discomfort, and flatulence.   
Discussed taking it with  
food and complication of lactic   
acidosis and signs/symptoms and   
medication  
handout given. Further instructed   
that if she experiences malaise,   
muscle aches,  
difficulty breathing, or severe   
abdominal pain to seek immediate   
medical  
attention.  
- HGB A1C  
- INSULIN ASSAY BLOOD  
- GLUCOSE FASTING BLD  
- - discussed with pt - review of   
PCOS with signs and symptoms.   
Increased risk  
of DMT2, CAD, infertility.   
Treatment discussed: weight loss   
to restore ovulatory  
cycles and reduce androgen   
concentrations, exercise, cyclic   
medroxyprogesterone  
to induce menses. Metformin   
discussed - explained that it is   
not considered a  
first-line treatment as it does   
not change pregnancy outcomes but   
that it will  
decrease HgbA1c although weight   
loss will do the same. Discussed   
that she may  
need medication to induce   
ovulation if she does not become   
pregnant with  
lifestyle changes and weight   
loss.  
- Eat primarily whole foods.   
Limit carbs, especially processed   
carbs.  
- Do not drink your calories  
- 30 grams of protein for your   
first meal of the day decreases   
your hunger  
during the day by up to 40 %   
Premier Protein or generic 30 gm   
protein 1 gm  
sugar  
- Walk for 15 minutes immediately   
a meal.  
3. Elevated LDL cholesterol level   
- ICD9: 272.0, ICD10: E78.00  
- benefits of weight loss   
discussed  
Fol (more content not   
included)...                            [facility]  
   
                                                    2024 Evaluation + Plan  
   
note                                    Associated Problem(s): Weight   
gain  
Formatting of this note might be   
different from the original.  
Will have her follow up with her   
ob/gyn, suspect gain posssilby   
from pcos. Continue exercise,   
healthy eating and portion   
control. Will stop lexapro   
(possible weight gain) and switch   
to wellbutrin.  
Electronically signed by SHANEKA Chacon CNP at   
2024 4:24 PM EST  
                                        [facility]  
   
                                                    2024 Miscellaneous   
Notes                                   Associated Problem(s): Weight   
gain  
Formatting of this note might be   
different from the original.  
Will have her follow up with her   
ob/gyn, suspect gain posssilby   
from pcos. Continue exercise,   
healthy eating and portion   
control. Will stop lexapro   
(possible weight gain) and switch   
to wellbutrin.  
Electronically signed by SHANEKA Chacon CNP at   
2024 4:24 PM EST  
Associated Problem(s): Anxiety   
and depression  
Formatting of this note might be   
different from the original.  
Denies si/hi. Anxiety and   
depression symptoms better but   
still is having trouble focusing.   
Will taper discontinue lexapro   
and start wellbutrin. Follow up   
in about 1 month  
Electronically signed by SHANEKA Chacon CNP at   
2024 4:17 PM EST  
documented in this encounter            [facility]  
   
                                                    2024 Miscellaneous   
Notes                                   Associated Problem(s): Weight   
gain  
Formatting of this note might be   
different from the original.  
Will have her follow up with her   
ob/gyn, suspect gain posssilby   
from pcos. Continue exercise,   
healthy eating and portion   
control. Will stop lexapro   
(possible weight gain) and switch   
to wellbutrin.  
Electronically signed by SHANEKA Chacon CNP at   
2024 4:24 PM EST  
Associated Problem(s): Anxiety   
and depression  
Formatting of this note might be   
different from the original.  
Denies si/hi. Anxiety and   
depression symptoms better but   
still is having trouble focusing.   
Will taper discontinue lexapro   
and start wellbutrin. Follow up   
in about 1 month  
Electronically signed by SHANEKA Chacon CNP at   
2024 4:17 PM EST  
Addended by: MONICA SANCHEZ   
on: 2024 10:57 AM  
  
Modules accepted: Level of   
Service  
  
  
Electronically signed by SHANEKA Chacon CNP at   
2024 10:57 AM EST  
documented in this encounter            [facility]  
   
                                                    2024 Evaluation + Plan  
   
note                                    Associated Problem(s): Anxiety   
and depression  
Formatting of this note might be   
different from the original.  
Denies si/hi. Anxiety and   
depression symptoms better but   
still is having trouble focusing.   
Will taper discontinue lexapro   
and start wellbutrin. Follow up   
in about 1 month  
Electronically signed by SHANEKA Chacon CNP at   
2024 4:17 PM EST  
                                        [facility]  
   
                                                    2024 History of   
Present illness Narrative               Formatting of this note might be   
different from the original.  
Patient was identified by name   
and Date of Birth.  
  
Electronically signed by Mandi D'Amico at 2024 4:24 PM EST  
Formatting of this note is   
different from the original.  
Images from the original note   
were not included.  
  
  
2024  
  
Don Mittal (: 2000) is   
a 23 y.o. female , Established   
patient, here for evaluation of   
the following chief complaint(s):  
Medication Check and Weight Gain  
  
ASSESSMENT/PLAN:  
  
1. Anxiety and depression  
Assessment & Plan:  
Denies si/hi. Anxiety and   
depression symptoms better but   
still is having trouble focusing.   
Will taper discontinue lexapro   
and start wellbutrin. Follow up   
in about 1 month  
Orders:  
- buPROPion XL (Wellbutrin XL)   
150 MG 24 hr tablet; Take 1   
tablet (150 mg) by mouth every   
morning. Do not crush, chew, or   
split., Starting Mon 2024,   
Until Fri 3/22/2024, Normal  
2. Weight gain  
Assessment & Plan:  
Will have her follow up with her   
ob/gyn, suspect gain posssilby   
from pcos. Continue exercise,   
healthy eating and portion   
control. Will stop lexapro   
(possible weight gain) and switch   
to wellbutrin.  
  
Follow up in about 1 month   
(around 2024).  
  
SUBJECTIVE/OBJECTIVE:  
  
HPI -  
Don Mittal (: 2000) is   
a 23 y.o. female , Established   
patient, here for the evaluation   
of the following chief   
complaint(s):  
Medication Check and Weight Gain  
  
Stopped nicotine and stopped etoh   
since we last met. . Has gained   
some weight. She thinks it may be   
from going off of her birth   
control medication, Is working   
out with online [employer] classes   
daily, watching what she is   
eating. Is doing well as far as   
eating and exercising. She also   
is doing yoga.  
Stopped birth control. Has not   
had period since sept and   
November and has been pregnancy   
testing. Is trying to get   
pregnant. Will be following up   
with her ob/gyn.  
Reports anxiety is better, but is   
not able to focus well still.   
Denies si/hi.  
Her and her  are getting   
ready to go to [place] for car   
racing for 11 days. Her    
races cars. States she is looking   
forward to going.  
Prior to Admission medications  
Medication Sig Start Date End   
Date Taking? Authorizing Provider  
escitalopram (Lexapro) 20 MG   
tablet Take 1 tablet (20 mg) by   
mouth daily. 12/15/23 3/14/24 Yes   
SHANEKA Chacon CNP  
Prenatal MV-Min-Fe Fum-FA-DHA   
(PRENATAL 1 PO) Take by mouth.   
Yes Historical Provider, MD  
  
  
  
Review of Systems  
Constitutional: Negative.  
HENT: Negative.  
Respiratory: Negative.  
Cardiovascular: Negative.  
Gastrointestinal: Negative.  
Genitourinary: Negative.  
Musculoskeletal: Negative.  
Neurological: Negative.  
Psychiatric/Behavioral: Positive   
for decreased concentration.   
Negative for self-injury, sleep   
disturbance and suicidal ideas.   
The patient is nervous/anxious.  
  
Vitals:  
24 1346  
BP: 133/89  
Pulse: 98  
Resp: 18  
Temp: 36.7 C (98.1 F)  
TempSrc: Infrared  
SpO2: 97%  
Weight: 164 lb (74.4 kg)  
  
Physical Exam  
Constitutional:  
General: She is not in acute   
distress.  
Appearance: Normal appearance.   
She is not ill-appearing.  
HENT:  
Head: Normocephalic and   
atraumatic.  
Right Ear: Tympanic membrane   
normal.  
Left Ear: Tympanic membrane   
normal.  
Nose: No congestion or   
rhinorrhea.  
Mouth/Throat:  
Mouth: Mucous membranes are   
moist.  
Pharynx: Oropharynx is clear. No   
posterior oropharyngeal erythema.  
Eyes:  
Conjunctiva/sclera: Conjunctivae   
normal.  
Cardiovascular:  
Rate and Rhythm: Normal rate and   
regular rhythm.  
Pulmonary:  
Effort: Pulmonary effort is   
normal.  
Breath sounds: Normal breath   
sounds.  
Lymphadenopathy:  
Cervical: No cervical adenopathy.  
Skin:  
General: Skin is warm and dry.  
Neurological:  
Mental Status: She is alert and   
oriented to person, place, and   
time.  
Psychiatric:  
Mood and Affect: Mood normal.  
Behavior: Behavior normal.  
Thought Content: Thought content   
normal.  
Judgment: Judgment normal.  
  
An electronic signature was used   
to authenticate this note.  
  
SHANEKA Chacon CNP  
2024  
4:24 PM  
Electronically signed by SHANEKA Chacon CNP at   
2024 4:24 PM EST  
documented in this encounter            [facility]  
   
                                                    2024 History of   
Present illness Narrative               Formatting of this note might be   
different from the original.  
Patient was identified by name   
and Date of Birth.  
  
Electronically signed by Mandi D'Amico at 2024 4:24 PM EST  
Formatting of this note is   
different from the original.  
Images from the original note   
were not included.  
  
  
2024  
  
Don Mittal (: 2000) is   
a 23 y.o. female , Established   
patient, here for evaluation of   
the following chief complaint(s):  
Medication Check and Weight Gain  
  
ASSESSMENT/PLAN:  
  
1. Anxiety and depression  
Assessment & Plan:  
Denies si/hi. Anxiety and   
depression symptoms better but   
still is having trouble focusing.   
Will taper discontinue lexapro   
and start wellbutrin. Follow up   
in about 1 month  
Orders:  
- buPROPion XL (Wellbutrin XL)   
150 MG 24 hr tablet; Take 1   
tablet (150 mg) by mouth every   
morning. Do not crush, chew, or   
split., Starting Mon 2024,   
Until Fri 3/22/2024, Normal  
2. Weight gain  
Assessment & Plan:  
Will have her follow up with her   
ob/gyn, suspect gain posssilby   
from pcos. Continue exercise,   
healthy eating and portion   
control. Will stop lexapro   
(possible weight gain) and switch   
to wellbutrin.  
  
Follow up in about 1 month   
(around 2024).  
  
SUBJECTIVE/OBJECTIVE:  
  
HPI -  
Don Mittal (: 2000) is   
a 23 y.o. female , Established   
patient, here for the evaluation   
of the following chief   
complaint(s):  
Medication Check and Weight Gain  
  
Stopped nicotine and stopped etoh   
since we last met. . Has gained   
some weight. She thinks it may be   
from going off of her birth   
control medication, Is working   
out with online [employer] classes   
daily, watching what she is   
eating. Is doing well as far as   
eating and exercising. She also   
is doing yoga.  
Stopped birth control. Has not   
had period since sept and   
November and has been pregnancy   
testing. Is trying to get   
pregnant. Will be following up   
with her ob/gyn.  
Reports anxiety is better, but is   
not able to focus well still.   
Denies si/hi.  
Her and her  are getting   
ready to go to [place] for car   
racing for 11 days. Her    
races cars. States she is looking   
forward to going.  
Prior to Admission medications  
Medication Sig Start Date End   
Date Taking? Authorizing Provider  
escitalopram (Lexapro) 20 MG   
tablet Take 1 tablet (20 mg) by   
mouth daily. 12/15/23 3/14/24 Yes   
SHANEKA Chacon CNP  
Prenatal MV-Min-Fe Fum-FA-DHA   
(PRENATAL 1 PO) Take by mouth.   
Yes Historical Provider, MD  
  
  
  
Review of Systems  
Constitutional: Negative.  
HENT: Negative.  
Respiratory: Negative.  
Cardiovascular: Negative.  
Gastrointestinal: Negative.  
Genitourinary: Negative.  
Musculoskeletal: Negative.  
Neurological: Negative.  
Psychiatric/Behavioral: Positive   
for decreased concentration.   
Negative for self-injury, sleep   
disturbance and suicidal ideas.   
The patient is nervous/anxious.  
  
Vitals:  
24 1346  
BP: 133/89  
Pulse: 98  
Resp: 18  
Temp: 36.7 C (98.1 F)  
TempSrc: Infrared  
SpO2: 97%  
Weight: 164 lb (74.4 kg)  
  
Physical Exam  
Constitutional:  
General: She is not in acute   
distress.  
Appearance: Normal appearance.   
She is not ill-appearing.  
HENT:  
Head: Normocephalic and   
atraumatic.  
Right Ear: Tympanic membrane   
normal.  
Left Ear: Tympanic membrane   
normal.  
Nose: No congestion or   
rhinorrhea.  
Mouth/Throat:  
Mouth: Mucous membranes are   
moist.  
Pharynx: Oropharynx is clear. No   
posterior oropharyngeal erythema.  
Eyes:  
Conjunctiva/sclera: Conjunctivae   
normal.  
Cardiovascular:  
Rate and Rhythm: Normal rate and   
regular rhythm.  
Pulmonary:  
Effort: Pulmonary effort is   
normal.  
Breath sounds: Normal breath   
sounds.  
Lymphadenopathy:  
Cervical: No cervical adenopathy.  
Skin:  
General: Skin is warm and dry.  
Neurological:  
Mental Status: She is alert and   
oriented to person, place, and   
time.  
Psychiatric:  
Mood and Affect: Mood normal.  
Behavior: Behavior normal.  
Thought Content: Thought content   
normal.  
Judgment: Judgment normal.  
  
An electronic signature was used   
to authenticate this note.  
  
SHANEKA Chacon CNP  
2024  
4:24 PM  
Electronically signed by SHANEKA Chacon CNP at   
2024 4:24 PM EST  
documented in this encounter            [facility]  
   
                                        2024 Instructions   
  
  
SHANEKA Chacon CNP -   
2024 1:40 PM ESTFormatting   
of this note might be different   
from the original.  
Start wellbutrin and decrease   
lexapro to 10 mg daily x 7 days,   
then stop the lexapro.  
Check [facility] james for   
meditation  
Electronically signed by HSANEKA Chacon CNP at   
2024 2:05 PM EST  
  
documented in this encounter            [facility]  
   
                                        2024 Instructions   
  
  
SHANEKA Chacon CNP -   
2024 1:40 PM ESTFormatting   
of this note might be different   
from the original.  
Start wellbutrin and decrease   
lexapro to 10 mg daily x 7 days,   
then stop the lexapro.  
Check Head[employer] james for   
meditation  
Electronically signed by SHANEKA Chacon CNP at   
2024 2:05 PM EST  
  
documented in this encounter            [facility]  
   
                                        2024 Note     Addended by: MONICA MCADAMS   
on: 2024 10:57 AM  
  
Modules accepted: Level of   
Service  
  
  
Electronically signed by SHANEKA Chacon CNP at   
2024 10:57 AM EST  
                                        [facility]  
   
                                        2024 Note     Addended by: MONICA MCADAMS   
on: 2024 10:57 AM  
  
Modules accepted: Level of   
Service                                 [facility]  
   
                                                    2023 Evaluation + Plan  
   
note                                    Associated Problem(s): Anxiety   
and depression  
Formatting of this note might be   
different from the original.  
Denies any suicidal or homicidal   
ideation. Reports improved   
symptoms. We will continue on   
Lexapro 10 mg daily she is to   
provide an update through [facility]   
in approximately 4 weeks.   
Consider up titration if needed   
at that point.  
Electronically signed by SHANEKA Chacon CNP at   
2023 1:22 PM EST  
                                        [facility]  
   
                                                    2023 Miscellaneous   
Notes                                   Associated Problem(s): Anxiety   
and depression  
Formatting of this note might be   
different from the original.  
Denies any suicidal or homicidal   
ideation. Reports improved   
symptoms. We will continue on   
Lexapro 10 mg daily she is to   
provide an update through [facility]   
in approximately 4 weeks.   
Consider up titration if needed   
at that point.  
Electronically signed by SHANEKA Chacon CNP at   
2023 1:22 PM EST  
documented in this encounter            [facility]  
   
                                                    2023 History of   
Present illness Narrative               Formatting of this note might be   
different from the original.  
Patient was identified by name   
and Date of Birth.  
  
Health Main:  
  
Lipid-pended  
HIV/Hep C-declined  
Covid-declined  
PHQ-completed  
Pap-[facility] (will scan in)  
Tdap-declined  
Influenza-declined  
  
  
Electronically signed by Mandi D'Amico at 2023 1:22 PM EST  
Formatting of this note is   
different from the original.  
Images from the original note   
were not included.  
  
  
2023  
  
Don Mittal (: 2000) is   
a 23 y.o. female , Established   
patient, here for evaluation of   
the following chief complaint(s):  
Follow-up and Health Maintenance   
(Lipid-pended/HIV/Hep   
C-declined/Covid-declined/PHQ-com  
pleted/Pap-[facility] (will scan   
in)/Tdap-declined/Influenza-decli  
edin/ )  
  
ASSESSMENT/PLAN:  
  
1. Annual physical exam  
- Comprehensive metabolic panel  
- CBC  
- Lipid panel  
2. Screening for deficiency   
anemia  
- CBC  
3. Screening for cholesterol   
level  
- Lipid panel  
4. Anxiety and depression  
Assessment & Plan:  
Denies any suicidal or homicidal   
ideation. Reports improved   
symptoms. We will continue on   
Lexapro 10 mg daily she is to   
provide an update through [facility]   
in approximately 4 weeks.   
Consider up titration if needed   
at that point.  
  
Follow up in about 3 months   
(around 2024).  
  
SUBJECTIVE/OBJECTIVE:  
  
HPI -  
Don Mittal (: 2000) is   
a 23 y.o. female , Established   
patient, here for the evaluation   
of the following chief   
complaint(s):  
Follow-up and Health Maintenance   
(Lipid-pended/HIV/Hep   
C-declined/Covid-declined/PHQ-com  
pleted/Pap-[facility] (will scan   
in)/Tdap-declined/Influenza-decli  
edin/ )  
Recently newly established   
patient to us about 2 weeks ago   
who had presented for an acute   
complaint of anxiety and   
depression. She was started on   
Lexapro 10 mg at that time and   
recommended to get set up with a   
counselor. No other acute   
complaints today.  
Anxiety/dep- feels like   
medication is helping some. Is   
able to think clearer. Is   
processing thoughts better.   
States that she has not broke   
down and cried like she was   
before. Denies any suicidal or   
homicidal ideation.  
She has yet to set up with a   
counselor.  
Has OB/GYN which she follows for   
women's health.  
  
Prior to Admission medications  
Medication Sig Start Date End   
Date Taking? Authorizing Provider  
escitalopram (Lexapro) 10 MG   
tablet Take 1 tablet (10 mg) by   
mouth daily. 23 Yes   
Monica Sanchez, SHANEKA - CNP  
Prenatal MV-Min-Fe Fum-FA-DHA   
(PRENATAL 1 PO) Take by mouth.   
Yes Historical Provider, MD  
  
  
  
Review of Systems  
Constitutional: Negative.  
HENT: Negative.  
Respiratory: Negative.  
Cardiovascular: Negative.  
Gastrointestinal: Negative.  
Genitourinary: Negative.  
Musculoskeletal: Negative.  
Neurological: Negative.  
Psychiatric/Behavioral: Positive   
for decreased concentration and   
sleep disturbance (still not   
sleeping well.). Negative for   
self-injury and suicidal ideas.   
The patient is nervous/anxious.  
  
Vitals:  
23 0929  
BP: 116/77  
Pulse: 98  
Resp: 18  
Temp: 36.4 C (97.6 F)  
TempSrc: Infrared  
SpO2: 99%  
Weight: 151 lb (68.5 kg)  
  
Physical Exam  
Constitutional:  
General: She is not in acute   
distress.  
Appearance: Normal appearance.   
She is not ill-appearing.  
HENT:  
Head: Normocephalic and   
atraumatic.  
Right Ear: Tympanic membrane   
normal.  
Left Ear: Tympanic membrane   
normal.  
Mouth/Throat:  
Mouth: Mucous membranes are   
moist.  
Pharynx: Oropharynx is clear. No   
posterior oropharyngeal erythema.  
Eyes:  
Conjunctiva/sclera: Conjunctivae   
normal.  
Cardiovascular:  
Rate and Rhythm: Normal rate and   
regular rhythm.  
Pulses: Normal pulses.  
Heart sounds: Normal heart   
sounds.  
Musculoskeletal:  
Right lower leg: No edema.  
Left lower leg: No edema.  
Skin:  
General: Skin is warm and dry.  
Neurological:  
Mental Status: She is alert and   
oriented to person, place, and   
time.  
Psychiatric:  
Mood and Affect: Mood normal.  
Behavior: Behavior normal.  
Thought Content: Thought content   
normal.  
Judgment: Judgment normal.  
  
An electronic signature was used   
to authenticate this note.  
  
SHANEKA Chacon CNP  
2023  
1:22 PM  
Electronically signed by SHANEKA Chacon CNP at   
2023 1:22 PM EST  
documented in this encounter            [facility]  
   
                                        2023 Instructions   
  
  
SHANEKA Chacon CNP -   
2023 9:40 AM ESTFormatting   
of this note might be different   
from the original.  
Melatonin 1- 5 mg nightly to help   
with sleep.  
  
Send update via [facility] to   
provider in 1 month on how you   
are doing on the lexapro 10 mg   
daily.  
Electronically signed by SHANEKA Chacon CNP at   
2023 10:00 AM EST  
  
documented in this encounter            [facility]  
   
                                        2023 Note     HNO ID: [id number]  
Author: Awilda Bell MD  
Service: ?  
Author Type: Physician  
Type: Progress Notes  
Filed: 2023 3:09 PM  
Note Text:  
Chaperone offered: Patient   
declinesJoe Gloria is a 23 year old    
who presents for an annual   
gynecologic  
exam without complaints. Does   
have irregular bleeding with   
pills.   
does sprint car racing. Has three   
step children. Thinking about   
kids.  
Menses: cycles every 28 days and   
5-8 days but does have a lot of   
irregular  
bleeding for 3-4 days per month-   
not as heavy. Does not miss pills   
or take  
them late.  
Contraception: combined hormonal   
contraceptives  
HPV vaccine: Yes  
Last Pap: 2022 normal  
HPV: N/A  
History of abnormal pap: No  
Last mammogram: never  
Sexually active: Yes  
History of STDS: None  
Patient concerns for STD   
exposure: No.  
Pain with intercourse: No  
Postcoital bleeding: No  
Exercise:active walking  
Diet: balanced  
OB History  
 T0  L0  
SAB0 IAB0 Ectopic0 Multiple0 Live   
Births0  
Comment: 3 stepchildren  
Gyn History  
LMP: 2022, Having periods  
Age at Menarche:  
Age at First Pregnancy:  
Age at Menopause:  
Gyn History Comments:  
Sexual Activity: Yes; Male;   
sexually active with   
Contraception: Pill  
PAST MEDICAL HISTORY  
Diagnosis Date  
Exertional asthma 2012  
Medial meniscus tear 2012  
Unspecified otitis media  
Recurrent otitis  
Vocal cord dysfunction 5/15/2012  
PAST SURGICAL HISTORY  
Procedure Laterality Date  
ANKLE ARTHROSCOPY Right 7/5/16  
Dr. Yeung  
FAMILY HISTORY  
Problem Relation Age of Onset  
other (Other [Other]) Paternal   
Grandfather  
leukemia  
Cancer Maternal Grandmother  
breast  
Asthma Sister  
SOCIAL HISTORY  
Social History  
Tobacco Use  
Smoking status: Never  
Smokeless tobacco: Never  
Vaping Use  
Vaping Use: Never used  
Substance Use Topics  
Alcohol use: Yes  
Drug use: No  
REVIEW OF SYSTEMS  
Abdomen: No abdominal pain,   
nausea, vomiting, diarrhea, or   
constipation.  
No bloating, early satiety,   
indigestion, or increased   
flatulence.  
Bladder: No dysuria, gross   
hematuria, urinary frequency,   
urinary urgency,  
or incontinence.  
Breast: No breast lumps, nipple   
d/c, overlying skin changes,   
redness or  
skin retraction.  
Allergies and current medication   
updated:Yes  
EXAM: /68   Ht 5' 4    
(1.63m)   Wt 149 lb (67.6kg)     
LMP 2023    
BMI 25.56 kg/(m2).  
GENERAL: pleasant,    
female in no apparent distress  
HEENT: Normocephalic, atraumatic,   
mucus membranes moist, and no   
lesions  
NECK: Supple, full range of   
motion, no adenopathy, and   
thyroid normal  
DERMATOLOGY: Normal, without   
lesions, non-icteric, and   
non-hirsute  
BREAST: soft, non-tender,   
symmetric, no dominant mass,   
normal  
nipple-areolar complex, no   
lymphadenopathy, and no nipple   
discharge  
ABDOMEN: soft, non-tender, and no   
masses  
PELVIC: external genitalia   
normal, normal Bartholin's   
glands, urethra,  
[place]'s glands, no vulvar   
lesions, no cervical lesions,   
good vaginal  
support, physiologic discharge   
present, normal appearing   
perineal body and  
perianal region  
BIMANUAL: uterus normal size,   
shape and consistency, no adnexal   
masses,  
and non-tender  
RECTOVAGINAL: deferred.  
NEURO: alert and oriented x3,exam   
grossly non-focal  
EXTREMITIES: normal  
ASSESSMENT/PLAN:  
1) Health maintenance:  
Pap/HPV up to date.  
Mammogram starting age 40.  
Nutrition, exercise and routine   
health maintenance exams   
reviewed.  
Calcium/Vitamin D supplementation   
information provided.  
2) Contraception: combined   
hormonal contraceptives.   
Contraceptive  
options reviewed and information   
provided. Changed to singh  
3) STD screening: Declined STD   
check.  
4) Follow up one year or sooner   
as needed  
Awilda Schulz MD             [facility]  
   
                                                    2023 History of   
Present illness Narrative               Formatting of this note is   
different from the original.  
Chaperone offered: Patient   
declines.  
  
Don is a 23 year old    
who presents for an annual   
gynecologic exam without   
complaints. Does have irregular   
bleeding with pills.  does   
sprint car racing. Has three step   
children. Thinking about kids.  
  
Menses: cycles every 28 days and   
5-8 days but does have a lot of   
irregular bleeding for 3-4 days   
per month- not as heavy. Does not   
miss pills or take them late.  
Contraception: combined hormonal   
contraceptives  
HPV vaccine: Yes  
Last Pap: 2022 normal  
HPV: N/A  
History of abnormal pap: No  
Last mammogram: never  
Sexually active: Yes  
History of STDS: None  
Patient concerns for STD   
exposure: No.  
Pain with intercourse: No  
Postcoital bleeding: No  
Exercise:active walking  
Diet: balanced  
  
OB History  
 T0  L0  
SAB0 IAB0 Ectopic0 Multiple0 Live   
Births0  
  
Comment: 3 stepchildren  
  
Gyn History  
  
LMP: 2022, Having periods  
Age at Menarche:  
Age at First Pregnancy:  
Age at Menopause:  
Gyn History Comments:  
Sexual Activity: Yes; Male;   
sexually active with   
Contraception: Pill  
  
  
  
PAST MEDICAL HISTORY  
Diagnosis Date  
Exertional asthma 2012  
Medial meniscus tear 2012  
Unspecified otitis media  
Recurrent otitis  
Vocal cord dysfunction 5/15/2012  
  
PAST SURGICAL HISTORY  
Procedure Laterality Date  
ANKLE ARTHROSCOPY Right 16  
Dr. Yeung  
  
FAMILY HISTORY  
Problem Relation Age of Onset  
other (Other [Other]) Paternal   
Grandfather  
leukemia  
Cancer Maternal Grandmother  
breast  
Asthma Sister  
SOCIAL HISTORY  
Social History  
  
Tobacco Use  
Smoking status: Never  
Smokeless tobacco: Never  
Vaping Use  
Vaping Use: Never used  
Substance Use Topics  
Alcohol use: Yes  
Drug use: No  
  
REVIEW OF SYSTEMS  
Abdomen: No abdominal pain,   
nausea, vomiting, diarrhea, or   
constipation. No bloating, early   
satiety, indigestion, or   
increased flatulence.  
Bladder: No dysuria, gross   
hematuria, urinary frequency,   
urinary urgency, or incontinence.  
Breast: No breast lumps, nipple   
d/c, overlying skin changes,   
redness or skin retraction.  
Allergies and current medication   
updated:Yes  
  
EXAM: /68   Ht 5' 4    
(1.63m)   Wt 149 lb (67.6kg)     
LMP 2023   BMI 25.56   
kg/(m^2).  
  
  
GENERAL: pleasant,    
female in no apparent distress  
HEENT: Normocephalic, atraumatic,   
mucus membranes moist, and no   
lesions  
NECK: Supple, full range of   
motion, no adenopathy, and   
thyroid normal  
DERMATOLOGY: Normal, without   
lesions, non-icteric, and   
non-hirsute  
BREAST: soft, non-tender,   
symmetric, no dominant mass,   
normal nipple-areolar complex, no   
lymphadenopathy, and no nipple   
discharge  
ABDOMEN: soft, non-tender, and no   
masses  
PELVIC: external genitalia   
normal, normal Bartholin's   
glands, urethra, [place]'s glands,   
no vulvar lesions, no cervical   
lesions, good vaginal support,   
physiologic discharge present,   
normal appearing perineal body   
and perianal region  
BIMANUAL: uterus normal size,   
shape and consistency, no adnexal   
masses, and non-tender  
RECTOVAGINAL: deferred.  
NEURO: alert and oriented x3,exam   
grossly non-focal  
EXTREMITIES: normal  
  
ASSESSMENT/PLAN:  
1) Health maintenance:  
Pap/HPV up to date.  
Mammogram starting age 40.  
Nutrition, exercise and routine   
health maintenance exams   
reviewed.  
Calcium/Vitamin D supplementation   
information provided.  
2) Contraception: combined   
hormonal contraceptives.   
Contraceptive options reviewed   
and information provided. Changed   
to singh  
3) STD screening: Declined STD   
check.  
4) Follow up one year or sooner   
as needed  
  
Awilda Schulz MD  
  
Electronically signed by Awilda Bell MD at   
2023 3:09 PM EDT  
documented in this encounter            [facility]  
   
                                                    2023 Miscellaneous   
Notes                                   Formatting of this note is   
different from the original.  
Patient called in requesting   
refill today. Annual scheduled   
with DM 23. No need to call   
back.  
  
Requested Prescriptions  
  
Pending Prescriptions Disp   
Refills  
Norethindrone Acet-Ethinyl Est   
(JUNEL 1/20, ,) 1-20 mg-mcg per   
tablet 28 tablet 2  
Sig: TAKE 1 TABLET BY MOUTH DAILY  
  
Shiloh Alvarado RN  
  
  
Electronically signed by Shiloh Alvarado RN at 2023 10:30 AM   
EDT  
documented in this encounter            [facility]  
   
                                                    2023 Miscellaneous   
Notes                                   Addended by: RACHEL NARAYANAN on:   
3/9/2023 03:14 PM  
  
Modules accepted: Orders  
  
  
Electronically signed by Rachel Narayanan APRN.CNM at 2023   
3:14 PM EST  
Addended by: JENNA STEPHENS LPN on: 3/9/2023 02:59 PM  
  
Modules accepted: Orders  
  
  
Electronically signed by Jenna Stephens LPN at 2023 2:59 PM   
EST  
Formatting of this note might be   
different from the original.  
Please see pended order below in   
DM absence. Pt is needing this   
today and it went to the wrong   
pharmacy. Call only if problems.   
Jenna Stephens LPN  
  
Electronically signed by Jenna Stephens LPN at 2023 2:59 PM   
EST  
Formatting of this note might be   
different from the original.  
ordered  
Electronically signed by Awilda Leonard MD at   
2023 2:54 PM EST  
Formatting of this note might be   
different from the original.  
Patient needing Rx before [facility]   
returns on Friday.  
Chitra Tijerina RN  
  
Electronically signed by Chitra Tijerina RN at 2023 1:53 PM   
EST  
Formatting of this note might be   
different from the original.  
RX pending. Patient last seen for   
annual 22  
Electronically signed by America Beck RN at 2023 1:15 PM   
EST  
documented in this encounter            [facility]  
   
                                                    2023 Miscellaneous   
Notes                                   Formatting of this note might be   
different from the original.  
Left detailed message on   
patient's voicemail  
Electronically signed by Ronel Diaz LPN at 2023 2:33   
PM EST  
Formatting of this note might be   
different from the original.  
Please let her know that I   
apologize. I thought the order   
was placed. Sorry for her   
inconvenience. Tomorrow morning   
would be fine for her to get her   
blood drawn.  
  
Lauryn Ryan MD  
  
Electronically signed by Lauryn Ryan MD at 2023 2:21 PM   
EST  
Formatting of this note might be   
different from the original.  
Patient was to have her 21 day   
progesterone drawn today. Patient   
went to the lab at 7 AM and there   
was no order. Patient voiced her   
frustration and lack of   
communication. If patient   
chooses, can she have it drawn   
tomorrow? Otherwise she's aware   
that she will have to wait until   
her next cycle. Order pending.  
America Beck RN  
  
Electronically signed by America Beck RN at 2023 2:14 PM   
EST  
documented in this encounter            [facility]  
   
                                                    2023 Miscellaneous   
Notes                                   Formatting of this note might be   
different from the original.  
Please call & explain that day 21   
would be  and   
today is day 1.  
Thanks!  
  
Lauryn Ryan MD  
  
Electronically signed by Lauryn Ryan MD at 2023 10:50 AM   
EST  
documented in this encounter            [facility]  
   
                                                    2022 Miscellaneous   
Notes                                   Formatting of this note might be   
different from the original.  
Menses can take 7-10 days to   
start after finishing provera.  
  
Lauryn Ryan MD  
  
Electronically signed by Lauryn Ryan MD at 2022 10:28 AM   
EST  
Formatting of this note might be   
different from the original.  
Rx provera given  
  
Lauryn Ryan MD  
  
Electronically signed by Lauryn Ryan MD at 2022 10:28 AM   
EST  
documented in this encounter            [facility]  
   
                                                    2022 Miscellaneous   
Notes                                   Formatting of this note might be   
different from the original.  
Left message for patient to call   
office or check [employer] message.  
Shiloh Alvarado RN  
  
Electronically signed by Shiloh Alvarado RN at 2022 11:20 AM   
EST  
Formatting of this note might be   
different from the original.  
Images from the original note   
were not included.  
Left message for patient to call   
office.  
MD Shiloh Baum RN, RN  
Please call patient & make sure   
she is taking a folic acid   
supplement as desires pregnancy.   
Thanks!  
  
KJ  
Electronically signed by Shiloh Alvarado RN at 2022 3:30 PM   
EST  
documented in this encounter            [facility]  
   
                                                    2022 History of   
Present illness Narrative               Formatting of this note is   
different from the original.  
Don Mittal is a 22 year old   
female who presents for problem   
visit (virtually).  
  
HPI: Patient has questions about   
her positive pregnancy test and   
missed menses. Also she would   
like to get pregnant and has   
questions about that as well.  
  
OB History  
 T0  L0  
SAB0 IAB0 Ectopic0 Multiple0 Live   
Births0  
  
Comment: 3 stepchildren  
  
Gyn History  
  
LMP: 2022, Having periods  
Age at Menarche:  
Age at First Pregnancy:  
Age at Menopause:  
Gyn History Comments:  
Sexual Activity: Yes; Male;   
sexually active with   
Contraception: Pill  
  
  
  
PAST MEDICAL HISTORY  
Diagnosis Date  
Exertional asthma 2012  
Medial meniscus tear 2012  
Unspecified otitis media  
Recurrent otitis  
Vocal cord dysfunction 5/15/2012  
  
PAST SURGICAL HISTORY  
Procedure Laterality Date  
ANKLE ARTHROSCOPY Right 16  
Dr. Yeung  
  
FAMILY HISTORY  
Problem Relation Age of Onset  
other (Other [Other]) Paternal   
Grandfather  
leukemia  
Cancer Maternal Grandmother  
breast  
Asthma Sister  
  
Social History  
  
Tobacco Use  
Smoking status: Never  
Smokeless tobacco: Never  
Vaping Use  
Vaping Use: Never used  
Substance Use Topics  
Alcohol use: Yes  
Drug use: No  
  
Current Outpatient Medications  
Medication Sig  
Norethindrone Acet-Ethinyl Est   
(JUNEL 1/20, ,) 1-20 mg-mcg per   
tablet TAKE 1 TABLET BY MOUTH   
DAILY  
albuterol HFA (PROVENTIL HFA,   
VENTOLIN HFA) 90 mcg/actuation   
inhaler Inhale 2 Puffs as   
instructed every 4 hours as   
needed.  
  
No current facility-administered   
medications for this visit.  
  
Allergies As of Date: 2022  
Allergen Noted Reaction  
GRASS POLLEN 2013 Unknown  
SEASONAL ALLERGIES 2013   
Unknown  
TREES 2013 Unknown  
  
Fully Assessed 08/10/2022  
  
Allergies and current medication   
updated:Yes  
  
EXAM: LMP 2022  
  
  
GENERAL: pleasant,    
female in no apparent distress  
  
ASSESSMENT AND PLAN:  
21yo female with PCOS & missed   
menses  
Discussed likely false positive   
home pregnancy test vs blighted   
ovum. Serum hcg confirms patient   
is no pregnant.  
Missed menses - discussed R/B/A   
and will proceed with provera to   
induce menses if no menses by   
.  
PCOS - patient reports that home   
OPK don't show ovulation. Will   
check day 21 progesterone.  
All questions answered & patient   
agrees with plan.  
  
I spent a total of 25 minutes on   
the date of the service which   
included preparing to see the   
patient, face-to-face patient   
care, completing clinical   
documentation, and counseling and   
educating the   
patient/family/caregiver.  
  
Lauryn Ryan MD  
  
  
Electronically signed by Lauryn Ryan MD at 2022 2:56 PM   
EST  
documented in this encounter            [facility]  
   
                                                    2022 Miscellaneous   
Notes                                   Formatting of this note might be   
different from the original.  
Patient notified and voiced   
understanding of below. Virtual   
visit appointment scheduled per   
request.   
  
Chitra Tijerina RN  
  
Electronically signed by Chitra Tijerina RN at 2022 2:31 PM   
EST  
Formatting of this note might be   
different from the original.  
Based on the dates of her tests I   
suspect that she had a false home   
pregnancy test but can't exclude   
a blighted ovum. If she misses   
another menses I could give her   
provera to induce menses. If   
she'd like to discuss things more   
please offer her a virtual or in   
person visit.  
Has she stopped taking the ocps?  
Please remind her to take a folic   
acid supplement daily when trying   
to conceive.  
  
Lauryn Ryan MD  
  
Electronically signed by Lauryn Ryan MD at 2022 2:21 PM   
EST  
Formatting of this note might be   
different from the original.  
LMP 10/26/22  
She had positive UPT  at   
home and then negative hcg quant   
on . Concerned because she   
still hasn't started menses. Not   
having any symptoms like she is   
going to start soon. She has been   
having regular menses in July,   
August and September. She was   
previously diagnosed with PCOS   
earlier this year and is aware   
that can cause irregular menses.   
She has been trying to conceive   
x2 months. Asking what else she   
can do to help regulate menses to   
continue to try to conceive.   
Aware [facility]J back in office tomorrow.   
Please advise.  
Shiloh Alvarado RN  
  
Electronically signed by Shiloh Alvarado RN at 2022 11:11 AM   
EST  
documented in this encounter            [facility]  
   
                                                    2022 Miscellaneous   
Notes                                   Formatting of this note might be   
different from the original.  
Patient notified and appt made   
for tomorrow at her request.   
Leave phone note open for quant   
result  
Electronically signed by Fariba Mojica RN at 2022 1:10 PM EST  
Formatting of this note might be   
different from the original.  
Hcg quant ordered  
  
Lauryn Ryan MD  
  
Electronically signed by Lauryn Ryan MD at 2022 12:12 PM   
EST  
Formatting of this note might be   
different from the original.  
Pt calling and stated that she   
got a positive pregnancy test on   
10/19/22. Pt has her PNOB and NOB   
appointment scheduled. She took   
another test this morning and and   
received a negative test. Pt   
stating she also go a negative   
results yesterday as well. Pt   
wanting to know if she can have a   
serum quant to verify. LMP   
10/26/22. Please advise. Jenna Stephens LPN  
  
Electronically signed by Jenna Stephens LPN at 2022 11:28   
AM EST  
documented in this encounter            [facility]  
   
                                                    2022 Miscellaneous   
Notes                                     
  
  
Formatting of this note might be   
different from the original.  
See phone note.  
Shiloh Alvarado RN  
  
  
  
Electronically signed by Shiloh Alvarado RN at 2022 8:50 AM   
EDT  
  
  
Formatting of this note might be   
different from the original.  
This is [facility] patient- I read   
ultrasound. Please forward to   
her.  
  
  
Electronically signed by Awilda Leonard MD at   
2022 8:45 AM EDTdocumented   
in this encounter                       [facility]  
   
                                                    2022 History of   
Present illness Narrative                 
  
  
Formatting of this note is   
different from the original.  
Don is a 22 year old who   
presents for an annual   
gynecologic exam.  
  
Menses are typically regular. She   
had some breakthrough bleeding so   
she stopped the ocps last month.   
The episode of irregular bleeding   
was heavy. She then restarted the   
ocps. Patient has changed jobs   
(last year) & got  in   
March. She takes her ocps   
regularly. Patient states home   
hcg tests are negative.  
  
Menses:regular other than above  
Contraception: combined hormonal   
contraceptives  
HPV vaccine: Yes  
Last Pap: 2019 normal  
HPV: N/A  
History of abnormal pap: No  
Last mammogram: never  
  
OB History  
No obstetric history on file.  
  
Gyn History  
LMP: 2022, Having periods  
Age at Menarche:  
Age at First Pregnancy:  
Age at Menopause:  
Gyn History Comments:  
Sexual Activity: Yes; Male;   
sexually active with   
Contraception: Pill  
  
  
PAST MEDICAL HISTORY  
Diagnosis Date  
Exertional asthma 2012  
Medial meniscus tear 2012  
Unspecified otitis media  
Recurrent otitis  
Vocal cord dysfunction 5/15/2012  
  
PAST SURGICAL HISTORY  
Procedure Laterality Date  
ANKLE ARTHROSCOPY Right 16  
Dr. Yeung  
  
FAMILY HISTORY  
Problem Relation Age of Onset  
other (Other [Other]) Paternal   
Grandfather  
leukemia  
Cancer Maternal Grandmother  
breast  
Asthma Sister  
SOCIAL HISTORY  
Social History  
  
Tobacco Use  
Smoking status: Never Smoker  
Smokeless tobacco: Never Used  
Vaping Use  
Vaping Use: Never used  
Substance Use Topics  
Alcohol use: Yes  
Drug use: No  
  
REVIEW OF SYSTEMS  
Abdomen: No abdominal pain,   
nausea, vomiting, diarrhea, or   
constipation. No bloating, early   
satiety, indigestion, or   
increased flatulence.  
Bladder: No dysuria, gross   
hematuria, urinary frequency,   
urinary urgency, or incontinence.  
Breast: No breast lumps, nipple   
d/c, overlying skin changes,   
redness or skin retraction.  
Allergies and current medication   
updated:Yes  
  
EXAM: /64   Ht 5' 4.173    
(1.63m)   Wt 149 lb (67.6kg)     
LMP 2022   BMI 25.44   
kg/(m^2).  
  
  
GENERAL: pleasant,    
female in no apparent distress  
BREAST: soft, non-tender,   
symmetric, no dominant mass,   
normal nipple-areolar complex, no   
lymphadenopathy and no nipple   
discharge  
CHEST: Normal inspiratory effort  
ABDOMEN: soft, non-tender and no   
masses  
PELVIC: external genitalia   
normal, normal Bartholin's   
glands, urethra, [place]'s glands,   
no vulvar lesions, no cervical   
lesions, good vaginal support,   
physiologic discharge present,   
normal appearing perineal body   
and perianal region  
BIMANUAL: uterus normal size,   
shape and consistency, no adnexal   
masses and non-tender  
RECTOVAGINAL: deferred.  
NEURO: alert and oriented x3,exam   
grossly non-focal  
EXTREMITIES: normal  
  
ASSESSMENT/PLAN:  
1) Health maintenance:  
Pap done with HPV.  
Nutrition, exercise and routine   
health maintenance exams   
reviewed.  
2) Contraception: combined   
hormonal contraceptives.   
Contraceptive options reviewed   
and information provided.  
3) STD screening: Declined STD   
check.  
4) Follow up one year or sooner   
as needed  
5) Breakthrough bleeding - likely   
from stress & then stopping ocps.   
Check labs & pelvic US.  
  
Lauryn Ryan MD  
  
  
Electronically signed by Lauryn Ryan MD at 2022 8:54 AM   
EDTdocumented in this encounter         [facility]  
  
  
  
                                                    2013 History of Past i  
llness Narrative   
  
  
  
                                Problem         Noted Date      Resolved Date  
   
                                Posterior tibial tendonitis 2013  
   
                                Achilles tendinitis 2013  
  
documented as of this encounter (statuses as of 2022)  
[facility]- History of Past illness Narrative*   
  
                                Problem         Noted Date      Resolved Date  
   
                                Posterior tibial tendonitis 2013  
   
                                Achilles tendinitis 2013  
  
documented as of this encounter (statuses as of 2022)  
[facility] History of Past illness Narrative*   
  
                                Problem         Noted Date      Resolved Date  
   
                                Posterior tibial tendonitis 2013  
   
                                Achilles tendinitis 2013  
  
documented as of this encounter (statuses as of 2022)  
[facility] History of Past illness Narrative*   
  
                                Problem         Noted Date      Resolved Date  
   
                                Posterior tibial tendonitis 2013  
   
                                Achilles tendinitis 2013  
  
documented as of this encounter (statuses as of 2022)  
[facility] History of Past illness Narrative*   
  
                                Problem         Noted Date      Resolved Date  
   
                                Posterior tibial tendonitis 2013  
   
                                Achilles tendinitis 2013  
  
documented as of this encounter (statuses as of 2022)  
[facility] History of Past illness Narrative*   
  
                                Problem         Noted Date      Resolved Date  
   
                                Posterior tibial tendonitis 2013  
   
                                Achilles tendinitis 2013  
  
documented as of this encounter (statuses as of 2022)  
[facility] History of Past illness Narrative*   
  
                                Problem         Noted Date      Resolved Date  
   
                                Posterior tibial tendonitis 2013  
   
                                Achilles tendinitis 2013  
  
documented as of this encounter (statuses as of 2022)  
[facility] History of Past illness Narrative*   
  
                                Problem         Noted Date      Resolved Date  
   
                                Posterior tibial tendonitis 2013  
   
                                Achilles tendinitis 2013  
  
documented as of this encounter (statuses as of 2023)  
[facility] History of Past illness Narrative*   
  
                                Problem         Noted Date      Resolved Date  
   
                                Posterior tibial tendonitis 2013  
   
                                Achilles tendinitis 2013  
  
documented as of this encounter (statuses as of 2023)  
[facility] History of Past illness Narrative*   
  
                                Problem         Noted Date      Resolved Date  
   
                                Posterior tibial tendonitis 2013  
   
                                Achilles tendinitis 2013  
  
documented as of this encounter (statuses as of 2023)  
[facility] History of Past illness Narrative*   
  
                                Problem         Noted Date      Resolved Date  
   
                                Posterior tibial tendonitis 2013  
   
                                Achilles tendinitis 2013  
  
documented as of this encounter (statuses as of 2023)  
[facility] History of Past illness Narrative*   
  
                                Problem         Noted Date      Resolved Date  
   
                                Posterior tibial tendonitis 2013  
   
                                Achilles tendinitis 2013  
  
documented as of this encounter (statuses as of 2023)  
[facility] History of Past illness Narrative*   
  
                          Problem      Noted Date   Diagnosed Date Resolved Date  
   
                          Posterior tibial tendonitis 2013  
   
                          Achilles tendinitis 2013  
15  
  
documented as of this encounter (statuses as of 2023)  
[facility] History of Past illness Narrative*   
  
                          Problem      Noted Date   Diagnosed Date Resolved Date  
   
                          Posterior tibial tendonitis 2013  
   
                          Achilles tendinitis 2013  
15  
  
documented as of this encounter (statuses as of 2024)  
[facility] History of Past illness Narrative*   
  
                          Problem      Noted Date   Diagnosed Date Resolved Date  
   
                          Posterior tibial tendonitis 2013  
   
                          Achilles tendinitis 2013  
15  
  
documented as of this encounter (statuses as of 2024)  
[facility]   
  
                                                    Diagnosis  
   
                                                      
  
  
Encounter for gynecological examination without abnormal finding- Primary  
  
  
Routine gynecological examination  
   
                                                      
  
  
Encounter for screening for malignant neoplasm of cervix  
  
  
Screening for malignant neoplasm of the cervix  
   
                                                      
  
  
Irregular intermenstrual bleeding  
  
  
Metrorrhagia  
  
documented in this encounter  
[facility]   
  
                                                    Diagnosis  
   
                                                      
  
  
Abnormal uterine bleeding (AUB)- Primary  
  
documented in this encounter  
[facility]   
  
                                                    Diagnosis  
   
                                                      
  
  
Missed menses- Primary  
  
  
Absence of menstruation  
  
documented in this encounter  
[facility]   
  
                                                    Diagnosis  
   
                                                      
  
  
PCOS (polycystic ovarian syndrome)- Primary  
  
  
Polycystic ovaries  
   
                                                      
  
  
Missed menses  
  
  
Absence of menstruation  
  
documented in this encounter  
[facility]   
  
                                                    Diagnosis  
   
                                                      
  
  
Anovulation- Primary  
  
  
Female infertility associated with anovulation  
  
documented in this encounter  
[facility] note*   
  
                                                    Diagnosis  
   
                                                      
  
  
Encounter for gynecological examination (general) (routine) without abnormal   
findings- Primary  
   
                                                      
  
  
PCOS (polycystic ovarian syndrome)  
  
  
Polycystic ovaries  
  
documented in this encounter  
[facility] note*   
  
                                                    Diagnosis  
   
                                                      
  
  
Annual physical exam- Primary  
  
  
Routine general medical examination at a health care facility  
   
                                                      
  
  
Screening for deficiency anemia  
  
  
Screening for other and unspecified deficiency anemia  
   
                                                      
  
  
Screening for cholesterol level  
   
                                                      
  
  
Anxiety and depression  
  
documented in this encounter  
[facility] note*   
  
                                                    Diagnosis  
   
                                                      
  
  
Anxiety and depression- Primary  
   
                                                      
  
  
Weight gain  
  
  
Other symptoms concerning nutrition, metabolism, and development  
  
documented in this encounter  
[facility] note*   
  
                                                    Diagnosis  
   
                                                      
  
  
Anxiety and depression- Primary  
   
                                                      
  
  
Weight gain  
  
  
Other symptoms concerning nutrition, metabolism, and development  
  
documented in this encounter  
[facility] note*   
  
                                                    Diagnosis  
   
                                                      
  
  
Anxiety and depression- Primary  
   
                                                      
  
  
PCOS (polycystic ovarian syndrome)  
  
  
Polycystic ovaries  
  
documented in this encounter  
[facility] note*   
  
                                                    Diagnosis  
   
                                                      
  
  
Anxiety and depression- Primary  
   
                                                      
  
  
PCOS (polycystic ovarian syndrome)  
  
  
Polycystic ovaries  
  
documented in this encounter  
[facility] note*   
  
                                                    Diagnosis  
   
                                                      
  
  
PCOS (polycystic ovarian syndrome)- Primary  
  
  
Polycystic ovaries  
   
                                                      
  
  
Elevated LDL cholesterol level  
  
  
Pure hypercholesterolemia  
   
                                                      
  
  
IFG (impaired fasting glucose)  
  
  
Impaired fasting glucose  
   
                                                      
  
  
Irregular menses  
  
  
Irregular menstrual cycle  
   
                                                      
  
  
Anxiety and depression  
  
  
Dysthymic disorder  
   
                                                      
  
  
History of bulimia  
  
  
Personal history of other mental disorder  
   
                                                      
  
  
History of anorexia nervosa  
  
  
Personal history of other mental disorder  
   
                                                      
  
  
Overweight with body mass index (BMI) of 29 to 29.9 in adult  
  
documented in this encounter  
[facility] note*   
  
                                                    Diagnosis  
   
                                                      
  
  
PCOS (polycystic ovarian syndrome)- Primary  
  
  
Polycystic ovaries  
   
                                                      
  
  
Irregular menses  
  
  
Irregular menstrual cycle  
   
                                                      
  
  
Anxiety and depression  
  
  
Dysthymic disorder  
   
                                                      
  
  
History of bulimia  
  
  
Personal history of other mental disorder  
   
                                                      
  
  
History of anorexia nervosa  
  
  
Personal history of other mental disorder  
   
                                                      
  
  
Provided repeat prescription for oral contraceptive  
   
                                                      
  
  
Overweight with body mass index (BMI) of 29 to 29.9 in adult  
  
documented in this encounter  
[facility] note*   
  
                                                    Diagnosis  
   
                                                      
  
  
Anxiety and depression- Primary  
   
                                                      
  
  
Skin sensation disturbance  
  
  
Disturbance of skin sensation  
  
documented in this encounter  
[facility] note*   
  
                                                    Diagnosis  
   
                                                      
  
  
Contact dermatitis, unspecified contact dermatitis type, unspecified trigger- 
Primary  
  
documented in this encounter  
[facility]  
Work Phone: [phone] for referral (narrative)* Diagnostic Procedure 
  Only (Routine) - Authorized  
  
                          Specialty    Diagnoses / Procedures Referred By Contac  
t Referred To Contact  
   
                                                    [facility]   
INSTITUTE                                 
  
  
Diagnoses  
  
  
Irregular intermenstrual   
bleeding  
  
  
  
Procedures  
  
  
PELVIC US WHI  
  
  
US PELVIC NONOBSTETRIC   
REAL-TIME IMAGE COMPLETE                  
  
  
Lauryn Ryan MD  
  
  
[address]  
  
  
[place] [address]  
  
  
Phone: [phone]  
  
  
Fax: [phone]                         
  
  
[facility]  
  
  
[address]  
  
  
[place] [address]  
  
  
  
                          Referral ID  Status       Reason       Start   
Date                                    Expiration   
Date                                    Visits   
Requested                               Visits   
Authorized  
   
                                [id number]        Authorized        
  
  
[employer]  
ed Referral     2022       1               1  
  
  
  
  
Electronically signed by Lauryn Ryan MD at 2022 7:46 AM EDT  
  
  
[facility]  
  
Summary Purpose  
  
  
                                                      
  
  
  
Family History  
No Family History Records FoundNo Family History Records Found  
  
Advance Directives  
No Advanced Directives Records FoundNo Advanced Directives Records Found  
  
Additional Source Comments  
  
  
  
                                                    Source Comments (unrecognize  
d section and content)  
   
                                                    In the event this informatio  
n is protected by the Federal Confidentiality of   
Alcohol   
and Drug Abuse Patient Records regulations: This information has been disclosed 
to   
you from records protected by Federal confidentiality rules (42 CFR Part 2). The
   
Federal rules prohibit you from making any further disclosure of this 
information   
unless further disclosure is expressly permitted by the written consent of the 
person   
to whom it pertains or as otherwise permitted by 42 CFR Part 2. A general   
authorization for the release of medical or other information is NOT sufficient 
for   
this purpose. The Federal rules restrict any use of the information to 
criminally   
investigate or prosecute any alcohol or drug abuse patient.[facility] HospitalIn 
the   
event this information is protected by the Federal Confidentiality of Alcohol 
and   
Drug Abuse Patient Records regulations: This information has been disclosed to 
you   
from records protected by Federal confidentiality rules (42 CFR Part 2). The 
Federal   
rules prohibit you from making any further disclosure of this information unless
   
further disclosure is expressly permitted by the written consent of the person 
to   
whom it pertains or as otherwise permitted by 42 CFR Part 2. A general 
authorization   
for the release of medical or other information is NOT sufficient for this 
purpose.   
The Federal rules restrict any use of the information to criminally investigate 
or   
prosecute any alcohol or drug abuse patient.[facility] HospitalIn the event this   
information is protected by the Federal Confidentiality of Alcohol and Drug 
Abuse   
Patient Records regulations: This information has been disclosed to you from 
records   
protected by Federal confidentiality rules (42 CFR Part 2). The Federal rules   
prohibit you from making any further disclosure of this information unless 
further   
disclosure is expressly permitted by the written consent of the person to whom 
it   
pertains or as otherwise permitted by 42 CFR Part 2. A general authorization for
 the   
release of medical or other information is NOT sufficient for this purpose. The   
Federal rules restrict any use of the information to criminally investigate or   
prosecute any alcohol or drug abuse patient.[facility] HospitalIn the event this   
information is protected by the Federal Confidentiality of Alcohol and Drug 
Abuse   
Patient Records regulations: This information has been disclosed to you from 
records   
protected by Federal confidentiality rules (42 CFR Part 2). The Federal rules   
prohibit you from making any further disclosure of this information unless 
further   
disclosure is expressly permitted by the written consent of the person to whom 
it   
pertains or as otherwise permitted by 42 CFR Part 2. A general authorization for
 the   
release of medical or other information is NOT sufficient for this purpose. The   
Federal rules restrict any use of the information to criminally investigate or   
prosecute any alcohol or drug abuse patient.[facility] HospitalIn the event this   
information is protected by the Federal Confidentiality of Alcohol and Drug 
Abuse   
Patient Records regulations: This information has been disclosed to you from 
records   
protected by Federal confidentiality rules (42 CFR Part 2). The Federal rules   
prohibit you from making any further disclosure of this information unless 
further   
disclosure is expressly permitted by the written consent of the person to whom 
it   
pertains or as otherwise permitted by 42 CFR Part 2. A general authorization for
 the   
release of medical or other information is NOT sufficient for this purpose. The   
Federal rules restrict any use of the information to criminally investigate or   
prosecute any alcohol or drug abuse patient.[facility] HospitalIn the event this   
information is protected by the Federal Confidentiality of Alcohol and Drug 
Abuse   
Patient Records regulations: This information has been disclosed to you from 
records   
protected by Federal confidentiality rules (42 CFR Part 2). The Federal rules   
prohibit you from making any further disclosure of this information unless 
further   
disclosure is expressly permitted by the written consent of the person to whom 
it   
pertains or as otherwise permitted by 42 CFR Part 2. A general authorization for
 the   
release of medical or other information is NOT sufficient for this purpose. The   
Federal rules restrict any use of the information to criminally investigate or   
prosecute any alcohol or drug abuse patient.[facility] HospitalIn the event this   
information is protected by the Federal Confidentiality of Alcohol and Drug 
Abuse   
Patient Records regulations: This information has been disclosed to you from 
records   
protected by Federal confidentiality rules (42 CFR Part 2). The Federal rules   
prohibit you from making any further disclosure of this information unless 
further   
disclosure is expressly permitted by the written consent of the person to whom 
it   
pertains or as otherwise permitted by 42 CFR Part 2. A general authorization for
 the   
release of medical or other information is NOT sufficient for this purpose. The   
Federal rules restrict any use of the information to criminally investigate or   
prosecute any alcohol or drug abuse patient.[facility] HospitalIn the event this   
information is protected by the Federal Confidentiality of Alcohol and Drug 
Abuse   
Patient Records regulations: This information has been disclosed to you from 
records   
protected by Federal confidentiality rules (42 CFR Part 2). The Federal rules   
prohibit you from making any further disclosure of this information unless 
further   
disclosure is expressly permitted by the written consent of the person to whom 
it   
pertains or as otherwise permitted by 42 CFR Part 2. A general authorization for
 the   
release of medical or other information is NOT sufficient for this purpose. The   
Federal rules restrict any use of the information to criminally investigate or   
prosecute any alcohol or drug abuse patient.[facility] HospitalIn the event this   
information is protected by the Federal Confidentiality of Alcohol and Drug 
Abuse   
Patient Records regulations: This information has been disclosed to you from 
records   
protected by Federal confidentiality rules (42 CFR Part 2). The Federal rules   
prohibit you from making any further disclosure of this information unless 
further   
disclosure is expressly permitted by the written consent of the person to whom 
it   
pertains or as otherwise permitted by 42 CFR Part 2. A general authorization for
 the   
release of medical or other information is NOT sufficient for this purpose. The   
Federal rules restrict any use of the information to criminally investigate or   
prosecute any alcohol or drug abuse patient.[facility] HospitalIn the event this   
information is protected by the Federal Confidentiality of Alcohol and Drug 
Abuse   
Patient Records regulations: This information has been disclosed to you from 
records   
protected by Federal confidentiality rules (42 CFR Part 2). The Federal rules   
prohibit you from making any further disclosure of this information unless 
further   
disclosure is expressly permitted by the written consent of the person to whom 
it   
pertains or as otherwise permitted by 42 CFR Part 2. A general authorization for
 the   
release of medical or other information is NOT sufficient for this purpose. The   
Federal rules restrict any use of the information to criminally investigate or   
prosecute any alcohol or drug abuse patient.[facility] HospitalIn the event this   
information is protected by the Federal Confidentiality of Alcohol and Drug 
Abuse   
Patient Records regulations: This information has been disclosed to you from 
records   
protected by Federal confidentiality rules (42 CFR Part 2). The Federal rules   
prohibit you from making any further disclosure of this information unless 
further   
disclosure is expressly permitted by the written consent of the person to whom 
it   
pertains or as otherwise permitted by 42 CFR Part 2. A general authorization for
 the   
release of medical or other information is NOT sufficient for this purpose. The   
Federal rules restrict any use of the information to criminally investigate or   
prosecute any alcohol or drug abuse patient.[facility] HospitalIn the event this   
information is protected by the Federal Confidentiality of Alcohol and Drug 
Abuse   
Patient Records regulations: This information has been disclosed to you from 
records   
protected by Federal confidentiality rules (42 CFR Part 2). The Federal rules   
prohibit you from making any further disclosure of this information unless 
further   
disclosure is expressly permitted by the written consent of the person to whom 
it   
pertains or as otherwise permitted by 42 CFR Part 2. A general authorization for
 the   
release of medical or other information is NOT sufficient for this purpose. The   
Federal rules restrict any use of the information to criminally investigate or   
prosecute any alcohol or drug abuse patient.[facility] HospitalIn the event this   
information is protected by the Federal Confidentiality of Alcohol and Drug 
Abuse   
Patient Records regulations: This information has been disclosed to you from 
records   
protected by Federal confidentiality rules (42 CFR Part 2). The Federal rules   
prohibit you from making any further disclosure of this information unless 
further   
disclosure is expressly permitted by the written consent of the person to whom 
it   
pertains or as otherwise permitted by 42 CFR Part 2. A general authorization for
 the   
release of medical or other information is NOT sufficient for this purpose. The   
Federal rules restrict any use of the information to criminally investigate or   
prosecute any alcohol or drug abuse patient.[facility] HospitalIn the event this   
information is protected by the Federal Confidentiality of Alcohol and Drug 
Abuse   
Patient Records regulations: This information has been disclosed to you from 
records   
protected by Federal confidentiality rules (42 CFR Part 2). The Federal rules   
prohibit you from making any further disclosure of this information unless 
further   
disclosure is expressly permitted by the written consent of the person to whom 
it   
pertains or as otherwise permitted by 42 CFR Part 2. A general authorization for
 the   
release of medical or other information is NOT sufficient for this purpose. The   
Federal rules restrict any use of the information to criminally investigate or   
prosecute any alcohol or drug abuse patient.[facility] HospitalIn the event this   
information is protected by the Federal Confidentiality of Alcohol and Drug 
Abuse   
Patient Records regulations: This information has been disclosed to you from 
records   
protected by Federal confidentiality rules (42 CFR Part 2). The Federal rules   
prohibit you from making any further disclosure of this information unless 
further   
disclosure is expressly permitted by the written consent of the person to whom 
it   
pertains or as otherwise permitted by 42 CFR Part 2. A general authorization for
 the   
release of medical or other information is NOT sufficient for this purpose. The   
Federal rules restrict any use of the information to criminally investigate or   
prosecute any alcohol or drug abuse patient.[facility]  
  
  
  
  
                                                    Reason for Visit (unrecogniz  
ed section and content)  
   
                                          
  
  
  
                                Reason          Onset Date      Comments  
   
                                Yearly Exam     2022        
  
  
  
                                        Reason              Comments  
   
                                        GYN Ultrasound        
  
  
  
                                        Reason              Comments  
   
                                        Patient Question      
  
  
  
                                        Reason              Comments  
   
                                        Irregular Menstrual Cycle   
  
  
  
                                        Reason              Comments  
   
                                        Follow Up             
  
  
  
                                        Reason              Comments  
   
                                        Orders                
  
  
  
                                Reason          Onset Date      Comments  
   
                                Refill Request                    
   
                                Refill Request  2023        
  
  
  
                                        Reason              Comments  
   
                                        Yearly Exam           
  
  
  
                                        Reason              Comments  
   
                                        Follow-up             
   
                                        Health Maintenance  Lipid-pendedHIV/Hep   
[id number]-[id number]-[id number]-completedPap-CCF   
(will scan in)Tdap-declinedInfluenza-declined  
  
  
  
                                        Reason              Comments  
   
                                        Medication Check      
   
                                        Weight Gain           
  
  
  
                                        Reason              Comments  
   
                                        Medication Check      
  
  
  
                                        Reason              Comments  
   
                                        Follow Up           Labs and medication  
  
  
  
                                        Reason              Comments  
   
                                        Weight Management     
  
  
  
                                        Reason              Comments  
   
                                        Medication Check      
   
                                        Foot Problem          
  
  
  
                                        Reason              Comments  
   
                                        Rash                  
  
  
  
  
  
                                                    Care Teams (unrecognized sec  
tion and content)  
   
                                          
  
  
  
                      Team Member Relationship Specialty  Start Date End Date  
   
                                                      
  
  
Virginie Renee III, MD  
  
  
NO FORWARDING ADDRESS PCP - General                   10/24/02          
  
  
  
                      Team Member Relationship Specialty  Start Date End Date  
   
                                                      
  
  
MachelleVirginie ramos III, MD  
  
  
NO FORWARDING ADDRESS PCP - General                   10/24/02          
  
  
  
                      Team Member Relationship Specialty  Start Date End Date  
   
                                                      
  
  
Virginie Renee III, MD  
  
  
NO FORWARDING ADDRESS PCP - General                   10/24/02          
  
  
  
                      Team Member Relationship Specialty  Start Date End Date  
   
                                                      
  
  
Virginie Renee III, MD  
  
  
NO FORWARDING ADDRESS PCP - General                   10/24/02          
  
  
  
                      Team Member Relationship Specialty  Start Date End Date  
   
                                                      
  
  
Virginie Renee III, MD  
  
  
NO FORWARDING ADDRESS PCP - General                   10/24/02          
  
  
  
                      Team Member Relationship Specialty  Start Date End Date  
   
                                                      
  
  
Virginie Renee III, MD  
  
  
NO FORWARDING ADDRESS PCP - General                   10/24/02          
  
  
  
                      Team Member Relationship Specialty  Start Date End Date  
   
                                                      
  
  
Virginie Renee III, MD  
  
  
NO FORWARDING ADDRESS PCP - General                   10/24/02          
  
  
  
                      Team Member Relationship Specialty  Start Date End Date  
   
                                                      
  
  
Virginie Renee III, MD  
  
  
NO FORWARDING ADDRESS PCP - General                   10/24/02          
  
  
  
                      Team Member Relationship Specialty  Start Date End Date  
   
                                                      
  
  
Virginie Renee III, MD  
  
  
NO FORWARDING ADDRESS PCP - General                   10/24/02          
  
  
  
                      Team Member Relationship Specialty  Start Date End Date  
   
                                                      
  
  
Virginie Renee III, MD  
  
  
NPI: [id number]  
  
  
NO FORWARDING ADDRESS PCP - General                   10/24/02          
  
  
  
                      Team Member Relationship Specialty  Start Date End Date  
   
                                                      
  
  
Jose Sanderson MD  
  
  
NPI: [id number]  
  
  
[address] [place] [address]  
  
  
[phone] (Work)  
  
  
[phone] (Fax) PCP - General   Family Medicine 23          
  
  
  
                      Team Member Relationship Specialty  Start Date End Date  
   
                                                      
  
  
Jose Sanderson MD  
  
  
NPI: [id number]  
  
  
[address] [place] [address]  
  
  
[phone] (Work)  
  
  
[phone] (Fax) PCP - General   Family Medicine 23          
  
  
  
                      Team Member Relationship Specialty  Start Date End Date  
   
                                                      
  
  
Jose Sanderson MD  
  
  
NPI: [id number]  
  
  
[address] [place] [address]  
  
  
[phone] (Work)  
  
  
[phone] (Fax) PCP - General   Family Medicine 23          
  
  
  
                      Team Member Relationship Specialty  Start Date End Date  
   
                                                      
  
  
Jose Sanderson MD  
  
  
NPI: [id number]  
  
  
[address] [place] [address]  
  
  
[phone] (Work)  
  
  
[phone] (Fax) PCP - General   Family Medicine 23          
  
  
  
                      Team Member Relationship Specialty  Start Date End Date  
   
                                                      
  
  
Monica Sanchez CNP  
  
  
NPI: [id number]  
  
  
[address] [place] [address]  
  
  
[phone] (Work)  
  
  
[phone] (Fax) PCP - General   Family Medicine 23          
  
  
  
                      Team Member Relationship Specialty  Start Date End Date  
   
                                                      
  
  
Monica Sanchez CNP  
  
  
NPI: [id number]  
  
  
[address] [place] [address]  
  
  
[phone] (Work)  
  
  
[phone] (Fax) PCP - General   Family Medicine 23          
  
  
  
                      Team Member Relationship Specialty  Start Date End Date  
   
                                                      
  
  
Jose Sanderson MD  
  
  
NPI: [id number]  
  
  
[address] [facility]  
  
  
[place] [address]  
  
  
[phone] (Work)  
  
  
[phone] (Fax) PCP - General   Family Medicine 23          
  
  
  
  
  
                                                    INFORMATION SOURCE (unrecogn  
ized section and content)  
   
                                          
  
  
  
                                        DATE CREATED        AUTHOR  
   
                                2024                      [facility]  
  
  
  
                                DATE CREATED    AUTHOR          AUTHOR'S ISIAH SAMPSON  
   
                                2024                      [facility]  
  
  
FOR RECORDS PERTAINING TO PATIENTS WHO ARE OR HAVE BEEN ENROLLED IN A CHEMICAL 
DEPENDENCY/SUBSTANCEABUSE PROGRAM, SOME INFORMATION MAY BE OMITTED. This 
clinical summary was aggregated from multiple sources. Caution should be 
exercised in using it in the provision of clinical care. This summary normalizes
 information from multiple sources, and as a consequence, information in this 
document may materially change the coding, format and clinical context of 
patient data. In addition, data may be omitted in some cases. CLINICAL DECISIONS
 SHOULD BE BASED ON THE PRIMARY CLINICAL RECORDS. [facility]. provides
 no warranty or guarantee of the accuracy or completeness of information in this
 document.

## 2025-02-27 ENCOUNTER — HOSPITAL ENCOUNTER (OUTPATIENT)
Dept: HOSPITAL 100 - BWCLAB | Age: 25
Discharge: HOME | End: 2025-02-27
Payer: COMMERCIAL

## 2025-02-27 DIAGNOSIS — Z3A.09: ICD-10-CM

## 2025-02-27 DIAGNOSIS — O09.90: Primary | ICD-10-CM

## 2025-02-27 PROCEDURE — 36415 COLL VENOUS BLD VENIPUNCTURE: CPT

## 2025-02-27 PROCEDURE — 87591 N.GONORRHOEAE DNA AMP PROB: CPT

## 2025-02-27 PROCEDURE — 87491 CHLMYD TRACH DNA AMP PROBE: CPT

## 2025-02-27 PROCEDURE — 84702 CHORIONIC GONADOTROPIN TEST: CPT

## 2025-02-27 PROCEDURE — 87086 URINE CULTURE/COLONY COUNT: CPT

## 2025-03-01 ENCOUNTER — HOSPITAL ENCOUNTER (OUTPATIENT)
Dept: HOSPITAL 100 - LAB | Age: 25
Discharge: HOME | End: 2025-03-01
Payer: COMMERCIAL

## 2025-03-01 DIAGNOSIS — Z87.59: ICD-10-CM

## 2025-03-01 DIAGNOSIS — Z34.90: Primary | ICD-10-CM

## 2025-03-01 LAB — HCG SERPL QL: (no result) MIU/ML

## 2025-03-01 PROCEDURE — 36415 COLL VENOUS BLD VENIPUNCTURE: CPT

## 2025-03-01 PROCEDURE — 84702 CHORIONIC GONADOTROPIN TEST: CPT

## 2025-03-03 ENCOUNTER — HOSPITAL ENCOUNTER (OUTPATIENT)
Dept: HOSPITAL 100 - LAB | Age: 25
Discharge: HOME | End: 2025-03-03
Payer: COMMERCIAL

## 2025-03-03 DIAGNOSIS — O20.0: Primary | ICD-10-CM

## 2025-03-03 DIAGNOSIS — Z3A.00: ICD-10-CM

## 2025-03-03 LAB — HCG SERPL QL: (no result) MIU/ML

## 2025-03-03 PROCEDURE — 84702 CHORIONIC GONADOTROPIN TEST: CPT

## 2025-03-03 PROCEDURE — 86850 RBC ANTIBODY SCREEN: CPT

## 2025-03-03 PROCEDURE — 36415 COLL VENOUS BLD VENIPUNCTURE: CPT

## 2025-03-03 PROCEDURE — 86901 BLOOD TYPING SEROLOGIC RH(D): CPT

## 2025-03-03 PROCEDURE — 86900 BLOOD TYPING SEROLOGIC ABO: CPT

## 2025-03-06 ENCOUNTER — HOSPITAL ENCOUNTER (OUTPATIENT)
Age: 25
Discharge: HOME | End: 2025-03-06
Payer: COMMERCIAL

## 2025-03-06 DIAGNOSIS — O09.90: Primary | ICD-10-CM

## 2025-03-06 DIAGNOSIS — Z87.59: ICD-10-CM

## 2025-03-06 DIAGNOSIS — Z3A.09: ICD-10-CM

## 2025-03-06 PROCEDURE — 76817 TRANSVAGINAL US OBSTETRIC: CPT

## 2025-03-06 NOTE — US_ITS
PROCEDURE:  
TRANSVAGINAL W/PREG US  
   
REASON FOR EXAM:  
Viability.  Irregular menses.  
   
COMPARISON:  
None  
   
FINDINGS:  
Comments: LMP: December 25, 2024.  
   
Number of Gestational Sacs: 1.  It measures 1.9 cm corresponding to a 
gestational age of 6 weeks and 6 days.  
Gestational Sac Shape: Normal  
   
Number of Fetuses: 1  
Fetal Heart Rate: Not detected at this time.  (Average)  
   
   
Yolk Sac: Present and unremarkable.  
Placenta: Presently not well-visualized  
Amniotic Fluid Volume: Subjectively normal for gestational age.  
   
Uterine Abnormalities: Maternal uterus is unremarkable.  
Ovaries / Adnexa: Both maternal ovaries are visualized and unremarkable.  
   
   
FETAL DIMENSIONS:  
Parameter                   Measurement  / EGA  
Crown Rump Length:   4 mm/6 weeks and 2 days  
Gestational Sac:          1.9 cm/6 weeks and 6 days  
Yolk Sac:                   3 mm/  
   
ESTIMATED GESTATIONAL AGE:  
By Ultrasound: 6 weeks 4 days.  
By LMP: Unknown.  
   
ESTIMATED DATE OF DELIVERY:  
By Ultrasound: October 26, 2025.  
By LMP:  
   
ORDER #: [id number] US/Transvaginal w/Preg US  
IMPRESSION:  
Single intrauterine gestation with a mean gestational age of 6 weeks and 4 days
.  No cardiac activity detected at this time.  
Follow-up recommended.  
   
Electronically Signed By: Omar Perez on 03/06/2025 13:32  
Reading Location: [id number]

## 2025-03-07 ENCOUNTER — HOSPITAL ENCOUNTER (OUTPATIENT)
Dept: HOSPITAL 100 - SDC | Age: 25
Discharge: HOME | End: 2025-03-07
Payer: COMMERCIAL

## 2025-03-07 VITALS
DIASTOLIC BLOOD PRESSURE: 52 MMHG | RESPIRATION RATE: 16 BRPM | RESPIRATION RATE: 20 BRPM | DIASTOLIC BLOOD PRESSURE: 58 MMHG | TEMPERATURE: 97.16 F | SYSTOLIC BLOOD PRESSURE: 110 MMHG | HEART RATE: 81 BPM | SYSTOLIC BLOOD PRESSURE: 105 MMHG | HEART RATE: 75 BPM | OXYGEN SATURATION: 98 % | OXYGEN SATURATION: 97 %

## 2025-03-07 VITALS
HEART RATE: 81 BPM | SYSTOLIC BLOOD PRESSURE: 121 MMHG | OXYGEN SATURATION: 97 % | DIASTOLIC BLOOD PRESSURE: 70 MMHG | RESPIRATION RATE: 16 BRPM

## 2025-03-07 VITALS — DIASTOLIC BLOOD PRESSURE: 58 MMHG | SYSTOLIC BLOOD PRESSURE: 121 MMHG

## 2025-03-07 VITALS
RESPIRATION RATE: 16 BRPM | TEMPERATURE: 97.16 F | SYSTOLIC BLOOD PRESSURE: 105 MMHG | DIASTOLIC BLOOD PRESSURE: 58 MMHG | OXYGEN SATURATION: 98 % | HEART RATE: 80 BPM

## 2025-03-07 VITALS
HEART RATE: 73 BPM | RESPIRATION RATE: 16 BRPM | SYSTOLIC BLOOD PRESSURE: 121 MMHG | OXYGEN SATURATION: 97 % | DIASTOLIC BLOOD PRESSURE: 58 MMHG | TEMPERATURE: 99.14 F

## 2025-03-07 VITALS
HEART RATE: 63 BPM | SYSTOLIC BLOOD PRESSURE: 121 MMHG | TEMPERATURE: 97.88 F | RESPIRATION RATE: 16 BRPM | OXYGEN SATURATION: 99 % | DIASTOLIC BLOOD PRESSURE: 63 MMHG

## 2025-03-07 VITALS — BODY MASS INDEX: 28 KG/M2

## 2025-03-07 VITALS
HEART RATE: 68 BPM | DIASTOLIC BLOOD PRESSURE: 72 MMHG | RESPIRATION RATE: 16 BRPM | OXYGEN SATURATION: 97 % | SYSTOLIC BLOOD PRESSURE: 121 MMHG

## 2025-03-07 DIAGNOSIS — O03.4: Primary | ICD-10-CM

## 2025-03-07 LAB
DEPRECATED RDW RBC: 36.8 FL (ref 35.1–43.9)
ERYTHROCYTE [DISTWIDTH] IN BLOOD: 12.1 % (ref 11.6–14.6)
HCT VFR BLD AUTO: 37.2 % (ref 37–47)
HEMOGLOBIN: 12.7 G/DL (ref 12–15)
HGB BLD-MCNC: 12.7 G/DL (ref 12–15)
MCV RBC: 84.5 FL (ref 81–99)
MEAN CORP HGB CONC: 34.1 G/DL (ref 32–36)
MEAN PLATELET VOL.: 11.2 FL (ref 6.2–12)
PLATELET # BLD: 340 K/MM3 (ref 150–450)
PLATELET COUNT: 340 K/MM3 (ref 150–450)
RBC # BLD AUTO: 4.4 M/MM3 (ref 4.2–5.4)
RBC DISTRIBUTION WIDTH CV: 12.1 % (ref 11.6–14.6)
RBC DISTRIBUTION WIDTH SD: 36.8 FL (ref 35.1–43.9)
WBC # BLD AUTO: 9 K/MM3 (ref 4.4–11)
WHITE BLOOD COUNT: 9 K/MM3 (ref 4.4–11)

## 2025-03-07 PROCEDURE — 85027 COMPLETE CBC AUTOMATED: CPT

## 2025-03-07 PROCEDURE — 86850 RBC ANTIBODY SCREEN: CPT

## 2025-03-07 PROCEDURE — 01965 ANES INCOMPL/MISSED AB PX: CPT

## 2025-03-07 PROCEDURE — 86900 BLOOD TYPING SEROLOGIC ABO: CPT

## 2025-03-07 PROCEDURE — A4216 STERILE WATER/SALINE, 10 ML: HCPCS

## 2025-03-07 PROCEDURE — 88305 TISSUE EXAM BY PATHOLOGIST: CPT

## 2025-03-07 PROCEDURE — 86901 BLOOD TYPING SEROLOGIC RH(D): CPT

## 2025-03-07 PROCEDURE — 59812 TREATMENT OF MISCARRIAGE: CPT

## 2025-03-07 RX ADMIN — LIDOCAINE HYDROCHLORIDE 20 ML: 10 INJECTION, SOLUTION INFILTRATION; PERINEURAL at 15:09

## 2025-03-07 NOTE — PCM.HP.OB
HPI - General
HPI Narrative
DON MITTAL, is a 24 F who presents for early pregnancy loss, supposed to be 9 weeks and only measuring 5-6 with early heartbeat seen and then no FHT seen on follow up ultrasound.  patient denies any significant crmaping or pain no fevers. 


PFSH
PFS
Medical History (Reviewed 25 @ 11:52 by Dr. David Mckeon MD)

Wears contact lenses
Wears glasses
Depression
Anxiety
Injury of head and neck
Loss of consciousness
Non-smoker
Seasonal allergies
Vocal cord dysfunction
History of miscarriage
PCOS (polycystic ovarian syndrome)
Anxiety and depression
Fatigue
Asthma


Home Medications

?Medication ?Instructions ?Recorded ?Last Taken ?Type
albuterol sulfate 90 mcg/actuation 1 - 2 puff inhalation Q4H PRN PRN 03/23/15 Unknown History
aerosol inhaler (Ventolin HFA) sob   
multivit-min no.71-iron fum 28 1 cap PO DAILY 25 Unknown History
mg-folate no.1  1 mg-dha 300 mg    
capsule (PNV-Omega)    
sertraline 50 mg tablet (Zoloft) 50 mg PO QDAY #60 tabs 25 Unknown Rx




Allergy/AdvReac Type Severity Reaction Status Date / Time
tree and shrub pollen Allergy Mild Other Verified 25 12:16
weed pollen Allergy Mild Other Verified 25 12:16


Family History (Reviewed 25 @ 11:52 by Dr. David Mckeon MD)
Grandmother
 Breast cancer,  Onset Age: 45
      Maternal
Grandfather   
 Myocardial infarction,  Onset Age: 51
      Maternal
Mother
 Thyroid disorder
      hypothyroid


Surgical History (Reviewed 25 @ 11:52 by Dr. David Mckeon MD)

History of wisdom tooth extraction
History of ankle surgery


Social History (Reviewed 25 @ 11:52 by Dr. David Mckeon MD)
adopted:  No 
household members:  spouse and other details: Partial custody of 3 step children 
housing:  house 
number of children:  3 
current occupational status:  employed 
current occupation:  Consumer  
current occupational exposures/hazards:  No 
pets and animals:  Yes (Avoid litterbox) pets and animals: cat(s), dog(s), turtle(s) and farm animals 
history of recent travel:  No 
sexually active:  Yes 
Smoking Status:  Never smoker 
alcohol intake:  current alcohol intake frequency: a few times a month 
details:  not while pregnant 
substance use type:  does not use 
well-balanced diet:  daily or most days 
caffeine:  No 
eating out:  1-3 times/week 
during the past year weight has:  increased > 10 lbs 
what type of physical activity do you participate in:  walking 
frequency:  3-4 times per week 
duration:  15-30 minutes/day 
gerda/Protestant:  Christianity 
seatbelt use:  always 
do you feel safe at home:  Yes 
additional social history:   Michael- Heavy  



Pregnancy History

        2            Elective abortions        
Hx Para        0            Spontaneous abortions        2
Hx # Term Pregnancies                    Ectopic pregnancies        
Hx #  Pregnancies                    Multiple births        
                    # of living children        0


Past Pregnancies
Del. Date Name GA/Weeks Outcome Route Bth Weight Infant Gen Labor Lgth Anesthesia Del Locatn Provider FOB
Unknown 2023 5 spontaneous         
25  6 spontaneous         

Delivery Date: 25  Last Updated by: Grace Colon RN
      D&C w/SM


ROS
Constitutional
Constitutional: Reports systems reviewed and no addt'l complaints, except as documented; Denies as per HPI, change in weight, fatigue, fever(s), malaise, weakness or other
Eyes
Eyes: Reports systems reviewed and no addt'l complaints, except as documented; Denies as per HPI, change in vision or other
ENT
HEENT: Reports systems reviewed and no addt'l complaints, except as documented
Respiratory/Chest
Respiratory/Chest: Reports systems reviewed and no addt'l complaints, except as documented
Gastrointestinal
Gastrointestinal: Reports systems reviewed and no addt'l complaints, except as documented and as per HPI
Genitourinary
Genitourinary: Reports as per HPI
Musculoskeletal
Musculoskeletal: Reports systems reviewed and no addt'l complaints, except as documented
Neurologic
Neurologic: Reports systems reviewed and no addt'l complaints, except as documented
Psychiatric
Psychiatric: Reports systems reviewed and no addt'l complaints, except as documented
Endocrine
Endocrinology: Reports systems reviewed and no addt'l complaints, except as documented
Hematologic/Lymphatic
Hematologic/Lymphatic: Reports systems reviewed and no addt'l complaints, except as documented

Vital Signs
Vital Signs
Vital Signs: 


 25
12:02 25
12:02
Temperature  99.2 F H
Temperature Source  Temporal
Pulse Rate  73
Respiratory Rate  16
Respiratory Pattern Normal 
Blood Pressure  121/58 H
Blood Pressure Mean  79
Blood Pressure Source  Monitor
Blood Pressure Position  Semi-Fowlers
Blood Pressure Location  Right Arm
Pulse Ox  97
Oxygen Delivery Method  Room Air

Weight

Weight:                        163 lb 2.273 oz                                   
Body Mass Index (BMI)          28.0                                              




Physical Exam
Const
alert, oriented x3 and no apparent distress
HEENT
normocephalic
Head and Scalp: atraumatic
Eyes
EOMs intact bilaterally and conjunctivae normal
Neck
full ROM, no lymphadenopathy, supple and thyroid normal
General: trachea midline
Lymph
Lymphatic: no lymphadenopathy noted
Resp
normal respiratory effort, no retractions and no use of accessory muscles
Cardio
regular rhythm
GI
soft to palpation, non-distended and no masses
Inspection: Negative for abdominal distention
Extremity
normal to inspection
Skin
no rashes or lesions noted
Neuro
moves all extremities
Psych
mental status grossly normal

Prenatal Labs
Prenatal Labs
Prenatal Labs: 
      Blood Type O POSITIVE 
      Antibody Screen NEGATIVE 
      Hct 37.2 % (37-47) 
      Hgb 12.7 g/dL (12.0-15.0) 
      Obstetrics Ultrasound 
      Chlamydia DNA (BERTIN) Negative  (Negative) 
      N.gonorrhoeae DNA (BERTIN) Negative  (Negative) 


Assessment & Plan
(1) Missed : 
COMMENT:       plan suction d and c
PLAN: 
Plan
After discussing the patient's diagnosis and treatment plan options, patient wishes to proceed with surgical management.  I have discussed with the patient the risks, benefits, and alternatives of the procedure which include but are not limited to 
risks of anesthesia, bleeding, infection, possible damage to bowel, bladder, or surrounding vasculature which could lead to additional surgery to evaluate any complications.  Patient agrees to procedure and wishes to proceed.  ACOG/uptodate 
references given for additional information regarding procedure.

## 2025-03-07 NOTE — DCINST_ITS
Discharge Instructions    
Diet    
Discharge Diet: No restrictions    
DC O2, CPAP, BIPAP needs    
Home O2 Discharge instructions: No    
Dressing / Incision    
Discharge Activity: Return to Normal Activity, May Shower and May Take a Tub   
Bath (after 1 week)    
May resume sexual activity in: 1-2 weeks    
Weight Bearing Status: Weight bearing as tolerated    
Lifting Restrictions: none    
Dressing / Incision    
Call your doctor if you observe: Fever of 101 or Higher, Using more than 1 pad   
per hour, Shortness of breath and Uncontrolled pain    
Follow Up Care    
Please Follow Up With: Priya Kelley MD    
When: Call [phone] to schedule appointment.    
Test Results:     
Test results from this visit will be discussed in further detail at your follow-  
up appointment, if applicable.    
    
    
Discharge Plan    
Admission    
Attending Provider: Priya Kelley    
    
Primary Care Provider: Lilliana Sanchez    
    
Instructions    
Print Language: English    
    
Discharge Orders/Prescriptions    
Prescriptions:    
No Action    
  PNV-Omega 28-1-300 mg capsule     
   1 cap PO DAILY     
  sertraline [Zoloft] 50 mg tablet     
   50 mg PO QDAY Qty: 60 3RF    
  albuterol sulfate [Ventolin HFA] 1 INHALER inhaler     
   1 - 2 puff inhalation Q4H PRN PRN (Reason: sob)     
    
Referrals / Follow Up:    
Lilliana Sanchez, NP-C [Primary Care Provider] -     
    
Disposition    
Disposition (needs filled in before D/C Order can be placed): Home, Self Care

## 2025-03-07 NOTE — POSTOPAN2_ITS
Anesthesia Postop Eval I Sum    
Postop Eval Completion status    
Anesthesia document: Postop Eval 1 completed: Yes    
Anesthesia Postop Eval I Summary    
Anesthesia Postop Eval I Summary:     
Anesthesia Postop Eval I:  Assessment Summary    
    
    
    
Airway patent                  Yes                03/07/25 15:25 [email or web address]    
     
Spontaneous unlabored          Yes                03/07/25 15:25 [email or web address]    
    
respirations                         
     
Mental status                  Awake,Calm         03/07/25 15:25 [email or web address]    
     
nausea                         No                 03/07/25 15:25 [email or web address]    
     
Vomiting                       No                 03/07/25 15:25 [email or web address]    
    
    
    
Anesthesia Postop Eval I: Fluid Summary    
    
    
    
Crystalloid volume administer  250                03/07/25 15:25 [email or web address]    
    
(ml)                                 
     
Colloids volume administered (       
    
ml)                                  
     
Blood Product volume                 
    
administered (ml)                    
     
Total IV fluid infused         250                03/07/25 15:25 [email or web address]    
    
    
    
Anesthesia Postop Eval I: Summary Notes    
    
    
    
Anesthesia Complication        No                 03/07/25 15:25 [email or web address]    
     
Anesthesia Complication              
    
Comment:                             
     
Post-operative progress note         
    
    
    
    
    
Anesthesia: Postop Eval II    
Evaluation    
Mental status: Awake    
Pain Level: 0    
nausea: No    
Vomiting: No

## 2025-03-07 NOTE — POSTOP.ANE_ITS
Anesthesia: Postop Eval I    
Current Vital Signs    
Temperature: 97.1 F    
Pulse Rate: 81    
Blood Pressure: 105/52    
Respiratory Rate: 20    
Pulse Ox: 98    
Oxygen Delivery Method: Room Air    
Assessment    
Airway patent: Yes    
Spontaneous unlabored respirations: Yes    
Mental status: Awake and Calm    
nausea: No    
Vomiting: No    
Anesthesia Complication: No    
Fluid Hydration    
Crystalloid volume administer (ml): 250    
Total IV fluid infused: 250    
Progress Note    
Anesthesia document: Postop Eval 1 completed: Yes

## 2025-03-07 NOTE — PCM.PRE.AN2
ASA Classification*
ASA Classification
ASA Classification: 2

Assessment & Plan Anesthesia*
Anesthesia Assessment
Anesthesia Assessment: 

Discussed sedation and/or anesthesia options, risks, benefits, and alternatives with patient/parents/legal guardian/POA. Questions invited. 

The patient/parents/legal guardian/POA seems to understand and agrees to proceed with anesthesia plan.

Reviewed the physical assessment, medical history, allergy history and patient home medications list prior to surgery/procedure/anesthetic and documented any changes.

Performed airway and anesthesia risk assessments.

Anesthesia Type
Anesthesia Type: MAC

Anesthesia Focused Assessment*
Airway Assessment
Mouth opens: >3 cm
Mallampati Score: II

Focused Labs
Anesthesia Preop lab: 
       *** CBC ***  
       *** CHEMISTRY ***  
      Potassium 4.0 mmol/L (3.5-5.1)  10/23/24 07:05 10/23/24
      Sodium 140 mmol/L (136-145)  10/23/24 07:05 10/23/24
      BUN 9 mg/dL (7-18)  10/23/24 07:05 10/23/24
      Creatinine 0.82 mg/dL (0.55-1.02)  10/23/24 07:05 10/23/24
      Glucose 105 mg/dL ()  10/23/24 07:05 10/23/24
      TSH 2.330 uIU/mL (0.358-3.740)  10/23/24 07:05 10/23/24
       *** COAG ***  
       *** PREGNANCY ***  
      HCG, Quant 42658 mIU/mL (<9 non-preg)  H 25 09:25 25
      Urine Pregnancy Test Negative Negative 16 09:50 16


Pre-Assessment
Diagnosis/Proposed Procedure
Planned Operative Procedure(s): SUCTION D&C
Anesthesia History
Anesthesia History - RN Documentation: 
Anesthesia History - RN Documentation

Hx Hospitalization             No                           25 15:22
Any Problems With Anesthesia   Yes: NAUSEA                  25 15:22
Cholinesterase deficiency      No                           25 15:22
You/Your Family Experience     No                           25 15:22
fever (hyperthermia) with       
Relationship                    
Recent Exposure to Contagious  No                           25 11:52
Disease                         
Does patient have nerve        No                           25 15:22
stimulator                      
Patient instructed to have      
device shut off                 
--Does patient have Pacemaker   
or ICD?                         
When Was Last Pacemaker Check   


*** QUESTION #4 FULL TEXT: You/Your Family Experience fever (hyperthermia) with Anesthesia

Last Oral Intake
Last Oral intake: 
Last Oral Intake

NPO since                       
Meds taken in AM with sips of   
water?                          
Meds patient instructed to      
take am of surgery              



PONV
PONV - RN Documentation: 
PONV - RN Documentation

Female                         Yes                          25 15:22
HX of Motion Sickness          No                           25 15:22
HX of N/V After Surgery        Yes                          25 15:22
Non-Smoker                     Yes                          25 15:22
Duration of Surgery greater    No                           25 15:22
than 60 minutes                 
Number of Risk Factors         3                            25 15:22
PONV Score                     Moderate Risk                25 15:22



Height & Weight
Height & Weight: 
Anesthesia: Height & Weight

Height                         5 ft 4 in                    25 16:33
Weight:                        74.843 kg                    25 16:33



Respiratory Assessment
Respiratory Assessment - RN Documentation: 
Respiratory Tract Infection Hx - RN Documentation

Hx Respiratory Tract Infection No                           25 15:22



STOP Sleep Apnea
STOP Sleep Apnea - RN Documentation: 
STOP Sleep Apnea - RN Documentation

Hx Hypertension                No                           25 15:22
Hx Sleep Apnea                 No                           25 15:22
CPAP                            
BIPAP                           
Do you snore loudly (louder    No                           25 15:22
than talking or can be heard    
Do you often feel tired/       No                           25 15:22
fatigued/ sleepy during         
daytime?                        
Has anyone observed you stop   No                           25 15:22
breathing during sleep?         
STOP Results                   Negative                     25 15:22



*** QUESTION #5 FULL TEXT : Do you snore loudly (louder than talking or can be heard through closed doors)? 

Tobacco Use History
Tobacco Use History - RN Documentation: 
Tobacco Use History - RN Documentation

Tobacco Use                     
Smoking Status                 Never smoker                 25 15:22
Hx Tobacco Use                 No                           25 15:22
Years Smoking                   
Packs Smoked per Day            
Smoking Cessation Date was      
within the last 15 years        
Hx Smoking Cessation Date       
Hx Smoking Cessation            
Counseling                      



Hematologic Medial History
Hematologic Hx - RN Documentation: 
Hematologic Medical Hx - RN documentation

Hx of Blood Transfusion        No                           25 15:22
Hx of Transfusion in last 3    No                           25 15:22
Months                          
Date of Last Transfusion (if    
within last 3 months)           
Ever experience any problems   No                           25 15:22
with transfusion(s)?            
Specify any problems            
Hx of Preganancy in last 3     No                           25 15:22
Months                          
Nurse Filling Out Transfusion  VCHRISTIN                    25 15:22
& Pregnancy Questions:          
Date:                          25 15:22
Time:                          15:23                        25 15:22
Patient unable to answer at     
this time (ie. confused,        
unrespo                         



Pregnancy/Reproduction History
Pregnancy/Reproductive History - RN Documentation: 
Pregnancy/Reproductive Hx- RN Documentation

Hx Pregnant Now                Yes                          25 15:22
Gestational Age (in weeks):     
EDC:                            
Hx                       
Hx Para                         
Hx  Section             
SAB                             
Breastfeeding                  No                           25 15:22




PFSH
Medical History (Reviewed 25 @ 11:52 by Dr. David Mckeon MD)

Wears contact lenses
Wears glasses
Depression
Anxiety
Injury of head and neck
Loss of consciousness
Non-smoker
Seasonal allergies
Vocal cord dysfunction
History of miscarriage
PCOS (polycystic ovarian syndrome)
Anxiety and depression
Fatigue
Asthma


Home Medications

?Medication ?Instructions ?Recorded ?Last Taken ?Type
albuterol sulfate 90 mcg/actuation 1 - 2 puff inhalation Q4H PRN PRN 03/23/15 Unknown History
aerosol inhaler (Ventolin HFA) sob   
multivit-min no.71-iron fum 28 1 cap PO DAILY 25 Unknown History
mg-folate no.1  1 mg-dha 300 mg    
capsule (PNV-Omega)    
sertraline 50 mg tablet (Zoloft) 50 mg PO QDAY #60 tabs 25 Unknown Rx




Allergy/AdvReac Type Severity Reaction Status Date / Time
tree and shrub pollen Allergy Mild Other Verified 25 15:16
weed pollen Allergy Mild Other Verified 25 15:16


Family History (Reviewed 25 @ 11:52 by Dr. David Mckeon MD)
Grandmother
 Breast cancer,  Onset Age: 45
      Maternal
Grandfather   
 Myocardial infarction,  Onset Age: 51
      Maternal
Mother
 Thyroid disorder
      hypothyroid


Surgical History (Reviewed 25 @ 11:52 by Dr. David Mckeon MD)

History of wisdom tooth extraction
History of ankle surgery


Social History (Reviewed 25 @ 11:52 by Dr. David Mckeon MD)
adopted:  No 
household members:  spouse and other details: Partial custody of 3 step children 
housing:  house 
number of children:  3 
current occupational status:  employed 
current occupation:  Consumer  
current occupational exposures/hazards:  No 
pets and animals:  Yes (Avoid litterbox) pets and animals: cat(s), dog(s), turtle(s) and farm animals 
history of recent travel:  No 
sexually active:  Yes 
Smoking Status:  Never smoker 
alcohol intake:  current alcohol intake frequency: a few times a month 
details:  not while pregnant 
substance use type:  does not use 
well-balanced diet:  daily or most days 
caffeine:  No 
eating out:  1-3 times/week 
during the past year weight has:  increased > 10 lbs 
what type of physical activity do you participate in:  walking 
frequency:  3-4 times per week 
duration:  15-30 minutes/day 
gerda/Quaker:  Taoism 
seatbelt use:  always 
do you feel safe at home:  Yes 
additional social history:   Michael- Heavy  



Review of Systems (Anesthesia)
ROS Narrative
System reviewed and no additional complaints, except as documented.

## 2025-03-07 NOTE — PCM.DC
Discharge Instructions
Diet
Discharge Diet: No restrictions
DC O2, CPAP, BIPAP needs
Home O2 Discharge instructions: No
Dressing / Incision
Discharge Activity: Return to Normal Activity, May Shower and May Take a Tub Bath (after 1 week)
May resume sexual activity in: 1-2 weeks
Weight Bearing Status: Weight bearing as tolerated
Lifting Restrictions: none
Dressing / Incision
Call your doctor if you observe: Fever of 101 or Higher, Using more than 1 pad per hour, Shortness of breath and Uncontrolled pain
Follow Up Care
Please Follow Up With: Priya Kelley MD
When: Call [phone] to schedule appointment.
Test Results: 
Test results from this visit will be discussed in further detail at your follow-up appointment, if applicable.


Discharge Plan
Admission
Attending Provider: Priya Kelley

Primary Care Provider: Lilliana Sanchez

Instructions
Print Language: English

Discharge Orders/Prescriptions
Prescriptions:
No Action
  PNV-Omega 28-1-300 mg capsule 
   1 cap PO DAILY 
  sertraline [Zoloft] 50 mg tablet 
   50 mg PO QDAY Qty: 60 3RF
  albuterol sulfate [Ventolin HFA] 1 INHALER inhaler 
   1 - 2 puff inhalation Q4H PRN PRN (Reason: sob) 

Referrals / Follow Up:
Lilliana Sanchez, NP-C [Primary Care Provider] - 

Disposition
Disposition (needs filled in before D/C Order can be placed): Home, Self Care

## 2025-03-07 NOTE — HP.PCM.OB_ITS
HPI - General    
HPI Narrative    
DON MITTAL, is a 24 F who presents for early pregnancy loss, supposed to be 9   
weeks and only measuring 5-6 with early heartbeat seen and then no FHT seen on   
follow up ultrasound.  patient denies any significant crmaping or pain no   
fevers.     
    
    
PFSH    
PFS    
Medical History (Reviewed 25 @ 11:52 by Dr. David Mckeon MD)    
    
Wears contact lenses    
Wears glasses    
Depression    
Anxiety    
Injury of head and neck    
Loss of consciousness    
Non-smoker    
Seasonal allergies    
Vocal cord dysfunction    
History of miscarriage    
PCOS (polycystic ovarian syndrome)    
Anxiety and depression    
Fatigue    
Asthma    
    
    
                                Home Medications    
    
    
    
?Medication ?Instructions ?Recorded ?Last Taken ?Type    
     
                          albuterol sulfate 90 mcg/actuation 1 - 2 puff inhalati    
on Q4H PRN PRN 03/23/15     
Unknown History    
    
                          aerosol inhaler (Ventolin HFA) sob       
     
                          multivit-min no.71-iron fum 28 1 cap PO DAILY 25    
 Unknown History    
    
                                        mg-folate no.1  1 mg-dha 300 mg        
    
                                        capsule (PNV-Omega)        
     
                          sertraline 50 mg tablet (Zoloft) 50 mg PO QDAY #60 tab    
s 25 Unknown Rx    
    
    
    
                                            
    
    
    
Allergy/AdvReac Type Severity Reaction Status Date / Time    
     
tree and shrub pollen Allergy Mild Other Verified 25 12:16    
     
weed pollen Allergy Mild Other Verified 25 12:16    
    
    
    
Family History (Reviewed 25 @ 11:52 by Dr. David Mckeon MD)    
Grandmother   Breast cancer,  Onset Age: 45    
        Maternal    
Grandfather      Myocardial infarction,  Onset Age: 51    
        Maternal    
Mother   Thyroid disorder    
        hypothyroid    
    
    
Surgical History (Reviewed 25 @ 11:52 by Dr. David Mckeon MD)    
    
History of wisdom tooth extraction    
History of ankle surgery    
    
    
Social History (Reviewed 25 @ 11:52 by Dr. David Mckeon MD)    
adopted:  No     
household members:  spouse and other details: Partial custody of 3 step children  
    
housing:  house     
number of children:  3     
current occupational status:  employed     
current occupation:  Consumer      
current occupational exposures/hazards:  No     
pets and animals:  Yes (Avoid litterbox) pets and animals: cat(s), dog(s),   
turtle(s) and farm animals     
history of recent travel:  No     
sexually active:  Yes     
Smoking Status:  Never smoker     
alcohol intake:  current alcohol intake frequency: a few times a month     
details:  not while pregnant     
substance use type:  does not use     
well-balanced diet:  daily or most days     
caffeine:  No     
eating out:  1-3 times/week     
during the past year weight has:  increased > 10 lbs     
what type of physical activity do you participate in:  walking     
frequency:  3-4 times per week     
duration:  15-30 minutes/day     
gerda/Congregational:  Latter-day     
seatbelt use:  always     
do you feel safe at home:  Yes     
additional social history:   Michael- Heavy      
    
    
    
Pregnancy History    
    
    
    
        2            Elective abortions            
     
Hx Para        0            Spontaneous abortions        2    
     
Hx # Term Pregnancies                    Ectopic pregnancies            
     
Hx #  Pregnancies                    Multiple births            
     
                    # of living children        0    
    
    
    
Past Pregnancies    
    
    
Del. Date Name    GA/Weeks Outcome Route   Bth Weight Infant Gen Labor Lgth     
Anesthesia          Del Locatn          Provider            FOB    
     
      Unknown 2023 5     spontaneous                               
                     
     
      25       6     spontaneous                                      
              
    
    
Delivery Date: 25  Last Updated by: Grace Colon RN    
      D&C w/SM    
    
    
ROS    
Constitutional    
Constitutional: Reports systems reviewed and no addt'l complaints, except as   
documented; Denies as per HPI, change in weight, fatigue, fever(s), malaise,   
weakness or other    
Eyes    
Eyes: Reports systems reviewed and no addt'l complaints, except as documented;   
Denies as per HPI, change in vision or other    
ENT    
HEENT: Reports systems reviewed and no addt'l complaints, except as documented    
Respiratory/Chest    
Respiratory/Chest: Reports systems reviewed and no addt'l complaints, except as   
documented    
Gastrointestinal    
Gastrointestinal: Reports systems reviewed and no addt'l complaints, except as   
documented and as per HPI    
Genitourinary    
Genitourinary: Reports as per HPI    
Musculoskeletal    
Musculoskeletal: Reports systems reviewed and no addt'l complaints, except as   
documented    
Neurologic    
Neurologic: Reports systems reviewed and no addt'l complaints, except as   
documented    
Psychiatric    
Psychiatric: Reports systems reviewed and no addt'l complaints, except as   
documented    
Endocrine    
Endocrinology: Reports systems reviewed and no addt'l complaints, except as   
documented    
Hematologic/Lymphatic    
Hematologic/Lymphatic: Reports systems reviewed and no addt'l complaints, except  
as documented    
    
Vital Signs    
Vital Signs    
Vital Signs:     
                                            
    
    
    
 25    
12:02 25    
12:02    
     
Temperature  99.2 F H    
     
Temperature Source  Temporal    
     
Pulse Rate  73    
     
Respiratory Rate  16    
     
Respiratory Pattern Normal     
     
Blood Pressure  121/58 H    
     
Blood Pressure Mean  79    
     
Blood Pressure Source  Monitor    
     
Blood Pressure Position  Semi-Fowlers    
     
Blood Pressure Location  Right Arm    
     
Pulse Ox  97    
     
Oxygen Delivery Method  Room Air    
    
    
                                     Weight    
    
    
    
Weight:                        163 lb 2.273 oz                                    
    
     
Body Mass Index (BMI)          28.0                                               
    
    
    
    
    
    
Physical Exam    
Const    
alert, oriented x3 and no apparent distress    
HEENT    
normocephalic    
Head and Scalp: atraumatic    
Eyes    
EOMs intact bilaterally and conjunctivae normal    
Neck    
full ROM, no lymphadenopathy, supple and thyroid normal    
General: trachea midline    
Lymph    
Lymphatic: no lymphadenopathy noted    
Resp    
normal respiratory effort, no retractions and no use of accessory muscles    
Cardio    
regular rhythm    
GI    
soft to palpation, non-distended and no masses    
Inspection: Negative for abdominal distention    
Extremity    
normal to inspection    
Skin    
no rashes or lesions noted    
Neuro    
moves all extremities    
Psych    
mental status grossly normal    
    
Prenatal Labs    
Prenatal Labs    
Prenatal Labs:     
    
    
                          Blood Type                O POSITIVE     
     
                          Antibody Screen           NEGATIVE     
     
                          Hct                       37.2 % (37-47)     
     
                          Hgb                       12.7 g/dL (12.0-15.0)     
     
                          Obstetrics Ultrasound         
     
                          Chlamydia DNA (BERTIN)       Negative  (Negative)     
     
                          N.gonorrhoeae DNA (BERTIN)   Negative  (Negative)     
    
    
    
Assessment & Plan    
(1) Missed :     
COMMENT:       plan suction d and c    
PLAN:     
Plan    
After discussing the patient's diagnosis and treatment plan options, patient   
wishes to proceed with surgical management.  I have discussed with the patient   
the risks, benefits, and alternatives of the procedure which include but are not  
limited to risks of anesthesia, bleeding, infection, possible damage to bowel,   
bladder, or surrounding vasculature which could lead to additional surgery to   
evaluate any complications.  Patient agrees to procedure and wishes to proceed.   
ACOG/uptodate references given for additional information regarding procedure.

## 2025-03-07 NOTE — PRE.ANES_ITS
ASA Classification*    
ASA Classification    
ASA Classification: 2    
    
Assessment & Plan Anesthesia*    
Anesthesia Assessment    
Anesthesia Assessment:     
    
Discussed sedation and/or anesthesia options, risks, benefits, and alternatives   
with patient/parents/legal guardian/POA. Questions invited.     
    
The patient/parents/legal guardian/POA seems to understand and agrees to proceed  
with anesthesia plan.    
    
Reviewed the physical assessment, medical history, allergy history and patient   
home medications list prior to surgery/procedure/anesthetic and documented any   
changes.    
    
Performed airway and anesthesia risk assessments.    
    
Anesthesia Type    
Anesthesia Type: MAC    
    
Anesthesia Focused Assessment*    
Airway Assessment    
Mouth opens: >3 cm    
Mallampati Score: II    
    
Focused Labs    
Anesthesia Preop lab:     
    
    
                                                    *** CBC ***    
     
                                                    *** CHEMISTRY ***    
     
                Potassium       4.0 mmol/L (3.5-5.1)  10/23/24 07:05  10/23/24    
     
                Sodium          140 mmol/L (136-145)  10/23/24 07:05  10/23/24    
     
                BUN             9 mg/dL (7-18)  10/23/24 07:05  10/23/24    
     
                Creatinine      0.82 mg/dL (0.55-1.02)  10/23/24 07:05  10/23/24    
     
                Glucose         105 mg/dL ()  10/23/24 07:05  10/23/24    
     
                TSH             2.330 uIU/mL (0.358-3.740)  10/23/24 07:05  10/2    
3/24    
     
                                                    *** COAG ***    
     
                                                    *** PREGNANCY ***    
     
                HCG, Quant      62691 mIU/mL (<9 non-preg)  H 25 09:25      
     
                Urine Pregnancy Test Negative Negative 16 09:50  16    
    
    
    
Pre-Assessment    
Diagnosis/Proposed Procedure    
Planned Operative Procedure(s): SUCTION D&C    
Anesthesia History    
Anesthesia History - RN Documentation:     
                      Anesthesia History - RN Documentation    
    
    
    
Hx Hospitalization             No                           25 15:22    
     
Any Problems With Anesthesia   Yes: NAUSEA                  25 15:22    
     
Cholinesterase deficiency      No                           25 15:22    
     
You/Your Family Experience     No                           25 15:22    
    
fever (hyperthermia) with           
     
Relationship                        
     
Recent Exposure to Contagious  No                           25 11:52    
    
Disease                             
     
Does patient have nerve        No                           25 15:22    
    
stimulator                          
     
Patient instructed to have          
    
device shut off                     
     
--Does patient have Pacemaker       
    
or ICD?                             
     
When Was Last Pacemaker Check       
    
    
    
*** QUESTION #4 FULL TEXT: You/Your Family Experience fever (hyperthermia) with   
Anesthesia    
    
Last Oral Intake    
Last Oral intake:     
                                Last Oral Intake    
    
    
    
NPO since                           
     
Meds taken in AM with sips of       
    
water?                              
     
Meds patient instructed to          
    
take am of surgery                  
    
    
    
    
PONV    
PONV - RN Documentation:     
                             PONV - RN Documentation    
    
    
    
Female                         Yes                          25 15:22    
     
HX of Motion Sickness          No                           25 15:22    
     
HX of N/V After Surgery        Yes                          25 15:22    
     
Non-Smoker                     Yes                          25 15:22    
     
Duration of Surgery greater    No                           25 15:22    
    
than 60 minutes                     
     
Number of Risk Factors         3                            25 15:22    
     
PONV Score                     Moderate Risk                25 15:22    
    
    
    
    
Height & Weight    
Height & Weight:     
                           Anesthesia: Height & Weight    
    
    
    
Height                         5 ft 4 in                    25 16:33    
     
Weight:                        74.843 kg                    25 16:33    
    
    
    
    
Respiratory Assessment    
Respiratory Assessment - RN Documentation:     
                Respiratory Tract Infection Hx - RN Documentation    
    
    
    
Hx Respiratory Tract Infection No                           25 15:22    
    
    
    
    
STOP Sleep Apnea    
STOP Sleep Apnea - RN Documentation:     
                       STOP Sleep Apnea - RN Documentation    
    
    
    
Hx Hypertension                No                           25 15:22    
     
Hx Sleep Apnea                 No                           25 15:22    
     
CPAP                                
     
BIPAP                               
     
Do you snore loudly (louder    No                           25 15:22    
    
than talking or can be heard        
     
Do you often feel tired/       No                           25 15:22    
    
fatigued/ sleepy during             
    
daytime?                            
     
Has anyone observed you stop   No                           25 15:22    
    
breathing during sleep?             
     
STOP Results                   Negative                     25 15:22    
    
    
    
    
*** QUESTION #5 FULL TEXT : Do you snore loudly (louder than talking or can be   
heard through closed doors)?     
    
Tobacco Use History    
Tobacco Use History - RN Documentation:     
                     Tobacco Use History - RN Documentation    
    
    
    
Tobacco Use                         
     
Smoking Status                 Never smoker                 25 15:22    
     
Hx Tobacco Use                 No                           25 15:22    
     
Years Smoking                       
     
Packs Smoked per Day                
     
Smoking Cessation Date was          
    
within the last 15 years            
     
Hx Smoking Cessation Date           
     
Hx Smoking Cessation                
    
Counseling                          
    
    
    
    
Hematologic Medial History    
Hematologic Hx - RN Documentation:     
                    Hematologic Medical Hx - RN documentation    
    
    
    
Hx of Blood Transfusion        No                           25 15:22    
     
Hx of Transfusion in last 3    No                           25 15:22    
    
Months                              
     
Date of Last Transfusion (if        
    
within last 3 months)               
     
Ever experience any problems   No                           25 15:22    
    
with transfusion(s)?                
     
Specify any problems                
     
Hx of Preganancy in last 3     No                           25 15:22    
    
Months                              
     
Nurse Filling Out Transfusion  VCHRISTIN                    25 15:22    
    
& Pregnancy Questions:              
     
Date:                          25 15:22    
     
Time:                          15:23                        25 15:22    
     
Patient unable to answer at         
    
this time (ie. confused,            
    
unrespo                             
    
    
    
    
Pregnancy/Reproduction History    
Pregnancy/Reproductive History - RN Documentation:     
                   Pregnancy/Reproductive Hx- RN Documentation    
    
    
    
Hx Pregnant Now                Yes                          25 15:22    
     
Gestational Age (in weeks):         
     
EDC:                                
     
Hx                           
     
Hx Para                             
     
Hx  Section                 
     
SAB                                 
     
Breastfeeding                  No                           25 15:22    
    
    
    
    
    
[facility]    
Medical History (Reviewed 25 @ 11:52 by Dr. David Mckeon MD)    
    
Wears contact lenses    
Wears glasses    
Depression    
Anxiety    
Injury of head and neck    
Loss of consciousness    
Non-smoker    
Seasonal allergies    
Vocal cord dysfunction    
History of miscarriage    
PCOS (polycystic ovarian syndrome)    
Anxiety and depression    
Fatigue    
Asthma    
    
    
                                Home Medications    
    
    
    
?Medication ?Instructions ?Recorded ?Last Taken ?Type    
     
                          albuterol sulfate 90 mcg/actuation 1 - 2 puff inhalati    
on Q4H PRN PRN 03/23/15     
Unknown History    
    
                          aerosol inhaler (Ventolin HFA) sob       
     
                          multivit-min no.71-iron fum 28 1 cap PO DAILY 25    
 Unknown History    
    
                                        mg-folate no.1  1 mg-dha 300 mg        
    
                                        capsule (PNV-Omega)        
     
                          sertraline 50 mg tablet (Zoloft) 50 mg PO QDAY #60 tab    
s 25 Unknown Rx    
    
    
    
                                            
    
    
    
Allergy/AdvReac Type Severity Reaction Status Date / Time    
     
tree and shrub pollen Allergy Mild Other Verified 25 15:16    
     
weed pollen Allergy Mild Other Verified 25 15:16    
    
    
    
Family History (Reviewed 25 @ 11:52 by Dr. David Mckeon MD)    
Grandmother   Breast cancer,  Onset Age: 45    
        Maternal    
Grandfather      Myocardial infarction,  Onset Age: 51    
        Maternal    
Mother   Thyroid disorder    
        hypothyroid    
    
    
Surgical History (Reviewed 25 @ 11:52 by Dr. David Mckeon MD)    
    
History of wisdom tooth extraction    
History of ankle surgery    
    
    
Social History (Reviewed 25 @ 11:52 by Dr. David Mckeon MD)    
adopted:  No     
household members:  spouse and other details: Partial custody of 3 step children  
    
housing:  house     
number of children:  3     
current occupational status:  employed     
current occupation:  Consumer      
current occupational exposures/hazards:  No     
pets and animals:  Yes (Avoid litterbox) pets and animals: cat(s), dog(s),   
turtle(s) and farm animals     
history of recent travel:  No     
sexually active:  Yes     
Smoking Status:  Never smoker     
alcohol intake:  current alcohol intake frequency: a few times a month     
details:  not while pregnant     
substance use type:  does not use     
well-balanced diet:  daily or most days     
caffeine:  No     
eating out:  1-3 times/week     
during the past year weight has:  increased > 10 lbs     
what type of physical activity do you participate in:  walking     
frequency:  3-4 times per week     
duration:  15-30 minutes/day     
gerda/Islam:  [employer]ism     
seatbelt use:  always     
do you feel safe at home:  Yes     
additional social history:   Michael- Heavy      
    
    
    
Review of Systems (Anesthesia)    
ROS Narrative    
System reviewed and no additional complaints, except as documented.

## 2025-03-07 NOTE — PCM.OPRPT
Problems
Associated Problem List Diagnoses
(1) Missed :

Procedures Urinary/Genital
52xxx-59xxx: 52462 Trmt of incomplete Ab, any TM

Operative Report (Standard)
Operative Information
Date of Procedure: 25
Pre-Operative Diagnosis: see problem list comments
Post-Operative Diagnosis: same
Surgery/Procedure Performed: suction dilation and curettage
FIRST Assistant: No
Type of Anesthesia: IV Sedation and Local
RN Documented Start/Stop Times: 



Operation Date: 25 13:30
Case Time  
Into Pre-Op 25 11:43
Out of Pre-Op 25 14:49
Anesthesia Start 25 14:52
Into Room 25 14:52
Procedure Start 25 15:04
Procedure End 25 15:10
Anesthesia End 25 15:15
Out of Room 25 15:15
Into Recovery 25 15:17
Into Phase II Recovery 25 15:46
Out of Recovery 25 15:46
Out of Phase II 25 16:24



Procedure Start Time: 15:04
Procedure Stop Time: 15:10
Select all DRAINS/GRAFTS/IMPLANTS that apply: None
Estimated Blood Loss: 50
Specimen collected: Yes Description of specimen(s) removed: retained POC
Description of surgery: 
Patient was taken to the operating room and placed under MAC local anesthesia.  She was prepped and draped in the normal sterile fashion the dorsal lithotomy position.  Bladder was drained of clear urine and anterior lip of the cervix was grasped 
and the uterus sounded to 9.  Cervix was progressively dilated to allow passage of a 9mm suction curette.  Progressive passes were made removing the retained products of conception without complication.  Sharp curettage confirmed complete removal of 
the retained products.  All instruments were removed from the vagina and excellent hemostasis was noted and the patient was taken to recovery in stable condition.
Surgical Findings: 
6 weeks miscarriage
Complications
Complications: No

## 2025-03-07 NOTE — POC_PTH
PATHOLOGY RESULTS

PATIENT: DON MITTAL             ACCT #:[id number]  LOC: [facility]        U#:[id number]
                                       AGE/SX: 24/F         ROOM:           [id number]
REG DR: Dr. Priya Kelley MD     : 2000      BED:            DIS: 2025

--------------------------------------------------------------------------------------------

SPEC #:          RECD: 25 17:25    STATUS:  TARYN        JETHRO #: [id number]
                        TANYA: 25 13:30    SUBM DR: Priya Kelley

DEPT: SURGICAL PATHOLOGY                        RECD BY: Tiana Harrington

ENTERED: 03/10/25 08:24 SP TYPE: PROD CONC  OTHR DR: Lilliana Sanchez, NP-NAHUN

Tissues:    Product of conception, NOS
Procedures: Surgery Specimen Level IV

--------------------------------------------------------------------------------------------

HEADER

OPERATION: Dilation and curettage, suction  
PRE-OP DIAGNOSIS: Missed   
TISSUE SUBMITTED: Products of conception  

--------------------------------------------------------------------------------------------

MICROSCOPIC DIAGNOSIS

Uterine contents, suction D&C:  
  *  Immature chorionic villi, decidua and secretory endometrium, consistent with products 
of conception.  
  

--------------------------------------------------------------------------------------------

MICROSCOPIC DESCRIPTION

Slides are reviewed.  

--------------------------------------------------------------------------------------------

GROSS DESCRIPTION

Received in fixative is one container labeled with the patient's name and designated 
 Products of conception.   The specimen is received in a suction device and consists of 
multiple fragments of tan-gray spongy to mucoid soft tissue measuring 7 x 5 x 0.8cm in 
aggregate. Possible villi are identified, but no fetal parts are identified. RS3 mr 
3/10/2025  
CPT: 62035

## 2025-06-10 ENCOUNTER — HOSPITAL ENCOUNTER (EMERGENCY)
Age: 25
Discharge: HOME | End: 2025-06-10
Payer: COMMERCIAL

## 2025-06-10 VITALS
OXYGEN SATURATION: 98 % | SYSTOLIC BLOOD PRESSURE: 139 MMHG | RESPIRATION RATE: 14 BRPM | DIASTOLIC BLOOD PRESSURE: 89 MMHG | HEART RATE: 79 BPM | TEMPERATURE: 97.3 F

## 2025-06-10 VITALS
OXYGEN SATURATION: 100 % | SYSTOLIC BLOOD PRESSURE: 112 MMHG | DIASTOLIC BLOOD PRESSURE: 75 MMHG | RESPIRATION RATE: 17 BRPM | HEART RATE: 66 BPM

## 2025-06-10 VITALS
DIASTOLIC BLOOD PRESSURE: 78 MMHG | TEMPERATURE: 97.8 F | RESPIRATION RATE: 17 BRPM | SYSTOLIC BLOOD PRESSURE: 121 MMHG | OXYGEN SATURATION: 100 % | HEART RATE: 81 BPM

## 2025-06-10 VITALS — BODY MASS INDEX: 29.3 KG/M2

## 2025-06-10 DIAGNOSIS — R42: ICD-10-CM

## 2025-06-10 DIAGNOSIS — F41.8: ICD-10-CM

## 2025-06-10 DIAGNOSIS — N93.8: Primary | ICD-10-CM

## 2025-06-10 DIAGNOSIS — Z79.899: ICD-10-CM

## 2025-06-10 LAB
ABSOLUTE NEUTROPHIL COUNT: 7.1 X10^3/UL (ref 2–7.7)
ANISOCYTOSIS BLD QL SMEAR: (no result)
BASO STIPL BLD QL SMEAR: (no result)
BASOPHIL#: 0.04 X10^3/UL
BASOPHIL%: 0.4 % (ref 0–1)
BASOPHILS NFR SPEC MANUAL: (no result) % (ref 0–1)
BITE CELLS BLD QL SMEAR: (no result)
BURR CELLS BLD QL SMEAR: (no result)
DEPRECATED RDW RBC: 38.9 FL (ref 35.1–43.9)
DOHLE BOD BLD QL SMEAR: (no result)
EOSINOPHIL # BLD: 0.18 X10^3/UL
ERYTHROCYTE [DISTWIDTH] IN BLOOD: 12.5 % (ref 11.6–14.6)
HCG SERPL QL: NEGATIVE NEGATIVE
HCT VFR BLD AUTO: 39.8 % (ref 37–47)
HGB BLD-MCNC: 13.4 G/DL (ref 12–15)
HOWELL-JOLLY BOD BLD QL SMEAR: (no result)
HYPOCHROMIA BLD QL: (no result)
IMM GRANULOCYTES NFR BLD AUTO: 0.4 % (ref 0–0.9)
IMMATURE GRANULOCYTES COUNT: 0.04 X10^3/UL (ref 0–0)
INTERNAL QC VALIDATED?: (no result)
LYMPHOCYTES # SPEC AUTO: 2.37 X10^3/UL (ref 0.83–4.51)
LYMPHOCYTES # SPEC AUTO: 2.37 X10^3/UL (ref 0.83–4.51)
LYMPHOCYTES NFR SPEC AUTO: 23 % (ref 19–41)
Lab: 1.7 % (ref 0–5)
MACROCYTES BLD QL: (no result)
MANUAL DIF COMMENT BLD-IMP: (no result)
MCH RBC QN AUTO: 28.8 PG (ref 27–32)
MCV RBC: 85.4 FL (ref 81–99)
MEAN CORP HGB CONC: 33.7 G/DL (ref 32–36)
MEAN PLATELET VOL.: 11.3 FL (ref 6.2–12)
MONOCYTE#: 0.55 X10^3/UL
MONOCYTE%: 5.3 % (ref 0–10)
NEUTROPHIL #: 7.12 X10^3/UL (ref 2.7–7.7)
NEUTROPHILS NFR BLD AUTO: 69.2 % (ref 47–70)
NEUTS HYPERSEG # BLD: (no result) 10*3/UL
NRBC FLAGGED BY ANALYZER: 0 % (ref 0–5)
PLATELET # BLD: 271 K/MM3 (ref 150–450)
POLYCHROMASIA BLD QL SMEAR: (no result)
RBC # BLD AUTO: 4.66 M/MM3 (ref 4.2–5.4)
RECORD KIT LOT#, SERUM PREG.: (no result)
WBC # BLD AUTO: 10.3 K/MM3 (ref 4.4–11)
WBC NRBC COR # BLD: (no result) K/MM3 (ref 4.4–11)

## 2025-06-10 PROCEDURE — A4216 STERILE WATER/SALINE, 10 ML: HCPCS

## 2025-06-10 PROCEDURE — 76830 TRANSVAGINAL US NON-OB: CPT

## 2025-06-10 PROCEDURE — 84703 CHORIONIC GONADOTROPIN ASSAY: CPT

## 2025-06-10 PROCEDURE — 93005 ELECTROCARDIOGRAM TRACING: CPT

## 2025-06-10 PROCEDURE — 99283 EMERGENCY DEPT VISIT LOW MDM: CPT

## 2025-06-10 PROCEDURE — 85025 COMPLETE CBC W/AUTO DIFF WBC: CPT

## 2025-07-19 ENCOUNTER — HOSPITAL ENCOUNTER (OUTPATIENT)
Dept: HOSPITAL 100 - LAB | Age: 25
Discharge: HOME | End: 2025-07-19
Payer: COMMERCIAL

## 2025-07-19 DIAGNOSIS — E28.2: Primary | ICD-10-CM

## 2025-07-19 PROCEDURE — 36415 COLL VENOUS BLD VENIPUNCTURE: CPT

## 2025-07-19 PROCEDURE — 84144 ASSAY OF PROGESTERONE: CPT

## 2025-07-20 LAB — PROGESTERONE: 0.6 NG/ML
